# Patient Record
Sex: MALE | Race: WHITE
[De-identification: names, ages, dates, MRNs, and addresses within clinical notes are randomized per-mention and may not be internally consistent; named-entity substitution may affect disease eponyms.]

---

## 2017-05-08 ENCOUNTER — APPOINTMENT (OUTPATIENT)
Dept: THORACIC SURGERY | Facility: CLINIC | Age: 82
End: 2017-05-08
Payer: COMMERCIAL

## 2017-05-08 PROCEDURE — 99213 OFFICE O/P EST LOW 20 MIN: CPT

## 2017-05-17 ENCOUNTER — APPOINTMENT (OUTPATIENT)
Dept: ELECTROPHYSIOLOGY | Facility: CLINIC | Age: 82
End: 2017-05-17

## 2017-05-17 VITALS
HEIGHT: 71 IN | HEART RATE: 59 BPM | SYSTOLIC BLOOD PRESSURE: 170 MMHG | WEIGHT: 196 LBS | BODY MASS INDEX: 27.44 KG/M2 | DIASTOLIC BLOOD PRESSURE: 71 MMHG

## 2017-09-21 ENCOUNTER — INPATIENT (INPATIENT)
Facility: HOSPITAL | Age: 82
LOS: 5 days | Discharge: HOME HEALTH | End: 2017-09-27
Attending: FAMILY MEDICINE | Admitting: FAMILY MEDICINE
Payer: COMMERCIAL

## 2017-09-21 VITALS — WEIGHT: 184.97 LBS | HEIGHT: 71 IN

## 2017-09-21 DIAGNOSIS — J18.9 PNEUMONIA, UNSPECIFIED ORGANISM: ICD-10-CM

## 2017-09-21 DIAGNOSIS — Z98.1 ARTHRODESIS STATUS: Chronic | ICD-10-CM

## 2017-09-21 LAB
ALBUMIN SERPL ELPH-MCNC: 3.8 G/DL — SIGNIFICANT CHANGE UP (ref 3.3–5)
ALP SERPL-CCNC: 67 U/L — SIGNIFICANT CHANGE UP (ref 40–120)
ALT FLD-CCNC: 15 U/L — SIGNIFICANT CHANGE UP (ref 12–78)
ANION GAP SERPL CALC-SCNC: 9 MMOL/L — SIGNIFICANT CHANGE UP (ref 5–17)
APTT BLD: 23.9 SEC — LOW (ref 27.5–37.4)
AST SERPL-CCNC: 13 U/L — LOW (ref 15–37)
BASOPHILS # BLD AUTO: 0.1 K/UL — SIGNIFICANT CHANGE UP (ref 0–0.2)
BASOPHILS NFR BLD AUTO: 0.8 % — SIGNIFICANT CHANGE UP (ref 0–2)
BILIRUB SERPL-MCNC: 0.8 MG/DL — SIGNIFICANT CHANGE UP (ref 0.2–1.2)
BLD GP AB SCN SERPL QL: SIGNIFICANT CHANGE UP
BUN SERPL-MCNC: 31 MG/DL — HIGH (ref 7–23)
CALCIUM SERPL-MCNC: 8.3 MG/DL — LOW (ref 8.5–10.1)
CHLORIDE SERPL-SCNC: 109 MMOL/L — HIGH (ref 96–108)
CO2 SERPL-SCNC: 24 MMOL/L — SIGNIFICANT CHANGE UP (ref 22–31)
CREAT SERPL-MCNC: 1.8 MG/DL — HIGH (ref 0.5–1.3)
EOSINOPHIL # BLD AUTO: 0.4 K/UL — SIGNIFICANT CHANGE UP (ref 0–0.5)
EOSINOPHIL NFR BLD AUTO: 3.6 % — SIGNIFICANT CHANGE UP (ref 0–6)
GLUCOSE SERPL-MCNC: 117 MG/DL — HIGH (ref 70–99)
HCT VFR BLD CALC: 43.3 % — SIGNIFICANT CHANGE UP (ref 39–50)
HGB BLD-MCNC: 15 G/DL — SIGNIFICANT CHANGE UP (ref 13–17)
INR BLD: 0.98 RATIO — SIGNIFICANT CHANGE UP (ref 0.88–1.16)
LYMPHOCYTES # BLD AUTO: 1.5 K/UL — SIGNIFICANT CHANGE UP (ref 1–3.3)
LYMPHOCYTES # BLD AUTO: 13 % — SIGNIFICANT CHANGE UP (ref 13–44)
MCHC RBC-ENTMCNC: 31.5 PG — SIGNIFICANT CHANGE UP (ref 27–34)
MCHC RBC-ENTMCNC: 34.5 GM/DL — SIGNIFICANT CHANGE UP (ref 32–36)
MCV RBC AUTO: 91.3 FL — SIGNIFICANT CHANGE UP (ref 80–100)
MONOCYTES # BLD AUTO: 0.7 K/UL — SIGNIFICANT CHANGE UP (ref 0–0.9)
MONOCYTES NFR BLD AUTO: 6.4 % — SIGNIFICANT CHANGE UP (ref 2–14)
NEUTROPHILS # BLD AUTO: 8.6 K/UL — HIGH (ref 1.8–7.4)
NEUTROPHILS NFR BLD AUTO: 76.2 % — SIGNIFICANT CHANGE UP (ref 43–77)
NT-PROBNP SERPL-SCNC: 238 PG/ML — SIGNIFICANT CHANGE UP (ref 0–450)
PLATELET # BLD AUTO: 262 K/UL — SIGNIFICANT CHANGE UP (ref 150–400)
POTASSIUM SERPL-MCNC: 4.4 MMOL/L — SIGNIFICANT CHANGE UP (ref 3.5–5.3)
POTASSIUM SERPL-SCNC: 4.4 MMOL/L — SIGNIFICANT CHANGE UP (ref 3.5–5.3)
PROT SERPL-MCNC: 6.9 G/DL — SIGNIFICANT CHANGE UP (ref 6–8.3)
PROTHROM AB SERPL-ACNC: 10.6 SEC — SIGNIFICANT CHANGE UP (ref 9.8–12.7)
RBC # BLD: 4.74 M/UL — SIGNIFICANT CHANGE UP (ref 4.2–5.8)
RBC # FLD: 12.8 % — SIGNIFICANT CHANGE UP (ref 10.3–14.5)
SODIUM SERPL-SCNC: 142 MMOL/L — SIGNIFICANT CHANGE UP (ref 135–145)
TROPONIN I SERPL-MCNC: <.015 NG/ML — SIGNIFICANT CHANGE UP (ref 0.01–0.04)
TYPE + AB SCN PNL BLD: SIGNIFICANT CHANGE UP
WBC # BLD: 11.2 K/UL — HIGH (ref 3.8–10.5)
WBC # FLD AUTO: 11.2 K/UL — HIGH (ref 3.8–10.5)

## 2017-09-21 PROCEDURE — 99285 EMERGENCY DEPT VISIT HI MDM: CPT

## 2017-09-21 PROCEDURE — 71250 CT THORAX DX C-: CPT | Mod: 26

## 2017-09-21 PROCEDURE — 71010: CPT | Mod: 26

## 2017-09-21 PROCEDURE — 93010 ELECTROCARDIOGRAM REPORT: CPT

## 2017-09-21 RX ORDER — ASPIRIN/CALCIUM CARB/MAGNESIUM 324 MG
81 TABLET ORAL DAILY
Qty: 0 | Refills: 0 | Status: DISCONTINUED | OUTPATIENT
Start: 2017-09-21 | End: 2017-09-27

## 2017-09-21 RX ORDER — FINASTERIDE 5 MG/1
5 TABLET, FILM COATED ORAL DAILY
Qty: 0 | Refills: 0 | Status: DISCONTINUED | OUTPATIENT
Start: 2017-09-21 | End: 2017-09-27

## 2017-09-21 RX ORDER — SODIUM CHLORIDE 9 MG/ML
3 INJECTION INTRAMUSCULAR; INTRAVENOUS; SUBCUTANEOUS ONCE
Qty: 0 | Refills: 0 | Status: COMPLETED | OUTPATIENT
Start: 2017-09-21 | End: 2017-09-21

## 2017-09-21 RX ORDER — DILTIAZEM HCL 120 MG
180 CAPSULE, EXT RELEASE 24 HR ORAL DAILY
Qty: 0 | Refills: 0 | Status: DISCONTINUED | OUTPATIENT
Start: 2017-09-21 | End: 2017-09-27

## 2017-09-21 RX ORDER — HYDROMORPHONE HYDROCHLORIDE 2 MG/ML
0.5 INJECTION INTRAMUSCULAR; INTRAVENOUS; SUBCUTANEOUS ONCE
Qty: 0 | Refills: 0 | Status: DISCONTINUED | OUTPATIENT
Start: 2017-09-21 | End: 2017-09-21

## 2017-09-21 RX ORDER — PREGABALIN 225 MG/1
1000 CAPSULE ORAL DAILY
Qty: 0 | Refills: 0 | Status: DISCONTINUED | OUTPATIENT
Start: 2017-09-21 | End: 2017-09-27

## 2017-09-21 RX ORDER — CHOLECALCIFEROL (VITAMIN D3) 125 MCG
2000 CAPSULE ORAL DAILY
Qty: 0 | Refills: 0 | Status: DISCONTINUED | OUTPATIENT
Start: 2017-09-21 | End: 2017-09-27

## 2017-09-21 RX ORDER — LABETALOL HCL 100 MG
200 TABLET ORAL THREE TIMES A DAY
Qty: 0 | Refills: 0 | Status: DISCONTINUED | OUTPATIENT
Start: 2017-09-21 | End: 2017-09-27

## 2017-09-21 RX ORDER — TAMSULOSIN HYDROCHLORIDE 0.4 MG/1
0.4 CAPSULE ORAL AT BEDTIME
Qty: 0 | Refills: 0 | Status: DISCONTINUED | OUTPATIENT
Start: 2017-09-21 | End: 2017-09-27

## 2017-09-21 RX ORDER — HEPARIN SODIUM 5000 [USP'U]/ML
5000 INJECTION INTRAVENOUS; SUBCUTANEOUS EVERY 8 HOURS
Qty: 0 | Refills: 0 | Status: DISCONTINUED | OUTPATIENT
Start: 2017-09-21 | End: 2017-09-27

## 2017-09-21 RX ORDER — FUROSEMIDE 40 MG
20 TABLET ORAL DAILY
Qty: 0 | Refills: 0 | Status: DISCONTINUED | OUTPATIENT
Start: 2017-09-21 | End: 2017-09-23

## 2017-09-21 RX ORDER — ACETAMINOPHEN 500 MG
650 TABLET ORAL EVERY 6 HOURS
Qty: 0 | Refills: 0 | Status: DISCONTINUED | OUTPATIENT
Start: 2017-09-21 | End: 2017-09-27

## 2017-09-21 RX ORDER — MORPHINE SULFATE 50 MG/1
2 CAPSULE, EXTENDED RELEASE ORAL EVERY 4 HOURS
Qty: 0 | Refills: 0 | Status: DISCONTINUED | OUTPATIENT
Start: 2017-09-21 | End: 2017-09-27

## 2017-09-21 RX ORDER — MORPHINE SULFATE 50 MG/1
4 CAPSULE, EXTENDED RELEASE ORAL ONCE
Qty: 0 | Refills: 0 | Status: DISCONTINUED | OUTPATIENT
Start: 2017-09-21 | End: 2017-09-21

## 2017-09-21 RX ORDER — RANOLAZINE 500 MG/1
500 TABLET, FILM COATED, EXTENDED RELEASE ORAL
Qty: 0 | Refills: 0 | Status: DISCONTINUED | OUTPATIENT
Start: 2017-09-21 | End: 2017-09-27

## 2017-09-21 RX ADMIN — HEPARIN SODIUM 5000 UNIT(S): 5000 INJECTION INTRAVENOUS; SUBCUTANEOUS at 21:14

## 2017-09-21 RX ADMIN — SODIUM CHLORIDE 3 MILLILITER(S): 9 INJECTION INTRAMUSCULAR; INTRAVENOUS; SUBCUTANEOUS at 10:39

## 2017-09-21 RX ADMIN — RANOLAZINE 500 MILLIGRAM(S): 500 TABLET, FILM COATED, EXTENDED RELEASE ORAL at 21:14

## 2017-09-21 RX ADMIN — MORPHINE SULFATE 4 MILLIGRAM(S): 50 CAPSULE, EXTENDED RELEASE ORAL at 10:39

## 2017-09-21 RX ADMIN — Medication 81 MILLIGRAM(S): at 18:46

## 2017-09-21 RX ADMIN — MORPHINE SULFATE 4 MILLIGRAM(S): 50 CAPSULE, EXTENDED RELEASE ORAL at 11:09

## 2017-09-21 RX ADMIN — Medication 2000 UNIT(S): at 17:52

## 2017-09-21 RX ADMIN — TAMSULOSIN HYDROCHLORIDE 0.4 MILLIGRAM(S): 0.4 CAPSULE ORAL at 21:14

## 2017-09-21 RX ADMIN — Medication 200 MILLIGRAM(S): at 21:14

## 2017-09-21 RX ADMIN — Medication 20 MILLIGRAM(S): at 17:52

## 2017-09-21 RX ADMIN — Medication 180 MILLIGRAM(S): at 17:52

## 2017-09-21 RX ADMIN — HYDROMORPHONE HYDROCHLORIDE 0.5 MILLIGRAM(S): 2 INJECTION INTRAMUSCULAR; INTRAVENOUS; SUBCUTANEOUS at 21:29

## 2017-09-21 RX ADMIN — PREGABALIN 1000 MICROGRAM(S): 225 CAPSULE ORAL at 17:52

## 2017-09-21 RX ADMIN — FINASTERIDE 5 MILLIGRAM(S): 5 TABLET, FILM COATED ORAL at 17:52

## 2017-09-21 RX ADMIN — HYDROMORPHONE HYDROCHLORIDE 0.5 MILLIGRAM(S): 2 INJECTION INTRAMUSCULAR; INTRAVENOUS; SUBCUTANEOUS at 21:14

## 2017-09-21 NOTE — ED PROVIDER NOTE - MEDICAL DECISION MAKING DETAILS
Pt with recurrent R pleural effusions p/w exertional dyspnea and R sided chest pain. CT showing hydropneumothorax, VSS in ED. Seen by surgery, plan for admission and IR catheter drainage

## 2017-09-21 NOTE — H&P ADULT - HISTORY OF PRESENT ILLNESS
84 yo male with PMH of CAD, HTN, HLD, BPH, SSS s/p PPM, CKD stage III, h/o SVT, h/o right pleural effusions and PTX presents to ED with complaint of SOB and right sided chest pain. Pt states he woke up this morning with dyspnea on exertion. No orthopnea or PND. States he did have right sided chest pain that wrapped around to right back this morning which has now resolved after receiving morphine. He had a recent outpatient CT 2 weeks ago which he states showed a little fluid on right side but no PTX. Denies fevers, chills, headaches, palpitations, abd pain, N/V, diarrhea, dizziness.    In ED CXR and CT chest shows hydropneumothorax.

## 2017-09-21 NOTE — ED PROVIDER NOTE - OBJECTIVE STATEMENT
86 y/o M hx HTN HLD, recurrent R sided pleural effusions p/w pain to R side of chest and dyspnea on exertion. Pt reports he had a CT done 2 wks ago that showed small R effusion through his pulmonologist, and since then reports progressive worsening of his symptoms. He feels comfortable at rest but experiences dyspnea when walking as well as central and R sided chest pain. Denies fever, cough, falls/injuries, light headedness, N/V/D, syncope.

## 2017-09-21 NOTE — ED ADULT TRIAGE NOTE - CHIEF COMPLAINT QUOTE
Pt presents to ED c/o chest pain that radiates to the back starting 4 days ago. Pt report "my lung doctor told me I have fluid on my heart".

## 2017-09-21 NOTE — H&P ADULT - PMH
BPH (benign prostatic hyperplasia)    CAD (coronary artery disease)    CKD (chronic kidney disease), stage III    Hyperlipidemia    Hypertension    Pleural effusion    Spinal stenosis    SSS (sick sinus syndrome)

## 2017-09-21 NOTE — ED PROVIDER NOTE - PROGRESS NOTE DETAILS
Attending Jorje: S/w pts pulmonologist Dr Valadez - current XR is pts baseline, appears as PTX however he has adhesions from prior VATS procedure. CT 1 wk ago showed no PTX Pt remains stable, no complaints at this time. Seen by surgery PA, see note for details. Surgery will rec IR for pigtail drainage, no acute surgical intervention, medical admission

## 2017-09-21 NOTE — H&P ADULT - NSHPPHYSICALEXAM_GEN_ALL_CORE
Vital Signs Last 24 Hrs  T(C): 36.7 (21 Sep 2017 15:00), Max: 36.7 (21 Sep 2017 15:00)  T(F): 98 (21 Sep 2017 15:00), Max: 98 (21 Sep 2017 15:00)  HR: 60 (21 Sep 2017 15:09) (60 - 60)  BP: 171/67 (21 Sep 2017 15:09) (142/61 - 171/67)  BP(mean): --  RR: 18 (21 Sep 2017 15:09) (18 - 19)  SpO2: 100% (21 Sep 2017 15:09) (99% - 100%)

## 2017-09-22 ENCOUNTER — RESULT REVIEW (OUTPATIENT)
Age: 82
End: 2017-09-22

## 2017-09-22 LAB
ANION GAP SERPL CALC-SCNC: 7 MMOL/L — SIGNIFICANT CHANGE UP (ref 5–17)
B PERT IGG+IGM PNL SER: (no result)
BASOPHILS # BLD AUTO: 0.1 K/UL — SIGNIFICANT CHANGE UP (ref 0–0.2)
BASOPHILS NFR BLD AUTO: 0.5 % — SIGNIFICANT CHANGE UP (ref 0–2)
BUN SERPL-MCNC: 31 MG/DL — HIGH (ref 7–23)
CALCIUM SERPL-MCNC: 8.3 MG/DL — LOW (ref 8.5–10.1)
CHLORIDE SERPL-SCNC: 107 MMOL/L — SIGNIFICANT CHANGE UP (ref 96–108)
CHOLEST SERPL-MCNC: 123 MG/DL — SIGNIFICANT CHANGE UP (ref 10–199)
CO2 SERPL-SCNC: 28 MMOL/L — SIGNIFICANT CHANGE UP (ref 22–31)
COLOR FLD: SIGNIFICANT CHANGE UP
CREAT SERPL-MCNC: 1.89 MG/DL — HIGH (ref 0.5–1.3)
EOSINOPHIL # BLD AUTO: 0.4 K/UL — SIGNIFICANT CHANGE UP (ref 0–0.5)
EOSINOPHIL # FLD: 16 % — SIGNIFICANT CHANGE UP
EOSINOPHIL NFR BLD AUTO: 3.3 % — SIGNIFICANT CHANGE UP (ref 0–6)
FLUID INTAKE SUBSTANCE CLASS: SIGNIFICANT CHANGE UP
FLUID SEGMENTED GRANULOCYTES: 5 % — SIGNIFICANT CHANGE UP
GLUCOSE SERPL-MCNC: 118 MG/DL — HIGH (ref 70–99)
HCT VFR BLD CALC: 39.8 % — SIGNIFICANT CHANGE UP (ref 39–50)
HDLC SERPL-MCNC: 30 MG/DL — LOW (ref 40–125)
HGB BLD-MCNC: 13.8 G/DL — SIGNIFICANT CHANGE UP (ref 13–17)
INR BLD: 1.05 RATIO — SIGNIFICANT CHANGE UP (ref 0.88–1.16)
LIPID PNL WITH DIRECT LDL SERPL: 42 MG/DL — SIGNIFICANT CHANGE UP
LYMPHOCYTES # BLD AUTO: 1.6 K/UL — SIGNIFICANT CHANGE UP (ref 1–3.3)
LYMPHOCYTES # BLD AUTO: 15 % — SIGNIFICANT CHANGE UP (ref 13–44)
LYMPHOCYTES # FLD: 66 % — SIGNIFICANT CHANGE UP
MCHC RBC-ENTMCNC: 32.4 PG — SIGNIFICANT CHANGE UP (ref 27–34)
MCHC RBC-ENTMCNC: 34.8 GM/DL — SIGNIFICANT CHANGE UP (ref 32–36)
MCV RBC AUTO: 93 FL — SIGNIFICANT CHANGE UP (ref 80–100)
MONOCYTES # BLD AUTO: 0.9 K/UL — SIGNIFICANT CHANGE UP (ref 0–0.9)
MONOCYTES NFR BLD AUTO: 8.2 % — SIGNIFICANT CHANGE UP (ref 2–14)
MONOS+MACROS # FLD: 13 % — SIGNIFICANT CHANGE UP
NEUTROPHILS # BLD AUTO: 7.7 K/UL — HIGH (ref 1.8–7.4)
NEUTROPHILS NFR BLD AUTO: 73 % — SIGNIFICANT CHANGE UP (ref 43–77)
PH FLD: 7.25 — SIGNIFICANT CHANGE UP
PLATELET # BLD AUTO: 215 K/UL — SIGNIFICANT CHANGE UP (ref 150–400)
POTASSIUM SERPL-MCNC: 4.2 MMOL/L — SIGNIFICANT CHANGE UP (ref 3.5–5.3)
POTASSIUM SERPL-SCNC: 4.2 MMOL/L — SIGNIFICANT CHANGE UP (ref 3.5–5.3)
PROTHROM AB SERPL-ACNC: 11.3 SEC — SIGNIFICANT CHANGE UP (ref 9.8–12.7)
RBC # BLD: 4.28 M/UL — SIGNIFICANT CHANGE UP (ref 4.2–5.8)
RBC # FLD: 13.2 % — SIGNIFICANT CHANGE UP (ref 10.3–14.5)
RCV VOL RI: HIGH /UL (ref 0–5)
SODIUM SERPL-SCNC: 142 MMOL/L — SIGNIFICANT CHANGE UP (ref 135–145)
TOTAL CHOLESTEROL/HDL RATIO MEASUREMENT: 4.1 RATIO — SIGNIFICANT CHANGE UP (ref 3.4–9.6)
TOTAL NUCLEATED CELL COUNT, BODY FLUID: 3800 /UL — HIGH (ref 0–5)
TRIGL SERPL-MCNC: 257 MG/DL — HIGH (ref 10–149)
TUBE TYPE: SIGNIFICANT CHANGE UP
WBC # BLD: 10.6 K/UL — HIGH (ref 3.8–10.5)
WBC # FLD AUTO: 10.6 K/UL — HIGH (ref 3.8–10.5)

## 2017-09-22 PROCEDURE — 88305 TISSUE EXAM BY PATHOLOGIST: CPT | Mod: 26

## 2017-09-22 PROCEDURE — 32557 INSERT CATH PLEURA W/ IMAGE: CPT | Mod: RT

## 2017-09-22 PROCEDURE — 88108 CYTOPATH CONCENTRATE TECH: CPT | Mod: 26

## 2017-09-22 RX ORDER — SODIUM CHLORIDE 9 MG/ML
1000 INJECTION INTRAMUSCULAR; INTRAVENOUS; SUBCUTANEOUS
Qty: 0 | Refills: 0 | Status: DISCONTINUED | OUTPATIENT
Start: 2017-09-22 | End: 2017-09-25

## 2017-09-22 RX ORDER — ACETAMINOPHEN 500 MG
1000 TABLET ORAL ONCE
Qty: 0 | Refills: 0 | Status: DISCONTINUED | OUTPATIENT
Start: 2017-09-22 | End: 2017-09-22

## 2017-09-22 RX ORDER — ONDANSETRON 8 MG/1
4 TABLET, FILM COATED ORAL ONCE
Qty: 0 | Refills: 0 | Status: DISCONTINUED | OUTPATIENT
Start: 2017-09-22 | End: 2017-09-22

## 2017-09-22 RX ORDER — FENTANYL CITRATE 50 UG/ML
25 INJECTION INTRAVENOUS
Qty: 0 | Refills: 0 | Status: DISCONTINUED | OUTPATIENT
Start: 2017-09-22 | End: 2017-09-22

## 2017-09-22 RX ADMIN — Medication 2000 UNIT(S): at 15:19

## 2017-09-22 RX ADMIN — MORPHINE SULFATE 2 MILLIGRAM(S): 50 CAPSULE, EXTENDED RELEASE ORAL at 06:18

## 2017-09-22 RX ADMIN — TAMSULOSIN HYDROCHLORIDE 0.4 MILLIGRAM(S): 0.4 CAPSULE ORAL at 21:42

## 2017-09-22 RX ADMIN — RANOLAZINE 500 MILLIGRAM(S): 500 TABLET, FILM COATED, EXTENDED RELEASE ORAL at 18:01

## 2017-09-22 RX ADMIN — Medication 200 MILLIGRAM(S): at 21:42

## 2017-09-22 RX ADMIN — HEPARIN SODIUM 5000 UNIT(S): 5000 INJECTION INTRAVENOUS; SUBCUTANEOUS at 15:18

## 2017-09-22 RX ADMIN — FINASTERIDE 5 MILLIGRAM(S): 5 TABLET, FILM COATED ORAL at 15:20

## 2017-09-22 RX ADMIN — Medication 200 MILLIGRAM(S): at 15:18

## 2017-09-22 RX ADMIN — MORPHINE SULFATE 2 MILLIGRAM(S): 50 CAPSULE, EXTENDED RELEASE ORAL at 21:42

## 2017-09-22 RX ADMIN — PREGABALIN 1000 MICROGRAM(S): 225 CAPSULE ORAL at 15:19

## 2017-09-22 RX ADMIN — HEPARIN SODIUM 5000 UNIT(S): 5000 INJECTION INTRAVENOUS; SUBCUTANEOUS at 21:42

## 2017-09-22 RX ADMIN — MORPHINE SULFATE 2 MILLIGRAM(S): 50 CAPSULE, EXTENDED RELEASE ORAL at 05:38

## 2017-09-22 RX ADMIN — Medication 81 MILLIGRAM(S): at 15:18

## 2017-09-22 NOTE — PROGRESS NOTE ADULT - SUBJECTIVE AND OBJECTIVE BOX
Pt seen, no new complaints. Awaiting pigtail placement with IR.    MEDICATIONS  (STANDING):  finasteride 5 milliGRAM(s) Oral daily  aspirin enteric coated 81 milliGRAM(s) Oral daily  tamsulosin 0.4 milliGRAM(s) Oral at bedtime  ranolazine 500 milliGRAM(s) Oral two times a day  diltiazem    milliGRAM(s) Oral daily  labetalol 200 milliGRAM(s) Oral three times a day  furosemide    Tablet 20 milliGRAM(s) Oral daily  cyanocobalamin 1000 MICROGram(s) Oral daily  cholecalciferol 2000 Unit(s) Oral daily  heparin  Injectable 5000 Unit(s) SubCutaneous every 8 hours  sodium chloride 0.9%. 1000 milliLiter(s) (75 mL/Hr) IV Continuous <Continuous>    MEDICATIONS  (PRN):  acetaminophen   Tablet. 650 milliGRAM(s) Oral every 6 hours PRN Mild Pain (1 - 3)  morphine  - Injectable 2 milliGRAM(s) IV Push every 4 hours PRN Moderate Pain (4 - 6)      Allergies    amlodipine (Unknown)  Crestor (Other)  Lipitor (Unknown)      Vital Signs Last 24 Hrs  T(C): 37.1 (22 Sep 2017 10:05), Max: 37.1 (22 Sep 2017 10:05)  T(F): 98.8 (22 Sep 2017 10:05), Max: 98.8 (22 Sep 2017 10:05)  HR: 59 (22 Sep 2017 10:05) (59 - 60)  BP: 135/57 (22 Sep 2017 10:05) (135/57 - 177/64)  BP(mean): --  RR: 17 (22 Sep 2017 10:05) (15 - 19)  SpO2: 92% (22 Sep 2017 10:05) (91% - 100%)    General: NAD  Cardiovascular: S1, S2  Respiratory: CTA    LABS:                        13.8   10.6  )-----------( 215      ( 22 Sep 2017 06:15 )             39.8     09-22    142  |  107  |  31<H>  ----------------------------<  118<H>  4.2   |  28  |  1.89<H>    Ca    8.3<L>      22 Sep 2017 06:15    TPro  6.9  /  Alb  3.8  /  TBili  0.8  /  DBili  x   /  AST  13<L>  /  ALT  15  /  AlkPhos  67  09-21    PT/INR - ( 22 Sep 2017 06:15 )   PT: 11.3 sec;   INR: 1.05 ratio         PTT - ( 21 Sep 2017 09:16 )  PTT:23.9 sec      RADIOLOGY & ADDITIONAL TESTS:  < from: CT Chest No Cont (09.21.17 @ 10:50) >    EXAM:  CT CHEST                            PROCEDURE DATE:  09/21/2017          INTERPRETATION:  Clinical information: Right-sided chest pain. Status   post VATS.    COMPARISON: May 17, 2016    PROCEDURE:   CT of the Chest was performed without intravenous contrast.  Sagittal and coronal reformats were performed.    FINDINGS:    LOWER NECK: Within normal limits.  AXILLA, MEDIASTINUM AND CARLITA: No lymphadenopathy. Small hiatal hernia.  VESSELS: Atherosclerotic arterial calcifications, including diffuse   coronary artery calcification.  HEART: Heart size is normal.No pericardial effusion. Pacemaker leads   noted in the right atrium and right ventricle.  PLEURA: Large right-sided hydropneumothorax. There is no mass effect on   the heart or mediastinum.  Status post right upper lobe wedge resection.  LUNGS AND LARGE AIRWAYS: Patent central airways. No pulmonary nodules.   Subsegmental atelectasis in the lingula and right lower lobe.  VISUALIZED UPPER ABDOMEN: Bilateral renal cysts. Nonobstructing right   renal calculus. Cholelithiasis. Caudate lobe cyst in the liver.  BONES: Diffuse degenerative arthritic changes. 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 No acute bony   abnormality.  CHEST WALL:  Unremarkable    IMPRESSION: Large right hydropneumothorax.  Findings were discussed with Dr. Recinos on 9.21.17, 1125 hours.                          MINNIE JAY   This document has been electronically signed. Sep 21 2017 11:27AM                < end of copied text >

## 2017-09-22 NOTE — PROGRESS NOTE ADULT - SUBJECTIVE AND OBJECTIVE BOX
86 yo male with PMH of CAD, HTN, HLD, BPH, SSS s/p PPM, CKD stage III, h/o SVT, h/o right pleural effusions and PTX presents to ED with complaint of SOB and right sided chest pain. Pt states he woke up this morning with dyspnea on exertion. No orthopnea or PND. States he did have right sided chest pain that wrapped around to right back this morning which has now resolved after receiving morphine. He had a recent outpatient CT 2 weeks ago which he states showed a little fluid on right side but no PTX. Denies fevers, chills, headaches, palpitations, abd pain, N/V, diarrhea, dizziness.  CT done revealed a large Rt effusion      9.22: no cp, no sob      REVIEW OF SYSTEMS:    CONSTITUTIONAL: No weakness, No fevers or chills  ENT: No ear ache, No sorethroat  NECK: No pain, No stiffness  RESPIRATORY: No cough, No wheezing, No hemoptysis; +dyspnea  CARDIOVASCULAR: No chest pain, No palpitations  GASTROINTESTINAL: No abd pain, No nausea, No vomiting, No hematemesis, No diarrhea or constipation. No melena, No hematochezia.  GENITOURINARY: No dysuria, No  hematuria  NEUROLOGICAL: No diplopia, No paresthesia, No motor dysfunction  MUSCULOSKELETAL: No arthralgia, No myalgia  SKIN: No rashes, or lesions   PSYCH: no anxiety, no suicidal ideation    All other review of systems is negative unless indicated above    Vital Signs Last 24 Hrs  T(C): 36.5 (22 Sep 2017 15:14), Max: 37.1 (22 Sep 2017 10:05)  T(F): 97.7 (22 Sep 2017 15:14), Max: 98.8 (22 Sep 2017 10:05)  HR: 60 (22 Sep 2017 15:14) (59 - 60)  BP: 151/52 (22 Sep 2017 15:14) (135/57 - 177/64)  BP(mean): --  RR: 18 (22 Sep 2017 15:14) (15 - 18)  SpO2: 96% (22 Sep 2017 15:14) (91% - 99%)    PHYSICAL EXAM:    GENERAL: NAD, Well nourished  HEENT:  NC/AT, EOMI, PERRLA, No scleral icterus, Moist mucous membranes  NECK: Supple, No JVD  CNS:  Alert & Oriented X3, Motor Strength 5/5 B/L upper and lower extremities; DTRs 2+ intact   LUNG: Decreased BS on Rt base  HEART: RRR; No murmurs, No rubs  ABDOMEN: +BS, ST/ND/NT  GENITOURINARY: Voiding, Bladder not distended  EXTREMITIES:  2+ Peripheral Pulses, No clubbing, No cyanosis, No tibial edema  MUSCULOSKELTAL: Joints normal ROM, No TTP, No effusion  SKIN: no rashes  RECTAL: deferred, not indicated  BREAST: deferred                          13.8   10.6  )-----------( 215      ( 22 Sep 2017 06:15 )             39.8     09-22    142  |  107  |  31<H>  ----------------------------<  118<H>  4.2   |  28  |  1.89<H>    Ca    8.3<L>      22 Sep 2017 06:15    TPro  6.9  /  Alb  3.8  /  TBili  0.8  /  DBili  x   /  AST  13<L>  /  ALT  15  /  AlkPhos  67  09-21      MEDICATIONS  (STANDING):  finasteride 5 milliGRAM(s) Oral daily  aspirin enteric coated 81 milliGRAM(s) Oral daily  tamsulosin 0.4 milliGRAM(s) Oral at bedtime  ranolazine 500 milliGRAM(s) Oral two times a day  diltiazem    milliGRAM(s) Oral daily  labetalol 200 milliGRAM(s) Oral three times a day  furosemide    Tablet 20 milliGRAM(s) Oral daily  cyanocobalamin 1000 MICROGram(s) Oral daily  cholecalciferol 2000 Unit(s) Oral daily  heparin  Injectable 5000 Unit(s) SubCutaneous every 8 hours  sodium chloride 0.9%. 1000 milliLiter(s) (75 mL/Hr) IV Continuous <Continuous>    MEDICATIONS  (PRN):  acetaminophen   Tablet. 650 milliGRAM(s) Oral every 6 hours PRN Mild Pain (1 - 3)  morphine  - Injectable 2 milliGRAM(s) IV Push every 4 hours PRN Moderate Pain (4 - 6)      all labs reviewed  all imaging reviewed      Assessment and Plan:    1. SOB secondary to right large recurrent  hydropneumothorax  - admit to [facility] for pulse ox monitoring  - CT Sx consult noted  - IR consult for thoracentesis and biopsy today  - pulm consult  - O2 supplementation    2. CKD stage 3  - baseline Cr 1.8  -gentle hydration  - monitor renal panel  - avoid nephrotoxic medications    3. h/o CAD  - continue ASA, BB, statin, ranexa    4. SSS s/p PPM  - scheduled for outpatient PPM interrogation next month    5. HTN  - continue home meds: diltiazem, labetalol, furosemide    6. BPH: cw Flomax, Proscar

## 2017-09-22 NOTE — BRIEF OPERATIVE NOTE - PROCEDURE
<<-----Click on this checkbox to enter Procedure Chest tube placement with CT guidance  09/22/2017    Active  [id number]

## 2017-09-22 NOTE — PROGRESS NOTE ADULT - ASSESSMENT
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  1. plan is for IR biopsy today.  Case discussed with Dr. Reyes and Dr. Carrion

## 2017-09-23 DIAGNOSIS — J90 PLEURAL EFFUSION, NOT ELSEWHERE CLASSIFIED: ICD-10-CM

## 2017-09-23 LAB
ALBUMIN FLD-MCNC: 2.9 G/DL — SIGNIFICANT CHANGE UP
ANION GAP SERPL CALC-SCNC: 8 MMOL/L — SIGNIFICANT CHANGE UP (ref 5–17)
BUN SERPL-MCNC: 29 MG/DL — HIGH (ref 7–23)
CALCIUM SERPL-MCNC: 8.5 MG/DL — SIGNIFICANT CHANGE UP (ref 8.5–10.1)
CHLORIDE SERPL-SCNC: 106 MMOL/L — SIGNIFICANT CHANGE UP (ref 96–108)
CO2 SERPL-SCNC: 24 MMOL/L — SIGNIFICANT CHANGE UP (ref 22–31)
CREAT SERPL-MCNC: 1.96 MG/DL — HIGH (ref 0.5–1.3)
GLUCOSE FLD-MCNC: 55 MG/DL — SIGNIFICANT CHANGE UP
GLUCOSE SERPL-MCNC: 127 MG/DL — HIGH (ref 70–99)
GRAM STN FLD: SIGNIFICANT CHANGE UP
HCT VFR BLD CALC: 40.5 % — SIGNIFICANT CHANGE UP (ref 39–50)
HGB BLD-MCNC: 14.1 G/DL — SIGNIFICANT CHANGE UP (ref 13–17)
LDH SERPL L TO P-CCNC: 366 U/L — SIGNIFICANT CHANGE UP
NIGHT BLUE STAIN TISS: SIGNIFICANT CHANGE UP
POTASSIUM SERPL-MCNC: 4.4 MMOL/L — SIGNIFICANT CHANGE UP (ref 3.5–5.3)
POTASSIUM SERPL-SCNC: 4.4 MMOL/L — SIGNIFICANT CHANGE UP (ref 3.5–5.3)
PROT FLD-MCNC: 4.8 G/DL — SIGNIFICANT CHANGE UP
SODIUM SERPL-SCNC: 138 MMOL/L — SIGNIFICANT CHANGE UP (ref 135–145)
SPECIMEN SOURCE: SIGNIFICANT CHANGE UP
SPECIMEN SOURCE: SIGNIFICANT CHANGE UP

## 2017-09-23 PROCEDURE — 71010: CPT | Mod: 26

## 2017-09-23 RX ORDER — CEFTRIAXONE 500 MG/1
1 INJECTION, POWDER, FOR SOLUTION INTRAMUSCULAR; INTRAVENOUS EVERY 24 HOURS
Qty: 0 | Refills: 0 | Status: DISCONTINUED | OUTPATIENT
Start: 2017-09-24 | End: 2017-09-27

## 2017-09-23 RX ORDER — CEFTRIAXONE 500 MG/1
1 INJECTION, POWDER, FOR SOLUTION INTRAMUSCULAR; INTRAVENOUS ONCE
Qty: 0 | Refills: 0 | Status: COMPLETED | OUTPATIENT
Start: 2017-09-23 | End: 2017-09-23

## 2017-09-23 RX ORDER — CEFTRIAXONE 500 MG/1
INJECTION, POWDER, FOR SOLUTION INTRAMUSCULAR; INTRAVENOUS
Qty: 0 | Refills: 0 | Status: DISCONTINUED | OUTPATIENT
Start: 2017-09-23 | End: 2017-09-27

## 2017-09-23 RX ORDER — ACYCLOVIR 50 MG/G
1 OINTMENT TOPICAL
Qty: 0 | Refills: 0 | Status: DISCONTINUED | OUTPATIENT
Start: 2017-09-23 | End: 2017-09-23

## 2017-09-23 RX ORDER — VALACYCLOVIR 500 MG/1
500 TABLET, FILM COATED ORAL EVERY 12 HOURS
Qty: 0 | Refills: 0 | Status: DISCONTINUED | OUTPATIENT
Start: 2017-09-23 | End: 2017-09-27

## 2017-09-23 RX ADMIN — Medication 81 MILLIGRAM(S): at 11:42

## 2017-09-23 RX ADMIN — FINASTERIDE 5 MILLIGRAM(S): 5 TABLET, FILM COATED ORAL at 11:43

## 2017-09-23 RX ADMIN — Medication 200 MILLIGRAM(S): at 14:38

## 2017-09-23 RX ADMIN — HEPARIN SODIUM 5000 UNIT(S): 5000 INJECTION INTRAVENOUS; SUBCUTANEOUS at 06:07

## 2017-09-23 RX ADMIN — PREGABALIN 1000 MICROGRAM(S): 225 CAPSULE ORAL at 11:43

## 2017-09-23 RX ADMIN — Medication 2000 UNIT(S): at 11:42

## 2017-09-23 RX ADMIN — MORPHINE SULFATE 2 MILLIGRAM(S): 50 CAPSULE, EXTENDED RELEASE ORAL at 14:38

## 2017-09-23 RX ADMIN — VALACYCLOVIR 500 MILLIGRAM(S): 500 TABLET, FILM COATED ORAL at 14:38

## 2017-09-23 RX ADMIN — Medication 200 MILLIGRAM(S): at 06:06

## 2017-09-23 RX ADMIN — Medication 20 MILLIGRAM(S): at 06:07

## 2017-09-23 RX ADMIN — TAMSULOSIN HYDROCHLORIDE 0.4 MILLIGRAM(S): 0.4 CAPSULE ORAL at 21:20

## 2017-09-23 RX ADMIN — RANOLAZINE 500 MILLIGRAM(S): 500 TABLET, FILM COATED, EXTENDED RELEASE ORAL at 06:07

## 2017-09-23 RX ADMIN — VALACYCLOVIR 500 MILLIGRAM(S): 500 TABLET, FILM COATED ORAL at 18:15

## 2017-09-23 RX ADMIN — HEPARIN SODIUM 5000 UNIT(S): 5000 INJECTION INTRAVENOUS; SUBCUTANEOUS at 21:20

## 2017-09-23 RX ADMIN — Medication 200 MILLIGRAM(S): at 21:20

## 2017-09-23 RX ADMIN — HEPARIN SODIUM 5000 UNIT(S): 5000 INJECTION INTRAVENOUS; SUBCUTANEOUS at 14:38

## 2017-09-23 RX ADMIN — Medication 180 MILLIGRAM(S): at 06:07

## 2017-09-23 RX ADMIN — RANOLAZINE 500 MILLIGRAM(S): 500 TABLET, FILM COATED, EXTENDED RELEASE ORAL at 18:15

## 2017-09-23 RX ADMIN — CEFTRIAXONE 100 GRAM(S): 500 INJECTION, POWDER, FOR SOLUTION INTRAMUSCULAR; INTRAVENOUS at 13:29

## 2017-09-23 NOTE — PROGRESS NOTE ADULT - SUBJECTIVE AND OBJECTIVE BOX
86 yo male with PMH of CAD, HTN, HLD, BPH, SSS s/p PPM, CKD stage III, h/o SVT, h/o right pleural effusions and PTX presents to ED with complaint of SOB and right sided chest pain. Pt states he woke up this morning with dyspnea on exertion. No orthopnea or PND. States he did have right sided chest pain that wrapped around to right back this morning which has now resolved after receiving morphine. He had a recent outpatient CT 2 weeks ago which he states showed a little fluid on right side but no PTX. Denies fevers, chills, headaches, palpitations, abd pain, N/V, diarrhea, dizziness.  CT done revealed a large Rt effusion      9.22: no cp, no sob      REVIEW OF SYSTEMS:    CONSTITUTIONAL: No weakness, No fevers or chills  ENT: No ear ache, No sorethroat  NECK: No pain, No stiffness  RESPIRATORY: No cough, No wheezing, No hemoptysis; +dyspnea  CARDIOVASCULAR: No chest pain, No palpitations  GASTROINTESTINAL: No abd pain, No nausea, No vomiting, No hematemesis, No diarrhea or constipation. No melena, No hematochezia.  GENITOURINARY: No dysuria, No  hematuria  NEUROLOGICAL: No diplopia, No paresthesia, No motor dysfunction  MUSCULOSKELETAL: No arthralgia, No myalgia  SKIN: No rashes, or lesions   PSYCH: no anxiety, no suicidal ideation    All other review of systems is negative unless indicated above    Vital Signs Last 24 Hrs  T(C): 36.5 (22 Sep 2017 15:14), Max: 37.1 (22 Sep 2017 10:05)  T(F): 97.7 (22 Sep 2017 15:14), Max: 98.8 (22 Sep 2017 10:05)  HR: 60 (22 Sep 2017 15:14) (59 - 60)  BP: 151/52 (22 Sep 2017 15:14) (135/57 - 177/64)  BP(mean): --  RR: 18 (22 Sep 2017 15:14) (15 - 18)  SpO2: 96% (22 Sep 2017 15:14) (91% - 99%)    PHYSICAL EXAM:    GENERAL: NAD, Well nourished  HEENT:  NC/AT, EOMI, PERRLA, No scleral icterus, Moist mucous membranes  NECK: Supple, No JVD  CNS:  Alert & Oriented X3, Motor Strength 5/5 B/L upper and lower extremities; DTRs 2+ intact   LUNG: Decreased BS on Rt base  HEART: RRR; No murmurs, No rubs  ABDOMEN: +BS, ST/ND/NT  GENITOURINARY: Voiding, Bladder not distended  EXTREMITIES:  2+ Peripheral Pulses, No clubbing, No cyanosis, No tibial edema  MUSCULOSKELTAL: Joints normal ROM, No TTP, No effusion  SKIN: no rashes  RECTAL: deferred, not indicated  BREAST: deferred                          13.8   10.6  )-----------( 215      ( 22 Sep 2017 06:15 )             39.8     09-22    142  |  107  |  31<H>  ----------------------------<  118<H>  4.2   |  28  |  1.89<H>    Ca    8.3<L>      22 Sep 2017 06:15    TPro  6.9  /  Alb  3.8  /  TBili  0.8  /  DBili  x   /  AST  13<L>  /  ALT  15  /  AlkPhos  67  09-21      MEDICATIONS  (STANDING):  finasteride 5 milliGRAM(s) Oral daily  aspirin enteric coated 81 milliGRAM(s) Oral daily  tamsulosin 0.4 milliGRAM(s) Oral at bedtime  ranolazine 500 milliGRAM(s) Oral two times a day  diltiazem    milliGRAM(s) Oral daily  labetalol 200 milliGRAM(s) Oral three times a day  furosemide    Tablet 20 milliGRAM(s) Oral daily  cyanocobalamin 1000 MICROGram(s) Oral daily  cholecalciferol 2000 Unit(s) Oral daily  heparin  Injectable 5000 Unit(s) SubCutaneous every 8 hours  sodium chloride 0.9%. 1000 milliLiter(s) (75 mL/Hr) IV Continuous <Continuous>    MEDICATIONS  (PRN):  acetaminophen   Tablet. 650 milliGRAM(s) Oral every 6 hours PRN Mild Pain (1 - 3)  morphine  - Injectable 2 milliGRAM(s) IV Push every 4 hours PRN Moderate Pain (4 - 6)      all labs reviewed  all imaging reviewed      Assessment and Plan:    1. SOB secondary to right large recurrent  hydropneumothorax  - admit to [facility] for pulse ox monitoring  - CT Sx consult noted  - IR consult for thoracentesis and biopsy today  - pulm consult  - O2 supplementation    2. CKD stage 3  - baseline Cr 1.8  -gentle hydration  - monitor renal panel  - avoid nephrotoxic medications    3. h/o CAD  - continue ASA, BB, statin, ranexa    4. SSS s/p PPM  - scheduled for outpatient PPM interrogation next month    5. HTN  - continue home meds: diltiazem, labetalol, furosemide    6. BPH: cw Flomax, Proscar 84 yo male with PMH of CAD, HTN, HLD, BPH, SSS s/p PPM, CKD stage III, h/o SVT, h/o right pleural effusions and PTX presents to ED with complaint of SOB and right sided chest pain. Pt states he woke up this morning with dyspnea on exertion. No orthopnea or PND. States he did have right sided chest pain that wrapped around to right back this morning which has now resolved after receiving morphine. He had a recent outpatient CT 2 weeks ago which he states showed a little fluid on right side but no PTX. Denies fevers, chills, headaches, palpitations, abd pain, N/V, diarrhea, dizziness.  CT done revealed a large Rt effusion      9/23: Chart reviewed, Pt was seen and examined, Pt reports that Chest pain and SOB is better. No fevers. Pt was asked regartding  R chest wall rash, states that was applying hitting pads and burned then skin, also was using Icy/hot  patch which POC discussed.       REVIEW OF SYSTEMS:  CONSTITUTIONAL: No weakness, No fevers or chills  ENT: No ear ache, No sore throat  NECK: No pain, No stiffness  RESPIRATORY: No cough, No wheezing, No hemoptysis; +dyspnea  CARDIOVASCULAR: No chest pain, No palpitations  GASTROINTESTINAL: No abd pain, No nausea, No vomiting, No hematemesis, No diarrhea or constipation. No melena, No hematochezia.  GENITOURINARY: No dysuria, No  hematuria  NEUROLOGICAL: No diplopia, No paresthesia, No motor dysfunction  MUSCULOSKELETAL: No arthralgia, No myalgia  SKIN: No rashes, or lesions   PSYCH: no anxiety, no suicidal ideation    All other review of systems is negative unless indicated above    Vital Signs Last 24 Hrs  T(C): 36.5 (22 Sep 2017 15:14), Max: 37.1 (22 Sep 2017 10:05)  T(F): 97.7 (22 Sep 2017 15:14), Max: 98.8 (22 Sep 2017 10:05)  HR: 60 (22 Sep 2017 15:14) (59 - 60)  BP: 151/52 (22 Sep 2017 15:14) (135/57 - 177/64)  BP(mean): --  RR: 18 (22 Sep 2017 15:14) (15 - 18)  SpO2: 96% (22 Sep 2017 15:14) (91% - 99%)    PHYSICAL EXAM:    GENERAL: NAD, Well nourished  HEENT:  NC/AT, EOMI, PERRLA, No scleral icterus, Moist mucous membranes  NECK: Supple, No JVD  CNS:  Alert & Oriented X3, Motor Strength 5/5 B/L upper and lower extremities; DTRs 2+ intact   LUNG: Decreased BS on Rt base  HEART: RRR; No murmurs, No rubs  ABDOMEN: +BS, ST/ND/NT  GENITOURINARY: Voiding, Bladder not distended  EXTREMITIES:  2+ Peripheral Pulses, No clubbing, No cyanosis, No tibial edema  MUSCULOSKELTAL: Joints normal ROM, No TTP, No effusion  SKIN: no rashes  RECTAL: deferred, not indicated  BREAST: deferred    LABS:                           14.1   x     )-----------( x        ( 23 Sep 2017 06:23 )             40.5     09-23    138  |  106  |  29<H>  ----------------------------<  127<H>  4.4   |  24  |  1.96<H>    Ca    8.5      23 Sep 2017 06:23      CARDIAC MARKERS ( 21 Sep 2017 14:47 )  <.015 ng/mL / x     / x     / x     / x      CARDIAC MARKERS ( 21 Sep 2017 11:54 )  <.015 ng/mL / x     / x     / x     / x        PT/INR - ( 22 Sep 2017 06:15 )   PT: 11.3 sec;   INR: 1.05 ratio        MEDICATIONS  (STANDING):  finasteride 5 milliGRAM(s) Oral daily  aspirin enteric coated 81 milliGRAM(s) Oral daily  tamsulosin 0.4 milliGRAM(s) Oral at bedtime  ranolazine 500 milliGRAM(s) Oral two times a day  diltiazem    milliGRAM(s) Oral daily  labetalol 200 milliGRAM(s) Oral three times a day  furosemide    Tablet 20 milliGRAM(s) Oral daily  cyanocobalamin 1000 MICROGram(s) Oral daily  cholecalciferol 2000 Unit(s) Oral daily  heparin  Injectable 5000 Unit(s) SubCutaneous every 8 hours  sodium chloride 0.9%. 1000 milliLiter(s) (75 mL/Hr) IV Continuous <Continuous>    MEDICATIONS  (PRN):  acetaminophen   Tablet. 650 milliGRAM(s) Oral every 6 hours PRN Mild Pain (1 - 3)  morphine  - Injectable 2 milliGRAM(s) IV Push every 4 hours PRN Moderate Pain (4 - 6)          Assessment and Plan:    1. SOB secondary to right large recurrent  hydropneumothorax  - S/P CT placement, management as per Dr Reyes, Plan for water seal   - c/w  pulse ox monitoring, supplement with O2 PRN  - Incentive spirometry   - F/u cytology  - Pulm eval appreciated     2. CKD stage 3  - baseline Cr 1.8  - gentle hydration  - D/c lasix   - monitor renal panel  - avoid nephrotoxic medications    3. h/o CAD  - continue ASA, BB, statin, ranexa  - D/w DR Carranza     4. SSS s/p PPM  - scheduled for routine  outpatient PPM interrogation next month    5. HTN  - continue home meds: diltiazem, labetalol, hold  furosemide for now     6. BPH: cw Flomax, Proscar

## 2017-09-23 NOTE — PROGRESS NOTE ADULT - SUBJECTIVE AND OBJECTIVE BOX
Patient is a 85y old  Male who presents with a chief complaint of SOB (21 Sep 2017 15:34)      HPI:  84 yo male with PMH of CAD, HTN, HLD, BPH, SSS s/p PPM, CKD stage III, h/o SVT, h/o right pleural effusions seen by me as an outpatient.  Presents with recurrent pleuritic chest pain, worse on the right side.  Recently seen for CAP, treated with ABX/PO Prednisone and I also had repeated a CT chest at that time.  He presented to the ER on 9/21 for a pleurisy.   No fevers, chills, headaches, palpitations, abd pain, N/V, diarrhea, dizziness.  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 Denies any shingles history      PAST MEDICAL & SURGICAL HISTORY:  Spinal stenosis  CKD (chronic kidney disease), stage III  SSS (sick sinus syndrome)  Pleural effusion  Hyperlipidemia  BPH (benign prostatic hyperplasia)  CAD (coronary artery disease)  Hypertension  Status post lumbar spinal fusion      PREVIOUS DIAGNOSTIC TESTING:      MEDICATIONS  (STANDING):  finasteride 5 milliGRAM(s) Oral daily  aspirin enteric coated 81 milliGRAM(s) Oral daily  tamsulosin 0.4 milliGRAM(s) Oral at bedtime  ranolazine 500 milliGRAM(s) Oral two times a day  diltiazem    milliGRAM(s) Oral daily  labetalol 200 milliGRAM(s) Oral three times a day  furosemide    Tablet 20 milliGRAM(s) Oral daily  cyanocobalamin 1000 MICROGram(s) Oral daily  cholecalciferol 2000 Unit(s) Oral daily  heparin  Injectable 5000 Unit(s) SubCutaneous every 8 hours  sodium chloride 0.9%. 1000 milliLiter(s) (75 mL/Hr) IV Continuous <Continuous>    MEDICATIONS  (PRN):  acetaminophen   Tablet. 650 milliGRAM(s) Oral every 6 hours PRN Mild Pain (1 - 3)  morphine  - Injectable 2 milliGRAM(s) IV Push every 4 hours PRN Moderate Pain (4 - 6)      FAMILY HISTORY:  No pertinent family history in first degree relatives      SOCIAL HISTORY: lives at home, born in [place]    REVIEW OF SYSTEM:  Shortness of breath, chest pain, otherwise 12 point ROS negative    Allergic/Immunologic: negative for anaphylaxis, angioedema and urticaria    Vital Signs Last 24 Hrs  T(C): 37.1 (22 Sep 2017 10:05), Max: 37.1 (22 Sep 2017 10:05)  T(F): 98.8 (22 Sep 2017 10:05), Max: 98.8 (22 Sep 2017 10:05)  HR: 59 (22 Sep 2017 10:05) (59 - 60)  BP: 135/57 (22 Sep 2017 10:05) (135/57 - 177/64)  BP(mean): --  RR: 17 (22 Sep 2017 10:05) (15 - 19)  SpO2: 92% (22 Sep 2017 10:05) (91% - 100%)    I&O's Summary    PHYSICAL EXAM  General Appearance: cooperative, no acute distress,   HEENT: PERRL, conjunctiva clear, EOM's intact, non injected pharynx, no exudate, TM   normal  Neck: Supple, , no adenopathy, thyroid: not enlarged, no carotid bruit or JVD  Back: Symmetric, no  tenderness,no soft tissue tenderness  Lungs: Decreased breath sounds right hemithorax, Right pigtail in place  Heart: Regular rate and rhythm, S1, S2 normal, no murmur, rub or gallop  Abdomen: Soft, non-tender, bowel sounds active , no hepatosplenomegaly  Extremities: no cyanosis or edema, no joint swelling  Skin: Skin color, texture normal, no rashes   Neurologic: Alert and oriented X3 , cranial nerves intact, sensory and motor normal,    ECG:    LABS:                          13.8   10.6  )-----------( 215      ( 22 Sep 2017 06:15 )             39.8     09-22    142  |  107  |  31<H>  ----------------------------<  118<H>  4.2   |  28  |  1.89<H>    Ca    8.3<L>      22 Sep 2017 06:15    TPro  6.9  /  Alb  3.8  /  TBili  0.8  /  DBili  x   /  AST  13<L>  /  ALT  15  /  AlkPhos  67  09-21    CARDIAC MARKERS ( 21 Sep 2017 14:47 )  <.015 ng/mL / x     / x     / x     / x      CARDIAC MARKERS ( 21 Sep 2017 11:54 )  <.015 ng/mL / x     / x     / x     / x      CARDIAC MARKERS ( 21 Sep 2017 09:16 )  <.015 ng/mL / x     / x     / x     / x            Pro BNP  238 09-21 @ 09:16  D Dimer  -- 09-21 @ 09:16    PT/INR - ( 22 Sep 2017 06:15 )   PT: 11.3 sec;   INR: 1.05 ratio         PTT - ( 21 Sep 2017 09:16 )  PTT:23.9 sec          RADIOLOGY & ADDITIONAL STUDIES:    CT chest images/report reviewed by me  My interpretation: Non-expandable right hemithorax, increased pleural fluid

## 2017-09-23 NOTE — PROGRESS NOTE ADULT - ASSESSMENT
1) Pleuritic Chest Pain  2) Trapped Lung  3) Abnormal CT Chest  4) Recent Pneumonia  5) Shortness of Breath  6) Parapneumonic effusion    86 yo Pt is non smoker.s/p right VATS decortication done by Dr Reyes 5/2016 for recurrent pleural effusions in the setting of trapped lung.  Patient was recently treated in my office on 9/14 for worsening cough, dyspnea and subjective fevers/rigors so I had prescribed 5 day course of Z-Pack and Prednisone 40mg for 5 days. He states that his cough and fevers resolved but the dyspnea was associated with pleuritic chest pain in the right side so he presented to the ED.   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  In the past, he states that thoracentesis/thoracostomy has helped.  Patient to undergo IR guided pigtail  Follow up fluid studies    9/23  - Patient s/p IR guided thoracostomy, denies any issues  - Studies show a ph7.25/glucose 55/pleural LDHelevated, no organisms, eosinophils not elevated but fluid sanguinous.   - This could be suggestive of parapneumonic effusion, will start IV Ceftriaxone 1g daily for 5-7 days  - Primary team to discuss treatment with ID for shingles     Thank you for the consult

## 2017-09-24 LAB
ANION GAP SERPL CALC-SCNC: 5 MMOL/L — SIGNIFICANT CHANGE UP (ref 5–17)
BUN SERPL-MCNC: 34 MG/DL — HIGH (ref 7–23)
CALCIUM SERPL-MCNC: 8.1 MG/DL — LOW (ref 8.5–10.1)
CHLORIDE SERPL-SCNC: 107 MMOL/L — SIGNIFICANT CHANGE UP (ref 96–108)
CO2 SERPL-SCNC: 25 MMOL/L — SIGNIFICANT CHANGE UP (ref 22–31)
CREAT ?TM UR-MCNC: 121 MG/DL — SIGNIFICANT CHANGE UP
CREAT SERPL-MCNC: 2.21 MG/DL — HIGH (ref 0.5–1.3)
GLUCOSE SERPL-MCNC: 151 MG/DL — HIGH (ref 70–99)
HCT VFR BLD CALC: 39.2 % — SIGNIFICANT CHANGE UP (ref 39–50)
HGB BLD-MCNC: 13 G/DL — SIGNIFICANT CHANGE UP (ref 13–17)
MCHC RBC-ENTMCNC: 31 PG — SIGNIFICANT CHANGE UP (ref 27–34)
MCHC RBC-ENTMCNC: 33.2 GM/DL — SIGNIFICANT CHANGE UP (ref 32–36)
MCV RBC AUTO: 93.6 FL — SIGNIFICANT CHANGE UP (ref 80–100)
NT-PROBNP SERPL-SCNC: 222 PG/ML — SIGNIFICANT CHANGE UP (ref 0–450)
PLATELET # BLD AUTO: 165 K/UL — SIGNIFICANT CHANGE UP (ref 150–400)
POTASSIUM SERPL-MCNC: 4.6 MMOL/L — SIGNIFICANT CHANGE UP (ref 3.5–5.3)
POTASSIUM SERPL-SCNC: 4.6 MMOL/L — SIGNIFICANT CHANGE UP (ref 3.5–5.3)
PROT ?TM UR-MCNC: 20 MG/DL — HIGH (ref 0–12)
PROT/CREAT UR-RTO: 0.2 RATIO — SIGNIFICANT CHANGE UP (ref 0–0.2)
RBC # BLD: 4.19 M/UL — LOW (ref 4.2–5.8)
RBC # FLD: 13.3 % — SIGNIFICANT CHANGE UP (ref 10.3–14.5)
SODIUM SERPL-SCNC: 137 MMOL/L — SIGNIFICANT CHANGE UP (ref 135–145)
SODIUM UR-SCNC: 56 MMOL/L — SIGNIFICANT CHANGE UP
WBC # BLD: 11.2 K/UL — HIGH (ref 3.8–10.5)
WBC # FLD AUTO: 11.2 K/UL — HIGH (ref 3.8–10.5)

## 2017-09-24 PROCEDURE — 93280 PM DEVICE PROGR EVAL DUAL: CPT | Mod: 26

## 2017-09-24 RX ORDER — INFLUENZA VIRUS VACCINE 15; 15; 15; 15 UG/.5ML; UG/.5ML; UG/.5ML; UG/.5ML
0.5 SUSPENSION INTRAMUSCULAR ONCE
Qty: 0 | Refills: 0 | Status: COMPLETED | OUTPATIENT
Start: 2017-09-24 | End: 2017-09-27

## 2017-09-24 RX ORDER — DOCUSATE SODIUM 100 MG
100 CAPSULE ORAL THREE TIMES A DAY
Qty: 0 | Refills: 0 | Status: DISCONTINUED | OUTPATIENT
Start: 2017-09-24 | End: 2017-09-27

## 2017-09-24 RX ORDER — MULTIVIT WITH MIN/MFOLATE/K2 340-15/3 G
300 POWDER (GRAM) ORAL ONCE
Qty: 0 | Refills: 0 | Status: COMPLETED | OUTPATIENT
Start: 2017-09-24 | End: 2017-09-24

## 2017-09-24 RX ORDER — POLYETHYLENE GLYCOL 3350 17 G/17G
17 POWDER, FOR SOLUTION ORAL DAILY
Qty: 0 | Refills: 0 | Status: DISCONTINUED | OUTPATIENT
Start: 2017-09-24 | End: 2017-09-27

## 2017-09-24 RX ADMIN — POLYETHYLENE GLYCOL 3350 17 GRAM(S): 17 POWDER, FOR SOLUTION ORAL at 12:55

## 2017-09-24 RX ADMIN — MORPHINE SULFATE 2 MILLIGRAM(S): 50 CAPSULE, EXTENDED RELEASE ORAL at 05:20

## 2017-09-24 RX ADMIN — VALACYCLOVIR 500 MILLIGRAM(S): 500 TABLET, FILM COATED ORAL at 06:28

## 2017-09-24 RX ADMIN — Medication 100 MILLIGRAM(S): at 06:40

## 2017-09-24 RX ADMIN — TAMSULOSIN HYDROCHLORIDE 0.4 MILLIGRAM(S): 0.4 CAPSULE ORAL at 21:08

## 2017-09-24 RX ADMIN — Medication 100 MILLIGRAM(S): at 21:08

## 2017-09-24 RX ADMIN — FINASTERIDE 5 MILLIGRAM(S): 5 TABLET, FILM COATED ORAL at 11:19

## 2017-09-24 RX ADMIN — VALACYCLOVIR 500 MILLIGRAM(S): 500 TABLET, FILM COATED ORAL at 18:01

## 2017-09-24 RX ADMIN — Medication 300 MILLILITER(S): at 18:01

## 2017-09-24 RX ADMIN — Medication 180 MILLIGRAM(S): at 05:03

## 2017-09-24 RX ADMIN — Medication 200 MILLIGRAM(S): at 05:03

## 2017-09-24 RX ADMIN — HEPARIN SODIUM 5000 UNIT(S): 5000 INJECTION INTRAVENOUS; SUBCUTANEOUS at 05:03

## 2017-09-24 RX ADMIN — Medication 200 MILLIGRAM(S): at 21:08

## 2017-09-24 RX ADMIN — CEFTRIAXONE 100 GRAM(S): 500 INJECTION, POWDER, FOR SOLUTION INTRAMUSCULAR; INTRAVENOUS at 12:54

## 2017-09-24 RX ADMIN — MORPHINE SULFATE 2 MILLIGRAM(S): 50 CAPSULE, EXTENDED RELEASE ORAL at 04:50

## 2017-09-24 RX ADMIN — PREGABALIN 1000 MICROGRAM(S): 225 CAPSULE ORAL at 11:19

## 2017-09-24 RX ADMIN — Medication 81 MILLIGRAM(S): at 11:19

## 2017-09-24 RX ADMIN — RANOLAZINE 500 MILLIGRAM(S): 500 TABLET, FILM COATED, EXTENDED RELEASE ORAL at 18:01

## 2017-09-24 RX ADMIN — RANOLAZINE 500 MILLIGRAM(S): 500 TABLET, FILM COATED, EXTENDED RELEASE ORAL at 05:03

## 2017-09-24 RX ADMIN — HEPARIN SODIUM 5000 UNIT(S): 5000 INJECTION INTRAVENOUS; SUBCUTANEOUS at 21:08

## 2017-09-24 RX ADMIN — Medication 200 MILLIGRAM(S): at 13:10

## 2017-09-24 RX ADMIN — HEPARIN SODIUM 5000 UNIT(S): 5000 INJECTION INTRAVENOUS; SUBCUTANEOUS at 13:10

## 2017-09-24 RX ADMIN — Medication 2000 UNIT(S): at 11:19

## 2017-09-24 NOTE — PROGRESS NOTE ADULT - SUBJECTIVE AND OBJECTIVE BOX
HPI:  Samuel Behar is a 85 y.o. male  he has a history of high cholesterol, hypertension, sick sinus syndrome status post permanent pacemaker placement in the past, coronary artery disease by CT scan of the chest, he has calcified coronaries on chest CT scan, chronic renal insufficiency, history of chronic moderate right pleural effusion and history of right hydropneumothorax in the past status post pleurodesis and wedge resection in the past . He was admitted with complains of right pleuritic chest pain, increasing shortness of breath for the past 2 weeks and after admission he was diagnosed to have large right hydro-pneumothorax and he status post chest tube placement. He feels better since yesterday and he denies any chest pain or shortness of breath. He has a pain in the chest tube site. He is hemodynamically stable and is afebrile.      : CT Chest No Cont (09.21.17   FINDINGS:    LOWER NECK: Within normal limits.  AXILLA, MEDIASTINUM AND CARLITA: No lymphadenopathy. Small hiatal hernia.  VESSELS: Atherosclerotic arterial calcifications, including diffuse   coronary artery calcification.  HEART: Heart size is normal.No pericardial effusion. Pacemaker leads   noted in the right atrium and right ventricle.  PLEURA: Large right-sided hydropneumothorax. There is no mass effect on   the heart or mediastinum.  Status post right upper lobe wedge resection.  LUNGS AND LARGE AIRWAYS: Patent central airways. No pulmonary nodules.   Subsegmental atelectasis in the lingula and right lower lobe.  VISUALIZED UPPER ABDOMEN: Bilateral renal cysts. Nonobstructing right   renal calculus. Cholelithiasis. Caudate lobe cyst in the liver.  BONES: Diffuse degenerative arthritic changes. 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 No acute bony   abnormality.  CHEST WALL:  Unremarkable    IMPRESSION: Large right hydropneumothorax.    Echo jUNE 2017   --There is mild left ventricular hypertrophy.  --Global LV wall motion is normal.  --The left ventricular filling pattern is consistent with abnormal relaxation.  --The left ventricular ejection fraction is normal at 60-65%.  --There is a pacemaker in the right heart.  --Aortic valve is trileaflet. The aortic valve is mildly calcified.  --There is mild mitral annular calcification. There is mild mitral valve thickening. There is   trace mitral regurgitation.  --There is mild tricuspid regurgitation.  --There is trace pulmonic regurgitation.  --The right atrial pressure is 6 - 10 mm Hg. There is no pulmonary hypertension.  --There is no pericardial effusion.      PAST MEDICAL & SURGICAL HISTORY:  Spinal stenosis  CKD (chronic kidney disease), stage III  SSS (sick sinus syndrome)  Pleural effusion  Hyperlipidemia  BPH (benign prostatic hyperplasia)  CAD (coronary artery disease)  Hypertension  Status post lumbar spinal fusion      MEDICATIONS  (STANDING):  finasteride 5 milliGRAM(s) Oral daily  aspirin enteric coated 81 milliGRAM(s) Oral daily  tamsulosin 0.4 milliGRAM(s) Oral at bedtime  ranolazine 500 milliGRAM(s) Oral two times a day  diltiazem    milliGRAM(s) Oral daily  labetalol 200 milliGRAM(s) Oral three times a day  cyanocobalamin 1000 MICROGram(s) Oral daily  cholecalciferol 2000 Unit(s) Oral daily  heparin  Injectable 5000 Unit(s) SubCutaneous every 8 hours  sodium chloride 0.9%. 1000 milliLiter(s) (75 mL/Hr) IV Continuous <Continuous>  cefTRIAXone   IVPB      cefTRIAXone   IVPB 1 Gram(s) IV Intermittent every 24 hours  valACYclovir 500 milliGRAM(s) Oral every 12 hours  docusate sodium 100 milliGRAM(s) Oral three times a day  influenza   Vaccine 0.5 milliLiter(s) IntraMuscular once  polyethylene glycol 3350 17 Gram(s) Oral daily    MEDICATIONS  (PRN):  acetaminophen   Tablet. 650 milliGRAM(s) Oral every 6 hours PRN Mild Pain (1 - 3)  morphine  - Injectable 2 milliGRAM(s) IV Push every 4 hours PRN Moderate Pain (4 - 6)          REVIEW OF SYSTEM:  Pertinent items are noted in HPI.  Constitutional	Negative for chills, fevers, sweats.    Eyes: 	Negative for visual disturbance.  Ears, nose, mouth, throat, and face: Negative for epistaxis, nasal congestion, sore throat  .  Neck:	Negative for enlargement, pain and difficulty in swallowing  Respiration : Negative for cough, dyspnea on exertion, pleuritic chest pain and wheezing  Cardiovascular: Negative for chest pain, dyspnea and palpitations    Gastrointestinal : Negative for abdominal pain, diarrhea, nausea and vomiting  Genitourinary: Negative for dysuria, frequency and urinary incontinence .  Skin: Negative for  rash, pruritus, swelling, dryness .  	  Hematologic/lymphatic: Negative for bleeding and easy bruising  Musculoskeletal: Negative for arthralgias, back pain and muscle weakness.  Neurological: Negative for dizziness, headaches, seizures and tremors  Behavioral/Psych: Negative for mood change, depression.  Endocrine:	Negative for blurry vision, polydipsia and polyuria, diaphoresis.   Allergic/Immunologic:	Negative for anaphylaxis, angioedema and urticaria.      Vital Signs Last 24 Hrs  T(C): 37.4 (24 Sep 2017 05:08), Max: 37.4 (24 Sep 2017 05:08)  T(F): 99.4 (24 Sep 2017 05:08), Max: 99.4 (24 Sep 2017 05:08)  HR: 60 (24 Sep 2017 05:08) (60 - 60)  BP: 138/48 (24 Sep 2017 05:08) (133/56 - 149/52)  BP(mean): --  RR: 17 (24 Sep 2017 05:08) (17 - 19)  SpO2: 96% (24 Sep 2017 05:08) (95% - 96%)        PHYSICAL EXAM  General Appearance: cooperative, no acute distress,   HEENT: PERRL, conjunctiva clear, EOM's intact, non injected pharynx, no exudate .  Neck: Supple, , no adenopathy, thyroid: not enlarged, no carotid bruit or JVD  Back: Symmetric, no  tenderness,no soft tissue tenderness  Lungs: Clear to auscultation bilateral,no adventitious breath sounds, normal   expiratory phase  Heart: Regular rate and rhythm, S1, S2 normal, no murmur, rub or gallop  Abdomen: Soft, non-tender, bowel sounds active , no hepatosplenomegaly  Extremities: no cyanosis or edema, no joint swelling  Skin: Skin color, texture normal, no rashes   Neurologic: Alert and oriented X3 , cranial nerves intact, sensory and motor normal,        INTERPRETATION OF TELEMETRY: Atrial paced beats.          LABS:                          13.0   11.2  )-----------( 165      ( 24 Sep 2017 06:22 )             39.2     09-24    137  |  107  |  34<H>  ----------------------------<  151<H>  4.6   |  25  |  2.21<H>    Ca    8.1<L>      24 Sep 2017 06:22          Lipid Panel  123  30  42  257    Pro BNP  238 09-21 @ 09:16  D Dimer  -- 09-21 @ 09:16

## 2017-09-24 NOTE — PROGRESS NOTE ADULT - SUBJECTIVE AND OBJECTIVE BOX
86 yo male with PMH of CAD, HTN, HLD, BPH, SSS s/p PPM, CKD stage III, h/o SVT, h/o right pleural effusions and PTX presents to ED with complaint of SOB and right sided chest pain. Pt states he woke up this morning with dyspnea on exertion. No orthopnea or PND. States he did have right sided chest pain that wrapped around to right back this morning which has now resolved after receiving morphine. He had a recent outpatient CT 2 weeks ago which he states showed a little fluid on right side but no PTX. Denies fevers, chills, headaches, palpitations, abd pain, N/V, diarrhea, dizziness.  CT done revealed a large Rt effusion      9/23: Chart reviewed, Pt was seen and examined, Pt reports that Chest pain and SOB is better. No fevers. Pt was asked regartding  R chest wall rash, states that was applying hitting pads and burned then skin, also was using Icy/hot  patch which POC discussed.     9/24: Pt was seen and examined, states that does not believe that rash from shingles, but due to burn. Explained that the way it located  unlikely was able to burn  that way and also had new vesicles while in the hospital. Reports no pain or SOB. As per RN was dyspneic when was walking back from the bathroom. No fevers. No BMs x 3 days. No fevers, no CP.     REVIEW OF SYSTEMS:  CONSTITUTIONAL: No weakness, No fevers or chills  ENT: No ear ache, No sore throat  NECK: No pain, No stiffness  RESPIRATORY: No cough, No wheezing, No hemoptysis; +dyspnea  CARDIOVASCULAR: No chest pain, but yesterday was c/o burning at R Chest wall.  No palpitations  GASTROINTESTINAL: No abd pain, No nausea, No vomiting, No hematemesis, No diarrhea, + constipation. No melena, No hematochezia.  GENITOURINARY: No dysuria, No  hematuria  NEUROLOGICAL: No diplopia, No paresthesia, No motor dysfunction  MUSCULOSKELETAL: No arthralgia, No myalgia  SKIN: +  rash, or lesions       All other review of systems is negative unless indicated above    Vital Signs Last 24 Hrs  T(C): 36.5 (22 Sep 2017 15:14), Max: 37.1 (22 Sep 2017 10:05)  T(F): 97.7 (22 Sep 2017 15:14), Max: 98.8 (22 Sep 2017 10:05)  HR: 60 (22 Sep 2017 15:14) (59 - 60)  BP: 151/52 (22 Sep 2017 15:14) (135/57 - 177/64)  RR: 18 (22 Sep 2017 15:14) (15 - 18)  SpO2: 96% (22 Sep 2017 15:14) (91% - 99%)    PHYSICAL EXAM:    GENERAL: NAD, Well nourished  HEENT:  NC/AT, EOMI, PERRLA, No scleral icterus, Moist mucous membranes  NECK: Supple, No JVD  CNS:  Alert & Oriented X3, Motor Strength 5/5 B/L upper and lower extremities  LUNG: Decreased BS on Rt base, no wheezing  or rales, + subQ crepitation, non tender  HEART: RRR; No murmurs  ABDOMEN: +BS, ST/ND/NT  GENITOURINARY: Voiding, no suprapubic tenderness  EXTREMITIES:  2+ Peripheral Pulses, No  edema  MUSCULOSKELTAL: Joints normal ROM, No TTP, No effusion  SKIN: + R chest wall linear vesicular rash, mostly dry vesicles few new with fluid       LABS:                           13.0   11.2  )-----------( 165      ( 24 Sep 2017 06:22 )             39.2     09-24    137  |  107  |  34<H>  ----------------------------<  151<H>  4.6   |  25  |  2.21<H>    Ca    8.1<L>      24 Sep 2017 06:22          MEDICATIONS  (STANDING):  finasteride 5 milliGRAM(s) Oral daily  aspirin enteric coated 81 milliGRAM(s) Oral daily  tamsulosin 0.4 milliGRAM(s) Oral at bedtime  ranolazine 500 milliGRAM(s) Oral two times a day  diltiazem    milliGRAM(s) Oral daily  labetalol 200 milliGRAM(s) Oral three times a day  cyanocobalamin 1000 MICROGram(s) Oral daily  cholecalciferol 2000 Unit(s) Oral daily  heparin  Injectable 5000 Unit(s) SubCutaneous every 8 hours  sodium chloride 0.9%. 1000 milliLiter(s) (75 mL/Hr) IV Continuous <Continuous>  cefTRIAXone   IVPB      cefTRIAXone   IVPB 1 Gram(s) IV Intermittent every 24 hours  valACYclovir 500 milliGRAM(s) Oral every 12 hours  docusate sodium 100 milliGRAM(s) Oral three times a day  influenza   Vaccine 0.5 milliLiter(s) IntraMuscular once  polyethylene glycol 3350 17 Gram(s) Oral daily    MEDICATIONS  (PRN):  acetaminophen   Tablet. 650 milliGRAM(s) Oral every 6 hours PRN Mild Pain (1 - 3)  morphine  - Injectable 2 milliGRAM(s) IV Push every 4 hours PRN Moderate Pain (4 - 6)            Assessment and Plan:    1. SOB secondary to right large recurrent  hydropneumothorax  - S/P CT placement, management as per Dr Reyes  - some subQ emphysema   - c/w  pulse ox monitoring, supplement with O2 PRN, stable on RA  - Repeat CXR noted, PNTX improved   - Incentive spirometry   - F/u cytology  - D/w Pulm, likely parapneumonic effusion, started on IV Ceftriaxone     2. SHRUTHI/ CKD stage 3  - baseline Cr 1.8  - BUN/CR higher today  - off  lasix from today  - On gentle IVF, but not sure if received   - avoid nephrotoxic medications  -Bladder scan  - D/w DR Mae will evaluate Pt     3. h/o CAD  - continue ASA, BB, statin, ranexa  - D/w DR Carranza     4. SSS s/p PPM  - A and AV paced. SR underline   - interrogated, Normal Fx     5. HTN  - continue home meds: diltiazem, labetalol, hold  furosemide for now     6. BPH: cw Flomax, Proscar    7. Rash, likely Shingles  - suggest hitting pad made it worse  - Started on Valacyclovir 500mg PO BID, renally dosed   - D/w Dr Brown     Dispo: Monitor Renal FX

## 2017-09-24 NOTE — PROGRESS NOTE ADULT - ASSESSMENT
Dyspnea and right pleuritic chest pain because of recurrent right pleural effusion. He status post chest tube placement and he feels better.  Essential hypertension.  Sick sinus syndrome.  Chronic renal insufficiency.  Probable coronary artery disease and he has opted for medical management. No recent angina.  Suggest  Continue with current cardiac medications.  Will check pacemaker during his admission.  Follow-up with electrolytes.  Continue with incentive spirometry.  DVT prophylaxis.  Discussed with hospitalist.

## 2017-09-24 NOTE — PROGRESS NOTE ADULT - SUBJECTIVE AND OBJECTIVE BOX
Patient is a 85y old  Male who presents with a chief complaint of SOB (21 Sep 2017 15:34)      HPI:  86 yo male with PMH of CAD, HTN, HLD, BPH, SSS s/p PPM, CKD stage III, h/o SVT, h/o right pleural effusions seen by me as an outpatient.  Presents with recurrent pleuritic chest pain, worse on the right side.  Recently seen for CAP, treated with ABX/PO Prednisone and I also had repeated a CT chest at that time.  He presented to the ER on 9/21 for a pleurisy.   No fevers, chills, headaches, palpitations, abd pain, N/V, diarrhea, dizziness.  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 Denies any shingles history    9/24  Patient has no shortness of breath  Pleurvac on water seal, Air leak present      PAST MEDICAL & SURGICAL HISTORY:  Spinal stenosis  CKD (chronic kidney disease), stage III  SSS (sick sinus syndrome)  Pleural effusion  Hyperlipidemia  BPH (benign prostatic hyperplasia)  CAD (coronary artery disease)  Hypertension  Status post lumbar spinal fusion      PREVIOUS DIAGNOSTIC TESTING:      MEDICATIONS  (STANDING):  finasteride 5 milliGRAM(s) Oral daily  aspirin enteric coated 81 milliGRAM(s) Oral daily  tamsulosin 0.4 milliGRAM(s) Oral at bedtime  ranolazine 500 milliGRAM(s) Oral two times a day  diltiazem    milliGRAM(s) Oral daily  labetalol 200 milliGRAM(s) Oral three times a day  furosemide    Tablet 20 milliGRAM(s) Oral daily  cyanocobalamin 1000 MICROGram(s) Oral daily  cholecalciferol 2000 Unit(s) Oral daily  heparin  Injectable 5000 Unit(s) SubCutaneous every 8 hours  sodium chloride 0.9%. 1000 milliLiter(s) (75 mL/Hr) IV Continuous <Continuous>    MEDICATIONS  (PRN):  acetaminophen   Tablet. 650 milliGRAM(s) Oral every 6 hours PRN Mild Pain (1 - 3)  morphine  - Injectable 2 milliGRAM(s) IV Push every 4 hours PRN Moderate Pain (4 - 6)      FAMILY HISTORY:  No pertinent family history in first degree relatives      SOCIAL HISTORY: lives at home, born in [place]    REVIEW OF SYSTEM:  Shortness of breath, chest pain, otherwise 12 point ROS negative    Allergic/Immunologic: negative for anaphylaxis, angioedema and urticaria    Vital Signs Last 24 Hrs  T(C): 37.1 (22 Sep 2017 10:05), Max: 37.1 (22 Sep 2017 10:05)  T(F): 98.8 (22 Sep 2017 10:05), Max: 98.8 (22 Sep 2017 10:05)  HR: 59 (22 Sep 2017 10:05) (59 - 60)  BP: 135/57 (22 Sep 2017 10:05) (135/57 - 177/64)  BP(mean): --  RR: 17 (22 Sep 2017 10:05) (15 - 19)  SpO2: 92% (22 Sep 2017 10:05) (91% - 100%)    I&O's Summary    PHYSICAL EXAM  General Appearance: cooperative, no acute distress,   HEENT: PERRL, conjunctiva clear, EOM's intact, non injected pharynx, no exudate, TM   normal  Neck: Supple, , no adenopathy, thyroid: not enlarged, no carotid bruit or JVD  Back: Symmetric, no  tenderness,no soft tissue tenderness  Lungs: Decreased breath sounds right hemithorax, Right pigtail in place  Heart: Regular rate and rhythm, S1, S2 normal, no murmur, rub or gallop  Abdomen: Soft, non-tender, bowel sounds active , no hepatosplenomegaly  Extremities: no cyanosis or edema, no joint swelling  Skin: Skin color, texture normal, no rashes   Neurologic: Alert and oriented X3 , cranial nerves intact, sensory and motor normal,    ECG:    LABS:                          13.8   10.6  )-----------( 215      ( 22 Sep 2017 06:15 )             39.8     09-22    142  |  107  |  31<H>  ----------------------------<  118<H>  4.2   |  28  |  1.89<H>    Ca    8.3<L>      22 Sep 2017 06:15    TPro  6.9  /  Alb  3.8  /  TBili  0.8  /  DBili  x   /  AST  13<L>  /  ALT  15  /  AlkPhos  67  09-21    CARDIAC MARKERS ( 21 Sep 2017 14:47 )  <.015 ng/mL / x     / x     / x     / x      CARDIAC MARKERS ( 21 Sep 2017 11:54 )  <.015 ng/mL / x     / x     / x     / x      CARDIAC MARKERS ( 21 Sep 2017 09:16 )  <.015 ng/mL / x     / x     / x     / x            Pro BNP  238 09-21 @ 09:16  D Dimer  -- 09-21 @ 09:16    PT/INR - ( 22 Sep 2017 06:15 )   PT: 11.3 sec;   INR: 1.05 ratio         PTT - ( 21 Sep 2017 09:16 )  PTT:23.9 sec          RADIOLOGY & ADDITIONAL STUDIES:    CT chest images/report reviewed by me  My interpretation: Non-expandable right hemithorax, increased pleural fluid

## 2017-09-24 NOTE — PROGRESS NOTE ADULT - ASSESSMENT
1) Pleuritic Chest Pain  2) Trapped Lung  3) Abnormal CT Chest  4) Recent Pneumonia  5) Shortness of Breath  6) Parapneumonic effusion    86 yo Pt is non smoker.s/p right VATS decortication done by Dr Reyes 5/2016 for recurrent pleural effusions in the setting of trapped lung.  Patient was recently treated in my office on 9/14 for worsening cough, dyspnea and subjective fevers/rigors so I had prescribed 5 day course of Z-Pack and Prednisone 40mg for 5 days. He states that his cough and fevers resolved but the dyspnea was associated with pleuritic chest pain in the right side so he presented to the ED.   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  In the past, he states that thoracentesis/thoracostomy has helped.  Patient to undergo IR guided pigtail  Follow up fluid studies    9/23  - Patient s/p IR guided thoracostomy, denies any issues  - Studies show a ph7.25/glucose 55/pleural LDHelevated, no organisms, eosinophils not elevated but fluid sanguinous.   - This could be suggestive of parapneumonic effusion, will start IV Ceftriaxone 1g daily for 5-7 days  - Primary team to discuss treatment with ID for shingles     9/24  Patient with chest tube with non-expandable lung, fluid seems to be drained optimally but CXR shows subcutaneous emphysema and air leak present in pleurvac.  Continue to leave on water seal  Continue IV Ceftriaxone  Will check again in AM    Thank you for the consult

## 2017-09-24 NOTE — CONSULT NOTE ADULT - ASSESSMENT
Right hydropneumothorax/vs trapped lung effusion, discussed with Dr. Valadez and Dr. Reyes, admit pt to tele, hospitalist service, get IR pigtail for drainage.  Send fluid for analysis.  Case dsicussed with Dr. reyes
1) Pleuritic Chest Pain  2) Trapped Lung  3) Abnormal CT Chest  4) Recent Pneumonia  5) Shortness of Breath    84 yo Pt is non smoker.s/p right VATS decortication done by Dr Reyes 5/2016 for recurrent pleural effusions in the setting of trapped lung.  Patient was recently treated in my office on 9/14 for worsening cough, dyspnea and subjective fevers/rigors so I had prescribed 5 day course of Z-Pack and Prednisone 40mg for 5 days. He states that his cough and fevers resolved but the dyspnea was associated with pleuritic chest pain in the right side so he presented to the ED.   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  In the past, he states that thoracentesis/thoracostomy has helped.  Patient to undergo IR guided pigtail  Follow up fluid studies  Thank you for the consult
85 with history of high cholesterol, hypertension, sick sinus syndrome status post permanent pacemaker placement in the past, coronary artery disease, CKD baseline near 1.8 mg/dl follows with Dr. Pastrana, history of chronic moderate right pleural effusion and history of right hydropneumothorax here with recurrence of the same, renal evaluation of CKD/Rise in renal function. Etiology of rise in renal function unclear, still mild pre-renal state. Urinary retention?    CKD/SHRUTHI  -Maintain IVF, NS at 75  -Formal US kidney r/o retention  -Hold lasix for now  -Urine studies/lytes  -NSAID avoidance    PNA/Hydropneumothorax  -Abx per medical team/ID  -CT surgery following    thank you for the courtesy of the consult, will follow with you
Dyspnea and right pleuritic chest pain because of recurrent right pleural effusion. He status post chest tube placement and he feels better.  Essential hypertension.  Sick sinus syndrome.  Chronic renal insufficiency.  Probable coronary artery disease and he has opted for medical management. No recent angina.  Suggest  Continue with current cardiac medications.  Will check pacemaker during his admission.  Follow-up with electrolytes.  Continue with incentive spirometry.  DVT prophylaxis.  Discussed with hospitalist.
86 yo male with PMH of CAD, HTN, HLD, BPH, SSS s/p PPM, CKD stage III, h/o SVT, h/o chronic right pleural effusion and PTX s/p wedge resection in past admitted 9/21 w/ sob/R sided cp found to have R hydropnemothorax s/p pigtail catheter placement with improvement, pt noted to have vesicular erythematous rash along R chest wall that started about 4 days prior, was given valtrex d/t concern for shingles and on IV rocephin for pna coverage.  1. shingles/R hydropneumonthorax/CKD III  - agree with valtrex renally-dose adjusted [id number] plan for 7 days of tx  - on precautions  - s/p IR pigtail catheter drainage  - on IV rocephin #4 for empiric pna coverage  - continue for now plan for 5 day course  - f/u cbc  - monitor temps  -tolerating abx well so far; no side effects noted  -reason for abx use and side effects reviewed with patient

## 2017-09-24 NOTE — CONSULT NOTE ADULT - SUBJECTIVE AND OBJECTIVE BOX
84 yo male with PMH of CAD, HTN, HLD, BPH, SSS s/p PPM, CKD stage III, h/o SVT, h/o right pleural effusions and PTX presents to ED with complaint of SOB and right sided chest pain. Pt states he woke up this morning with dyspnea on exertion. No orthopnea or PND. States he did have right sided chest pain that wrapped around to right back this morning which has now resolved after receiving morphine. He had a recent outpatient CT 2 weeks ago which he states showed a little fluid on right side but no PTX. I spoke with Dr. Valadez, pts pulmonologist who states that indeed on the CT scan 2 weeks ago there was trapped lung, and some increase in fluid accumulation.    In ED CXR and CT chest shows hydropneumothorax. (21 Sep 2017 15:34)      PAST MEDICAL & SURGICAL HISTORY:  Spinal stenosis  CKD (chronic kidney disease), stage III  SSS (sick sinus syndrome)  Pleural effusion s/p right VATS last year  Hyperlipidemia  BPH (benign prostatic hyperplasia)  CAD (coronary artery disease)  Hypertension  Status post lumbar spinal fusion      REVIEW OF SYSTEMS  Negative other than those stated in HPI    MEDICATIONS  (STANDING):  finasteride 5 milliGRAM(s) Oral daily  aspirin enteric coated 81 milliGRAM(s) Oral daily  tamsulosin 0.4 milliGRAM(s) Oral at bedtime  ranolazine 500 milliGRAM(s) Oral two times a day  diltiazem    milliGRAM(s) Oral daily  labetalol 200 milliGRAM(s) Oral three times a day  furosemide    Tablet 20 milliGRAM(s) Oral daily  cyanocobalamin 1000 MICROGram(s) Oral daily  cholecalciferol 2000 Unit(s) Oral daily  heparin  Injectable 5000 Unit(s) SubCutaneous every 8 hours    MEDICATIONS  (PRN):  acetaminophen   Tablet. 650 milliGRAM(s) Oral every 6 hours PRN Mild Pain (1 - 3)      Allergies    amlodipine (Unknown)  Crestor (Other)  Lipitor (Unknown)      FAMILY HISTORY:  No pertinent family history in first degree relatives      Vital Signs Last 24 Hrs  T(C): 36.7 (21 Sep 2017 15:00), Max: 36.7 (21 Sep 2017 15:00)  T(F): 98 (21 Sep 2017 15:00), Max: 98 (21 Sep 2017 15:00)  HR: 60 (21 Sep 2017 16:05) (60 - 60)  BP: 173/76 (21 Sep 2017 16:05) (142/61 - 173/76)  BP(mean): --  RR: 18 (21 Sep 2017 16:05) (18 - 19)  SpO2: 99% (21 Sep 2017 16:05) (99% - 100%)    PHYSICAL EXAM:  Constitutional: Comfortable, NAD  Eyes: EOMI  ENMT: Nares patent, throat clear  Neck: No LAD  Respiratory: CTA  Cardiovascular: S1, S2  Gastrointestinal: Abd soft, NT  Extremities: No edmema  Vascular: peripheral pulses 2+  Neurological: A&Ox3  Skin: No rashes  Lymph Nodes: No LAD  Musculoskeletal: FROM BLUE and BLLE        LABS:                        15.0   11.2  )-----------( 262      ( 21 Sep 2017 09:16 )             43.3     09-21    142  |  109<H>  |  31<H>  ----------------------------<  117<H>  4.4   |  24  |  1.80<H>    Ca    8.3<L>      21 Sep 2017 09:16    TPro  6.9  /  Alb  3.8  /  TBili  0.8  /  DBili  x   /  AST  13<L>  /  ALT  15  /  AlkPhos  67  09-21    PT/INR - ( 21 Sep 2017 09:16 )   PT: 10.6 sec;   INR: 0.98 ratio         PTT - ( 21 Sep 2017 09:16 )  PTT:23.9 sec      RADIOLOGY & ADDITIONAL STUDIES:  < from: CT Chest No Cont (09.21.17 @ 10:50) >    EXAM:  CT CHEST                            PROCEDURE DATE:  09/21/2017          INTERPRETATION:  Clinical information: Right-sided chest pain. Status   post VATS.    COMPARISON: May 17, 2016    PROCEDURE:   CT of the Chest was performed without intravenous contrast.  Sagittal and coronal reformats were performed.    FINDINGS:    LOWER NECK: Within normal limits.  AXILLA, MEDIASTINUM AND CARLITA: No lymphadenopathy. Small hiatal hernia.  VESSELS: Atherosclerotic arterial calcifications, including diffuse   coronary artery calcification.  HEART: Heart size is normal.No pericardial effusion. Pacemaker leads   noted in the right atrium and right ventricle.  PLEURA: Large right-sided hydropneumothorax. There is no mass effect on   the heart or mediastinum.  Status post right upper lobe wedge resection.  LUNGS AND LARGE AIRWAYS: Patent central airways. No pulmonary nodules.   Subsegmental atelectasis in the lingula and right lower lobe.  VISUALIZED UPPER ABDOMEN: Bilateral renal cysts. Nonobstructing right   renal calculus. Cholelithiasis. Caudate lobe cyst in the liver.  BONES: Diffuse degenerative arthritic changes. 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 No acute bony   abnormality.  CHEST WALL:  Unremarkable    IMPRESSION: Large right hydropneumothorax.  Findings were discussed with Dr. Recinos on 9.21.17, 1125 hours.                          MINNIE JAY   This document has been electronically signed. Sep 21 2017 11:27AM                < end of copied text >
Patient is a 85y Male whom presented to the hospital with history of high cholesterol, hypertension, sick sinus syndrome status post permanent pacemaker placement in the past, coronary artery disease, CKD baseline near 1.8 mg/dl follows with Dr. Pastrana, history of chronic moderate right pleural effusion and history of right hydropneumothorax in the past status post pleurodesis and wedge resection in the past here with pain and diagnosed to have large right hydro-pneumothorax now s/p catheter placement. Patient reports good UOP, bladder scan with 250? No nsaid use, no chest pain or SOB. Has been getting some IVF and some oral diuretics as well     PAST MEDICAL & SURGICAL HISTORY:  Spinal stenosis  CKD (chronic kidney disease), stage III  SSS (sick sinus syndrome)  Pleural effusion  Hyperlipidemia  BPH (benign prostatic hyperplasia)  CAD (coronary artery disease)  Hypertension  Status post lumbar spinal fusion      MEDICATIONS  (STANDING):  finasteride 5 milliGRAM(s) Oral daily  aspirin enteric coated 81 milliGRAM(s) Oral daily  tamsulosin 0.4 milliGRAM(s) Oral at bedtime  ranolazine 500 milliGRAM(s) Oral two times a day  diltiazem    milliGRAM(s) Oral daily  labetalol 200 milliGRAM(s) Oral three times a day  cyanocobalamin 1000 MICROGram(s) Oral daily  cholecalciferol 2000 Unit(s) Oral daily  heparin  Injectable 5000 Unit(s) SubCutaneous every 8 hours  sodium chloride 0.9%. 1000 milliLiter(s) (75 mL/Hr) IV Continuous <Continuous>  cefTRIAXone   IVPB      cefTRIAXone   IVPB 1 Gram(s) IV Intermittent every 24 hours  valACYclovir 500 milliGRAM(s) Oral every 12 hours  docusate sodium 100 milliGRAM(s) Oral three times a day  influenza   Vaccine 0.5 milliLiter(s) IntraMuscular once  polyethylene glycol 3350 17 Gram(s) Oral daily    MEDICATIONS  (PRN):  acetaminophen   Tablet. 650 milliGRAM(s) Oral every 6 hours PRN Mild Pain (1 - 3)  morphine  - Injectable 2 milliGRAM(s) IV Push every 4 hours PRN Moderate Pain (4 - 6)      Allergies    amlodipine (Unknown)  Crestor (Other)  Lipitor (Unknown)    Intolerances        SOCIAL HISTORY:  no active cigg/etoh    FAMILY HISTORY:  No pertinent family history in first degree relatives      REVIEW OF SYSTEMS:    CONSTITUTIONAL: No weakness, fevers or chills  EYES/ENT: No visual changes;  No vertigo or throat pain   NECK: No pain or stiffness  RESPIRATORY: No cough, wheezing, hemoptysis; No shortness of breath  CARDIOVASCULAR: No chest pain or palpitations  GASTROINTESTINAL: No abdominal or epigastric pain. No nausea, vomiting, or hematemesis; No diarrhea or constipation. No melena or hematochezia.  GENITOURINARY: No dysuria, frequency or hematuria  NEUROLOGICAL: No numbness or weakness  SKIN: No itching, burning, rashes, or lesions   All other review of systems is negative unless indicated above.      T(C): , Max: 37.4 (09-24-17 @ 05:08)  T(F): , Max: 99.4 (09-24-17 @ 05:08)  HR: 60 (09-24-17 @ 05:08)  BP: 138/48 (09-24-17 @ 05:08)  BP(mean): --  RR: 17 (09-24-17 @ 05:08)  SpO2: 96% (09-24-17 @ 05:08)  Wt(kg): --    09-23 @ 07:01  -  09-24 @ 07:00  --------------------------------------------------------  IN: 0 mL / OUT: 518 mL / NET: -518 mL          PHYSICAL EXAM:    Constitutional: NAD, well-groomed, well-developed  HEENT: PERRLA, EOMI,  MMM  Neck: No LAD, No JVD  Respiratory: decreased BS R  Cardiovascular: S1 and S2, RRR  Gastrointestinal: BS+, soft, NT/ND  Extremities: No peripheral edema  Neurological: A/O x 3, no focal deficits  Psychiatric: Normal mood, normal affect  : No Beaver  Skin: No rashes  Access: Not applicable        LABS:                        13.0   11.2  )-----------( 165      ( 24 Sep 2017 06:22 )             39.2     24 Sep 2017 06:22    137    |  107    |  34     ----------------------------<  151    4.6     |  25     |  2.21   23 Sep 2017 06:23    138    |  106    |  29     ----------------------------<  127    4.4     |  24     |  1.96   22 Sep 2017 06:15    142    |  107    |  31     ----------------------------<  118    4.2     |  28     |  1.89   21 Sep 2017 09:16    142    |  109    |  31     ----------------------------<  117    4.4     |  24     |  1.80     Ca    8.1        24 Sep 2017 06:22  Ca    8.5        23 Sep 2017 06:23  Ca    8.3        22 Sep 2017 06:15  Ca    8.3        21 Sep 2017 09:16    TPro  6.9    /  Alb  3.8    /  TBili  0.8    /  DBili  x      /  AST  13     /  ALT  15     /  AlkPhos  67     21 Sep 2017 09:16          Urine Studies:          RADIOLOGY & ADDITIONAL STUDIES:
Patient is a 85y old  Male who presents with a chief complaint of SOB (21 Sep 2017 15:34)      HPI:  84 yo male with PMH of CAD, HTN, HLD, BPH, SSS s/p PPM, CKD stage III, h/o SVT, h/o right pleural effusions seen by me as an outpatient.  Presents with recurrent pleuritic chest pain, worse on the right side.  Recently seen for CAP, treated with ABX/PO Prednisone and I also had repeated a CT chest at that time.  He presented to the ER on 9/21 for a pleurisy.   No fevers, chills, headaches, palpitations, abd pain, N/V, diarrhea, dizziness.  He stated that post antibiotics and steroids, his cough resolved  CT Chest showed recurrence of trapped lung with pleural fluid that might have increased  No alleviating/aggravating symptoms  States that in the past when he gets drained, it improves  CTS already on the case    PAST MEDICAL & SURGICAL HISTORY:  Spinal stenosis  CKD (chronic kidney disease), stage III  SSS (sick sinus syndrome)  Pleural effusion  Hyperlipidemia  BPH (benign prostatic hyperplasia)  CAD (coronary artery disease)  Hypertension  Status post lumbar spinal fusion      PREVIOUS DIAGNOSTIC TESTING:      MEDICATIONS  (STANDING):  finasteride 5 milliGRAM(s) Oral daily  aspirin enteric coated 81 milliGRAM(s) Oral daily  tamsulosin 0.4 milliGRAM(s) Oral at bedtime  ranolazine 500 milliGRAM(s) Oral two times a day  diltiazem    milliGRAM(s) Oral daily  labetalol 200 milliGRAM(s) Oral three times a day  furosemide    Tablet 20 milliGRAM(s) Oral daily  cyanocobalamin 1000 MICROGram(s) Oral daily  cholecalciferol 2000 Unit(s) Oral daily  heparin  Injectable 5000 Unit(s) SubCutaneous every 8 hours  sodium chloride 0.9%. 1000 milliLiter(s) (75 mL/Hr) IV Continuous <Continuous>    MEDICATIONS  (PRN):  acetaminophen   Tablet. 650 milliGRAM(s) Oral every 6 hours PRN Mild Pain (1 - 3)  morphine  - Injectable 2 milliGRAM(s) IV Push every 4 hours PRN Moderate Pain (4 - 6)      FAMILY HISTORY:  No pertinent family history in first degree relatives      SOCIAL HISTORY: lives at home, born in [place]    REVIEW OF SYSTEM:  Shortness of breath, chest pain, otherwise 12 point ROS negative    Allergic/Immunologic: negative for anaphylaxis, angioedema and urticaria    Vital Signs Last 24 Hrs  T(C): 37.1 (22 Sep 2017 10:05), Max: 37.1 (22 Sep 2017 10:05)  T(F): 98.8 (22 Sep 2017 10:05), Max: 98.8 (22 Sep 2017 10:05)  HR: 59 (22 Sep 2017 10:05) (59 - 60)  BP: 135/57 (22 Sep 2017 10:05) (135/57 - 177/64)  BP(mean): --  RR: 17 (22 Sep 2017 10:05) (15 - 19)  SpO2: 92% (22 Sep 2017 10:05) (91% - 100%)    I&O's Summary    PHYSICAL EXAM  General Appearance: cooperative, no acute distress,   HEENT: PERRL, conjunctiva clear, EOM's intact, non injected pharynx, no exudate, TM   normal  Neck: Supple, , no adenopathy, thyroid: not enlarged, no carotid bruit or JVD  Back: Symmetric, no  tenderness,no soft tissue tenderness  Lungs: Decreased breath sounds right hemithorax  Heart: Regular rate and rhythm, S1, S2 normal, no murmur, rub or gallop  Abdomen: Soft, non-tender, bowel sounds active , no hepatosplenomegaly  Extremities: no cyanosis or edema, no joint swelling  Skin: Skin color, texture normal, no rashes   Neurologic: Alert and oriented X3 , cranial nerves intact, sensory and motor normal,    ECG:    LABS:                          13.8   10.6  )-----------( 215      ( 22 Sep 2017 06:15 )             39.8     09-22    142  |  107  |  31<H>  ----------------------------<  118<H>  4.2   |  28  |  1.89<H>    Ca    8.3<L>      22 Sep 2017 06:15    TPro  6.9  /  Alb  3.8  /  TBili  0.8  /  DBili  x   /  AST  13<L>  /  ALT  15  /  AlkPhos  67  09-21    CARDIAC MARKERS ( 21 Sep 2017 14:47 )  <.015 ng/mL / x     / x     / x     / x      CARDIAC MARKERS ( 21 Sep 2017 11:54 )  <.015 ng/mL / x     / x     / x     / x      CARDIAC MARKERS ( 21 Sep 2017 09:16 )  <.015 ng/mL / x     / x     / x     / x            Pro BNP  238 09-21 @ 09:16  D Dimer  -- 09-21 @ 09:16    PT/INR - ( 22 Sep 2017 06:15 )   PT: 11.3 sec;   INR: 1.05 ratio         PTT - ( 21 Sep 2017 09:16 )  PTT:23.9 sec          RADIOLOGY & ADDITIONAL STUDIES:    CT chest images/report reviewed by me  My interpretation: Non-expandable right hemithorax, increased pleural fluid
Patient is a 85y old  Male who presents with a chief complaint of SOB (21 Sep 2017 15:34)      HPI:  Samuel Behar is a 85 y.o. male  he has a history of high cholesterol, hypertension, sick sinus syndrome status post permanent pacemaker placement in the past, coronary artery disease by CT scan of the chest, he has calcified coronaries on chest CT scan, chronic renal insufficiency, history of chronic moderate right pleural effusion and history of right hydropneumothorax in the past status post pleurodesis and wedge resection in the past . He was admitted with complains of right pleuritic chest pain, increasing shortness of breath for the past 2 weeks and after admission he was diagnosed to have large right hydro-pneumothorax and he status post chest tube placement. And he feels better. Patient states that because of his pain he has been putting heating pad and now he has a skin burn. He also complains of cough. Since admission and chest tube placement he feels better.      : CT Chest No Cont (09.21.17   FINDINGS:    LOWER NECK: Within normal limits.  AXILLA, MEDIASTINUM AND CARLITA: No lymphadenopathy. Small hiatal hernia.  VESSELS: Atherosclerotic arterial calcifications, including diffuse   coronary artery calcification.  HEART: Heart size is normal.No pericardial effusion. Pacemaker leads   noted in the right atrium and right ventricle.  PLEURA: Large right-sided hydropneumothorax. There is no mass effect on   the heart or mediastinum.  Status post right upper lobe wedge resection.  LUNGS AND LARGE AIRWAYS: Patent central airways. No pulmonary nodules.   Subsegmental atelectasis in the lingula and right lower lobe.  VISUALIZED UPPER ABDOMEN: Bilateral renal cysts. Nonobstructing right   renal calculus. Cholelithiasis. Caudate lobe cyst in the liver.  BONES: Diffuse degenerative arthritic changes. 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 No acute bony   abnormality.  CHEST WALL:  Unremarkable    IMPRESSION: Large right hydropneumothorax.    Echo jUNE 2017   --There is mild left ventricular hypertrophy.  --Global LV wall motion is normal.  --The left ventricular filling pattern is consistent with abnormal relaxation.  --The left ventricular ejection fraction is normal at 60-65%.  --There is a pacemaker in the right heart.  --Aortic valve is trileaflet. The aortic valve is mildly calcified.  --There is mild mitral annular calcification. There is mild mitral valve thickening. There is   trace mitral regurgitation.  --There is mild tricuspid regurgitation.  --There is trace pulmonic regurgitation.  --The right atrial pressure is 6 - 10 mm Hg. There is no pulmonary hypertension.  --There is no pericardial effusion.      PAST MEDICAL & SURGICAL HISTORY:  Spinal stenosis  CKD (chronic kidney disease), stage III  SSS (sick sinus syndrome)  Pleural effusion  Hyperlipidemia  BPH (benign prostatic hyperplasia)  CAD (coronary artery disease)  Hypertension  Status post lumbar spinal fusion        MEDICATIONS  (STANDING):  finasteride 5 milliGRAM(s) Oral daily  aspirin enteric coated 81 milliGRAM(s) Oral daily  tamsulosin 0.4 milliGRAM(s) Oral at bedtime  ranolazine 500 milliGRAM(s) Oral two times a day  diltiazem    milliGRAM(s) Oral daily  labetalol 200 milliGRAM(s) Oral three times a day  cyanocobalamin 1000 MICROGram(s) Oral daily  cholecalciferol 2000 Unit(s) Oral daily  heparin  Injectable 5000 Unit(s) SubCutaneous every 8 hours  sodium chloride 0.9%. 1000 milliLiter(s) (75 mL/Hr) IV Continuous <Continuous>    MEDICATIONS  (PRN):  acetaminophen   Tablet. 650 milliGRAM(s) Oral every 6 hours PRN Mild Pain (1 - 3)  morphine  - Injectable 2 milliGRAM(s) IV Push every 4 hours PRN Moderate Pain (4 - 6)      FAMILY HISTORY:  No pertinent family history in first degree relatives      SOCIAL HISTORY:  He denies any history of smoking or any alcohol consumption.    REVIEW OF SYSTEM:  Pertinent items are noted in HPI.  Constitutional	Negative for chills, fevers, sweats.    Eyes: 	Negative for visual disturbance.  Ears, nose, mouth, throat, and face: Negative for epistaxis, nasal congestion, sore throat and tinnitus.  Neck:	Negative for enlargement, pain and difficulty in swallowing  Respiration : Notable for cough, dyspnea on exertion, pleuritic chest pain .  Cardiovascular: Negative for chest pain, dyspnea and palpitations    Gastrointestinal : Negative for abdominal pain, diarrhea, nausea and vomiting  Genitourinary: Negative for dysuria, frequency and urinary incontinence .  Skin: Negative for  rash, pruritus, swelling, dryness .  	  Hematologic/lymphatic: Negative for bleeding and easy bruising  Musculoskeletal: Negative for arthralgias, back pain and muscle weakness.  Neurological: Negative for dizziness, headaches, seizures and tremors  Behavioral/Psych: Negative for mood change, depression.  Endocrine:	Negative for blurry vision, polydipsia and polyuria, diaphoresis.   Allergic/Immunologic:	Negative for anaphylaxis, angioedema and urticaria.      Vital Signs Last 24 Hrs  T(C): 37 (23 Sep 2017 05:52), Max: 99 (22 Sep 2017 21:45)  T(F): 98.6 (23 Sep 2017 05:52), Max: 210.2 (22 Sep 2017 21:45)  HR: 60 (23 Sep 2017 05:52) (59 - 60)  BP: 138/62 (23 Sep 2017 05:52) (135/56 - 168/51)  BP(mean): 82 (23 Sep 2017 05:52) (82 - 82)  RR: 18 (23 Sep 2017 05:52) (16 - 18)  SpO2: 92% (23 Sep 2017 05:52) (92% - 98%)          PHYSICAL EXAM  General Appearance: cooperative, no acute distress,   HEENT: PERRL, conjunctiva clear, EOM's intact, non injected pharynx, no exudate,.  Neck: Supple, , no adenopathy, thyroid: not enlarged, no carotid bruit or JVD  Lungs: Diminished breath sounds right base and patient has a chest tube. There is also a burn anthony present on the skin.  Heart: Regular rate and rhythm, S1, S2 normal, no murmur, rub or gallop  Abdomen: Soft, non-tender, bowel sounds active , no hepatosplenomegaly  Extremities: no cyanosis or edema, no joint swelling  Skin: Skin color, texture normal, no rashes   Neurologic: Alert and oriented X3 , cranial nerves intact, sensory and motor normal,        INTERPRETATION OF TELEMETRY: Electronic Atrial pacing      ECG:  Electronic Atrial pacing RBBB        LABS:                          14.1   x     )-----------( x        ( 23 Sep 2017 06:23 )             40.5     09-23    138  |  106  |  29<H>  ----------------------------<  127<H>  4.4   |  24  |  1.96<H>    Ca    8.5      23 Sep 2017 06:23      CARDIAC MARKERS ( 21 Sep 2017 14:47 )  <.015 ng/mL / x     / x     / x     / x          Lipid Panel  123  30  42  257    Pro BNP  238 09-21 @ 09:16  D Dimer  -- 09-21 @ 09:16    PT/INR - ( 22 Sep 2017 06:15 )   PT: 11.3 sec;   INR: 1.05 ratio                   RADIOLOGY & ADDITIONAL STUDIES:
Patient is a 85y old  Male who presents with a chief complaint of SOB (21 Sep 2017 15:34)      HPI:  86 yo male with PMH of CAD, HTN, HLD, BPH, SSS s/p PPM, CKD stage III, h/o SVT, h/o chronic right pleural effusion and PTX s/p wedge resection in past admitted 9/21 w/ sob/R sided cp found to have R hydropnemothorax s/p pigtail catheter placement with improvement, pt noted to have vesicular erythematous rash along R chest wall that started about 4 days prior, was given valtrex d/t concern for shingles and on IV rocephin for pna coverage.          PMH: as above    PSH: as above    Meds: per reconciliation sheet, noted below    MEDICATIONS  (STANDING):  finasteride 5 milliGRAM(s) Oral daily  aspirin enteric coated 81 milliGRAM(s) Oral daily  tamsulosin 0.4 milliGRAM(s) Oral at bedtime  ranolazine 500 milliGRAM(s) Oral two times a day  diltiazem    milliGRAM(s) Oral daily  labetalol 200 milliGRAM(s) Oral three times a day  cyanocobalamin 1000 MICROGram(s) Oral daily  cholecalciferol 2000 Unit(s) Oral daily  heparin  Injectable 5000 Unit(s) SubCutaneous every 8 hours  sodium chloride 0.9%. 1000 milliLiter(s) (75 mL/Hr) IV Continuous <Continuous>  cefTRIAXone   IVPB      cefTRIAXone   IVPB 1 Gram(s) IV Intermittent every 24 hours  valACYclovir 500 milliGRAM(s) Oral every 12 hours  docusate sodium 100 milliGRAM(s) Oral three times a day  influenza   Vaccine 0.5 milliLiter(s) IntraMuscular once      Allergies    amlodipine (Unknown)  Crestor (Other)  Lipitor (Unknown)    Intolerances      Social: no smoking, no alcohol, no illegal drugs; no recent travel, no exposure to TB    Family history:  No pertinent family history in first degree relatives      ROS: the patient denies fever, no chills, no HA, no dizziness, no sore throat, no blurry vision, no CP, no palpitations, no SOB, no cough, no abdominal pain, no diarrhea, no N/V, no dysuria, no leg pain, no claudication, no rash, no joint aches, no rectal pain or bleeding, no night sweats    Vital Signs Last 24 Hrs  T(C): 37.4 (24 Sep 2017 05:08), Max: 37.4 (24 Sep 2017 05:08)  T(F): 99.4 (24 Sep 2017 05:08), Max: 99.4 (24 Sep 2017 05:08)  HR: 60 (24 Sep 2017 05:08) (60 - 60)  BP: 138/48 (24 Sep 2017 05:08) (133/56 - 149/52)  BP(mean): --  RR: 17 (24 Sep 2017 05:08) (17 - 19)  SpO2: 96% (24 Sep 2017 05:08) (95% - 96%)      PE:  Constitutional: frail looking  HEENT: NC/AT, EOMI, PERRLA  Neck: supple  Back: no tenderness  Respiratory: decreased breath sounds, R pigtail catheter in place   Cardiovascular: S1S2 regular, no murmurs  Abdomen: soft, not tender, not distended, positive BS  Genitourinary: deferred  Rectal: deferred  Musculoskeletal: no muscle tenderness, no joint swelling or tenderness  Extremities: no pedal edema  Neurological: AxOx3, moving all extremities, no focal deficits  Skin: R chest wall along T4 dermatoma with vesicular rash on erythematous base with small blisters    Labs:                        13.0   11.2  )-----------( 165      ( 24 Sep 2017 06:22 )             39.2     09-24    137  |  107  |  34<H>  ----------------------------<  151<H>  4.6   |  25  |  2.21<H>    Ca    8.1<L>      24 Sep 2017 06:22                 Radiology: reviewed    Advanced directives addressed: full resuscitation

## 2017-09-25 LAB
ALBUMIN SERPL ELPH-MCNC: 3.1 G/DL — LOW (ref 3.3–5)
ALP SERPL-CCNC: 68 U/L — SIGNIFICANT CHANGE UP (ref 40–120)
ALT FLD-CCNC: 14 U/L — SIGNIFICANT CHANGE UP (ref 12–78)
ANION GAP SERPL CALC-SCNC: 8 MMOL/L — SIGNIFICANT CHANGE UP (ref 5–17)
AST SERPL-CCNC: 11 U/L — LOW (ref 15–37)
BILIRUB SERPL-MCNC: 0.6 MG/DL — SIGNIFICANT CHANGE UP (ref 0.2–1.2)
BUN SERPL-MCNC: 26 MG/DL — HIGH (ref 7–23)
CALCIUM SERPL-MCNC: 8.6 MG/DL — SIGNIFICANT CHANGE UP (ref 8.5–10.1)
CHLORIDE SERPL-SCNC: 108 MMOL/L — SIGNIFICANT CHANGE UP (ref 96–108)
CO2 SERPL-SCNC: 24 MMOL/L — SIGNIFICANT CHANGE UP (ref 22–31)
CREAT SERPL-MCNC: 2.03 MG/DL — HIGH (ref 0.5–1.3)
GLUCOSE SERPL-MCNC: 134 MG/DL — HIGH (ref 70–99)
HCT VFR BLD CALC: 37.9 % — LOW (ref 39–50)
HGB BLD-MCNC: 13.1 G/DL — SIGNIFICANT CHANGE UP (ref 13–17)
MCHC RBC-ENTMCNC: 32 PG — SIGNIFICANT CHANGE UP (ref 27–34)
MCHC RBC-ENTMCNC: 34.6 GM/DL — SIGNIFICANT CHANGE UP (ref 32–36)
MCV RBC AUTO: 92.3 FL — SIGNIFICANT CHANGE UP (ref 80–100)
NON-GYN CYTOLOGY SPEC: SIGNIFICANT CHANGE UP
PLATELET # BLD AUTO: 159 K/UL — SIGNIFICANT CHANGE UP (ref 150–400)
POTASSIUM SERPL-MCNC: 3.9 MMOL/L — SIGNIFICANT CHANGE UP (ref 3.5–5.3)
POTASSIUM SERPL-SCNC: 3.9 MMOL/L — SIGNIFICANT CHANGE UP (ref 3.5–5.3)
PROT SERPL-MCNC: 6.2 GM/DL — SIGNIFICANT CHANGE UP (ref 6–8.3)
RBC # BLD: 4.1 M/UL — LOW (ref 4.2–5.8)
RBC # FLD: 13 % — SIGNIFICANT CHANGE UP (ref 10.3–14.5)
SODIUM SERPL-SCNC: 140 MMOL/L — SIGNIFICANT CHANGE UP (ref 135–145)
WBC # BLD: 8.5 K/UL — SIGNIFICANT CHANGE UP (ref 3.8–10.5)
WBC # FLD AUTO: 8.5 K/UL — SIGNIFICANT CHANGE UP (ref 3.8–10.5)

## 2017-09-25 PROCEDURE — 99232 SBSQ HOSP IP/OBS MODERATE 35: CPT

## 2017-09-25 PROCEDURE — 71010: CPT | Mod: 26

## 2017-09-25 RX ADMIN — Medication 81 MILLIGRAM(S): at 12:20

## 2017-09-25 RX ADMIN — VALACYCLOVIR 500 MILLIGRAM(S): 500 TABLET, FILM COATED ORAL at 18:08

## 2017-09-25 RX ADMIN — Medication 100 MILLIGRAM(S): at 05:07

## 2017-09-25 RX ADMIN — Medication 180 MILLIGRAM(S): at 05:07

## 2017-09-25 RX ADMIN — Medication 200 MILLIGRAM(S): at 14:44

## 2017-09-25 RX ADMIN — TAMSULOSIN HYDROCHLORIDE 0.4 MILLIGRAM(S): 0.4 CAPSULE ORAL at 21:45

## 2017-09-25 RX ADMIN — HEPARIN SODIUM 5000 UNIT(S): 5000 INJECTION INTRAVENOUS; SUBCUTANEOUS at 05:07

## 2017-09-25 RX ADMIN — HEPARIN SODIUM 5000 UNIT(S): 5000 INJECTION INTRAVENOUS; SUBCUTANEOUS at 14:43

## 2017-09-25 RX ADMIN — HEPARIN SODIUM 5000 UNIT(S): 5000 INJECTION INTRAVENOUS; SUBCUTANEOUS at 21:45

## 2017-09-25 RX ADMIN — RANOLAZINE 500 MILLIGRAM(S): 500 TABLET, FILM COATED, EXTENDED RELEASE ORAL at 05:07

## 2017-09-25 RX ADMIN — Medication 100 MILLIGRAM(S): at 21:45

## 2017-09-25 RX ADMIN — Medication 200 MILLIGRAM(S): at 05:07

## 2017-09-25 RX ADMIN — FINASTERIDE 5 MILLIGRAM(S): 5 TABLET, FILM COATED ORAL at 12:19

## 2017-09-25 RX ADMIN — RANOLAZINE 500 MILLIGRAM(S): 500 TABLET, FILM COATED, EXTENDED RELEASE ORAL at 18:08

## 2017-09-25 RX ADMIN — CEFTRIAXONE 100 GRAM(S): 500 INJECTION, POWDER, FOR SOLUTION INTRAMUSCULAR; INTRAVENOUS at 12:19

## 2017-09-25 RX ADMIN — PREGABALIN 1000 MICROGRAM(S): 225 CAPSULE ORAL at 12:19

## 2017-09-25 RX ADMIN — Medication 2000 UNIT(S): at 12:19

## 2017-09-25 RX ADMIN — VALACYCLOVIR 500 MILLIGRAM(S): 500 TABLET, FILM COATED ORAL at 05:07

## 2017-09-25 NOTE — PROGRESS NOTE ADULT - ASSESSMENT
Dyspnea and right pleuritic chest pain because of recurrent right pleural effusion. He status post chest tube placement and he feels better.  Essential hypertension.  Sick sinus syndrome.  Chronic renal insufficiency.  Probable coronary artery disease and he has opted for medical management. No recent angina.  Suggest  Continue with current cardiac medications.  Follow-up with electrolytes.  Continue with incentive spirometry.  DVT prophylaxis.  Discussed with hospitalist.  Stop IV fluids.  Discussed with Family.

## 2017-09-25 NOTE — PROGRESS NOTE ADULT - ASSESSMENT
85 with history of high cholesterol, hypertension, sick sinus syndrome status post permanent pacemaker placement in the past, coronary artery disease, CKD baseline near 1.8 mg/dl follows with Dr. Pastrana, history of chronic moderate right pleural effusion and history of right hydropneumothorax here with recurrence of the same, renal evaluation of CKD/Rise in renal function. Etiology of rise in renal function unclear, still mild pre-renal state. Urinary retention?    CKD/SHRUTHI  -Formal US kidney r/o retention - ordered  -Hold lasix for now  -Urine studies/lytes  -NSAID avoidance  - stop IVF  - renal function improving    PNA/Hydropneumothorax  -Abx per medical team/ID  -CT surgery following  - s/p CTube

## 2017-09-25 NOTE — PROGRESS NOTE ADULT - ASSESSMENT
86 yo male with PMH of CAD, HTN, HLD, BPH, SSS s/p PPM, CKD stage III, h/o SVT, h/o chronic right pleural effusion and PTX s/p wedge resection in past admitted 9/21 w/ sob/R sided cp found to have R hydropnemothorax s/p pigtail catheter placement with improvement, pt noted to have vesicular erythematous rash along R chest wall that started about 4 days prior, was given valtrex d/t concern for shingles and on IV rocephin for pna coverage.  1. shingles/R hydropneumonthorax/CKD III  - slowly improving  - rash does not cross midline and isolated to single dermatomal distribution  - continue with valtrex renally-dose adjusted [id number] #3 plan for 7 days of tx  - on contact precautions  - s/p IR pigtail catheter drainage  - on IV rocephin #5 for empiric pna coverage  - continue for now plan for 5-7 day course  - f/u cbc  - monitor temps  -tolerating abx well so far; no side effects noted  -reason for abx use and side effects reviewed with patient

## 2017-09-25 NOTE — PROGRESS NOTE ADULT - SUBJECTIVE AND OBJECTIVE BOX
HPI:  Samuel Behar is a 85 y.o. male  he has a history of high cholesterol, hypertension, sick sinus syndrome status post permanent pacemaker placement in the past, coronary artery disease by CT scan of the chest, he has calcified coronaries on chest CT scan, chronic renal insufficiency, history of chronic moderate right pleural effusion and history of right hydropneumothorax in the past status post pleurodesis and wedge resection in the past . He was admitted with complains of right pleuritic chest pain, increasing shortness of breath for the past 2 weeks and after admission he was diagnosed to have large right hydro-pneumothorax and he status post chest tube placement. He is comfortable and he complains of pain at the chest tube site, otherwise no chest pain or shortness of breath. He Henson to be in electronic atrial pacing on telemetry.      : CT Chest No Cont (09.21.17   FINDINGS:    LOWER NECK: Within normal limits.  AXILLA, MEDIASTINUM AND CARLITA: No lymphadenopathy. Small hiatal hernia.  VESSELS: Atherosclerotic arterial calcifications, including diffuse   coronary artery calcification.  HEART: Heart size is normal.No pericardial effusion. Pacemaker leads   noted in the right atrium and right ventricle.  PLEURA: Large right-sided hydropneumothorax. There is no mass effect on   the heart or mediastinum.  Status post right upper lobe wedge resection.  LUNGS AND LARGE AIRWAYS: Patent central airways. No pulmonary nodules.   Subsegmental atelectasis in the lingula and right lower lobe.  VISUALIZED UPPER ABDOMEN: Bilateral renal cysts. Nonobstructing right   renal calculus. Cholelithiasis. Caudate lobe cyst in the liver.  BONES: Diffuse degenerative arthritic changes. 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 No acute bony   abnormality.  CHEST WALL:  Unremarkable    IMPRESSION: Large right hydropneumothorax.    Echo jUNE 2017   --There is mild left ventricular hypertrophy.  --Global LV wall motion is normal.  --The left ventricular filling pattern is consistent with abnormal relaxation.  --The left ventricular ejection fraction is normal at 60-65%.  --There is a pacemaker in the right heart.  --Aortic valve is trileaflet. The aortic valve is mildly calcified.  --There is mild mitral annular calcification. There is mild mitral valve thickening. There is   trace mitral regurgitation.  --There is mild tricuspid regurgitation.  --There is trace pulmonic regurgitation.  --The right atrial pressure is 6 - 10 mm Hg. There is no pulmonary hypertension.  --There is no pericardial effusion.      PAST MEDICAL & SURGICAL HISTORY:  Spinal stenosis  CKD (chronic kidney disease), stage III  SSS (sick sinus syndrome)  Pleural effusion  Hyperlipidemia  BPH (benign prostatic hyperplasia)  CAD (coronary artery disease)  Hypertension  Status post lumbar spinal fusion    PAST MEDICAL & SURGICAL HISTORY:  Spinal stenosis  CKD (chronic kidney disease), stage III  SSS (sick sinus syndrome)  Pleural effusion  Hyperlipidemia  BPH (benign prostatic hyperplasia)  CAD (coronary artery disease)  Hypertension  Status post lumbar spinal fusion      MEDICATIONS  (STANDING):  finasteride 5 milliGRAM(s) Oral daily  aspirin enteric coated 81 milliGRAM(s) Oral daily  tamsulosin 0.4 milliGRAM(s) Oral at bedtime  ranolazine 500 milliGRAM(s) Oral two times a day  diltiazem    milliGRAM(s) Oral daily  labetalol 200 milliGRAM(s) Oral three times a day  cyanocobalamin 1000 MICROGram(s) Oral daily  cholecalciferol 2000 Unit(s) Oral daily  heparin  Injectable 5000 Unit(s) SubCutaneous every 8 hours  cefTRIAXone   IVPB      cefTRIAXone   IVPB 1 Gram(s) IV Intermittent every 24 hours  valACYclovir 500 milliGRAM(s) Oral every 12 hours  docusate sodium 100 milliGRAM(s) Oral three times a day  influenza   Vaccine 0.5 milliLiter(s) IntraMuscular once  polyethylene glycol 3350 17 Gram(s) Oral daily    MEDICATIONS  (PRN):  acetaminophen   Tablet. 650 milliGRAM(s) Oral every 6 hours PRN Mild Pain (1 - 3)  morphine  - Injectable 2 milliGRAM(s) IV Push every 4 hours PRN Moderate Pain (4 - 6)      REVIEW OF SYSTEM:  Pertinent items are noted in HPI.  Constitutional	Negative for chills, fevers, sweats.    Eyes: 	Negative for visual disturbance.  Ears, nose, mouth, throat, and face: Negative for epistaxis, nasal congestion, sore throat and tinnitus.  Neck:	Negative for enlargement, pain and difficulty in swallowing  Respiration : Negative for cough, dyspnea on exertion, pleuritic chest pain and wheezing  Cardiovascular: Negative for chest pain,  and palpitations    Gastrointestinal : Negative for abdominal pain, diarrhea, nausea and vomiting  Genitourinary: Negative for dysuria, frequency and urinary incontinence .  Skin: Negative for  rash, pruritus, swelling, dryness .  	  Hematologic/lymphatic: Negative for bleeding and easy bruising  Musculoskeletal: Negative for arthralgias, back pain and muscle weakness.  Neurological: Negative for dizziness, headaches, seizures and tremors  Behavioral/Psych: Negative for mood change, depression.  Endocrine:	Negative for blurry vision, polydipsia and polyuria, diaphoresis.   Allergic/Immunologic:	Negative for anaphylaxis, angioedema and urticaria.      Vital Signs Last 24 Hrs  T(C): 37 (25 Sep 2017 04:05), Max: 37 (25 Sep 2017 04:05)  T(F): 98.6 (25 Sep 2017 04:05), Max: 98.6 (25 Sep 2017 04:05)  HR: 58 (25 Sep 2017 04:05) (58 - 62)  BP: 140/373 (25 Sep 2017 04:05) (140/373 - 167/69)  BP(mean): --  RR: 18 (25 Sep 2017 04:05) (18 - 18)  SpO2: 96% (25 Sep 2017 04:05) (93% - 97%)          PHYSICAL EXAM  General Appearance: cooperative, no acute distress,   HEENT: PERRL, conjunctiva clear, EOM's intact, non injected pharynx, no exudate .  Neck: Supple, , no adenopathy, thyroid: not enlarged, no carotid bruit or JVD  Back: Symmetric, no  tenderness,no soft tissue tenderness  Lungs: Diminished breath sounds on right side scattered rhonchi on the right side.  Heart: Regular rate and rhythm, S1, S2 normal, no murmur, rub or gallop  Abdomen: Soft, non-tender, bowel sounds active , no hepatosplenomegaly  Extremities: no cyanosis or edema, no joint swelling  Skin: Skin color, texture normal, no rashes   Neurologic: Alert and oriented X3 , cranial nerves intact, sensory and motor normal,        INTERPRETATION OF TELEMETRY: Atrial paced           LABS:                          13.1   8.5   )-----------( 159      ( 25 Sep 2017 06:21 )             37.9     09-25    140  |  108  |  26<H>  ----------------------------<  134<H>  3.9   |  24  |  2.03<H>    Ca    8.6      25 Sep 2017 06:21    TPro  6.2  /  Alb  3.1<L>  /  TBili  0.6  /  DBili  x   /  AST  11<L>  /  ALT  14  /  AlkPhos  68  09-25        Lipid Panel  123  30  42  257    Pro BNP  222 09-24 @ 16:35  D Dimer  -- 09-24 @ 16:35  Pro BNP  238 09-21 @ 09:16  D Dimer  -- 09-21 @ 09:16

## 2017-09-25 NOTE — PROGRESS NOTE ADULT - SUBJECTIVE AND OBJECTIVE BOX
86 yo male with PMH of CAD, HTN, HLD, BPH, SSS s/p PPM, CKD stage III, h/o SVT, h/o chronic right pleural effusion and PTX s/p wedge resection in past admitted 9/21 w/ sob/R sided cp found to have R hydropnemothorax s/p pigtail catheter placement with improvement, pt noted to have vesicular erythematous rash along R chest wall that started about 4 days prior, was given valtrex d/t concern for shingles and on IV rocephin for pna coverage.    feels ok  no complaints  had some burning along R chest wall yesterday      MEDICATIONS  (STANDING):  finasteride 5 milliGRAM(s) Oral daily  aspirin enteric coated 81 milliGRAM(s) Oral daily  tamsulosin 0.4 milliGRAM(s) Oral at bedtime  ranolazine 500 milliGRAM(s) Oral two times a day  diltiazem    milliGRAM(s) Oral daily  labetalol 200 milliGRAM(s) Oral three times a day  cyanocobalamin 1000 MICROGram(s) Oral daily  cholecalciferol 2000 Unit(s) Oral daily  heparin  Injectable 5000 Unit(s) SubCutaneous every 8 hours  cefTRIAXone   IVPB      cefTRIAXone   IVPB 1 Gram(s) IV Intermittent every 24 hours  valACYclovir 500 milliGRAM(s) Oral every 12 hours  docusate sodium 100 milliGRAM(s) Oral three times a day  influenza   Vaccine 0.5 milliLiter(s) IntraMuscular once  polyethylene glycol 3350 17 Gram(s) Oral daily      Vital Signs Last 24 Hrs  T(C): 36.9 (25 Sep 2017 10:05), Max: 37 (25 Sep 2017 04:05)  T(F): 98.4 (25 Sep 2017 10:05), Max: 98.6 (25 Sep 2017 04:05)  HR: 58 (25 Sep 2017 10:05) (58 - 59)  BP: 137/36 (25 Sep 2017 10:05) (137/36 - 167/69)  BP(mean): --  RR: 18 (25 Sep 2017 10:05) (18 - 18)  SpO2: 92% (25 Sep 2017 10:05) (92% - 96%)          PE:  Constitutional: frail looking  HEENT: NC/AT, EOMI, PERRLA  Neck: supple  Back: no tenderness  Respiratory: decreased breath sounds, R pigtail catheter in place   Cardiovascular: S1S2 regular, no murmurs  Abdomen: soft, not tender, not distended, positive BS  Genitourinary: deferred  Rectal: deferred  Musculoskeletal: no muscle tenderness, no joint swelling or tenderness  Extremities: no pedal edema  Neurological: AxOx3, moving all extremities, no focal deficits  Skin: R chest wall along T4 dermatoma with vesicular rash on erythematous base with small blisters    Labs:                                   13.1   8.5   )-----------( 159      ( 25 Sep 2017 06:21 )             37.9     09-25    140  |  108  |  26<H>  ----------------------------<  134<H>  3.9   |  24  |  2.03<H>    Ca    8.6      25 Sep 2017 06:21    TPro  6.2  /  Alb  3.1<L>  /  TBili  0.6  /  DBili  x   /  AST  11<L>  /  ALT  14  /  AlkPhos  68  09-25           Cultures:   Culture - Body Fluid with Gram Stain (09.22.17 @ 14:12)    Gram Stain:   polymorphonuclear leukocytes seen  No organisms seen  by cytocentrifuge    Specimen Source: .Body Fluid Pleural Fluid    Culture Results:   No growth              Radiology: reviewed    Advanced directives addressed: full resuscitation

## 2017-09-25 NOTE — PROGRESS NOTE ADULT - SUBJECTIVE AND OBJECTIVE BOX
Patient is a 85y old  Male who presents with a chief complaint of SOB (21 Sep 2017 15:34)      HPI:  86 yo male with PMH of CAD, HTN, HLD, BPH, SSS s/p PPM, CKD stage III, h/o SVT, h/o right pleural effusions seen by me as an outpatient.  Presents with recurrent pleuritic chest pain, worse on the right side.  Recently seen for CAP, treated with ABX/PO Prednisone and I also had repeated a CT chest at that time.  He presented to the ER on 9/21 for a pleurisy.   No fevers, chills, headaches, palpitations, abd pain, N/V, diarrhea, dizziness.  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 Denies any shingles history    9/24  Patient has no shortness of breath  Pleurvac on water seal, Air leak present    9/25  Patient is asleep  water chamber not showing air leak      PAST MEDICAL & SURGICAL HISTORY:  Spinal stenosis  CKD (chronic kidney disease), stage III  SSS (sick sinus syndrome)  Pleural effusion  Hyperlipidemia  BPH (benign prostatic hyperplasia)  CAD (coronary artery disease)  Hypertension  Status post lumbar spinal fusion      PREVIOUS DIAGNOSTIC TESTING:      MEDICATIONS  (STANDING):  finasteride 5 milliGRAM(s) Oral daily  aspirin enteric coated 81 milliGRAM(s) Oral daily  tamsulosin 0.4 milliGRAM(s) Oral at bedtime  ranolazine 500 milliGRAM(s) Oral two times a day  diltiazem    milliGRAM(s) Oral daily  labetalol 200 milliGRAM(s) Oral three times a day  furosemide    Tablet 20 milliGRAM(s) Oral daily  cyanocobalamin 1000 MICROGram(s) Oral daily  cholecalciferol 2000 Unit(s) Oral daily  heparin  Injectable 5000 Unit(s) SubCutaneous every 8 hours  sodium chloride 0.9%. 1000 milliLiter(s) (75 mL/Hr) IV Continuous <Continuous>    MEDICATIONS  (PRN):  acetaminophen   Tablet. 650 milliGRAM(s) Oral every 6 hours PRN Mild Pain (1 - 3)  morphine  - Injectable 2 milliGRAM(s) IV Push every 4 hours PRN Moderate Pain (4 - 6)      FAMILY HISTORY:  No pertinent family history in first degree relatives      SOCIAL HISTORY: lives at home, born in [place]    REVIEW OF SYSTEM:  Shortness of breath, chest pain, otherwise 12 point ROS negative    Allergic/Immunologic: negative for anaphylaxis, angioedema and urticaria    Vital Signs Last 24 Hrs  T(C): 37.1 (22 Sep 2017 10:05), Max: 37.1 (22 Sep 2017 10:05)  T(F): 98.8 (22 Sep 2017 10:05), Max: 98.8 (22 Sep 2017 10:05)  HR: 59 (22 Sep 2017 10:05) (59 - 60)  BP: 135/57 (22 Sep 2017 10:05) (135/57 - 177/64)  BP(mean): --  RR: 17 (22 Sep 2017 10:05) (15 - 19)  SpO2: 92% (22 Sep 2017 10:05) (91% - 100%)    I&O's Summary    PHYSICAL EXAM  General Appearance: cooperative, no acute distress,   HEENT: PERRL, conjunctiva clear, EOM's intact, non injected pharynx, no exudate, TM   normal  Neck: Supple, , no adenopathy, thyroid: not enlarged, no carotid bruit or JVD  Back: Symmetric, no  tenderness,no soft tissue tenderness  Lungs: Decreased breath sounds right hemithorax, Right pigtail in place  Heart: Regular rate and rhythm, S1, S2 normal, no murmur, rub or gallop  Abdomen: Soft, non-tender, bowel sounds active , no hepatosplenomegaly  Extremities: no cyanosis or edema, no joint swelling  Skin: Skin color, texture normal, no rashes   Neurologic: Alert and oriented X3 , cranial nerves intact, sensory and motor normal,    ECG:    LABS:                          13.1   8.5   )-----------( 159      ( 25 Sep 2017 06:21 )             37.9   09-24    137  |  107  |  34<H>  ----------------------------<  151<H>  4.6   |  25  |  2.21<H>    Ca    8.1<L>      24 Sep 2017 06:22          Pro BNP  238 09-21 @ 09:16  D Dimer  -- 09-21 @ 09:16    PT/INR - ( 22 Sep 2017 06:15 )   PT: 11.3 sec;   INR: 1.05 ratio         PTT - ( 21 Sep 2017 09:16 )  PTT:23.9 sec          RADIOLOGY & ADDITIONAL STUDIES:    CT chest images/report reviewed by me  My interpretation: Non-expandable right hemithorax, increased pleural fluid

## 2017-09-25 NOTE — PROGRESS NOTE ADULT - SUBJECTIVE AND OBJECTIVE BOX
Pt seen, no complaints, says his breathing is improved.    MEDICATIONS  (STANDING):  finasteride 5 milliGRAM(s) Oral daily  aspirin enteric coated 81 milliGRAM(s) Oral daily  tamsulosin 0.4 milliGRAM(s) Oral at bedtime  ranolazine 500 milliGRAM(s) Oral two times a day  diltiazem    milliGRAM(s) Oral daily  labetalol 200 milliGRAM(s) Oral three times a day  cyanocobalamin 1000 MICROGram(s) Oral daily  cholecalciferol 2000 Unit(s) Oral daily  heparin  Injectable 5000 Unit(s) SubCutaneous every 8 hours  cefTRIAXone   IVPB      cefTRIAXone   IVPB 1 Gram(s) IV Intermittent every 24 hours  valACYclovir 500 milliGRAM(s) Oral every 12 hours  docusate sodium 100 milliGRAM(s) Oral three times a day  influenza   Vaccine 0.5 milliLiter(s) IntraMuscular once  polyethylene glycol 3350 17 Gram(s) Oral daily    MEDICATIONS  (PRN):  acetaminophen   Tablet. 650 milliGRAM(s) Oral every 6 hours PRN Mild Pain (1 - 3)  morphine  - Injectable 2 milliGRAM(s) IV Push every 4 hours PRN Moderate Pain (4 - 6)      Allergies    amlodipine (Unknown)  Crestor (Other)  Lipitor (Unknown)      Vital Signs Last 24 Hrs  T(C): 36.9 (25 Sep 2017 10:05), Max: 37 (25 Sep 2017 04:05)  T(F): 98.4 (25 Sep 2017 10:05), Max: 98.6 (25 Sep 2017 04:05)  HR: 58 (25 Sep 2017 10:05) (58 - 59)  BP: 137/36 (25 Sep 2017 10:05) (137/36 - 167/69)  BP(mean): --  RR: 18 (25 Sep 2017 10:05) (18 - 18)  SpO2: 92% (25 Sep 2017 10:05) (92% - 96%)    General: NAD  Cardiovascular: S1, S2  Respiratory: CTA  Chest tube: Output minimal.  There is a small air leak.  LABS:                        13.1   8.5   )-----------( 159      ( 25 Sep 2017 06:21 )             37.9     09-25    140  |  108  |  26<H>  ----------------------------<  134<H>  3.9   |  24  |  2.03<H>    Ca    8.6      25 Sep 2017 06:21    TPro  6.2  /  Alb  3.1<L>  /  TBili  0.6  /  DBili  x   /  AST  11<L>  /  ALT  14  /  AlkPhos  68  09-25          RADIOLOGY & ADDITIONAL TESTS:  < from: Xray Chest 1 View AP/PA. (09.25.17 @ 09:38) >    EXAM:  CHEST SINGLE VIEW FRONTAL                            PROCEDURE DATE:  09/25/2017          INTERPRETATION:  Single view chest    History pneumothorax    Comparison 2 days prior    The right pigtail chest catheter remains in place. There is mild to   moderate, worsening amount of peripheral soft tissue emphysema. There is   a tiny stable basilar and apical pneumothorax. There is no recurrent   effusion. There is mild slightly worsening discoid atelectasis in the   left lung. A pacemaker isnoted. Heart is normal in size. There is no   kailyn central edema.    IMPRESSION:    As above                ROLY LIM   This document has been electronically signed. Sep 25 2017  9:56AM              < end of copied text >

## 2017-09-25 NOTE — PROGRESS NOTE ADULT - SUBJECTIVE AND OBJECTIVE BOX
86 yo male with PMH of CAD, HTN, HLD, BPH, SSS s/p PPM, CKD stage III, h/o SVT, h/o right pleural effusions and PTX presents to ED with complaint of SOB and right sided chest pain. Pt states he woke up this morning with dyspnea on exertion. No orthopnea or PND. States he did have right sided chest pain that wrapped around to right back this morning which has now resolved after receiving morphine. He had a recent outpatient CT 2 weeks ago which he states showed a little fluid on right side but no PTX. Denies fevers, chills, headaches, palpitations, abd pain, N/V, diarrhea, dizziness.  CT done revealed a large Rt effusion      9/23: Chart reviewed, Pt was seen and examined, Pt reports that Chest pain and SOB is better. No fevers. Pt was asked regartding  R chest wall rash, states that was applying hitting pads and burned then skin, also was using Icy/hot  patch which POC discussed.     9/24: Pt was seen and examined, states that does not believe that rash from shingles, but due to burn. Explained that the way it located  unlikely was able to burn  that way and also had new vesicles while in the hospital. Reports no pain or SOB. As per RN was dyspneic when was walking back from the bathroom. No fevers. No BMs x 3 days. No fevers, no CP.     9/25: Pt was seen and examined, reports no new complains, denies SOB.      REVIEW OF SYSTEMS:  CONSTITUTIONAL: No weakness, No fevers or chills  ENT: No ear ache, No sore throat  NECK: No pain, No stiffness  RESPIRATORY: No cough, No wheezing, No hemoptysis; +dyspnea  CARDIOVASCULAR: No chest pain, but yesterday was c/o burning at R Chest wall.  No palpitations  GASTROINTESTINAL: No abd pain, No nausea, No vomiting, No hematemesis, No diarrhea, + constipation. No melena, No hematochezia.  GENITOURINARY: No dysuria, No  hematuria  NEUROLOGICAL: No diplopia, No paresthesia, No motor dysfunction  MUSCULOSKELETAL: No arthralgia, No myalgia  SKIN: +  rash, or lesions       All other review of systems is negative unless indicated above    Vital Signs Last 24 Hrs  T(C): 36.5 (22 Sep 2017 15:14), Max: 37.1 (22 Sep 2017 10:05)  T(F): 97.7 (22 Sep 2017 15:14), Max: 98.8 (22 Sep 2017 10:05)  HR: 60 (22 Sep 2017 15:14) (59 - 60)  BP: 151/52 (22 Sep 2017 15:14) (135/57 - 177/64)  RR: 18 (22 Sep 2017 15:14) (15 - 18)  SpO2: 96% (22 Sep 2017 15:14) (91% - 99%)    PHYSICAL EXAM:    GENERAL: NAD, Well nourished  HEENT:  NC/AT, EOMI, PERRLA, No scleral icterus, Moist mucous membranes  NECK: Supple, No JVD  CNS:  Alert & Oriented X3, Motor Strength 5/5 B/L upper and lower extremities  LUNG: Decreased BS on Rt base, no wheezing  or rales, + subQ crepitation, improved,  non tender  HEART: RRR; No murmurs  ABDOMEN: +BS, ST/ND/NT  GENITOURINARY: Voiding, no suprapubic tenderness  EXTREMITIES:  2+ Peripheral Pulses, No  edema  MUSCULOSKELTAL: Joints normal ROM, No TTP, No effusion  SKIN: + R chest wall linear vesicular rash, mostly dry vesicles on the back, multiple  new  vesicles on anterior chest       LABS:                                      13.1   8.5   )-----------( 159      ( 25 Sep 2017 06:21 )             37.9     09-25    140  |  108  |  26<H>  ----------------------------<  134<H>  3.9   |  24  |  2.03<H>    Ca    8.6      25 Sep 2017 06:21    TPro  6.2  /  Alb  3.1<L>  /  TBili  0.6  /  DBili  x   /  AST  11<L>  /  ALT  14  /  AlkPhos  68  09-25    LIVER FUNCTIONS - ( 25 Sep 2017 06:21 )  Alb: 3.1 g/dL / Pro: 6.2 gm/dL / ALK PHOS: 68 U/L / ALT: 14 U/L / AST: 11 U/L / GGT: x             MEDICATIONS  (STANDING):  finasteride 5 milliGRAM(s) Oral daily  aspirin enteric coated 81 milliGRAM(s) Oral daily  tamsulosin 0.4 milliGRAM(s) Oral at bedtime  ranolazine 500 milliGRAM(s) Oral two times a day  diltiazem    milliGRAM(s) Oral daily  labetalol 200 milliGRAM(s) Oral three times a day  cyanocobalamin 1000 MICROGram(s) Oral daily  cholecalciferol 2000 Unit(s) Oral daily  heparin  Injectable 5000 Unit(s) SubCutaneous every 8 hours  cefTRIAXone   IVPB      cefTRIAXone   IVPB 1 Gram(s) IV Intermittent every 24 hours  valACYclovir 500 milliGRAM(s) Oral every 12 hours  docusate sodium 100 milliGRAM(s) Oral three times a day  influenza   Vaccine 0.5 milliLiter(s) IntraMuscular once  polyethylene glycol 3350 17 Gram(s) Oral daily    MEDICATIONS  (PRN):  acetaminophen   Tablet. 650 milliGRAM(s) Oral every 6 hours PRN Mild Pain (1 - 3)  morphine  - Injectable 2 milliGRAM(s) IV Push every 4 hours PRN Moderate Pain (4 - 6)              Assessment and Plan:    1. SOB secondary to right large recurrent  hydropneumothorax, trapped lung  - S/P CT placement, management as per CT Sx, + mild air leak   - some subQ emphysema   - c/w  pulse ox monitoring, supplement with O2 PRN, stable on RA  - Repeat CXR noted, PNTX improved, still collapsed lung  - Incentive spirometry   - CX neg  - C/w IV Ceftriaxone for empiric  PNA coverage  - D/w CT Sx team       2. SHRUTHI/ CKD stage 3  - baseline Cr 1.8  - BUN/CR starte trending down on IVF   - hold  lasix  - D/c  IVF  - avoid nephrotoxic medications  -Bladder scan 200ml   - Bldder/Kidneys US ordered  - Renal f/u appreciated     3. h/o CAD  - continue ASA, BB, statin, ranexa  - D/w DR Carranza     4. SSS s/p PPM  - A and AV paced. SR underline   - interrogated, Normal Fx     5. HTN  - continue home meds: diltiazem, labetalol, hold  furosemide for now     6. BPH: cw Flomax, Proscar    7. Rash, likely Shingles  - suggest hitting pad made it worse  -C/w  Valacyclovir 500mg PO BID, renally dosed   - D/w Dr Brown     Dispo: Monitor Renal FX

## 2017-09-25 NOTE — PROGRESS NOTE ADULT - ASSESSMENT
Right hydropneumothorax, improved. Chest tube with air leak.  1. Continue chest tube to water seal.  Case discussed with Dr. Reyes

## 2017-09-25 NOTE — PROGRESS NOTE ADULT - SUBJECTIVE AND OBJECTIVE BOX
Patient is a 85y Male whom presented to the hospital with history of high cholesterol, hypertension, sick sinus syndrome status post permanent pacemaker placement in the past, coronary artery disease, CKD baseline near 1.8 mg/dl, history of chronic moderate right pleural effusion and history of right hydropneumothorax in the past status post pleurodesis and wedge resection in the past here with pain and diagnosed to have large right hydro-pneumothorax now s/p catheter placement. Patient reports good UOP, bladder scan with 250? No nsaid use, no chest pain or SOB. Has been getting some IVF and some oral diuretics as well     9/25 - improved SOB, good appetite, on IVF    MEDICATIONS  (STANDING):  finasteride 5 milliGRAM(s) Oral daily  aspirin enteric coated 81 milliGRAM(s) Oral daily  tamsulosin 0.4 milliGRAM(s) Oral at bedtime  ranolazine 500 milliGRAM(s) Oral two times a day  diltiazem    milliGRAM(s) Oral daily  labetalol 200 milliGRAM(s) Oral three times a day  cyanocobalamin 1000 MICROGram(s) Oral daily  cholecalciferol 2000 Unit(s) Oral daily  heparin  Injectable 5000 Unit(s) SubCutaneous every 8 hours  cefTRIAXone   IVPB      cefTRIAXone   IVPB 1 Gram(s) IV Intermittent every 24 hours  valACYclovir 500 milliGRAM(s) Oral every 12 hours  docusate sodium 100 milliGRAM(s) Oral three times a day  influenza   Vaccine 0.5 milliLiter(s) IntraMuscular once  polyethylene glycol 3350 17 Gram(s) Oral daily        Allergies    amlodipine (Unknown)  Crestor (Other)  Lipitor (Unknown)    Intolerances    REVIEW OF SYSTEMS:    CONSTITUTIONAL: No weakness, fevers or chills  EYES/ENT: No visual changes;  No vertigo or throat pain   NECK: No pain or stiffness  RESPIRATORY: No cough, wheezing, hemoptysis; + shortness of breath (improving)  CARDIOVASCULAR: No chest pain or palpitations  GASTROINTESTINAL: No abdominal or epigastric pain. No nausea, vomiting, or hematemesis; No diarrhea or constipation. No melena or hematochezia.  GENITOURINARY: No dysuria, frequency or hematuria  NEUROLOGICAL: No numbness or weakness  SKIN: No itching, burning, rashes, or lesions   All other review of systems is negative unless indicated above.      Vital Signs Last 24 Hrs  T(C): 37 (25 Sep 2017 04:05), Max: 37 (25 Sep 2017 04:05)  T(F): 98.6 (25 Sep 2017 04:05), Max: 98.6 (25 Sep 2017 04:05)  HR: 58 (25 Sep 2017 04:05) (58 - 62)  BP: 140/373 (25 Sep 2017 04:05) (140/373 - 167/69)  BP(mean): --  RR: 18 (25 Sep 2017 04:05) (18 - 18)  SpO2: 96% (25 Sep 2017 04:05) (93% - 97%)  Daily     Daily   I&O's Summary    24 Sep 2017 07:01  -  25 Sep 2017 07:00  --------------------------------------------------------  IN: 0 mL / OUT: 552 mL / NET: -552 mL        PHYSICAL EXAM:    Constitutional: NAD, well-groomed, well-developed  HEENT: PERRLA, EOMI,  MMM  Neck: No LAD, No JVD  Respiratory: R chest tube with decreased BS at base, L trace crackles at base  Cardiovascular: S1 and S2, RRR  Gastrointestinal: BS+, soft, NT/ND  Extremities: trace peripheral edema  Neurological: A/O x 3, no focal deficits  Psychiatric: Normal mood, normal affect  : No Beaver  Skin: No rashes  Access: Not applicable        LABS:                                   13.1   8.5   )-----------( 159      ( 25 Sep 2017 06:21 )             37.9                         13.0   11.2  )-----------( 165      ( 24 Sep 2017 06:22 )             39.2     140    |  108    |  26     ----------------------------<  134       25 Sep 2017 06:21  3.9     |  24     |  2.03     137    |  107    |  34     ----------------------------<  151       24 Sep 2017 06:22  4.6     |  25     |  2.21     138    |  106    |  29     ----------------------------<  127       23 Sep 2017 06:23  4.4     |  24     |  1.96     Ca    8.6        25 Sep 2017 06:21  Ca    8.1        24 Sep 2017 06:22        TPro  6.2    /  Alb  3.1    /  TBili  0.6    /        25 Sep 2017 06:21  DBili  x      /  AST  11     /  ALT  14     /  AlkPhos  68         22 Sep 2017 06:15    142    |  107    |  31     ----------------------------<  118    4.2     |  28     |  1.89   21 Sep 2017 09:16    142    |  109    |  31     ----------------------------<  117    4.4     |  24     |  1.80     Ca    8.1        24 Sep 2017 06:22  Ca    8.5        23 Sep 2017 06:23  Ca    8.3        22 Sep 2017 06:15  Ca    8.3        21 Sep 2017 09:16    TPro  6.9    /  Alb  3.8    /  TBili  0.8    /  DBili  x      /  AST  13     /  ALT  15     /  AlkPhos  67     21 Sep 2017 09:16          Urine Studies:          RADIOLOGY & ADDITIONAL STUDIES:

## 2017-09-25 NOTE — PROGRESS NOTE ADULT - ASSESSMENT
1) Pleuritic Chest Pain  2) Trapped Lung  3) Abnormal CT Chest  4) Recent Pneumonia  5) Shortness of Breath  6) Parapneumonic effusion    86 yo Pt is non smoker.s/p right VATS decortication done by Dr Reyes 5/2016 for recurrent pleural effusions in the setting of trapped lung.  Patient was recently treated in my office on 9/14 for worsening cough, dyspnea and subjective fevers/rigors so I had prescribed 5 day course of Z-Pack and Prednisone 40mg for 5 days. He states that his cough and fevers resolved but the dyspnea was associated with pleuritic chest pain in the right side so he presented to the ED.   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  In the past, he states that thoracentesis/thoracostomy has helped.  Patient to undergo IR guided pigtail  Follow up fluid studies    9/23  - Patient s/p IR guided thoracostomy, denies any issues  - Studies show a ph7.25/glucose 55/pleural LDHelevated, no organisms, eosinophils not elevated but fluid sanguinous.   - This could be suggestive of parapneumonic effusion, will start IV Ceftriaxone 1g daily for 5-7 days  - Primary team to discuss treatment with ID for shingles     9/24  Patient with chest tube with non-expandable lung, fluid seems to be drained optimally but CXR shows subcutaneous emphysema and air leak present in pleurvac.  Continue to leave on water seal  Continue IV Ceftriaxone  Will check again in AM    9/25  Patient with chest tube with non-expandable lung, fluid seems to be drained optimally and an air leak was present on 9/24. This may represent the non-expandable space but previous CXR suggested subcutaneous emphysema.  I have ordered a repeat CXR for today.  Will continue to follow     Thank you for the consult

## 2017-09-26 LAB
ANION GAP SERPL CALC-SCNC: 8 MMOL/L — SIGNIFICANT CHANGE UP (ref 5–17)
BUN SERPL-MCNC: 22 MG/DL — SIGNIFICANT CHANGE UP (ref 7–23)
CALCIUM SERPL-MCNC: 8.6 MG/DL — SIGNIFICANT CHANGE UP (ref 8.5–10.1)
CHLORIDE SERPL-SCNC: 109 MMOL/L — HIGH (ref 96–108)
CO2 SERPL-SCNC: 23 MMOL/L — SIGNIFICANT CHANGE UP (ref 22–31)
CREAT SERPL-MCNC: 1.86 MG/DL — HIGH (ref 0.5–1.3)
GLUCOSE SERPL-MCNC: 102 MG/DL — HIGH (ref 70–99)
POTASSIUM SERPL-MCNC: 3.7 MMOL/L — SIGNIFICANT CHANGE UP (ref 3.5–5.3)
POTASSIUM SERPL-SCNC: 3.7 MMOL/L — SIGNIFICANT CHANGE UP (ref 3.5–5.3)
SODIUM SERPL-SCNC: 140 MMOL/L — SIGNIFICANT CHANGE UP (ref 135–145)

## 2017-09-26 PROCEDURE — 32552 REMOVE LUNG CATHETER: CPT

## 2017-09-26 PROCEDURE — 99232 SBSQ HOSP IP/OBS MODERATE 35: CPT | Mod: 25

## 2017-09-26 PROCEDURE — 76770 US EXAM ABDO BACK WALL COMP: CPT | Mod: 26

## 2017-09-26 PROCEDURE — 71010: CPT | Mod: 26

## 2017-09-26 RX ORDER — FUROSEMIDE 40 MG
20 TABLET ORAL DAILY
Qty: 0 | Refills: 0 | Status: DISCONTINUED | OUTPATIENT
Start: 2017-09-26 | End: 2017-09-27

## 2017-09-26 RX ADMIN — VALACYCLOVIR 500 MILLIGRAM(S): 500 TABLET, FILM COATED ORAL at 17:45

## 2017-09-26 RX ADMIN — Medication 20 MILLIGRAM(S): at 11:07

## 2017-09-26 RX ADMIN — VALACYCLOVIR 500 MILLIGRAM(S): 500 TABLET, FILM COATED ORAL at 05:46

## 2017-09-26 RX ADMIN — TAMSULOSIN HYDROCHLORIDE 0.4 MILLIGRAM(S): 0.4 CAPSULE ORAL at 21:52

## 2017-09-26 RX ADMIN — Medication 100 MILLIGRAM(S): at 21:52

## 2017-09-26 RX ADMIN — MORPHINE SULFATE 2 MILLIGRAM(S): 50 CAPSULE, EXTENDED RELEASE ORAL at 21:52

## 2017-09-26 RX ADMIN — Medication 200 MILLIGRAM(S): at 05:49

## 2017-09-26 RX ADMIN — RANOLAZINE 500 MILLIGRAM(S): 500 TABLET, FILM COATED, EXTENDED RELEASE ORAL at 05:46

## 2017-09-26 RX ADMIN — CEFTRIAXONE 100 GRAM(S): 500 INJECTION, POWDER, FOR SOLUTION INTRAMUSCULAR; INTRAVENOUS at 13:56

## 2017-09-26 RX ADMIN — Medication 180 MILLIGRAM(S): at 05:49

## 2017-09-26 RX ADMIN — Medication 200 MILLIGRAM(S): at 13:56

## 2017-09-26 RX ADMIN — POLYETHYLENE GLYCOL 3350 17 GRAM(S): 17 POWDER, FOR SOLUTION ORAL at 11:08

## 2017-09-26 RX ADMIN — RANOLAZINE 500 MILLIGRAM(S): 500 TABLET, FILM COATED, EXTENDED RELEASE ORAL at 17:45

## 2017-09-26 RX ADMIN — Medication 81 MILLIGRAM(S): at 11:07

## 2017-09-26 RX ADMIN — HEPARIN SODIUM 5000 UNIT(S): 5000 INJECTION INTRAVENOUS; SUBCUTANEOUS at 13:56

## 2017-09-26 RX ADMIN — HEPARIN SODIUM 5000 UNIT(S): 5000 INJECTION INTRAVENOUS; SUBCUTANEOUS at 05:46

## 2017-09-26 RX ADMIN — Medication 2000 UNIT(S): at 11:07

## 2017-09-26 RX ADMIN — PREGABALIN 1000 MICROGRAM(S): 225 CAPSULE ORAL at 11:07

## 2017-09-26 RX ADMIN — FINASTERIDE 5 MILLIGRAM(S): 5 TABLET, FILM COATED ORAL at 11:08

## 2017-09-26 RX ADMIN — Medication 100 MILLIGRAM(S): at 13:56

## 2017-09-26 RX ADMIN — Medication 100 MILLIGRAM(S): at 05:46

## 2017-09-26 RX ADMIN — HEPARIN SODIUM 5000 UNIT(S): 5000 INJECTION INTRAVENOUS; SUBCUTANEOUS at 21:52

## 2017-09-26 RX ADMIN — Medication 200 MILLIGRAM(S): at 21:52

## 2017-09-26 NOTE — PROGRESS NOTE ADULT - SUBJECTIVE AND OBJECTIVE BOX
Patient is a 85y old  Male who presents with a chief complaint of SOB (21 Sep 2017 15:34)      HPI:  84 yo male with PMH of CAD, HTN, HLD, BPH, SSS s/p PPM, CKD stage III, h/o SVT, h/o right pleural effusions seen by me as an outpatient.  Presents with recurrent pleuritic chest pain, worse on the right side.  Recently seen for CAP, treated with ABX/PO Prednisone and I also had repeated a CT chest at that time.  He presented to the ER on 9/21 for a pleurisy.   No fevers, chills, headaches, palpitations, abd pain, N/V, diarrhea, dizziness.  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 Denies any shingles history    9/24  Patient has no shortness of breath  Pleurvac on water seal, Air leak present    9/25  Patient is asleep  water chamber not showing air leak    9/26  Patient states he feels better today (Denies chest pain)  On expiration, slight air leak in water chamber  States that he would like to walk around to see if he feels more short of breath      PAST MEDICAL & SURGICAL HISTORY:  Spinal stenosis  CKD (chronic kidney disease), stage III  SSS (sick sinus syndrome)  Pleural effusion  Hyperlipidemia  BPH (benign prostatic hyperplasia)  CAD (coronary artery disease)  Hypertension  Status post lumbar spinal fusion      PREVIOUS DIAGNOSTIC TESTING:      MEDICATIONS  (STANDING):  MEDICATIONS  (STANDING):  finasteride 5 milliGRAM(s) Oral daily  aspirin enteric coated 81 milliGRAM(s) Oral daily  tamsulosin 0.4 milliGRAM(s) Oral at bedtime  ranolazine 500 milliGRAM(s) Oral two times a day  diltiazem    milliGRAM(s) Oral daily  labetalol 200 milliGRAM(s) Oral three times a day  cyanocobalamin 1000 MICROGram(s) Oral daily  cholecalciferol 2000 Unit(s) Oral daily  heparin  Injectable 5000 Unit(s) SubCutaneous every 8 hours  cefTRIAXone   IVPB      cefTRIAXone   IVPB 1 Gram(s) IV Intermittent every 24 hours  valACYclovir 500 milliGRAM(s) Oral every 12 hours  docusate sodium 100 milliGRAM(s) Oral three times a day  influenza   Vaccine 0.5 milliLiter(s) IntraMuscular once  polyethylene glycol 3350 17 Gram(s) Oral daily    MEDICATIONS  (PRN):  acetaminophen   Tablet. 650 milliGRAM(s) Oral every 6 hours PRN Mild Pain (1 - 3)  morphine  - Injectable 2 milliGRAM(s) IV Push every 4 hours PRN Moderate Pain (4 - 6)      FAMILY HISTORY:  No pertinent family history in first degree relatives      SOCIAL HISTORY: lives at home, born in [place]    REVIEW OF SYSTEM:  Shortness of breath, chest pain, otherwise 12 point ROS negative    Allergic/Immunologic: negative for anaphylaxis, angioedema and urticaria    Vital Signs Last 24 Hrs  T(C): 37.1 (22 Sep 2017 10:05), Max: 37.1 (22 Sep 2017 10:05)  T(F): 98.8 (22 Sep 2017 10:05), Max: 98.8 (22 Sep 2017 10:05)  HR: 59 (22 Sep 2017 10:05) (59 - 60)  BP: 135/57 (22 Sep 2017 10:05) (135/57 - 177/64)  BP(mean): --  RR: 17 (22 Sep 2017 10:05) (15 - 19)  SpO2: 92% (22 Sep 2017 10:05) (91% - 100%)    I&O's Summary    PHYSICAL EXAM  General Appearance: cooperative, no acute distress,   HEENT: PERRL, conjunctiva clear, EOM's intact, non injected pharynx, no exudate, TM   normal  Neck: Supple, , no adenopathy, thyroid: not enlarged, no carotid bruit or JVD  Back: Symmetric, no  tenderness,no soft tissue tenderness  Lungs: Decreased breath sounds right hemithorax, Right pigtail in place  Heart: Regular rate and rhythm, S1, S2 normal, no murmur, rub or gallop  Abdomen: Soft, non-tender, bowel sounds active , no hepatosplenomegaly  Extremities: no cyanosis or edema, no joint swelling  Skin: Skin color, texture normal, no rashes   Neurologic: Alert and oriented X3 , cranial nerves intact, sensory and motor normal,    ECG:    LABS:                          13.1   8.5   )-----------( 159      ( 25 Sep 2017 06:21 )             37.9   09-24    137  |  107  |  34<H>  ----------------------------<  151<H>  4.6   |  25  |  2.21<H>    Ca    8.1<L>      24 Sep 2017 06:22          Pro BNP  238 09-21 @ 09:16  D Dimer  -- 09-21 @ 09:16    PT/INR - ( 22 Sep 2017 06:15 )   PT: 11.3 sec;   INR: 1.05 ratio         PTT - ( 21 Sep 2017 09:16 )  PTT:23.9 sec          RADIOLOGY & ADDITIONAL STUDIES:    CT chest images/report reviewed by me  My interpretation: Non-expandable right hemithorax, increased pleural fluid

## 2017-09-26 NOTE — PROGRESS NOTE ADULT - SUBJECTIVE AND OBJECTIVE BOX
Patient is a 85y Male whom presented to the hospital with history of high cholesterol, hypertension, sick sinus syndrome status post permanent pacemaker placement in the past, coronary artery disease, CKD baseline near 1.8 mg/dl, history of chronic moderate right pleural effusion and history of right hydropneumothorax in the past status post pleurodesis and wedge resection in the past here with pain and diagnosed to have large right hydro-pneumothorax now s/p catheter placement. Patient reports good UOP, bladder scan with 250? No nsaid use, no chest pain or SOB. Has been getting some IVF and some oral diuretics as well     9/25 - improved SOB, good appetite, on IVF  9/26 - off IVF, for CT removal today, wants to ambulate, no sob    MEDICATIONS  (STANDING):  finasteride 5 milliGRAM(s) Oral daily  aspirin enteric coated 81 milliGRAM(s) Oral daily  tamsulosin 0.4 milliGRAM(s) Oral at bedtime  ranolazine 500 milliGRAM(s) Oral two times a day  diltiazem    milliGRAM(s) Oral daily  labetalol 200 milliGRAM(s) Oral three times a day  cyanocobalamin 1000 MICROGram(s) Oral daily  cholecalciferol 2000 Unit(s) Oral daily  heparin  Injectable 5000 Unit(s) SubCutaneous every 8 hours  cefTRIAXone   IVPB      cefTRIAXone   IVPB 1 Gram(s) IV Intermittent every 24 hours  valACYclovir 500 milliGRAM(s) Oral every 12 hours  docusate sodium 100 milliGRAM(s) Oral three times a day  influenza   Vaccine 0.5 milliLiter(s) IntraMuscular once  polyethylene glycol 3350 17 Gram(s) Oral daily      Allergies    amlodipine (Unknown)  Crestor (Other)  Lipitor (Unknown)    Intolerances    REVIEW OF SYSTEMS:    CONSTITUTIONAL: No weakness, fevers or chills  EYES/ENT: No visual changes;  No vertigo or throat pain   NECK: No pain or stiffness  RESPIRATORY: No cough, wheezing, hemoptysis; + shortness of breath (improving)  CARDIOVASCULAR: No chest pain or palpitations  GASTROINTESTINAL: No abdominal or epigastric pain. No nausea, vomiting, or hematemesis; No diarrhea or constipation. No melena or hematochezia.  GENITOURINARY: No dysuria, frequency or hematuria  NEUROLOGICAL: No numbness or weakness  SKIN: No itching, burning, rashes, or lesions   All other review of systems is negative unless indicated above.      Vital Signs Last 24 Hrs  T(C): 36.8 (25 Sep 2017 18:30), Max: 36.9 (25 Sep 2017 10:05)  T(F): 98.3 (25 Sep 2017 18:30), Max: 98.4 (25 Sep 2017 10:05)  HR: 60 (26 Sep 2017 05:53) (58 - 65)  BP: 160/46 (26 Sep 2017 05:53) (118/28 - 160/46)  BP(mean): --  RR: 18 (25 Sep 2017 10:05) (18 - 18)  SpO2: 92% (25 Sep 2017 10:05) (92% - 92%)  Daily     Daily   I&O's Summary        PHYSICAL EXAM:    Constitutional: NAD, well-groomed, well-developed  HEENT: PERRLA, EOMI,  MMM  Neck: No LAD, No JVD  Respiratory: R chest tube with decreased BS at base (minimal), L trace crackles at base  Cardiovascular: S1 and S2, RRR  Gastrointestinal: BS+, soft, NT/ND  Extremities: no peripheral edema  Neurological: A/O x 3, no focal deficits  Psychiatric: Normal mood, normal affect  : No Beaver  Skin: No rashes  Access: Not applicable        LABS:                               13.1   8.5   )-----------( 159      ( 25 Sep 2017 06:21 )             37.9                         13.0   11.2  )-----------( 165      ( 24 Sep 2017 06:22 )             39.2   09-26    140  |  109<H>  |  22  ----------------------------<  102<H>  3.7   |  23  |  1.86<H>    Ca    8.6      26 Sep 2017 05:45    TPro  6.2  /  Alb  3.1<L>  /  TBili  0.6  /  DBili  x   /  AST  11<L>  /  ALT  14  /  AlkPhos  68  09-25    140    |  108    |  26     ----------------------------<  134       25 Sep 2017 06:21  3.9     |  24     |  2.03     137    |  107    |  34     ----------------------------<  151       24 Sep 2017 06:22  4.6     |  25     |  2.21     138    |  106    |  29     ----------------------------<  127       23 Sep 2017 06:23  4.4     |  24     |  1.96     Ca    8.6        25 Sep 2017 06:21  Ca    8.1        24 Sep 2017 06:22        TPro  6.2    /  Alb  3.1    /  TBili  0.6    /        25 Sep 2017 06:21  DBili  x      /  AST  11     /  ALT  14     /  AlkPhos  68         22 Sep 2017 06:15    142    |  107    |  31     ----------------------------<  118    4.2     |  28     |  1.89   21 Sep 2017 09:16    142    |  109    |  31     ----------------------------<  117    4.4     |  24     |  1.80     Ca    8.1        24 Sep 2017 06:22  Ca    8.5        23 Sep 2017 06:23  Ca    8.3        22 Sep 2017 06:15  Ca    8.3        21 Sep 2017 09:16    TPro  6.9    /  Alb  3.8    /  TBili  0.8    /  DBili  x      /  AST  13     /  ALT  15     /  AlkPhos  67     21 Sep 2017 09:16          Urine Studies:          RADIOLOGY & ADDITIONAL STUDIES:

## 2017-09-26 NOTE — PROGRESS NOTE ADULT - ASSESSMENT
85 with history of high cholesterol, hypertension, sick sinus syndrome status post permanent pacemaker placement in the past, coronary artery disease, CKD baseline near 1.8 mg/dl follows with Dr. Pastrana, history of chronic moderate right pleural effusion and history of right hydropneumothorax here with recurrence of the same, renal evaluation of CKD/Rise in renal function. Etiology of rise in renal function unclear, still mild pre-renal state. Urinary retention?    CKD/SHRUTHI  -Formal US kidney r/o retention - ordered, bladder scan with 200ml, renal function improving daily, now at baseline 1.8  -Hold lasix for now  -Urine studies/lytes  -NSAID avoidance  -  off IVF    PNA/Hydropneumothorax  -Abx per medical team/ID  -CT surgery following  - s/p CTube - for removal

## 2017-09-26 NOTE — PROCEDURE NOTE - ADDITIONAL PROCEDURE DETAILS
CXR reviewed, no significant change with tube clamped.  Area cleansed with chlorhexidine, pigtail released, right pigtail removed without complication.  Occlusive dressing placed.  Get post tube removal CXR
6.5 years remaining on battery  Total  41.3%

## 2017-09-26 NOTE — PROGRESS NOTE ADULT - SUBJECTIVE AND OBJECTIVE BOX
Pt seen, no new complaints.  Says his breathing is improved.  Pigtail output 0cc last night, there is a small air leak.  On water seal.    MEDICATIONS  (STANDING):  finasteride 5 milliGRAM(s) Oral daily  aspirin enteric coated 81 milliGRAM(s) Oral daily  tamsulosin 0.4 milliGRAM(s) Oral at bedtime  ranolazine 500 milliGRAM(s) Oral two times a day  diltiazem    milliGRAM(s) Oral daily  labetalol 200 milliGRAM(s) Oral three times a day  cyanocobalamin 1000 MICROGram(s) Oral daily  cholecalciferol 2000 Unit(s) Oral daily  heparin  Injectable 5000 Unit(s) SubCutaneous every 8 hours  cefTRIAXone   IVPB      cefTRIAXone   IVPB 1 Gram(s) IV Intermittent every 24 hours  valACYclovir 500 milliGRAM(s) Oral every 12 hours  docusate sodium 100 milliGRAM(s) Oral three times a day  influenza   Vaccine 0.5 milliLiter(s) IntraMuscular once  polyethylene glycol 3350 17 Gram(s) Oral daily  furosemide    Tablet 20 milliGRAM(s) Oral daily    MEDICATIONS  (PRN):  acetaminophen   Tablet. 650 milliGRAM(s) Oral every 6 hours PRN Mild Pain (1 - 3)  morphine  - Injectable 2 milliGRAM(s) IV Push every 4 hours PRN Moderate Pain (4 - 6)      Allergies    amlodipine (Unknown)  Crestor (Other)  Lipitor (Unknown)    Vital Signs Last 24 Hrs  T(C): 37.2 (26 Sep 2017 10:00), Max: 37.2 (26 Sep 2017 10:00)  T(F): 99 (26 Sep 2017 10:00), Max: 99 (26 Sep 2017 10:00)  HR: 60 (26 Sep 2017 10:00) (59 - 65)  BP: 147/44 (26 Sep 2017 10:00) (118/28 - 160/46)  BP(mean): --  RR: 16 (26 Sep 2017 10:00) (16 - 16)  SpO2: 95% (26 Sep 2017 10:00) (95% - 95%)    General: Comfortable, NAD  Cardiovascular: S1, S2  Respiratory: CTA  Chest tube: Outpu 80cc, small air leak.  On water seal.    LABS:                        13.1   8.5   )-----------( 159      ( 25 Sep 2017 06:21 )             37.9     09-26    140  |  109<H>  |  22  ----------------------------<  102<H>  3.7   |  23  |  1.86<H>    Ca    8.6      26 Sep 2017 05:45    TPro  6.2  /  Alb  3.1<L>  /  TBili  0.6  /  DBili  x   /  AST  11<L>  /  ALT  14  /  AlkPhos  68  09-25          RADIOLOGY & ADDITIONAL TESTS:  CXR today shows tiny basilar pneumotx.  Official report pending.

## 2017-09-26 NOTE — PROGRESS NOTE ADULT - ASSESSMENT
Dyspnea and right pleuritic chest pain because of recurrent right pleural effusion. He status post chest tube placement and he feels better.  Essential hypertension.  Sick sinus syndrome.  Chronic renal insufficiency.  Probable coronary artery disease and he has opted for medical management. No recent angina.  Suggest  Continue with current cardiac medications.  Restart low doses of lasix 20 mg PO Once daily.  Follow-up with electrolytes.  Continue with incentive spirometry.  DVT prophylaxis.  Discussed with hospitalist.  Discussed with patient's son .

## 2017-09-26 NOTE — PROGRESS NOTE ADULT - SUBJECTIVE AND OBJECTIVE BOX
Samuel Behar is a 85 y.o. male  he has a history of high cholesterol, hypertension, sick sinus syndrome status post permanent pacemaker placement in the past, coronary artery disease by CT scan of the chest, he has calcified coronaries on chest CT scan, chronic renal insufficiency, history of chronic moderate right pleural effusion and history of right hydropneumothorax in the past status post pleurodesis and wedge resection in the past . He was admitted with complains of right pleuritic chest pain, increasing shortness of breath for the past 2 weeks and after admission he was diagnosed to have large right hydro-pneumothorax and he status post chest tube placement. No real change in his clinical status, he feels better. He denies any chest pain or shortness of breath. His last some pain in the chest tube site. He is in atrial paced rhythm on telemetry. His blood pressure is mildly elevated.    : CT Chest No Cont 09.21.17   FINDINGS:    LOWER NECK: Within normal limits.  AXILLA, MEDIASTINUM AND CARLITA: No lymphadenopathy. Small hiatal hernia.  VESSELS: Atherosclerotic arterial calcifications, including diffuse   coronary artery calcification.  HEART: Heart size is normal.No pericardial effusion. Pacemaker leads   noted in the right atrium and right ventricle.  PLEURA: Large right-sided hydropneumothorax. There is no mass effect on   the heart or mediastinum.  Status post right upper lobe wedge resection.  LUNGS AND LARGE AIRWAYS: Patent central airways. No pulmonary nodules.   Subsegmental atelectasis in the lingula and right lower lobe.  VISUALIZED UPPER ABDOMEN: Bilateral renal cysts. Nonobstructing right   renal calculus. Cholelithiasis. Caudate lobe cyst in the liver.  BONES: Diffuse degenerative arthritic changes. 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 No acute bony   abnormality.  CHEST WALL:  Unremarkable    IMPRESSION: Large right hydropneumothorax.    Echo jUNE 2017   --There is mild left ventricular hypertrophy.  --Global LV wall motion is normal.  --The left ventricular filling pattern is consistent with abnormal relaxation.  --The left ventricular ejection fraction is normal at 60-65%.  --There is a pacemaker in the right heart.  --Aortic valve is trileaflet. The aortic valve is mildly calcified.  --There is mild mitral annular calcification. There is mild mitral valve thickening. There is   trace mitral regurgitation.  --There is mild tricuspid regurgitation.  --There is trace pulmonic regurgitation.  --The right atrial pressure is 6 - 10 mm Hg. There is no pulmonary hypertension.  --There is no pericardial effusion.      PAST MEDICAL & SURGICAL HISTORY:  Spinal stenosis  CKD (chronic kidney disease), stage III  SSS (sick sinus syndrome)  Pleural effusion  Hyperlipidemia  BPH (benign prostatic hyperplasia)  CAD (coronary artery disease)  Hypertension  Status post lumbar spinal fusion    PAST MEDICAL & SURGICAL HISTORY:  Spinal stenosis  CKD (chronic kidney disease), stage III  SSS (sick sinus syndrome)  Pleural effusion  Hyperlipidemia  BPH (benign prostatic hyperplasia)  CAD (coronary artery disease)  Hypertension  Status post lumbar spinal fusion    MEDICATIONS  (STANDING):  finasteride 5 milliGRAM(s) Oral daily  aspirin enteric coated 81 milliGRAM(s) Oral daily  tamsulosin 0.4 milliGRAM(s) Oral at bedtime  ranolazine 500 milliGRAM(s) Oral two times a day  diltiazem    milliGRAM(s) Oral daily  labetalol 200 milliGRAM(s) Oral three times a day  cyanocobalamin 1000 MICROGram(s) Oral daily  cholecalciferol 2000 Unit(s) Oral daily  heparin  Injectable 5000 Unit(s) SubCutaneous every 8 hours  cefTRIAXone   IVPB      cefTRIAXone   IVPB 1 Gram(s) IV Intermittent every 24 hours  valACYclovir 500 milliGRAM(s) Oral every 12 hours  docusate sodium 100 milliGRAM(s) Oral three times a day  influenza   Vaccine 0.5 milliLiter(s) IntraMuscular once  polyethylene glycol 3350 17 Gram(s) Oral daily  furosemide    Tablet 20 milliGRAM(s) Oral daily    MEDICATIONS  (PRN):  acetaminophen   Tablet. 650 milliGRAM(s) Oral every 6 hours PRN Mild Pain (1 - 3)  morphine  - Injectable 2 milliGRAM(s) IV Push every 4 hours PRN Moderate Pain (4 - 6)        REVIEW OF SYSTEM:  Pertinent items are noted in HPI.  Constitutional	Negative for chills, fevers, sweats.    Eyes: 	Negative for visual disturbance.  Ears, nose, mouth, throat, and face: Negative for epistaxis, nasal congestion, sore throat and tinnitus.  Neck:	Negative for enlargement, pain and difficulty in swallowing  Respiration : Negative for cough, and wheezing . He has mildly right pleuritic chest pain.  Cardiovascular: Negative for chest pain, and palpitations    Gastrointestinal : Negative for abdominal pain, diarrhea, nausea and vomiting  Genitourinary: Negative for dysuria, frequency and urinary incontinence .  Skin: Negative for  rash, pruritus, swelling, dryness .  	  Hematologic/lymphatic: Negative for bleeding and easy bruising  Musculoskeletal: Negative for arthralgias, back pain and muscle weakness.  Neurological: Negative for dizziness, headaches, seizures and tremors  Behavioral/Psych: Negative for mood change, depression.  Endocrine:	Negative for blurry vision, polydipsia and polyuria, diaphoresis.   Allergic/Immunologic:	Negative for anaphylaxis, angioedema and urticaria.      Vital Signs Last 24 Hrs  T(C): 36.8 (25 Sep 2017 18:30), Max: 36.9 (25 Sep 2017 10:05)  T(F): 98.3 (25 Sep 2017 18:30), Max: 98.4 (25 Sep 2017 10:05)  HR: 60 (26 Sep 2017 05:53) (58 - 65)  BP: 160/46 (26 Sep 2017 05:53) (118/28 - 160/46)  BP(mean): --  RR: 18 (25 Sep 2017 10:05) (18 - 18)  SpO2: 92% (25 Sep 2017 10:05) (92% - 92%)    I&O's Summary    25 Sep 2017 07:01  -  26 Sep 2017 07:00  --------------------------------------------------------  IN: 295 mL / OUT: 415 mL / NET: -120 mL      PHYSICAL EXAM  General Appearance: cooperative, no acute distress,   HEENT: PERRL, conjunctiva clear, EOM's intact, non injected pharynx, no exudate .  Neck: Supple, , no adenopathy, thyroid: not enlarged, no carotid bruit or JVD  Back: Symmetric, no  tenderness,no soft tissue tenderness  Lungs:  Diminished breath sounds in right base with scattered right basilar rales and rhonchi .  Heart: Regular rate and rhythm, S1, S2 normal, grade 1/6 holosystolic murmur, no rub or gallop  Abdomen: Soft, non-tender, bowel sounds active , no hepatosplenomegaly  Extremities: no cyanosis or edema, no joint swelling  Skin: Skin color, texture normal, no rashes   Neurologic: Alert and oriented X3 , cranial nerves intact, sensory and motor normal,        INTERPRETATION OF TELEMETRY: Atrial ppacing          LABS:                          13.1   8.5   )-----------( 159      ( 25 Sep 2017 06:21 )             37.9     09-26    140  |  109<H>  |  22  ----------------------------<  102<H>  3.7   |  23  |  1.86<H>    Ca    8.6      26 Sep 2017 05:45    TPro  6.2  /  Alb  3.1<L>  /  TBili  0.6  /  DBili  x   /  AST  11<L>  /  ALT  14  /  AlkPhos  68  09-25          Pro BNP  222 09-24 @ 16:35  D Dimer  -- 09-24 @ 16:35  Pro BNP  238 09-21 @ 09:16  D Dimer  -- 09-21 @ 09:16

## 2017-09-26 NOTE — PROGRESS NOTE ADULT - ASSESSMENT
Right hydropneumothorax, tiny residual.  pigtail with small leak  1. clamped pigtail this morning to assess stability of pneumothorax  2. Repeat CXR, if stable will d/c tube  Case discussed with Dr. Reyes

## 2017-09-26 NOTE — PROGRESS NOTE ADULT - SUBJECTIVE AND OBJECTIVE BOX
86 yo male with PMH of CAD, HTN, HLD, BPH, SSS s/p PPM, CKD stage III, h/o SVT, h/o right pleural effusions and PTX presents to ED with complaint of SOB and right sided chest pain. Pt states he woke up this morning with dyspnea on exertion. No orthopnea or PND. States he did have right sided chest pain that wrapped around to right back this morning which has now resolved after receiving morphine. He had a recent outpatient CT 2 weeks ago which he states showed a little fluid on right side but no PTX. Denies fevers, chills, headaches, palpitations, abd pain, N/V, diarrhea, dizziness.  CT done revealed a large Rt effusion      9/23: Chart reviewed, Pt was seen and examined, Pt reports that Chest pain and SOB is better. No fevers. Pt was asked regartding  R chest wall rash, states that was applying hitting pads and burned then skin, also was using Icy/hot  patch which POC discussed.     9/24: Pt was seen and examined, states that does not believe that rash from shingles, but due to burn. Explained that the way it located  unlikely was able to burn  that way and also had new vesicles while in the hospital. Reports no pain or SOB. As per RN was dyspneic when was walking back from the bathroom. No fevers. No BMs x 3 days. No fevers, no CP.     9/25: Pt was seen and examined, reports no new complains, denies SOB.    9/26: Pt was seen and examined, reports feeling well, no SOB, no pain, wants to ambulate. No CP. + BMs     REVIEW OF SYSTEMS:  CONSTITUTIONAL: No weakness, No fevers or chills  ENT: No ear ache, No sore throat  NECK: No pain, No stiffness  RESPIRATORY: No cough, No wheezing, No hemoptysis; +dyspnea  CARDIOVASCULAR: No chest pain,  No palpitations  GASTROINTESTINAL: No abd pain, No nausea, No vomiting, No hematemesis, No diarrhea, NO constipation. No melena, No hematochezia.  GENITOURINARY: No dysuria, No  hematuria  NEUROLOGICAL: No diplopia, No paresthesia, No motor dysfunction  MUSCULOSKELETAL: No arthralgia, No myalgia  SKIN: +  rash, or lesions       All other review of systems is negative unless indicated above    Vital Signs Last 24 Hrs  T(C): 36.8 (25 Sep 2017 18:30), Max: 36.9 (25 Sep 2017 10:05)  T(F): 98.3 (25 Sep 2017 18:30), Max: 98.4 (25 Sep 2017 10:05)  HR: 60 (26 Sep 2017 05:53) (58 - 65)  BP: 160/46 (26 Sep 2017 05:53) (118/28 - 160/46  RR: 18 (25 Sep 2017 10:05) (18 - 18)  SpO2: 92% (25 Sep 2017 10:05) (92% - 92%)    PHYSICAL EXAM:    GENERAL: NAD, Well nourished  HEENT:  NC/AT, EOMI, PERRLA, No scleral icterus, Moist mucous membranes  NECK: Supple, No JVD  CNS:  Alert & Oriented X3, Motor Strength 5/5 B/L upper and lower extremities  LUNG: Decreased BS on Rt base, no wheezing  or rales, no  subQ crepitation,  non tender  HEART: RRR; No murmurs  ABDOMEN: +BS, ST/ND/NT  GENITOURINARY: Voiding, no suprapubic tenderness  EXTREMITIES:  2+ Peripheral Pulses, No  edema  MUSCULOSKELTAL: Joints normal ROM, No TTP, No effusion  SKIN: + R chest wall linear vesicular rash, mostly dry vesicles on the back, multiple  new  vesicles on anterior chest       LABS:                                              13.1   8.5   )-----------( 159      ( 25 Sep 2017 06:21 )             37.9     09-26    140  |  109<H>  |  22  ----------------------------<  102<H>  3.7   |  23  |  1.86<H>    Ca    8.6      26 Sep 2017 05:45    TPro  6.2  /  Alb  3.1<L>  /  TBili  0.6  /  DBili  x   /  AST  11<L>  /  ALT  14  /  AlkPhos  68  09-25  LIVER FUNCTIONS - ( 25 Sep 2017 06:21 )  Alb: 3.1 g/dL / Pro: 6.2 gm/dL / ALK PHOS: 68 U/L / ALT: 14 U/L / AST: 11 U/L / GGT: x             MEDICATIONS  (STANDING):  finasteride 5 milliGRAM(s) Oral daily  aspirin enteric coated 81 milliGRAM(s) Oral daily  tamsulosin 0.4 milliGRAM(s) Oral at bedtime  ranolazine 500 milliGRAM(s) Oral two times a day  diltiazem    milliGRAM(s) Oral daily  labetalol 200 milliGRAM(s) Oral three times a day  cyanocobalamin 1000 MICROGram(s) Oral daily  cholecalciferol 2000 Unit(s) Oral daily  heparin  Injectable 5000 Unit(s) SubCutaneous every 8 hours  cefTRIAXone   IVPB      cefTRIAXone   IVPB 1 Gram(s) IV Intermittent every 24 hours  valACYclovir 500 milliGRAM(s) Oral every 12 hours  docusate sodium 100 milliGRAM(s) Oral three times a day  influenza   Vaccine 0.5 milliLiter(s) IntraMuscular once  polyethylene glycol 3350 17 Gram(s) Oral daily    MEDICATIONS  (PRN):  acetaminophen   Tablet. 650 milliGRAM(s) Oral every 6 hours PRN Mild Pain (1 - 3)  morphine  - Injectable 2 milliGRAM(s) IV Push every 4 hours PRN Moderate Pain (4 - 6)    RADIOLOGY:    EXAM:  CHEST SINGLE VIEW FRONTAL                        PROCEDURE DATE:  09/25/2017      The right pigtail chest catheter remains in place. There is mild to   moderate, worsening amount of peripheral soft tissue emphysema. There is   a tiny stable basilar and apical pneumothorax. There is no recurrent   effusion. There is mild slightly worsening discoid atelectasis in the   left lung. A pacemaker isnoted. Heart is normal in size. There is no   kailyn central edema.                  Assessment and Plan:    1. SOB secondary to right large recurrent  hydropneumothorax, trapped lung  - S/P CT placement, management as per CT Sx, + mild air leak   - some subQ emphysema   -Clinically much improved   - c/w  pulse ox monitoring, supplement with O2 PRN, stable on RA  - Repeat CXR noted  - Incentive spirometry   - CX neg  - C/w IV Ceftriaxone for empiric  PNA coverage  - D/w CT Sx team      2. SHRUTHI/ CKD stage 3  - baseline Cr 1.8  - BUN/CR  trending down   - hold  lasix  - off   IVF  - avoid nephrotoxic medications  -Bladder scan 200ml   - Bldder/Kidneys US ordered  - D/w Renal     3. h/o CAD  - continue ASA, BB, statin, ranexa  - D/w DR Carranza     4. SSS s/p PPM  - A and AV paced. SR underline   - interrogated, Normal Fx     5. HTN  - continue home meds: diltiazem, labetalol, hold  furosemide for now     6. BPH: cw Flomax, Proscar    7. Rash, likely Shingles  - suggest hitting pad made it worse  -C/w  Valacyclovir 500mg PO BID, renally dosed

## 2017-09-26 NOTE — PROGRESS NOTE ADULT - ASSESSMENT
1) Pleuritic Chest Pain  2) Trapped Lung  3) Abnormal CT Chest  4) Recent Pneumonia  5) Shortness of Breath  6) Parapneumonic effusion    84 yo Pt is non smoker.s/p right VATS decortication done by Dr Reyes 5/2016 for recurrent pleural effusions in the setting of trapped lung.  Patient was recently treated in my office on 9/14 for worsening cough, dyspnea and subjective fevers/rigors so I had prescribed 5 day course of Z-Pack and Prednisone 40mg for 5 days. He states that his cough and fevers resolved but the dyspnea was associated with pleuritic chest pain in the right side so he presented to the ED.   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  In the past, he states that thoracentesis/thoracostomy has helped.  Patient to undergo IR guided pigtail  Follow up fluid studies    9/23  - Patient s/p IR guided thoracostomy, denies any issues  - Studies show a ph7.25/glucose 55/pleural LDHelevated, no organisms, eosinophils not elevated but fluid sanguinous.   - This could be suggestive of parapneumonic effusion, will start IV Ceftriaxone 1g daily for 5-7 days  - Primary team to discuss treatment with ID for shingles     9/24  Patient with chest tube with non-expandable lung, fluid seems to be drained optimally but CXR shows subcutaneous emphysema and air leak present in pleurvac.  Continue to leave on water seal  Continue IV Ceftriaxone  Will check again in AM    9/25  Patient with chest tube with non-expandable lung, fluid seems to be drained optimally and an air leak was present on 9/24. This may represent the non-expandable space but previous CXR suggested subcutaneous emphysema.  I have ordered a repeat CXR for today.  Will continue to follow     9/26  Patient with chest tube for trapped lung and increased pleural fluid  CXR suggested subcutaneous emphysema (repeat CXR on 9/26 showed the same), findings are suggestive of pigtail possibly in the lung.   CTS planning on removing tube today.  Last day of ceftriaxone will be 9/27    Thank you for the consult 1) Pleuritic Chest Pain  2) Trapped Lung  3) Abnormal CT Chest  4) Recent Pneumonia  5) Shortness of Breath  6) Parapneumonic effusion    84 yo Pt is non smoker.s/p right VATS decortication done by Dr Reyes 5/2016 for recurrent pleural effusions in the setting of trapped lung.  Patient was recently treated in my office on 9/14 for worsening cough, dyspnea and subjective fevers/rigors so I had prescribed 5 day course of Z-Pack and Prednisone 40mg for 5 days. He states that his cough and fevers resolved but the dyspnea was associated with pleuritic chest pain in the right side so he presented to the ED.   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  In the past, he states that thoracentesis/thoracostomy has helped.  Patient to undergo IR guided pigtail  Follow up fluid studies    9/23  - Patient s/p IR guided thoracostomy, denies any issues  - Studies show a ph7.25/glucose 55/pleural LDHelevated, no organisms, eosinophils not elevated but fluid sanguinous.   - This could be suggestive of parapneumonic effusion, will start IV Ceftriaxone 1g daily for 5-7 days  - Primary team to discuss treatment with ID for shingles     9/24  Patient with chest tube with non-expandable lung, fluid seems to be drained optimally but CXR shows subcutaneous emphysema and air leak present in pleurvac.  Continue to leave on water seal  Continue IV Ceftriaxone  Will check again in AM    9/25  Patient with chest tube with non-expandable lung, fluid seems to be drained optimally and an air leak was present on 9/24. This may represent the non-expandable space but previous CXR suggested subcutaneous emphysema.  I have ordered a repeat CXR for today.  Will continue to follow     9/26  Patient with chest tube for trapped lung and increased pleural fluid  CXR suggested subcutaneous emphysema (repeat CXR on 9/26 showed the same), findings are suggestive of pigtail possibly in the lung.   CTS planning on removing tube today.  Last day of ceftriaxone will be 9/27  Encourage OOB activities with nursing/PT/OT assistance    Thank you for the consult

## 2017-09-27 ENCOUNTER — TRANSCRIPTION ENCOUNTER (OUTPATIENT)
Age: 82
End: 2017-09-27

## 2017-09-27 VITALS
SYSTOLIC BLOOD PRESSURE: 115 MMHG | TEMPERATURE: 98 F | DIASTOLIC BLOOD PRESSURE: 80 MMHG | OXYGEN SATURATION: 95 % | RESPIRATION RATE: 18 BRPM | HEART RATE: 60 BPM

## 2017-09-27 LAB
ANION GAP SERPL CALC-SCNC: 8 MMOL/L — SIGNIFICANT CHANGE UP (ref 5–17)
BUN SERPL-MCNC: 16 MG/DL — SIGNIFICANT CHANGE UP (ref 7–23)
CALCIUM SERPL-MCNC: 8.3 MG/DL — LOW (ref 8.5–10.1)
CHLORIDE SERPL-SCNC: 108 MMOL/L — SIGNIFICANT CHANGE UP (ref 96–108)
CO2 SERPL-SCNC: 23 MMOL/L — SIGNIFICANT CHANGE UP (ref 22–31)
CREAT SERPL-MCNC: 1.95 MG/DL — HIGH (ref 0.5–1.3)
CULTURE RESULTS: SIGNIFICANT CHANGE UP
GLUCOSE SERPL-MCNC: 113 MG/DL — HIGH (ref 70–99)
POTASSIUM SERPL-MCNC: 3.8 MMOL/L — SIGNIFICANT CHANGE UP (ref 3.5–5.3)
POTASSIUM SERPL-SCNC: 3.8 MMOL/L — SIGNIFICANT CHANGE UP (ref 3.5–5.3)
SODIUM SERPL-SCNC: 139 MMOL/L — SIGNIFICANT CHANGE UP (ref 135–145)
SPECIMEN SOURCE: SIGNIFICANT CHANGE UP

## 2017-09-27 PROCEDURE — 71010: CPT | Mod: 26

## 2017-09-27 PROCEDURE — 99232 SBSQ HOSP IP/OBS MODERATE 35: CPT

## 2017-09-27 RX ORDER — ACETAMINOPHEN 500 MG
2 TABLET ORAL
Qty: 0 | Refills: 0 | COMMUNITY
Start: 2017-09-27

## 2017-09-27 RX ORDER — VALACYCLOVIR 500 MG/1
1 TABLET, FILM COATED ORAL
Qty: 2 | Refills: 0 | OUTPATIENT
Start: 2017-09-27 | End: 2017-09-28

## 2017-09-27 RX ADMIN — Medication 81 MILLIGRAM(S): at 12:41

## 2017-09-27 RX ADMIN — RANOLAZINE 500 MILLIGRAM(S): 500 TABLET, FILM COATED, EXTENDED RELEASE ORAL at 06:46

## 2017-09-27 RX ADMIN — Medication 100 MILLIGRAM(S): at 14:00

## 2017-09-27 RX ADMIN — Medication 2000 UNIT(S): at 12:41

## 2017-09-27 RX ADMIN — Medication 200 MILLIGRAM(S): at 13:59

## 2017-09-27 RX ADMIN — VALACYCLOVIR 500 MILLIGRAM(S): 500 TABLET, FILM COATED ORAL at 17:13

## 2017-09-27 RX ADMIN — Medication 180 MILLIGRAM(S): at 06:46

## 2017-09-27 RX ADMIN — POLYETHYLENE GLYCOL 3350 17 GRAM(S): 17 POWDER, FOR SOLUTION ORAL at 12:48

## 2017-09-27 RX ADMIN — HEPARIN SODIUM 5000 UNIT(S): 5000 INJECTION INTRAVENOUS; SUBCUTANEOUS at 14:00

## 2017-09-27 RX ADMIN — Medication 200 MILLIGRAM(S): at 06:46

## 2017-09-27 RX ADMIN — Medication 20 MILLIGRAM(S): at 06:46

## 2017-09-27 RX ADMIN — FINASTERIDE 5 MILLIGRAM(S): 5 TABLET, FILM COATED ORAL at 12:42

## 2017-09-27 RX ADMIN — PREGABALIN 1000 MICROGRAM(S): 225 CAPSULE ORAL at 12:42

## 2017-09-27 RX ADMIN — CEFTRIAXONE 100 GRAM(S): 500 INJECTION, POWDER, FOR SOLUTION INTRAMUSCULAR; INTRAVENOUS at 12:42

## 2017-09-27 RX ADMIN — INFLUENZA VIRUS VACCINE 0.5 MILLILITER(S): 15; 15; 15; 15 SUSPENSION INTRAMUSCULAR at 17:14

## 2017-09-27 RX ADMIN — HEPARIN SODIUM 5000 UNIT(S): 5000 INJECTION INTRAVENOUS; SUBCUTANEOUS at 06:46

## 2017-09-27 RX ADMIN — Medication 100 MILLIGRAM(S): at 06:46

## 2017-09-27 RX ADMIN — RANOLAZINE 500 MILLIGRAM(S): 500 TABLET, FILM COATED, EXTENDED RELEASE ORAL at 17:13

## 2017-09-27 RX ADMIN — VALACYCLOVIR 500 MILLIGRAM(S): 500 TABLET, FILM COATED ORAL at 06:46

## 2017-09-27 NOTE — DISCHARGE NOTE ADULT - PATIENT PORTAL LINK FT
“You can access the [facility] Patient Portal, offered by [facility], by registering with the following website: [email or web address]”

## 2017-09-27 NOTE — PROGRESS NOTE ADULT - ASSESSMENT
1) Pleuritic Chest Pain  2) Trapped Lung  3) Abnormal CT Chest  4) Recent Pneumonia  5) Shortness of Breath  6) Parapneumonic effusion    86 yo Pt is non smoker.s/p right VATS decortication done by Dr Reyes 5/2016 for recurrent pleural effusions in the setting of trapped lung.  Patient was recently treated in my office on 9/14 for worsening cough, dyspnea and subjective fevers/rigors so I had prescribed 5 day course of Z-Pack and Prednisone 40mg for 5 days. He states that his cough and fevers resolved but the dyspnea was associated with pleuritic chest pain in the right side so he presented to the ED.   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  In the past, he states that thoracentesis/thoracostomy has helped.  Patient to undergo IR guided pigtail  Follow up fluid studies    9/23  - Patient s/p IR guided thoracostomy, denies any issues  - Studies show a ph7.25/glucose 55/pleural LDHelevated, no organisms, eosinophils not elevated but fluid sanguinous.   - This could be suggestive of parapneumonic effusion, will start IV Ceftriaxone 1g daily for 5-7 days  - Primary team to discuss treatment with ID for shingles     9/24  Patient with chest tube with non-expandable lung, fluid seems to be drained optimally but CXR shows subcutaneous emphysema and air leak present in pleurvac.  Continue to leave on water seal  Continue IV Ceftriaxone  Will check again in AM    9/25  Patient with chest tube with non-expandable lung, fluid seems to be drained optimally and an air leak was present on 9/24. This may represent the non-expandable space but previous CXR suggested subcutaneous emphysema.  I have ordered a repeat CXR for today.  Will continue to follow     9/26  Patient with chest tube for trapped lung and increased pleural fluid  CXR suggested subcutaneous emphysema (repeat CXR on 9/26 showed the same), findings are suggestive of pigtail possibly in the lung.   CTS planning on removing tube today.  Last day of ceftriaxone will be 9/27  Encourage OOB activities with nursing/PT/OT assistance    9/27  Patient initially with chest tube for trapped lung and increased pleural fluid  Etiology is multifactorial; Cytology was negative for malignancy  s/p thoracostomy removal on 9/27  Last day of ceftriaxone should be today.   Encourage OOB activities with nursing/PT/OT assistance    Thank you for the consult

## 2017-09-27 NOTE — DISCHARGE NOTE ADULT - PLAN OF CARE
resolve Complete 1 more days of Antiviral medication. keep rash/wound clean and dry. Apply dressing for night time improve F/u with Pulmonologist or PCP to repeat CXR monitor F/u with PCP to repeat renal fx in 3-4 days

## 2017-09-27 NOTE — PROGRESS NOTE ADULT - SUBJECTIVE AND OBJECTIVE BOX
Subjective:  Patient is feeling well, no SOB, would like to go home today.    Tele:                                                             T(C): 36.4 (09-27-17 @ 09:49), Max: 36.9 (09-26-17 @ 21:45)  HR: 60 (09-27-17 @ 09:49) (59 - 73)  BP: 124/54 (09-27-17 @ 09:49) (124/54 - 141/48)  RR: 17 (09-27-17 @ 09:49) (17 - 18)  SpO2: 92% (09-27-17 @ 09:49) (92% - 97%)     09-27    139  |  108  |  16  ----------------------------<  113<H>  3.8   |  23  |  1.95<H>    Ca    8.3<L>      27 Sep 2017 06:13    CXR:  < from: Xray Chest 1 View AP/PA. (09.27.17 @ 09:12) >  FINDINGS /   IMPRESSION:     There is small right pneumothorax with compression of adjacent right lung   base and subcutaneous emphysema unchanged. Dual-lead pacemaker.   Mediastinal structures are otherwise unremarkable.    There are   degenerative changes. If intervention isn't elected, continued follow-up   to full resolution is suggested    < end of copied text >    < from: Xray Chest 1 View AP/PA. (09.26.17 @ 14:30) >  IMPRESSION:   Unchanged small right pneumothorax.    < end of copied text >    < from: Xray Chest 1 View AP/PA. (09.26.17 @ 12:21) >  IMPRESSION:   Unchanged small right pneumothorax.    < end of copied text >    IN:  Oral Fluid: 120 mL  Total IN: 120 mL    OUT:  Total OUT: 0 mL    Total NET: 120 mL    Drug Dosing Weight  Height (cm): 180.34 (21 Sep 2017 08:28)  Weight (kg): 83.9 (21 Sep 2017 08:28)  BMI (kg/m2): 25.8 (21 Sep 2017 08:28)  BSA (m2): 2.04 (21 Sep 2017 08:28)    finasteride 5 milliGRAM(s) Oral daily  tamsulosin 0.4 milliGRAM(s) Oral at bedtime  ranolazine 500 milliGRAM(s) Oral two times a day  diltiazem    milliGRAM(s) Oral daily  labetalol 200 milliGRAM(s) Oral three times a day  cyanocobalamin 1000 MICROGram(s) Oral daily  cholecalciferol 2000 Unit(s) Oral daily  docusate sodium 100 milliGRAM(s) Oral three times a day  polyethylene glycol 3350 17 Gram(s) Oral daily  furosemide    Tablet 20 milliGRAM(s) Oral daily      Assessment  Neuro: awake and alert  Pulm: CTA b/l  CV: S1, S2  Abd: Soft, non-tender  Extrem/MS: Moves all ext.  Skin: rash with open sores on right back, in distribution resembling shingles.    Assessment:  85yMale with PAST MEDICAL & SURGICAL HISTORY:  Spinal stenosis  CKD (chronic kidney disease), stage III  SSS (sick sinus syndrome)  Pleural effusion  Hyperlipidemia  BPH (benign prostatic hyperplasia)  CAD (coronary artery disease)  Hypertension  Status post lumbar spinal fusion        Plan:

## 2017-09-27 NOTE — DISCHARGE NOTE ADULT - HOSPITAL COURSE
86 yo male with PMH of CAD, HTN, HLD, BPH, SSS s/p PPM, CKD stage III, h/o SVT, Diastolic CHF,  h/o right pleural effusions and PTX admitted SOB and right sided chest pain. + dyspnea on exertion. No orthopnea. +  right sided chest pain that wrapped around to right back  which has . He had a recent outpatient CT 2 weeks ago which he states showed a little fluid on right side but no PTX. In ED CT done revealed a large Rt effusion and PTX. Pt had CT placed, SOB improved gradually, Pt had mild air leak and suQ emphysema. Pt was receiving IV Ceftriaxone for empiric PNA coverage.  Also hospital course complicated by R sided linear vesicular rash from mid back wrapping around to sternum which thought to be 2/2 Shingles. Pt was started on  Valacyclovir.  Pts BUN/CR was elevated likely due to dehydration and baseline CKD, s/p fluids, furosemide was held/ Renal Fx at baseline now, lasix restarted.  Chest tube removed yesterday. Repeat CXR stable. Today Pt reports no complains,  denies CP or SOB, states feels well and wants to go home. Results, meds, outPt f/u discussed   Vital Signs Last 24 Hrs  T(C): 36.4 (27 Sep 2017 09:49), Max: 36.9 (26 Sep 2017 21:45)  T(F): 97.5 (27 Sep 2017 09:49), Max: 98.5 (26 Sep 2017 21:45)  HR: 60 (27 Sep 2017 13:58) (59 - 73)  BP: 135/44 (27 Sep 2017 13:58) (124/54 - 141/48)  RR: 17 (27 Sep 2017 09:49) (17 - 18)  SpO2: 92% (27 Sep 2017 09:49) (92% - 97%)    PHYSICAL EXAM:  GENERAL: NAD, Well nourished  HEENT:  NC/AT, EOMI, PERRLA, No scleral icterus, Moist mucous membranes  NECK: Supple, No JVD  CNS:  Alert & Oriented X3, Motor Strength 5/5 B/L upper and lower extremities  LUNG: Decreased BS on Rt base, no wheezing  or rales, no  subQ crepitation,  non tender  HEART: RRR; No murmurs  ABDOMEN: +BS, ST/ND/NT  GENITOURINARY: Voiding, no suprapubic tenderness  EXTREMITIES:  2+ Peripheral Pulses, No  edema  MUSCULOSKELTAL: Joints normal ROM, No TTP, No effusion  SKIN: + R chest wall linear vesicular rash, mostly dry vesicles on the back, no   new  vesicles on anterior chest. Skin tear at site of CT dressing, no edema or exudate.   Assessment and Plan:  1. SOB secondary to right large recurrent  hydropneumothorax, trapped lung  - S/P CT placement, and removal  - CXR repeat this am min PTX no pleural effusion   -   pulse ox stable  - S/p  IV Ceftriaxone for empiric  PNA coverage, completed today   - D/w CT Sx team      2. SHRUTHI/ CKD stage 3  - baseline Cr 1.8-2.0  - BUN/CR  at baseline   - restarted on   lasix   -Bladder/Kidneys US   - F/u with Renal or PCP outPt for renal Fx check     3. h/o CAD  - continue ASA, BB, statin, ranexa  - D/w DR Carranza     4. SSS s/p PPM  - A and AV paced. SR underline   - interrogated, Normal Fx     5. HTN  - continue home meds: diltiazem, labetalol,     6. BPH: cw Flomax, Proscar    7. Rash, likely Shingles  - suggest hitting pad made it worse  -C/w  Valacyclovir 500mg PO BID x 5days,  renally dosed, needs 1 more day     8. Chrinic systolic CHF  - Stable   - C/w lasix 20mg PO QD  - F/u with Dr Carranza       D/w DR Carranza

## 2017-09-27 NOTE — PROGRESS NOTE ADULT - ASSESSMENT
85 with history of high cholesterol, hypertension, sick sinus syndrome status post permanent pacemaker placement in the past, coronary artery disease, CKD3 (baseline SCr 1.8-2.0), history of chronic moderate right pleural effusion and history of right hydropneumothorax here with recurrence of the same, renal evaluation of CKD/Rise in renal function. Etiology of rise in renal function unclear, still mild pre-renal state. Urinary retention?    CKD/SHRUTHI  -Renal U/S negative for obstruction, bladder scan with minimal PVR, renal function improving daily, now at baseline 1.8-2.0  - monitor closely on Lasix - restarted at 20mg daily - cont  -NSAID avoidance  - off IVF  - po hydration    PNA/Hydropneumothorax  -Abx per medical team/ID  -CT surgery following  - s/p CTube - now removed

## 2017-09-27 NOTE — PROGRESS NOTE ADULT - ASSESSMENT
Dyspnea and right pleuritic chest pain because of recurrent right pleural effusion. Chest few has been removed and he feels better.  Essential hypertension.  Sick sinus syndrome.  Chronic renal insufficiency.  Probable coronary artery disease and he has opted for medical management. No recent angina.  Suggest  Continue with current cardiac medications.  Follow-up with electrolytes.  Continue with incentive spirometry.  DVT prophylaxis.  Discussed with hospitalist Dr Fox..  Discussed with patient's son .  I shall follow up as an outpatient.

## 2017-09-27 NOTE — PROGRESS NOTE ADULT - SUBJECTIVE AND OBJECTIVE BOX
Patient is a 85y old  Male who presents with a chief complaint of SOB (21 Sep 2017 15:34)      HPI:  86 yo male with PMH of CAD, HTN, HLD, BPH, SSS s/p PPM, CKD stage III, h/o SVT, h/o right pleural effusions seen by me as an outpatient.  Presents with recurrent pleuritic chest pain, worse on the right side.  Recently seen for CAP, treated with ABX/PO Prednisone and I also had repeated a CT chest at that time.  He presented to the ER on 9/21 for a pleurisy.   No fevers, chills, headaches, palpitations, abd pain, N/V, diarrhea, dizziness.  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 Denies any shingles history    9/24  Patient has no shortness of breath  Pleurvac on water seal, Air leak present    9/25  Patient is asleep  water chamber not showing air leak    9/26  Patient states he feels better today (Denies chest pain)  On expiration, slight air leak in water chamber  States that he would like to walk around to see if he feels more short of breath    9/27  Chest tube out, denies any pain  Patient would like to walk around    PAST MEDICAL & SURGICAL HISTORY:  Spinal stenosis  CKD (chronic kidney disease), stage III  SSS (sick sinus syndrome)  Pleural effusion  Hyperlipidemia  BPH (benign prostatic hyperplasia)  CAD (coronary artery disease)  Hypertension  Status post lumbar spinal fusion      PREVIOUS DIAGNOSTIC TESTING:      MEDICATIONS  (STANDING):  MEDICATIONS  (STANDING):  finasteride 5 milliGRAM(s) Oral daily  aspirin enteric coated 81 milliGRAM(s) Oral daily  tamsulosin 0.4 milliGRAM(s) Oral at bedtime  ranolazine 500 milliGRAM(s) Oral two times a day  diltiazem    milliGRAM(s) Oral daily  labetalol 200 milliGRAM(s) Oral three times a day  cyanocobalamin 1000 MICROGram(s) Oral daily  cholecalciferol 2000 Unit(s) Oral daily  heparin  Injectable 5000 Unit(s) SubCutaneous every 8 hours  cefTRIAXone   IVPB      cefTRIAXone   IVPB 1 Gram(s) IV Intermittent every 24 hours  valACYclovir 500 milliGRAM(s) Oral every 12 hours  docusate sodium 100 milliGRAM(s) Oral three times a day  influenza   Vaccine 0.5 milliLiter(s) IntraMuscular once  polyethylene glycol 3350 17 Gram(s) Oral daily    MEDICATIONS  (PRN):  acetaminophen   Tablet. 650 milliGRAM(s) Oral every 6 hours PRN Mild Pain (1 - 3)  morphine  - Injectable 2 milliGRAM(s) IV Push every 4 hours PRN Moderate Pain (4 - 6)      FAMILY HISTORY:  No pertinent family history in first degree relatives      SOCIAL HISTORY: lives at home, born in [place]    REVIEW OF SYSTEM:  Shortness of breath, chest pain, otherwise 12 point ROS negative    Allergic/Immunologic: negative for anaphylaxis, angioedema and urticaria    Vital Signs Last 24 Hrs  T(C): 37.1 (22 Sep 2017 10:05), Max: 37.1 (22 Sep 2017 10:05)  T(F): 98.8 (22 Sep 2017 10:05), Max: 98.8 (22 Sep 2017 10:05)  HR: 59 (22 Sep 2017 10:05) (59 - 60)  BP: 135/57 (22 Sep 2017 10:05) (135/57 - 177/64)  BP(mean): --  RR: 17 (22 Sep 2017 10:05) (15 - 19)  SpO2: 92% (22 Sep 2017 10:05) (91% - 100%)    I&O's Summary    PHYSICAL EXAM  General Appearance: cooperative, no acute distress,   HEENT: PERRL, conjunctiva clear, EOM's intact, non injected pharynx, no exudate, TM   normal  Neck: Supple, , no adenopathy, thyroid: not enlarged, no carotid bruit or JVD  Back: Symmetric, no  tenderness,no soft tissue tenderness  Lungs: Decreased breath sounds right hemithorax  Heart: Regular rate and rhythm, S1, S2 normal, no murmur, rub or gallop  Abdomen: Soft, non-tender, bowel sounds active , no hepatosplenomegaly  Extremities: no cyanosis or edema, no joint swelling  Skin: Skin color, texture normal, no rashes   Neurologic: Alert and oriented X3 , cranial nerves intact, sensory and motor normal,    ECG:    LABS:    09-27    139  |  108  |  16  ----------------------------<  113<H>  3.8   |  23  |  1.95<H>    Ca    8.3<L>      27 Sep 2017 06:13            Pro BNP  238 09-21 @ 09:16  D Dimer  -- 09-21 @ 09:16    PT/INR - ( 22 Sep 2017 06:15 )   PT: 11.3 sec;   INR: 1.05 ratio         PTT - ( 21 Sep 2017 09:16 )  PTT:23.9 sec          RADIOLOGY & ADDITIONAL STUDIES:    CT chest images/report reviewed by me  My interpretation: Non-expandable right hemithorax, increased pleural fluid

## 2017-09-27 NOTE — PROGRESS NOTE ADULT - ASSESSMENT
84 y/o male s/p VATS decortication, pleuradesis with Dr Reyes 5/2016 with recurrent hydropneumothoax in setting of trapped lung. Pigtail catheter removed yesterday, serial chest xray's reveal stable small right pneumothorax. The patient is cleared for d/c from thoracic surgery  view. Suggest repeat chest xray as out patient. D/w Dr. Reyes.

## 2017-09-27 NOTE — PROGRESS NOTE ADULT - SUBJECTIVE AND OBJECTIVE BOX
Patient is a 85y Male whom presented to the hospital with history of high cholesterol, hypertension, sick sinus syndrome status post permanent pacemaker placement in the past, coronary artery disease, CKD baseline near 1.8 mg/dl, history of chronic moderate right pleural effusion and history of right hydropneumothorax in the past status post pleurodesis and wedge resection in the past here with pain and diagnosed to have large right hydro-pneumothorax now s/p catheter placement. Patient reports good UOP, bladder scan with 250? No nsaid use, no chest pain or SOB. Has been getting some IVF and some oral diuretics as well     9/25 - improved SOB, good appetite, on IVF  9/26 - off IVF, for CT removal today, wants to ambulate, no sob  9/27 - chest tube out, started on Lasix 20mg po daily    MEDICATIONS  (STANDING):  finasteride 5 milliGRAM(s) Oral daily  aspirin enteric coated 81 milliGRAM(s) Oral daily  tamsulosin 0.4 milliGRAM(s) Oral at bedtime  ranolazine 500 milliGRAM(s) Oral two times a day  diltiazem    milliGRAM(s) Oral daily  labetalol 200 milliGRAM(s) Oral three times a day  cyanocobalamin 1000 MICROGram(s) Oral daily  cholecalciferol 2000 Unit(s) Oral daily  heparin  Injectable 5000 Unit(s) SubCutaneous every 8 hours  cefTRIAXone   IVPB      cefTRIAXone   IVPB 1 Gram(s) IV Intermittent every 24 hours  valACYclovir 500 milliGRAM(s) Oral every 12 hours  docusate sodium 100 milliGRAM(s) Oral three times a day  influenza   Vaccine 0.5 milliLiter(s) IntraMuscular once  polyethylene glycol 3350 17 Gram(s) Oral daily  furosemide    Tablet 20 milliGRAM(s) Oral daily        Allergies    amlodipine (Unknown)  Crestor (Other)  Lipitor (Unknown)    Intolerances    REVIEW OF SYSTEMS:    CONSTITUTIONAL: No weakness, fevers or chills  EYES/ENT: No visual changes;  No vertigo or throat pain   NECK: No pain or stiffness  RESPIRATORY: No cough, wheezing, hemoptysis; + shortness of breath (improving)  CARDIOVASCULAR: No chest pain or palpitations  GASTROINTESTINAL: No abdominal or epigastric pain. No nausea, vomiting, or hematemesis; No diarrhea or constipation. No melena or hematochezia.  GENITOURINARY: No dysuria, frequency or hematuria  NEUROLOGICAL: No numbness or weakness  SKIN: No itching, burning, rashes, or lesions   All other review of systems is negative unless indicated above.      Vital Signs Last 24 Hrs  T(C): 36.4 (27 Sep 2017 09:49), Max: 36.9 (26 Sep 2017 21:45)  T(F): 97.5 (27 Sep 2017 09:49), Max: 98.5 (26 Sep 2017 21:45)  HR: 60 (27 Sep 2017 09:49) (59 - 73)  BP: 124/54 (27 Sep 2017 09:49) (124/54 - 141/48)  BP(mean): --  RR: 17 (27 Sep 2017 09:49) (17 - 18)  SpO2: 92% (27 Sep 2017 09:49) (92% - 97%)  Daily     Daily   I&O's Summary        PHYSICAL EXAM:    Constitutional: NAD, well-groomed, well-developed  HEENT: PERRLA, EOMI,  MMM  Neck: No LAD, No JVD  Respiratory: R chest tube out, dressing in place, trace R and L crackles at basees  Cardiovascular: S1 and S2, RRR  Gastrointestinal: BS+, soft, NT/ND  Extremities: no peripheral edema  Neurological: A/O x 3, no focal deficits  Psychiatric: Normal mood, normal affect  : No [place]  Skin: No rashes  Access: Not applicable        LABS:                               13.1   8.5   )-----------( 159      ( 25 Sep 2017 06:21 )             37.9                         13.0   11.2  )-----------( 165      ( 24 Sep 2017 06:22 )             39.2     139    |  108    |  16     ----------------------------<  113       27 Sep 2017 06:13  3.8     |  23     |  1.95     140    |  109    |  22     ----------------------------<  102       26 Sep 2017 05:45  3.7     |  23     |  1.86     140    |  108    |  26     ----------------------------<  134       25 Sep 2017 06:21  3.9     |  24     |  2.03     Ca    8.3        27 Sep 2017 06:13  Ca    8.6        26 Sep 2017 05:45        TPro  6.2    /  Alb  3.1    /  TBili  0.6    /        25 Sep 2017 06:21  DBili  x      /  AST  11     /  ALT  14     /  AlkPhos  68           137    |  107    |  34     ----------------------------<  151       24 Sep 2017 06:22  4.6     |  25     |  2.21     138    |  106    |  29     ----------------------------<  127       23 Sep 2017 06:23  4.4     |  24     |  1.96     Ca    8.6        25 Sep 2017 06:21  Ca    8.1        24 Sep 2017 06:22        TPro  6.2    /  Alb  3.1    /  TBili  0.6    /        25 Sep 2017 06:21  DBili  x      /  AST  11     /  ALT  14     /  AlkPhos  68         22 Sep 2017 06:15    142    |  107    |  31     ----------------------------<  118    4.2     |  28     |  1.89   21 Sep 2017 09:16    142    |  109    |  31     ----------------------------<  117    4.4     |  24     |  1.80     Ca    8.1        24 Sep 2017 06:22  Ca    8.5        23 Sep 2017 06:23  Ca    8.3        22 Sep 2017 06:15  Ca    8.3        21 Sep 2017 09:16    TPro  6.9    /  Alb  3.8    /  TBili  0.8    /  DBili  x      /  AST  13     /  ALT  15     /  AlkPhos  67     21 Sep 2017 09:16          Urine Studies:          RADIOLOGY & ADDITIONAL STUDIES:    < from: US Kidney and Bladder (09.26.17 @ 14:47) >    EXAM:  US KIDNEYS AND BLADDER                            PROCEDURE DATE:  09/26/2017          INTERPRETATION:      Ultrasound of the kidneys         CLINICAL INFORMATION:       Flank pain.    TECHNIQUE:  Transabdominal sonography was performed.    FINDINGS:   2/6/2016 available for review.    The right kidney measures 9.3 cm in length. Cortical thinning is noted.   Its contours are smooth. Multiple cysts are noted measuring 2.4 cm in the   upper pole, 0.4 cm in the midpole and 0.2 cm in the lower pole.  No   calculi are seen.  No hydronephrosis is present.     The left kidney measures 11.0 cm in length. Cortical thinning is noted.   Its contours are smooth. There is a 10.3 cm cyst in the upper pole and   5.2 cm cyst in the lower pole.  No calculi are seen.  No hydronephrosis   is present.    The abdominal aorta measures normal caliber anterior to posterior.  The   IVC and retroperitoneal structures appear intact.      IMPRESSION:    Bilateral renal cysts.  Bilateral cortical thinning is  noted. No renal calculi or obstruction recognized.
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
## 2017-09-27 NOTE — DISCHARGE NOTE ADULT - CARE PLAN
Principal Discharge DX:	Pneumothorax  Goal:	improve  Instructions for follow-up, activity and diet:	F/u with Pulmonologist or PCP to repeat CXR  Secondary Diagnosis:	CKD (chronic kidney disease), stage III  Goal:	monitor  Instructions for follow-up, activity and diet:	F/u with PCP to repeat renal fx in 3-4 days  Secondary Diagnosis:	Shingles rash  Goal:	resolve  Instructions for follow-up, activity and diet:	Complete 1 more days of Antiviral medication. keep rash/wound clean and dry. Apply dressing for night time

## 2017-09-27 NOTE — PROGRESS NOTE ADULT - PROVIDER SPECIALTY LIST ADULT
CT Surgery
Cardiology
Hospitalist
Infectious Disease
Nephrology
Pulmonology
Thoracic Surgery
Hospitalist
Pulmonology
Pulmonology
Cardiology

## 2017-09-27 NOTE — DISCHARGE NOTE ADULT - CARE PROVIDER_API CALL
[facility] (), Pulmonary Disease; Sleep Medicine  [address] [place] [address]  Phone: [phone]  Fax: [phone]    Seth Carranza), Cardiovascular Disease; Internal Medicine  [address] [place] [address]  Phone: [phone]  Fax: [phone]

## 2017-09-27 NOTE — DISCHARGE NOTE ADULT - INSTRUCTIONS
Low sodium/Low fat diet cleanse shingle vesicules with ns then apply aquacel/hydrofiber (cut to size pieces) to opem shingle areas daily at night and leave open to air during the day.

## 2017-09-27 NOTE — DISCHARGE NOTE ADULT - MEDICATION SUMMARY - MEDICATIONS TO TAKE
I will START or STAY ON the medications listed below when I get home from the hospital:    finasteride 5 mg oral tablet  -- 1 tab(s) by mouth once a day  -- Indication: For BPH (benign prostatic hyperplasia)    Aspirin Enteric Coated 81 mg oral delayed release tablet  -- 1 tab(s) by mouth once a day  -- Indication: For CAD (coronary artery disease)    acetaminophen 325 mg oral tablet  -- 2 tab(s) by mouth every 6 hours, As needed, Mild Pain (1 - 3)  -- Indication: For Pain    tamsulosin 0.4 mg oral capsule  -- 1 cap(s) by mouth once a day  -- Indication: For BPH (benign prostatic hyperplasia)    Ranexa 500 mg oral tablet, extended release  -- 1 tab(s) by mouth 2 times a day  -- Indication: For CAD (coronary artery disease)    dilTIAZem 180 mg/24 hours oral capsule, extended release  -- 1 cap(s) by mouth once a day  -- Indication: For SSS (sick sinus syndrome)    pravastatin 80 mg oral tablet  -- 1 tab(s) by mouth once a day  -- Indication: For HLD    Welchol 625 mg oral tablet  -- 1 tab(s) by mouth 2 times a day  -- Indication: For HLD    valACYclovir 500 mg oral tablet  -- 1 tab(s) by mouth every 12 hours  -- Indication: For Shingles rash    labetalol 200 mg oral tablet  -- 1 tab(s) by mouth 3 times a day  -- Indication: For HTN    furosemide 20 mg oral tablet  -- 1 tab(s) by mouth once a day  -- Indication: For CHF    CoQ10 300 mg oral capsule  -- 1 cap(s) by mouth once a day  -- Indication: For Supplement    Vitamin B12 1000 mcg oral tablet  -- 1 tab(s) by mouth once a day  -- Indication: For Supplement    Vitamin D3 2000 intl units oral tablet  -- 1 tab(s) by mouth once a day  -- Indication: For Supplement

## 2017-10-01 DIAGNOSIS — J90 PLEURAL EFFUSION, NOT ELSEWHERE CLASSIFIED: ICD-10-CM

## 2017-10-01 DIAGNOSIS — J93.82 OTHER AIR LEAK: ICD-10-CM

## 2017-10-01 DIAGNOSIS — N17.9 ACUTE KIDNEY FAILURE, UNSPECIFIED: ICD-10-CM

## 2017-10-01 DIAGNOSIS — J94.2 HEMOTHORAX: ICD-10-CM

## 2017-10-01 DIAGNOSIS — Z88.8 ALLERGY STATUS TO OTHER DRUGS, MEDICAMENTS AND BIOLOGICAL SUBSTANCES STATUS: ICD-10-CM

## 2017-10-01 DIAGNOSIS — R07.81 PLEURODYNIA: ICD-10-CM

## 2017-10-01 DIAGNOSIS — J98.2 INTERSTITIAL EMPHYSEMA: ICD-10-CM

## 2017-10-01 DIAGNOSIS — E86.0 DEHYDRATION: ICD-10-CM

## 2017-10-01 DIAGNOSIS — Z95.0 PRESENCE OF CARDIAC PACEMAKER: ICD-10-CM

## 2017-10-01 DIAGNOSIS — B02.9 ZOSTER WITHOUT COMPLICATIONS: ICD-10-CM

## 2017-10-01 DIAGNOSIS — I25.10 ATHEROSCLEROTIC HEART DISEASE OF NATIVE CORONARY ARTERY WITHOUT ANGINA PECTORIS: ICD-10-CM

## 2017-10-01 DIAGNOSIS — N40.0 BENIGN PROSTATIC HYPERPLASIA WITHOUT LOWER URINARY TRACT SYMPTOMS: ICD-10-CM

## 2017-10-01 DIAGNOSIS — N18.3 CHRONIC KIDNEY DISEASE, STAGE 3 (MODERATE): ICD-10-CM

## 2017-10-01 DIAGNOSIS — I13.0 HYPERTENSIVE HEART AND CHRONIC KIDNEY DISEASE WITH HEART FAILURE AND STAGE 1 THROUGH STAGE 4 CHRONIC KIDNEY DISEASE, OR UNSPECIFIED CHRONIC KIDNEY DISEASE: ICD-10-CM

## 2017-10-01 DIAGNOSIS — I50.22 CHRONIC SYSTOLIC (CONGESTIVE) HEART FAILURE: ICD-10-CM
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
## 2017-11-11 LAB
CULTURE RESULTS: SIGNIFICANT CHANGE UP
SPECIMEN SOURCE: SIGNIFICANT CHANGE UP

## 2017-11-22 ENCOUNTER — APPOINTMENT (OUTPATIENT)
Dept: ELECTROPHYSIOLOGY | Facility: CLINIC | Age: 82
End: 2017-11-22
Payer: COMMERCIAL

## 2017-11-22 VITALS — DIASTOLIC BLOOD PRESSURE: 69 MMHG | SYSTOLIC BLOOD PRESSURE: 156 MMHG | HEART RATE: 60 BPM

## 2017-11-22 PROCEDURE — 93280 PM DEVICE PROGR EVAL DUAL: CPT

## 2018-02-23 ENCOUNTER — APPOINTMENT (OUTPATIENT)
Dept: ELECTROPHYSIOLOGY | Facility: CLINIC | Age: 83
End: 2018-02-23
Payer: COMMERCIAL

## 2018-02-23 VITALS
SYSTOLIC BLOOD PRESSURE: 152 MMHG | HEART RATE: 60 BPM | HEIGHT: 71 IN | BODY MASS INDEX: 24.64 KG/M2 | WEIGHT: 176 LBS | DIASTOLIC BLOOD PRESSURE: 65 MMHG

## 2018-02-23 PROCEDURE — 93280 PM DEVICE PROGR EVAL DUAL: CPT

## 2018-05-22 ENCOUNTER — APPOINTMENT (OUTPATIENT)
Dept: ELECTROPHYSIOLOGY | Facility: CLINIC | Age: 83
End: 2018-05-22
Payer: COMMERCIAL

## 2018-05-22 VITALS
BODY MASS INDEX: 24.5 KG/M2 | WEIGHT: 175 LBS | SYSTOLIC BLOOD PRESSURE: 163 MMHG | HEART RATE: 60 BPM | DIASTOLIC BLOOD PRESSURE: 67 MMHG | HEIGHT: 71 IN

## 2018-05-22 PROCEDURE — 93280 PM DEVICE PROGR EVAL DUAL: CPT

## 2018-05-23 ENCOUNTER — INPATIENT (INPATIENT)
Facility: HOSPITAL | Age: 83
LOS: 3 days | Discharge: HOME | End: 2018-05-27
Attending: INTERNAL MEDICINE | Admitting: INTERNAL MEDICINE
Payer: COMMERCIAL

## 2018-05-23 VITALS — WEIGHT: 117.07 LBS

## 2018-05-23 DIAGNOSIS — Z98.1 ARTHRODESIS STATUS: Chronic | ICD-10-CM

## 2018-05-23 LAB
ALBUMIN SERPL ELPH-MCNC: 4.1 G/DL — SIGNIFICANT CHANGE UP (ref 3.3–5)
ALP SERPL-CCNC: 91 U/L — SIGNIFICANT CHANGE UP (ref 40–120)
ALT FLD-CCNC: 16 U/L — SIGNIFICANT CHANGE UP (ref 12–78)
ANION GAP SERPL CALC-SCNC: 6 MMOL/L — SIGNIFICANT CHANGE UP (ref 5–17)
APTT BLD: 28.9 SEC — SIGNIFICANT CHANGE UP (ref 27.5–37.4)
AST SERPL-CCNC: 13 U/L — LOW (ref 15–37)
BASOPHILS # BLD AUTO: 0.04 K/UL — SIGNIFICANT CHANGE UP (ref 0–0.2)
BASOPHILS NFR BLD AUTO: 0.6 % — SIGNIFICANT CHANGE UP (ref 0–2)
BILIRUB SERPL-MCNC: 0.6 MG/DL — SIGNIFICANT CHANGE UP (ref 0.2–1.2)
BUN SERPL-MCNC: 27 MG/DL — HIGH (ref 7–23)
CALCIUM SERPL-MCNC: 9 MG/DL — SIGNIFICANT CHANGE UP (ref 8.5–10.1)
CHLORIDE SERPL-SCNC: 106 MMOL/L — SIGNIFICANT CHANGE UP (ref 96–108)
CO2 SERPL-SCNC: 25 MMOL/L — SIGNIFICANT CHANGE UP (ref 22–31)
CREAT SERPL-MCNC: 2.36 MG/DL — HIGH (ref 0.5–1.3)
EOSINOPHIL # BLD AUTO: 0.16 K/UL — SIGNIFICANT CHANGE UP (ref 0–0.5)
EOSINOPHIL NFR BLD AUTO: 2.3 % — SIGNIFICANT CHANGE UP (ref 0–6)
GLUCOSE SERPL-MCNC: 104 MG/DL — HIGH (ref 70–99)
HCT VFR BLD CALC: 42.4 % — SIGNIFICANT CHANGE UP (ref 39–50)
HGB BLD-MCNC: 14.2 G/DL — SIGNIFICANT CHANGE UP (ref 13–17)
IMM GRANULOCYTES NFR BLD AUTO: 0.3 % — SIGNIFICANT CHANGE UP (ref 0–1.5)
INR BLD: 1.03 RATIO — SIGNIFICANT CHANGE UP (ref 0.88–1.16)
LYMPHOCYTES # BLD AUTO: 1 K/UL — SIGNIFICANT CHANGE UP (ref 1–3.3)
LYMPHOCYTES # BLD AUTO: 14.2 % — SIGNIFICANT CHANGE UP (ref 13–44)
MCHC RBC-ENTMCNC: 31.1 PG — SIGNIFICANT CHANGE UP (ref 27–34)
MCHC RBC-ENTMCNC: 33.5 GM/DL — SIGNIFICANT CHANGE UP (ref 32–36)
MCV RBC AUTO: 93 FL — SIGNIFICANT CHANGE UP (ref 80–100)
MONOCYTES # BLD AUTO: 0.82 K/UL — SIGNIFICANT CHANGE UP (ref 0–0.9)
MONOCYTES NFR BLD AUTO: 11.6 % — SIGNIFICANT CHANGE UP (ref 2–14)
NEUTROPHILS # BLD AUTO: 5.02 K/UL — SIGNIFICANT CHANGE UP (ref 1.8–7.4)
NEUTROPHILS NFR BLD AUTO: 71 % — SIGNIFICANT CHANGE UP (ref 43–77)
NRBC # BLD: 0 /100 WBCS — SIGNIFICANT CHANGE UP (ref 0–0)
PLATELET # BLD AUTO: 234 K/UL — SIGNIFICANT CHANGE UP (ref 150–400)
POTASSIUM SERPL-MCNC: 4.7 MMOL/L — SIGNIFICANT CHANGE UP (ref 3.5–5.3)
POTASSIUM SERPL-SCNC: 4.7 MMOL/L — SIGNIFICANT CHANGE UP (ref 3.5–5.3)
PROT SERPL-MCNC: 7.6 GM/DL — SIGNIFICANT CHANGE UP (ref 6–8.3)
PROTHROM AB SERPL-ACNC: 11.1 SEC — SIGNIFICANT CHANGE UP (ref 9.8–12.7)
RBC # BLD: 4.56 M/UL — SIGNIFICANT CHANGE UP (ref 4.2–5.8)
RBC # FLD: 14.2 % — SIGNIFICANT CHANGE UP (ref 10.3–14.5)
SODIUM SERPL-SCNC: 137 MMOL/L — SIGNIFICANT CHANGE UP (ref 135–145)
WBC # BLD: 7.06 K/UL — SIGNIFICANT CHANGE UP (ref 3.8–10.5)
WBC # FLD AUTO: 7.06 K/UL — SIGNIFICANT CHANGE UP (ref 3.8–10.5)

## 2018-05-23 PROCEDURE — 71250 CT THORAX DX C-: CPT | Mod: 26

## 2018-05-23 PROCEDURE — 99285 EMERGENCY DEPT VISIT HI MDM: CPT

## 2018-05-23 PROCEDURE — 71046 X-RAY EXAM CHEST 2 VIEWS: CPT | Mod: 26

## 2018-05-23 PROCEDURE — 93010 ELECTROCARDIOGRAM REPORT: CPT

## 2018-05-23 RX ORDER — FINASTERIDE 5 MG/1
5 TABLET, FILM COATED ORAL AT BEDTIME
Qty: 0 | Refills: 0 | Status: DISCONTINUED | OUTPATIENT
Start: 2018-05-23 | End: 2018-05-27

## 2018-05-23 RX ORDER — SODIUM BICARBONATE 1 MEQ/ML
325 SYRINGE (ML) INTRAVENOUS THREE TIMES A DAY
Qty: 0 | Refills: 0 | Status: DISCONTINUED | OUTPATIENT
Start: 2018-05-23 | End: 2018-05-27

## 2018-05-23 RX ORDER — TAMSULOSIN HYDROCHLORIDE 0.4 MG/1
0.4 CAPSULE ORAL AT BEDTIME
Qty: 0 | Refills: 0 | Status: DISCONTINUED | OUTPATIENT
Start: 2018-05-23 | End: 2018-05-27

## 2018-05-23 RX ORDER — FINASTERIDE 5 MG/1
1 TABLET, FILM COATED ORAL
Qty: 0 | Refills: 0 | COMMUNITY

## 2018-05-23 RX ORDER — DILTIAZEM HCL 120 MG
180 CAPSULE, EXT RELEASE 24 HR ORAL DAILY
Qty: 0 | Refills: 0 | Status: DISCONTINUED | OUTPATIENT
Start: 2018-05-23 | End: 2018-05-27

## 2018-05-23 RX ORDER — LABETALOL HCL 100 MG
200 TABLET ORAL THREE TIMES A DAY
Qty: 0 | Refills: 0 | Status: DISCONTINUED | OUTPATIENT
Start: 2018-05-23 | End: 2018-05-27

## 2018-05-23 RX ORDER — ASPIRIN/CALCIUM CARB/MAGNESIUM 324 MG
81 TABLET ORAL DAILY
Qty: 0 | Refills: 0 | Status: DISCONTINUED | OUTPATIENT
Start: 2018-05-23 | End: 2018-05-27

## 2018-05-23 RX ORDER — CHOLECALCIFEROL (VITAMIN D3) 125 MCG
2000 CAPSULE ORAL DAILY
Qty: 0 | Refills: 0 | Status: DISCONTINUED | OUTPATIENT
Start: 2018-05-23 | End: 2018-05-27

## 2018-05-23 RX ORDER — PREGABALIN 225 MG/1
1000 CAPSULE ORAL DAILY
Qty: 0 | Refills: 0 | Status: DISCONTINUED | OUTPATIENT
Start: 2018-05-23 | End: 2018-05-27

## 2018-05-23 RX ORDER — ONDANSETRON 8 MG/1
4 TABLET, FILM COATED ORAL EVERY 6 HOURS
Qty: 0 | Refills: 0 | Status: DISCONTINUED | OUTPATIENT
Start: 2018-05-23 | End: 2018-05-27

## 2018-05-23 RX ORDER — ACETAMINOPHEN 500 MG
650 TABLET ORAL EVERY 6 HOURS
Qty: 0 | Refills: 0 | Status: DISCONTINUED | OUTPATIENT
Start: 2018-05-23 | End: 2018-05-27

## 2018-05-23 RX ORDER — RANOLAZINE 500 MG/1
500 TABLET, FILM COATED, EXTENDED RELEASE ORAL
Qty: 0 | Refills: 0 | Status: DISCONTINUED | OUTPATIENT
Start: 2018-05-23 | End: 2018-05-27

## 2018-05-23 RX ORDER — SODIUM CHLORIDE 9 MG/ML
3 INJECTION INTRAMUSCULAR; INTRAVENOUS; SUBCUTANEOUS ONCE
Qty: 0 | Refills: 0 | Status: COMPLETED | OUTPATIENT
Start: 2018-05-23 | End: 2018-05-23

## 2018-05-23 RX ADMIN — Medication 200 MILLIGRAM(S): at 23:14

## 2018-05-23 RX ADMIN — FINASTERIDE 5 MILLIGRAM(S): 5 TABLET, FILM COATED ORAL at 23:14

## 2018-05-23 RX ADMIN — SODIUM CHLORIDE 3 MILLILITER(S): 9 INJECTION INTRAMUSCULAR; INTRAVENOUS; SUBCUTANEOUS at 15:47

## 2018-05-23 RX ADMIN — Medication 325 MILLIGRAM(S): at 23:14

## 2018-05-23 RX ADMIN — TAMSULOSIN HYDROCHLORIDE 0.4 MILLIGRAM(S): 0.4 CAPSULE ORAL at 23:14

## 2018-05-23 NOTE — ED ADULT NURSE NOTE - FALL HARM RISK
coagulation(Bleeding disorder R/T clinical cond/anti-coags)/aspirin/bones(Osteoporosis,prev fx,steroid use,metastatic bone ca

## 2018-05-23 NOTE — ED PROVIDER NOTE - NS_ ATTENDINGSCRIBEDETAILS _ED_A_ED_FT
I, Chuck Meade MD,  performed the initial face to face bedside interview with this patient regarding history of present illness, review of symptoms and relevant past medical, social and family history.  I completed an independent physical examination.    The history, relevant review of systems, past medical and surgical history, medical decision making, and physical examination was documented by the scribe in my presence and I attest to the accuracy of the documentation.

## 2018-05-23 NOTE — ED ADULT NURSE NOTE - CHPI ED SYMPTOMS NEG
no chills/no diaphoresis/no body aches/no cough/no chest pain/no hemoptysis/no fever/no headache/no edema/no wheezing

## 2018-05-23 NOTE — ED ADULT NURSE NOTE - OBJECTIVE STATEMENT
pt states worsening exertional dyspnea accompanied w/ throat pain for the past 3 days. hx of pleural effusions w/ drainage last week.

## 2018-05-23 NOTE — ED STATDOCS - PROGRESS NOTE DETAILS
Mikala Delacruz on behalf of Attending Dr. Leach. 85 y/o M with PMHx of HTN, HLD, SSS, CAD with pacemaker, chronic R pleural effusion presents to the ED c/o SOB for the past few days. Pt was sent to the ED by MD after CXR showed R hydropneumothorax. Pt had chest tubes placed in the past. Dr. Veronica Lama, PCP. Pt will be sent to the Main ED for further evaluation.

## 2018-05-23 NOTE — H&P ADULT - NSHPPHYSICALEXAM_GEN_ALL_CORE
Vital Signs Last 24 Hrs  T(C): 37.1 (23 May 2018 19:06), Max: 37.2 (23 May 2018 14:42)  T(F): 98.7 (23 May 2018 19:06), Max: 98.9 (23 May 2018 14:42)  HR: 60 (23 May 2018 19:06) (60 - 66)  BP: 156/62 (23 May 2018 19:06) (106/82 - 156/62)  BP(mean): --  RR: 18 (23 May 2018 19:06) (18 - 18)  SpO2: 97% (23 May 2018 19:06) (97% - 97%)    GEN: appears comfortable  Neuro: AAOx3, nonfocal  HEENT: NC/AT, EOMI  Neck: no thyroidmegaly, no JVD  Cardiovascular: S1S2 present, regular rhythm, no murmur  Respiratory: breath sounds diminished right side, no wheezing, no rales, +rhonchi  Gastrointestinal: bowel sounds normal, soft, no abdominal tenderness  Musculoskeletal: no muscle tenderness  Extremities: No edema  Skin: No rash

## 2018-05-23 NOTE — ED PROVIDER NOTE - PROGRESS NOTE DETAILS
Jayme RAGSDALE for ED attending, Dr. Meade: spoke with Dr. Reyes who states will consult pt and recommends IR placement of chest tube. Jayme RAGSDALE for ED attending, Dr. Meade: spoke with Dr. Carrion, IR, who states he doesnt think pt needs emergent chest tube placed at this moment. Pt reassessed, comfortably seated in stretcher, pulse ox of 98.

## 2018-05-23 NOTE — ED ADULT NURSE REASSESSMENT NOTE - NS ED NURSE REASSESS COMMENT FT1
Pt updated in regards to admission status, pt remains on bedside monitor at this time with call bell within reach. Lights turned off to decrease visual stimuli and promote relaxation. Pt has no questions at this time. Will continue to monitor.

## 2018-05-23 NOTE — ED ADULT TRIAGE NOTE - CHIEF COMPLAINT QUOTE
pt states he was sent to ED by MD for water on his lungs. pt states he has had SOb for the past few days, no chest pain, dizziness or weakness

## 2018-05-23 NOTE — H&P ADULT - ASSESSMENT
85 y.o. male with PMH CAD, HTN, HLD, BPH, SSS s/p PPM, CKD stage III, h/o SVT, h/o right pleural effusions and PTX s/p VATS decortication, pleuradesis 5/2016, spinal stenosis s/p lumbar fusion presents to ED with complaint of cough for the past few days. Pt reports nonproductive cough, went to see his doctor and had Xray performed and told him to come to ED. Pt denies fever, chills, CP, palpitation, abd pain, dysuria, or diarrhea. Denies sore throat. States SOB with exertion and it has been similar to prior. Reports this is the 4th time with fluid in lungs. Denies any other symptoms.    #shortness of breath likely due to right hydropneumothorax, pleural effusion  -per ED note,  Dr. Meade: spoke with Dr. Reyes who states will consult pt and recommends IR placement of chest tube. Dr. Meade: spoke with Dr. Carrion, IR, who states he doesnt think pt needs emergent chest tube placed at this moment.   -admit to tele  -supplemental oxygen prn and pulse ox monitoring continuous  -cardio thoracic sx consult, Dr Reyes  -pulm consult, Dr Valadez  -daily CXR  -IR eval  -NPO after MN    #cough  -no fever, no leukocytosis. CT showing right basilar consolidation.  -obtain BCx  -will hold off ABx for now    #SHRUTHI on CKD stage 3  -baseline Cr ~1.8  -avoid nephrotoxic meds  -hold lasix  -echo    #CAD  -on ASA, BB, ranexa, statin    #HTN, HLD, BPH  -cont home meds    #DVT ppx  -SCDs (possible chest tube placement)

## 2018-05-23 NOTE — ED PROVIDER NOTE - OBJECTIVE STATEMENT
85 y/o M with PMHx of CKD, HTN, HLD, SSS, CAD, s/p pacemaker, chronic R pleural effusion s/p pleurodesis (by Dr. Reyes at [facility] presents to the ED c/o SOB on exertion x2days. Pt was sent to the ED by MD after CXR showed R hydropneumothorax. Pt has had fluid in lungs 3 other times (last time in Sep 2017). No fever or any other acute complaints at this time. On ASA 81mg. PCP: Dr. Veronica Lama. Pulm: Dr. Valadez. Cardio: Dr. Carranza.

## 2018-05-23 NOTE — H&P ADULT - HISTORY OF PRESENT ILLNESS
85 y.o. male with PMH CAD, HTN, HLD, BPH, SSS s/p PPM, CKD stage III, h/o SVT, h/o right pleural effusions and PTX s/p VATS decortication, pleuradesis 5/2016, spinal stenosis s/p lumbar fusion presents to ED with complaint of cough for the past few days. Pt reports nonproductive cough, went to see his doctor and had Xray performed and told him to come to ED. Pt denies fever, chills, CP, palpitation, abd pain, dysuria, or diarrhea. Denies sore throat. States SOB with exertion and it has been similar to prior. Reports this is the 4th time with fluid in lungs. Denies any other symptoms.    PMH: as above  PSH: as above and chest tube placed 9/22/17, cataract, tonsil  Social Hx: No tobacco, EtOH or drugs  Family Hx: Mother-CVA age 77; Father-CVA age 60  ROS: per HPI

## 2018-05-24 LAB
ANION GAP SERPL CALC-SCNC: 7 MMOL/L — SIGNIFICANT CHANGE UP (ref 5–17)
BASOPHILS # BLD AUTO: 0.04 K/UL — SIGNIFICANT CHANGE UP (ref 0–0.2)
BASOPHILS NFR BLD AUTO: 0.5 % — SIGNIFICANT CHANGE UP (ref 0–2)
BUN SERPL-MCNC: 28 MG/DL — HIGH (ref 7–23)
CALCIUM SERPL-MCNC: 8.6 MG/DL — SIGNIFICANT CHANGE UP (ref 8.5–10.1)
CHLORIDE SERPL-SCNC: 108 MMOL/L — SIGNIFICANT CHANGE UP (ref 96–108)
CK SERPL-CCNC: 49 U/L — SIGNIFICANT CHANGE UP (ref 26–308)
CO2 SERPL-SCNC: 26 MMOL/L — SIGNIFICANT CHANGE UP (ref 22–31)
CREAT SERPL-MCNC: 2.36 MG/DL — HIGH (ref 0.5–1.3)
EOSINOPHIL # BLD AUTO: 0.26 K/UL — SIGNIFICANT CHANGE UP (ref 0–0.5)
EOSINOPHIL NFR BLD AUTO: 3.6 % — SIGNIFICANT CHANGE UP (ref 0–6)
GLUCOSE SERPL-MCNC: 115 MG/DL — HIGH (ref 70–99)
HCT VFR BLD CALC: 37 % — LOW (ref 39–50)
HGB BLD-MCNC: 12.5 G/DL — LOW (ref 13–17)
IMM GRANULOCYTES NFR BLD AUTO: 0.4 % — SIGNIFICANT CHANGE UP (ref 0–1.5)
LYMPHOCYTES # BLD AUTO: 0.76 K/UL — LOW (ref 1–3.3)
LYMPHOCYTES # BLD AUTO: 10.4 % — LOW (ref 13–44)
MCHC RBC-ENTMCNC: 31.5 PG — SIGNIFICANT CHANGE UP (ref 27–34)
MCHC RBC-ENTMCNC: 33.8 GM/DL — SIGNIFICANT CHANGE UP (ref 32–36)
MCV RBC AUTO: 93.2 FL — SIGNIFICANT CHANGE UP (ref 80–100)
MONOCYTES # BLD AUTO: 0.92 K/UL — HIGH (ref 0–0.9)
MONOCYTES NFR BLD AUTO: 12.6 % — SIGNIFICANT CHANGE UP (ref 2–14)
NEUTROPHILS # BLD AUTO: 5.27 K/UL — SIGNIFICANT CHANGE UP (ref 1.8–7.4)
NEUTROPHILS NFR BLD AUTO: 72.5 % — SIGNIFICANT CHANGE UP (ref 43–77)
NRBC # BLD: 0 /100 WBCS — SIGNIFICANT CHANGE UP (ref 0–0)
PLATELET # BLD AUTO: 186 K/UL — SIGNIFICANT CHANGE UP (ref 150–400)
POTASSIUM SERPL-MCNC: 4.4 MMOL/L — SIGNIFICANT CHANGE UP (ref 3.5–5.3)
POTASSIUM SERPL-SCNC: 4.4 MMOL/L — SIGNIFICANT CHANGE UP (ref 3.5–5.3)
RBC # BLD: 3.97 M/UL — LOW (ref 4.2–5.8)
RBC # FLD: 14.2 % — SIGNIFICANT CHANGE UP (ref 10.3–14.5)
SODIUM SERPL-SCNC: 141 MMOL/L — SIGNIFICANT CHANGE UP (ref 135–145)
WBC # BLD: 7.28 K/UL — SIGNIFICANT CHANGE UP (ref 3.8–10.5)
WBC # FLD AUTO: 7.28 K/UL — SIGNIFICANT CHANGE UP (ref 3.8–10.5)

## 2018-05-24 PROCEDURE — 71045 X-RAY EXAM CHEST 1 VIEW: CPT | Mod: 26

## 2018-05-24 PROCEDURE — 93306 TTE W/DOPPLER COMPLETE: CPT | Mod: 26

## 2018-05-24 PROCEDURE — 99232 SBSQ HOSP IP/OBS MODERATE 35: CPT

## 2018-05-24 RX ORDER — AZITHROMYCIN 500 MG/1
500 TABLET, FILM COATED ORAL ONCE
Qty: 0 | Refills: 0 | Status: COMPLETED | OUTPATIENT
Start: 2018-05-24 | End: 2018-05-24

## 2018-05-24 RX ORDER — AZITHROMYCIN 500 MG/1
500 TABLET, FILM COATED ORAL EVERY 24 HOURS
Qty: 0 | Refills: 0 | Status: DISCONTINUED | OUTPATIENT
Start: 2018-05-25 | End: 2018-05-27

## 2018-05-24 RX ORDER — IPRATROPIUM/ALBUTEROL SULFATE 18-103MCG
3 AEROSOL WITH ADAPTER (GRAM) INHALATION EVERY 6 HOURS
Qty: 0 | Refills: 0 | Status: DISCONTINUED | OUTPATIENT
Start: 2018-05-24 | End: 2018-05-27

## 2018-05-24 RX ORDER — AZITHROMYCIN 500 MG/1
TABLET, FILM COATED ORAL
Qty: 0 | Refills: 0 | Status: DISCONTINUED | OUTPATIENT
Start: 2018-05-24 | End: 2018-05-27

## 2018-05-24 RX ADMIN — RANOLAZINE 500 MILLIGRAM(S): 500 TABLET, FILM COATED, EXTENDED RELEASE ORAL at 17:33

## 2018-05-24 RX ADMIN — Medication 180 MILLIGRAM(S): at 05:56

## 2018-05-24 RX ADMIN — Medication 3 MILLILITER(S): at 14:44

## 2018-05-24 RX ADMIN — Medication 2000 UNIT(S): at 11:53

## 2018-05-24 RX ADMIN — Medication 325 MILLIGRAM(S): at 05:56

## 2018-05-24 RX ADMIN — Medication 325 MILLIGRAM(S): at 21:11

## 2018-05-24 RX ADMIN — AZITHROMYCIN 255 MILLIGRAM(S): 500 TABLET, FILM COATED ORAL at 13:51

## 2018-05-24 RX ADMIN — Medication 200 MILLIGRAM(S): at 21:12

## 2018-05-24 RX ADMIN — Medication 3 MILLILITER(S): at 20:03

## 2018-05-24 RX ADMIN — RANOLAZINE 500 MILLIGRAM(S): 500 TABLET, FILM COATED, EXTENDED RELEASE ORAL at 05:56

## 2018-05-24 RX ADMIN — TAMSULOSIN HYDROCHLORIDE 0.4 MILLIGRAM(S): 0.4 CAPSULE ORAL at 21:11

## 2018-05-24 RX ADMIN — Medication 200 MILLIGRAM(S): at 05:56

## 2018-05-24 RX ADMIN — Medication 325 MILLIGRAM(S): at 13:29

## 2018-05-24 RX ADMIN — Medication 81 MILLIGRAM(S): at 11:54

## 2018-05-24 RX ADMIN — FINASTERIDE 5 MILLIGRAM(S): 5 TABLET, FILM COATED ORAL at 21:12

## 2018-05-24 RX ADMIN — Medication 650 MILLIGRAM(S): at 06:03

## 2018-05-24 RX ADMIN — Medication 650 MILLIGRAM(S): at 11:54

## 2018-05-24 RX ADMIN — Medication 80 MILLIGRAM(S): at 21:12

## 2018-05-24 RX ADMIN — PREGABALIN 1000 MICROGRAM(S): 225 CAPSULE ORAL at 11:55

## 2018-05-24 RX ADMIN — Medication 200 MILLIGRAM(S): at 13:29

## 2018-05-24 RX ADMIN — Medication 200 MILLIGRAM(S): at 13:54

## 2018-05-24 NOTE — PROGRESS NOTE ADULT - ASSESSMENT
A/P  SOB secondary to right hydropneumothorax vs infectious process  - con't tele for pulse ox monitoring  - CT Sx consult  - appreciate pulm consult  - start zithromax / recheck CXR / antitussive  - O2 supplementation    SHRUTHI on CKD stage 3  - ? baseline Cr 1.8  - monitor renal panel / ck renal US  - avoid nephrotoxic medications  - renal consult: he sees Dr Pastrana outpt    CAD - chronic  - no further chest pain (suspect d/t cough/pulmonary issues)  - continue ASA, BB, statin, ranexa / telemonitor    SSS  - hx of PPM placement   - EP for interrogation    HTN  - BP elevated overnight, better controlled today  - continue home meds    Dyslipidemia  - continue statin    DVT prophylaxis  - venodyne A/P  * Dyspnea secondary to chronic  right hydropneumothorax  * Suspected acute bronchitis vs atelectasis   - con't tele for pulse ox monitoring  - CT Sx consult - no surgical interventions  - appreciate pulm consult  - start zithromax / recheck CXR / antitussive  - O2 supplementation  - check O2 on ambulation  - incentive spirometry    SHRUTHI on CKD stage 3  - ? baseline Cr 1.8  - monitor renal panel / ck renal US  - avoid nephrotoxic medications  - renal consult: he sees Dr Pastrana outpt    CAD - chronic  - check troponin, CK, EKG   - no further chest pain (suspect d/t cough/pulmonary issues)  - continue ASA, BB, statin, ranexa / telemonitor    SSS  - hx of PPM placement   - EP for interrogation    HTN  - BP elevated overnight, better controlled today  - continue home meds    Dyslipidemia  - continue statin    DVT prophylaxis  - Venodyne

## 2018-05-24 NOTE — PROGRESS NOTE ADULT - SUBJECTIVE AND OBJECTIVE BOX
Chart and meds reviewed.  Patient seen and examined.    CC: shortness of breath, worsening cough    HPI: 84 yo male with PMH of spinal stenosis s/p lumbar fusion, CAD, HTN, HLD, BPH, SSS s/p PPM, CKD stage III, h/o SVT, h/o right pleural effusions and PTX s/p VATS decortication, pleuradesis 5/2016, presented to ED with complaint of SOB and right sided chest pain. Pt stated that he has had cough for several months but at its worst past 2-3 days at which time he presented for outpt visit with his doctor who then ordered CXR, which revealed PTX at which time he was referred to the ED. In ED CXR and CT chest shows hydropneumothorax.    HOSPITAL COURSE  5/24 He denies HA/dizziness/cp/palpitations/abd pain/n/v/d/f/c    All systems reviewed and found to be negative with the exception of what has been described above.      VITALS:  Vital Signs Last 24 Hrs  T(C): 37.6 (24 May 2018 11:41), Max: 37.6 (24 May 2018 11:41)  T(F): 99.7 (24 May 2018 11:41), Max: 99.7 (24 May 2018 11:41)  HR: 60 (24 May 2018 15:00) (59 - 64)  BP: 132/48 (24 May 2018 13:25) (125/39 - 160/51)  BP(mean): --  RR: 18 (24 May 2018 11:41) (18 - 18)  SpO2: 97% (24 May 2018 11:41) (94% - 97%)      PHYSICAL EXAM:  GENERAL: NAD  HEENT:  pupils equal and reactive, EOMI, no oropharyngeal lesions, erythema, exudates, oral thrush  NECK:   supple, no carotid bruits, no palpable lymph nodes, no thyromegaly  CV:  +S1, +S2, regular, no murmurs or rubs  RESP: decrease breath sounds R>L; +rhonchi, even and unlabored breathing at rest; raspy voice  GI:  abdomen soft, non-tender, non-distended, normal BS, no bruits, no abdominal masses, no palpable masses  MSK:   normal muscle tone, no atrophy, no rigidity, no contractions  EXT:   no clubbing, no cyanosis, no edema, no calf pain, swelling or erythema  VASCULAR:  pulses equal and symmetric in the upper and lower extremities  NEURO:  AAOX3, no focal neurological deficits, follows all commands, able to move extremities spontaneously  SKIN:  no ulcers, lesions or rashes      LABS:                        12.5   7.28  )-----------( 186      ( 24 May 2018 06:49 )             37.0   05-24    141  |  108  |  28<H>  ----------------------------<  115<H>  4.4   |  26  |  2.36<H>    Ca    8.6      24 May 2018 06:49  TPro  7.6  /  Alb  4.1  /  TBili  0.6  /  DBili  x   /  AST  13<L>  /  ALT  16  /  AlkPhos  91  05-23  LIVER FUNCTIONS - ( 23 May 2018 15:55 )  Alb: 4.1 g/dL / Pro: 7.6 gm/dL / ALK PHOS: 91 U/L / ALT: 16 U/L / AST: 13 U/L / GGT: x           PT/INR - ( 23 May 2018 15:55 )   PT: 11.1 sec;   INR: 1.03 ratio    PTT - ( 23 May 2018 15:55 )  PTT:28.9 sec      MEDICATIONS  (STANDING):  ALBUTerol/ipratropium for Nebulization 3 milliLiter(s) Nebulizer every 6 hours  aspirin enteric coated 81 milliGRAM(s) Oral daily  azithromycin  IVPB      cholecalciferol 2000 Unit(s) Oral daily  cyanocobalamin 1000 MICROGram(s) Oral daily  diltiazem    milliGRAM(s) Oral daily  finasteride 5 milliGRAM(s) Oral at bedtime  guaiFENesin    Syrup 200 milliGRAM(s) Oral every 8 hours  labetalol 200 milliGRAM(s) Oral three times a day  pravastatin 80 milliGRAM(s) Oral at bedtime  ranolazine 500 milliGRAM(s) Oral two times a day  sodium bicarbonate 325 milliGRAM(s) Oral three times a day  tamsulosin 0.4 milliGRAM(s) Oral at bedtime    MEDICATIONS  (PRN):  acetaminophen   Tablet 650 milliGRAM(s) Oral every 6 hours PRN For Temp greater than 38 C (100.4 F), mild pain, HA  ondansetron Injectable 4 milliGRAM(s) IV Push every 6 hours PRN Nausea Chart and meds reviewed.  Patient seen and examined.    CC: shortness of breath, worsening cough    HPI: 86 yo male with PMH of spinal stenosis s/p lumbar fusion, CAD, HTN, HLD, BPH, SSS s/p PPM, CKD stage III, h/o SVT, h/o right pleural effusions and PTX s/p VATS decortication, pleuradesis 5/2016, presented to ED on 5/23/18  with complaint of SOB and right sided chest pain. Pt stated that he has had cough for several months but at its worst past 2-3 days at which time he presented for outpt visit with his doctor who then ordered CXR, which revealed PTX at which time he was referred to the ED. In ED CXR and CT chest shows hydropneumothorax.    HOSPITAL COURSE  5/24 He denies HA/dizziness/cp/palpitations/abd pain/n/v/d/f/c, reports dyspnea on minimal exertion, + Productive cough     All systems reviewed and found to be negative with the exception of what has been described above.      VITALS:  Vital Signs Last 24 Hrs  T(C): 37.6 (24 May 2018 11:41), Max: 37.6 (24 May 2018 11:41)  T(F): 99.7 (24 May 2018 11:41), Max: 99.7 (24 May 2018 11:41)  HR: 60 (24 May 2018 15:00) (59 - 64)  BP: 132/48 (24 May 2018 13:25) (125/39 - 160/51)  BP(mean): --  RR: 18 (24 May 2018 11:41) (18 - 18)  SpO2: 97% (24 May 2018 11:41) (94% - 97%)      PHYSICAL EXAM:  GENERAL: NAD  HEENT:  pupils equal and reactive, EOMI, no oropharyngeal lesions, erythema, exudates, oral thrush  NECK:   supple, no carotid bruits, no palpable lymph nodes, no thyromegaly  CV:  +S1, +S2, regular, no murmurs or rubs  RESP: decrease breath sounds R>L; +rhonchi, even and unlabored breathing at rest; raspy voice  GI:  abdomen soft, non-tender, non-distended, normal BS, no bruits, no abdominal masses, no palpable masses  MSK:   normal muscle tone, no atrophy, no rigidity, no contractions  EXT:   no clubbing, no cyanosis, no edema, no calf pain, swelling or erythema  VASCULAR:  pulses equal and symmetric in the upper and lower extremities  NEURO:  AAOX3, no focal neurological deficits, follows all commands, able to move extremities spontaneously  SKIN:  no ulcers, lesions or rashes      LABS:                        12.5   7.28  )-----------( 186      ( 24 May 2018 06:49 )             37.0   05-24    141  |  108  |  28<H>  ----------------------------<  115<H>  4.4   |  26  |  2.36<H>    Ca    8.6      24 May 2018 06:49  TPro  7.6  /  Alb  4.1  /  TBili  0.6  /  DBili  x   /  AST  13<L>  /  ALT  16  /  AlkPhos  91  05-23  LIVER FUNCTIONS - ( 23 May 2018 15:55 )  Alb: 4.1 g/dL / Pro: 7.6 gm/dL / ALK PHOS: 91 U/L / ALT: 16 U/L / AST: 13 U/L / GGT: x           PT/INR - ( 23 May 2018 15:55 )   PT: 11.1 sec;   INR: 1.03 ratio    PTT - ( 23 May 2018 15:55 )  PTT:28.9 sec    < from: Xray Chest 1 View- PORTABLE-Urgent (05.24.18 @ 12:45) >    A pacemaker remains in place. The layering right-sided effusion   previously present has regressed with slight decrease in size of the   residual pneumothorax. There is no kailyn central edema. The left   hemithorax is clear. The heart is normal in size.    < end of copied text >    < from: CT Chest No Cont (05.23.18 @ 15:36) >  Lungs, Airways and Pleura: Central tracheobronchialtree is grossly   patent. No endobronchial lesions. Surgical sutures in the right upper   lobe. Moderate right hydropneumothorax. Right basilar consolidative   changes likely representing rounded atelectasis. Medial right apical   bleb. Linear scarring within the lingula. No left-sided pneumothorax. No   pulmonary edema. No left-sided pleural effusions.    Heart and Great Vessels: Atherosclerotic changes of the aorta and   coronary vasculature. Heart is normal in size. No pericardial effusions.   Left-sided pacemaker with leads in place.    Mediastinum and Sarah: within normal limits    Neck and Chest Wall: within normal limits    Bones: Degenerative changes and osteopenia.    Upper Abdomen:  Multiple bilateral renal cysts. Complex right lateral   midpole exophytic renal cyst with thin septations demonstrating   calcification. Nonobstructive right intrarenal calculi. Correlate lobe   cyst. Gallstones.    IMPRESSION:         Moderate right hydropneumothorax with right basilar consolidation with   the appearance of rounded atelectasis.        < end of copied text >  MEDICATIONS  (STANDING):  ALBUTerol/ipratropium for Nebulization 3 milliLiter(s) Nebulizer every 6 hours  aspirin enteric coated 81 milliGRAM(s) Oral daily  azithromycin  IVPB      cholecalciferol 2000 Unit(s) Oral daily  cyanocobalamin 1000 MICROGram(s) Oral daily  diltiazem    milliGRAM(s) Oral daily  finasteride 5 milliGRAM(s) Oral at bedtime  guaiFENesin    Syrup 200 milliGRAM(s) Oral every 8 hours  labetalol 200 milliGRAM(s) Oral three times a day  pravastatin 80 milliGRAM(s) Oral at bedtime  ranolazine 500 milliGRAM(s) Oral two times a day  sodium bicarbonate 325 milliGRAM(s) Oral three times a day  tamsulosin 0.4 milliGRAM(s) Oral at bedtime    MEDICATIONS  (PRN):  acetaminophen   Tablet 650 milliGRAM(s) Oral every 6 hours PRN For Temp greater than 38 C (100.4 F), mild pain, HA  ondansetron Injectable 4 milliGRAM(s) IV Push every 6 hours PRN Nausea

## 2018-05-24 NOTE — PROGRESS NOTE ADULT - SUBJECTIVE AND OBJECTIVE BOX
CXR reviewed from today and compared to yesterday evening's CXR, no obvious change in trapped lung appearance.  No surgical intervention based on symptoms and pt's co-morbidities.  OK to d/c home from thoracic standpoint.   Care as per medicine and pulmonary.  D/W covering hospitalist

## 2018-05-24 NOTE — PROGRESS NOTE ADULT - ATTENDING COMMENTS
Patient seen and examined with NP Swathi Elizondo . I performed a history and physical examination of the patient and discussed patient’s management with the NP and spend 40 minutes face to face time.   I reviewed the NP’s note and agree with the documented findings and plan of care.

## 2018-05-25 LAB
ANION GAP SERPL CALC-SCNC: 4 MMOL/L — LOW (ref 5–17)
BUN SERPL-MCNC: 24 MG/DL — HIGH (ref 7–23)
CALCIUM SERPL-MCNC: 8.1 MG/DL — LOW (ref 8.5–10.1)
CHLORIDE SERPL-SCNC: 109 MMOL/L — HIGH (ref 96–108)
CO2 SERPL-SCNC: 24 MMOL/L — SIGNIFICANT CHANGE UP (ref 22–31)
CREAT SERPL-MCNC: 2.17 MG/DL — HIGH (ref 0.5–1.3)
GLUCOSE SERPL-MCNC: 127 MG/DL — HIGH (ref 70–99)
HCT VFR BLD CALC: 36.8 % — LOW (ref 39–50)
HGB BLD-MCNC: 12.4 G/DL — LOW (ref 13–17)
MAGNESIUM SERPL-MCNC: 2.3 MG/DL — SIGNIFICANT CHANGE UP (ref 1.6–2.6)
MCHC RBC-ENTMCNC: 31.4 PG — SIGNIFICANT CHANGE UP (ref 27–34)
MCHC RBC-ENTMCNC: 33.7 GM/DL — SIGNIFICANT CHANGE UP (ref 32–36)
MCV RBC AUTO: 93.2 FL — SIGNIFICANT CHANGE UP (ref 80–100)
NRBC # BLD: 0 /100 WBCS — SIGNIFICANT CHANGE UP (ref 0–0)
PHOSPHATE SERPL-MCNC: 4 MG/DL — SIGNIFICANT CHANGE UP (ref 2.5–4.5)
PLATELET # BLD AUTO: 172 K/UL — SIGNIFICANT CHANGE UP (ref 150–400)
POTASSIUM SERPL-MCNC: 4.2 MMOL/L — SIGNIFICANT CHANGE UP (ref 3.5–5.3)
POTASSIUM SERPL-SCNC: 4.2 MMOL/L — SIGNIFICANT CHANGE UP (ref 3.5–5.3)
RBC # BLD: 3.95 M/UL — LOW (ref 4.2–5.8)
RBC # FLD: 14.3 % — SIGNIFICANT CHANGE UP (ref 10.3–14.5)
SODIUM SERPL-SCNC: 137 MMOL/L — SIGNIFICANT CHANGE UP (ref 135–145)
TROPONIN I SERPL-MCNC: <0.015 NG/ML — SIGNIFICANT CHANGE UP (ref 0.01–0.04)
WBC # BLD: 5.88 K/UL — SIGNIFICANT CHANGE UP (ref 3.8–10.5)
WBC # FLD AUTO: 5.88 K/UL — SIGNIFICANT CHANGE UP (ref 3.8–10.5)

## 2018-05-25 PROCEDURE — 99232 SBSQ HOSP IP/OBS MODERATE 35: CPT

## 2018-05-25 PROCEDURE — 71045 X-RAY EXAM CHEST 1 VIEW: CPT | Mod: 26

## 2018-05-25 PROCEDURE — 93010 ELECTROCARDIOGRAM REPORT: CPT

## 2018-05-25 PROCEDURE — 76770 US EXAM ABDO BACK WALL COMP: CPT | Mod: 26

## 2018-05-25 RX ORDER — BENZOCAINE AND MENTHOL 5; 1 G/100ML; G/100ML
1 LIQUID ORAL ONCE
Qty: 0 | Refills: 0 | Status: COMPLETED | OUTPATIENT
Start: 2018-05-25 | End: 2018-05-25

## 2018-05-25 RX ORDER — IPRATROPIUM/ALBUTEROL SULFATE 18-103MCG
3 AEROSOL WITH ADAPTER (GRAM) INHALATION ONCE
Qty: 0 | Refills: 0 | Status: COMPLETED | OUTPATIENT
Start: 2018-05-25 | End: 2018-05-25

## 2018-05-25 RX ORDER — BUDESONIDE, MICRONIZED 100 %
0.5 POWDER (GRAM) MISCELLANEOUS EVERY 12 HOURS
Qty: 0 | Refills: 0 | Status: DISCONTINUED | OUTPATIENT
Start: 2018-05-25 | End: 2018-05-27

## 2018-05-25 RX ADMIN — Medication 2000 UNIT(S): at 11:16

## 2018-05-25 RX ADMIN — Medication 3 MILLILITER(S): at 14:15

## 2018-05-25 RX ADMIN — RANOLAZINE 500 MILLIGRAM(S): 500 TABLET, FILM COATED, EXTENDED RELEASE ORAL at 05:55

## 2018-05-25 RX ADMIN — Medication 200 MILLIGRAM(S): at 13:02

## 2018-05-25 RX ADMIN — FINASTERIDE 5 MILLIGRAM(S): 5 TABLET, FILM COATED ORAL at 21:18

## 2018-05-25 RX ADMIN — Medication 200 MILLIGRAM(S): at 21:18

## 2018-05-25 RX ADMIN — Medication 650 MILLIGRAM(S): at 13:37

## 2018-05-25 RX ADMIN — Medication 325 MILLIGRAM(S): at 13:02

## 2018-05-25 RX ADMIN — BENZOCAINE AND MENTHOL 1 LOZENGE: 5; 1 LIQUID ORAL at 22:19

## 2018-05-25 RX ADMIN — Medication 650 MILLIGRAM(S): at 05:55

## 2018-05-25 RX ADMIN — Medication 200 MILLIGRAM(S): at 05:54

## 2018-05-25 RX ADMIN — Medication 0.5 MILLIGRAM(S): at 19:40

## 2018-05-25 RX ADMIN — RANOLAZINE 500 MILLIGRAM(S): 500 TABLET, FILM COATED, EXTENDED RELEASE ORAL at 17:15

## 2018-05-25 RX ADMIN — Medication 650 MILLIGRAM(S): at 21:23

## 2018-05-25 RX ADMIN — Medication 80 MILLIGRAM(S): at 21:19

## 2018-05-25 RX ADMIN — Medication 3 MILLILITER(S): at 10:29

## 2018-05-25 RX ADMIN — Medication 325 MILLIGRAM(S): at 21:19

## 2018-05-25 RX ADMIN — PREGABALIN 1000 MICROGRAM(S): 225 CAPSULE ORAL at 11:16

## 2018-05-25 RX ADMIN — Medication 81 MILLIGRAM(S): at 11:15

## 2018-05-25 RX ADMIN — TAMSULOSIN HYDROCHLORIDE 0.4 MILLIGRAM(S): 0.4 CAPSULE ORAL at 21:18

## 2018-05-25 RX ADMIN — Medication 325 MILLIGRAM(S): at 05:55

## 2018-05-25 RX ADMIN — Medication 180 MILLIGRAM(S): at 05:54

## 2018-05-25 RX ADMIN — Medication 3 MILLILITER(S): at 19:40

## 2018-05-25 RX ADMIN — AZITHROMYCIN 255 MILLIGRAM(S): 500 TABLET, FILM COATED ORAL at 13:02

## 2018-05-25 RX ADMIN — Medication 40 MILLIGRAM(S): at 21:18

## 2018-05-25 RX ADMIN — Medication 40 MILLIGRAM(S): at 13:01

## 2018-05-25 NOTE — PROGRESS NOTE ADULT - SUBJECTIVE AND OBJECTIVE BOX
Subjective: Patient has no new complaints.  Overnight he denies shortness of breath, difficulty breathing, chest pain.  His cough is controlled with PRN cough medication.  No fever or chills.    Vital Signs:  Vital Signs Last 24 Hrs  T(C): 37.3 (05-25-18 @ 04:49), Max: 37.6 (05-24-18 @ 11:41)  T(F): 99.1 (05-25-18 @ 04:49), Max: 99.7 (05-24-18 @ 11:41)  HR: 60 (05-25-18 @ 04:49) (59 - 60)   BP: 145/60 (05-25-18 @ 04:49) (127/49 - 145/60)  RR: 17 (05-25-18 @ 04:49) (17 - 18)  SpO2: 99% (05-25-18 @ 04:49) (94% - 99%) on (O2)    Telemetry/Alarms:  Sinus rhythm.    Relevant labs, radiology and Medications reviewed                        12.4   5.88  )-----------( 172      ( 25 May 2018 05:09 )             36.8     05-25    137  |  109<H>  |  24<H>  ----------------------------<  127<H>  4.2   |  24  |  2.17<H>    Ca    8.1<L>      25 May 2018 05:09  Phos  4.0     05-25  Mg     2.3     05-25    TPro  7.6  /  Alb  4.1  /  TBili  0.6  /  DBili  x   /  AST  13<L>  /  ALT  16  /  AlkPhos  91  05-23    PT/INR - ( 23 May 2018 15:55 )   PT: 11.1 sec;   INR: 1.03 ratio         PTT - ( 23 May 2018 15:55 )  PTT:28.9 sec  MEDICATIONS  (STANDING):  ALBUTerol/ipratropium for Nebulization 3 milliLiter(s) Nebulizer every 6 hours  aspirin enteric coated 81 milliGRAM(s) Oral daily  azithromycin  IVPB      azithromycin  IVPB 500 milliGRAM(s) IV Intermittent every 24 hours  buDESOnide   0.5 milliGRAM(s) Respule 0.5 milliGRAM(s) Inhalation every 12 hours  cholecalciferol 2000 Unit(s) Oral daily  cyanocobalamin 1000 MICROGram(s) Oral daily  diltiazem    milliGRAM(s) Oral daily  finasteride 5 milliGRAM(s) Oral at bedtime  guaiFENesin    Syrup 200 milliGRAM(s) Oral every 8 hours  labetalol 200 milliGRAM(s) Oral three times a day  pravastatin 80 milliGRAM(s) Oral at bedtime  ranolazine 500 milliGRAM(s) Oral two times a day  sodium bicarbonate 325 milliGRAM(s) Oral three times a day  tamsulosin 0.4 milliGRAM(s) Oral at bedtime    MEDICATIONS  (PRN):  acetaminophen   Tablet 650 milliGRAM(s) Oral every 6 hours PRN For Temp greater than 38 C (100.4 F), mild pain, HA  ondansetron Injectable 4 milliGRAM(s) IV Push every 6 hours PRN Nausea      Physical exam  Gen: NAD, breathing comfortably.  Neuro:  alert and oriented.  Card:  S1 S2, normal sinus rhythm.  Pulm:  Decreased breath sounds on the right.  No wheezing but minor rhonchi bilaterally.  Abd:  soft, nontender, nondistended.  Ext: no edema.    CXR:  right side hydropneumothorax, no worse.    Bedside ultrasound done on the right side, shows small loculated pneumothorax.        Assessment  86y Male  w/ PAST MEDICAL & SURGICAL HISTORY:  Spinal stenosis  CKD (chronic kidney disease), stage III  SSS (sick sinus syndrome)  Pleural effusion  Hyperlipidemia  BPH (benign prostatic hyperplasia)  CAD (coronary artery disease)  Hypertension  Status post lumbar spinal fusion  admitted with complaints of shortness of breath secondary to Right hydropnemothorax (23 May 2018 21:21)      PLAN  Neuro: Pain management.  Pulm: Encourage coughing, deep breathing and use of incentive spirometry.  Bedside ultrasound was done, spoke to Dr. Reyes, patient is not symptomatic will observe.  Cardio: Monitor telemetry/alarms.  GI: Tolerating diet. Continue stool softeners.  Renal: monitor urine output, supplement electrolytes as needed.  Vasc: OOB, ambulate.  Heme: Stable H/H.  ID:  Continue abx as prescribed.  Therapy: OOB/ambulate, PT    Discussed with Cardiothoracic Team at AM rounds.

## 2018-05-25 NOTE — PROGRESS NOTE ADULT - ASSESSMENT
A/P  * Dyspnea secondary to chronic  right hydropneumothorax vs d/t acute bronchitis vs atelectasis   - con't tele for pulse ox monitoring  - CT Sx consult - no surgical interventions  - appreciate pulm consult  - started zithromax 5/24 / recheck CXR / antitussive  - add solumedrol 40mg IV bid (5/25)  - O2 supplementation  - incentive spirometry    SHRUTHI on CKD stage 3  - ? baseline Cr 1.8  - monitor renal panel   - f/u renal US  - avoid nephrotoxic medications  - renal consult: he sees Dr Pastrana outpt    CAD - chronic  - TNI negative, ecg no acute ST-T changes  - no further chest pain (suspect d/t cough/pulmonary issues)  - continue ASA, BB, statin, ranexa / telemonitor  - f/b cardiology    SSS  - hx of PPM placement   - EP for interrogation    HTN  - BP controlled  - continue home meds    Dyslipidemia  - continue statin    DVT prophylaxis  - Venodyne A/P  * Dyspnea secondary to chronic  right hydropneumothorax vs d/t acute bronchitis vs atelectasis   - con't tele for pulse ox monitoring  - CT Sx consult - no surgical interventions  - appreciate pulm consult  - started zithromax 5/24 / recheck CXR / antitussive  - add solumedrol 40mg IV bid (5/25)  - O2 supplementation  - incentive spirometry    SHRUTHI on CKD stage 3  - ? baseline Cr 1.8  - slight downtrend in crt  - f/u renal US  - avoid nephrotoxic medications  - renal consult: he sees Dr Pastrana outpt    CAD - chronic  - TTE 5/24 reviewed  - TNI negative, ecg no acute ST-T changes  - no further chest pain (suspect d/t cough/pulmonary issues)  - continue ASA, BB, statin, ranexa / telemonitor  - f/b cardiology    SSS  - hx of PPM placement   - EP for interrogation    HTN  - BP controlled  - continue home meds    Dyslipidemia  - continue statin    DVT prophylaxis  - Venodyne A/P  * Dyspnea secondary to chronic  right hydropneumothorax vs d/t acute bronchitis vs atelectasis   - con't tele for pulse ox monitoring  - CT Sx consult - no surgical interventions  - appreciate pulm consult  - started zithromax 5/24 / recheck CXR / antitussive  - add solumedrol 40mg IV bid (5/25)  - O2 supplementation  - incentive spirometry    SHRUTHI on CKD stage 3  - ? baseline Cr 1.8  - slight downtrend in crt  - f/u renal US  - avoid nephrotoxic medications  - renal consult: he sees Dr Pastrana outpt    CAD - chronic  - TTE 5/24 reviewed  - TNI negative, ecg no acute ST-T changes  - no further chest pain (suspect d/t cough/pulmonary issues)  - continue ASA, BB, statin, ranexa / telemonitor  - f/b cardiology    SSS  - hx of PPM placement   - had outpt EP visit and PPM interrogation ~2 wks ago: normal functioning (report in paper chart)    HTN  - BP controlled  - continue home meds    Dyslipidemia  - continue statin    DVT prophylaxis  - Venodyne

## 2018-05-25 NOTE — PROGRESS NOTE ADULT - ATTENDING COMMENTS
Patient seen and examined.  Agree with NP Caro assessment and plan.  No plan for chest tube as per CT surgery.  Will add low dose IV solumedrol.  Discussed with patient.

## 2018-05-25 NOTE — CONSULT NOTE ADULT - ASSESSMENT
85 y/o M with PMH of CKD3 (baseline SCr 1.8-2.0), HTN, CAD, HLD, BPH, SSS/SVT s/p PPM, h/o right pleural effusions and PTX s/p VATS decortication, pleurodesis 5/2016, spinal stenosis s/p lumbar fusion presents to ED with complaint of cough for the past few days, nonproductive. Saw PCP, had Xray performed and told to come to ED. Found to have Hydropneumothorax and SCr 2.4 - renal called for eval.    1. SHRUTHI on CKD3 - baseline SCr 1.8-2.0, peak 2.4  - improving today to 2.2  - BP controlled  - renal sono WNL  - lytes acceptable  - due to hemodynamic changes  - encourage po hydration (discussed with patient)  - not on any nephrotoxic meds    2. HTN - controlled  3. Hydropneumothorax  - improved symptoms since admission  - incentive spirometry  - pulm following  - no surgical intervention    Thank you for consult. Will follow.  Dr. Mae to cover service 5/26-28.
85 y.o. male with PMH CAD, HTN, HLD, BPH, SSS s/p PPM, CKD stage III, h/o SVT, h/o right pleural effusions and PTX s/p VATS decortication, pleuradesis 5/2016, spinal stenosis s/p lumbar fusion presents to ED with complaint of cough for the past few days. Pt reports nonproductive cough, went to see his doctor and had Xray performed and told him to come to ED. Pt denies fever, chills, CP, palpitation, abd pain, dysuria, or diarrhea. Denies sore throat. States SOB with exertion and it has been similar to prior. Reports this is the 4th time with fluid in lungs. Denies any other symptoms.    pt is seen and examined with his RN at bedside today 5/24  Recurrent hydropneumothorax  Hx VATS / pleurodesis 2016  etiology remains obscured and concerning with failed pleurodesis  doubt malignancy or infectious process  stable resp status now  no sign of pneumonia  discussed case with Dr Reyes, his input is appreciated  will review ct scan
A/P- 86y Male with PMH CAD, HTN, HLD, BPH, SSS s/p PPM, CKD stage III, h/o SVT, h/o right pleural effusions and PTX s/p VATS decortication/pleuradesis 5/2016, spinal stenosis s/p lumbar fusion presents w cough, hydropneumothorax, trapped lung    Pt well known to Dr. Reyes  NO CT needed at this time, as this is chronic, pt is HD stable, and sating well.  Will follow with you  Cancelled IR request, and NPO order, pt may eat  D/W Dr. Reyes, IR, RN and pt
Dyspnea and cough due to recurrent hydro-pneumothorax. He feels better since admission. He is afebrile and hemodynamically stable. .  Essential hypertension.  Sick sinus syndrome. His pacemaker was recently checked and was reported to be functioning normal.  Chronic renal insufficiency.  Probable coronary artery disease and he has opted for medical management. No recent angina or congestive heart failure.  Suggest  Continue with current cardiac medications.  Follow-up with electrolytes.  Continue with incentive spirometry.  DVT prophylaxis.  Gradual ambulation.  discussed with the hospitalist and pulm

## 2018-05-25 NOTE — PROGRESS NOTE ADULT - SUBJECTIVE AND OBJECTIVE BOX
Chart and meds reviewed.  Patient seen and examined.    CC: shortness of breath, worsening cough    HPI: 86 yo male with PMH of spinal stenosis s/p lumbar fusion, CAD, HTN, HLD, BPH, SSS s/p PPM, CKD stage III, h/o SVT, h/o right pleural effusions and PTX s/p VATS decortication, pleuradesis 5/2016, presented to ED on 5/23/18  with complaint of SOB and right sided chest pain. Pt stated that he has had cough for several months but at its worst past 2-3 days at which time he presented for outpt visit with his doctor who then ordered CXR, which revealed PTX at which time he was referred to the ED. In ED CXR and CT chest shows hydropneumothorax.    HOSPITAL COURSE  5/24 He denies HA/dizziness/cp/palpitations/abd pain/n/v/d/f/c, reports dyspnea on minimal exertion, + Productive cough  5/25 slept well last night, no SOB at rest, only with ambulation, cough is improving with moving of phelgm better; denies CP/palpitations/n/v/d/f/c    All systems reviewed and found to be negative with the exception of what has been described above.      VITALS:  Vital Signs Last 24 Hrs  T(C): 37.1 (25 May 2018 10:44), Max: 37.3 (25 May 2018 04:49)  T(F): 98.7 (25 May 2018 10:44), Max: 99.1 (25 May 2018 04:49)  HR: 60 (25 May 2018 10:44) (60 - 60)  BP: 123/47 (25 May 2018 10:44) (123/47 - 145/60)  BP(mean): --  RR: 18 (25 May 2018 10:44) (17 - 18)  SpO2: 95% (25 May 2018 10:44) (94% - 99%)      PHYSICAL EXAM:  GENERAL: NAD  HEENT:  pupils equal and reactive, EOMI, no oropharyngeal lesions, erythema, exudates, oral thrush  NECK:   supple, no carotid bruits, no palpable lymph nodes, no thyromegaly  CV:  +S1, +S2, regular, no murmurs or rubs  RESP: decrease breath sounds R>L; +rhonchi, even and unlabored breathing at rest; raspy voice - improved  GI:  abdomen soft, non-tender, non-distended, normal BS, no bruits, no abdominal masses, no palpable masses  MSK:   normal muscle tone, no atrophy, no rigidity, no contractions  EXT:   no clubbing, no cyanosis, no edema, no calf pain, swelling or erythema  VASCULAR:  pulses equal and symmetric in the upper and lower extremities  NEURO:  AAOX3, no focal neurological deficits, follows all commands, able to move extremities spontaneously  SKIN:  no ulcers, lesions or rashes    LABS:    5-25             12.4   5.88  )-----------( 172      ( 25 May 2018 05:09 )             36.8     05-25    137  |  109<H>  |  24<H>  ----------------------------<  127<H>  4.2   |  24  |  2.17<H>    Ca    8.1<L>      25 May 2018 05:09  Phos  4.0     05-25  Mg     2.3     05-25    TPro  7.6  /  Alb  4.1  /  TBili  0.6  /  DBili  x   /  AST  13<L>  /  ALT  16  /  AlkPhos  91  05-23    CARDIAC MARKERS ( 25 May 2018 05:09 )  <0.015 ng/mL / x     / x     / x     / x      CARDIAC MARKERS ( 24 May 2018 20:00 )  x     / x     / 49 U/L / x     / x        LIVER FUNCTIONS - ( 23 May 2018 15:55 )  Alb: 4.1 g/dL / Pro: 7.6 gm/dL / ALK PHOS: 91 U/L / ALT: 16 U/L / AST: 13 U/L / GGT: x           PT/INR - ( 23 May 2018 15:55 )   PT: 11.1 sec;   INR: 1.03 ratio       PTT - ( 23 May 2018 15:55 )  PTT:28.9 sec    Culture - Blood (collected 05-23-18 @ 22:35)  Source: .Blood None  Preliminary Report (05-25-18 @ 09:03):    No growth to date.    Culture - Blood (collected 05-23-18 @ 22:35)  Source: .Blood None  Preliminary Report (05-25-18 @ 09:03):    No growth to date.    5-24                        12.5   7.28  )-----------( 186      ( 24 May 2018 06:49 )             37.0   05-24    141  |  108  |  28<H>  ----------------------------<  115<H>  4.4   |  26  |  2.36<H>    Ca    8.6      24 May 2018 06:49  TPro  7.6  /  Alb  4.1  /  TBili  0.6  /  DBili  x   /  AST  13<L>  /  ALT  16  /  AlkPhos  91  05-23  LIVER FUNCTIONS - ( 23 May 2018 15:55 )  Alb: 4.1 g/dL / Pro: 7.6 gm/dL / ALK PHOS: 91 U/L / ALT: 16 U/L / AST: 13 U/L / GGT: x           PT/INR - ( 23 May 2018 15:55 )   PT: 11.1 sec;   INR: 1.03 ratio    PTT - ( 23 May 2018 15:55 )  PTT:28.9 sec    < from: Xray Chest 1 View- PORTABLE-Urgent (05.24.18 @ 12:45) >    A pacemaker remains in place. The layering right-sided effusion   previously present has regressed with slight decrease in size of the   residual pneumothorax. There is no kailyn central edema. The left   hemithorax is clear. The heart is normal in size.    < end of copied text >    < from: CT Chest No Cont (05.23.18 @ 15:36) >  Lungs, Airways and Pleura: Central tracheobronchialtree is grossly   patent. No endobronchial lesions. Surgical sutures in the right upper   lobe. Moderate right hydropneumothorax. Right basilar consolidative   changes likely representing rounded atelectasis. Medial right apical   bleb. Linear scarring within the lingula. No left-sided pneumothorax. No   pulmonary edema. No left-sided pleural effusions.    Heart and Great Vessels: Atherosclerotic changes of the aorta and   coronary vasculature. Heart is normal in size. No pericardial effusions.   Left-sided pacemaker with leads in place.    Mediastinum and Sarah: within normal limits    Neck and Chest Wall: within normal limits    Bones: Degenerative changes and osteopenia.    Upper Abdomen:  Multiple bilateral renal cysts. Complex right lateral   midpole exophytic renal cyst with thin septations demonstrating   calcification. Nonobstructive right intrarenal calculi. Correlate lobe   cyst. Gallstones.    IMPRESSION:         Moderate right hydropneumothorax with right basilar consolidation with   the appearance of rounded atelectasis.    < end of copied text >  MEDICATIONS  (STANDING):  ALBUTerol/ipratropium for Nebulization 3 milliLiter(s) Nebulizer every 6 hours  aspirin enteric coated 81 milliGRAM(s) Oral daily  azithromycin  IVPB      cholecalciferol 2000 Unit(s) Oral daily  cyanocobalamin 1000 MICROGram(s) Oral daily  diltiazem    milliGRAM(s) Oral daily  finasteride 5 milliGRAM(s) Oral at bedtime  guaiFENesin    Syrup 200 milliGRAM(s) Oral every 8 hours  labetalol 200 milliGRAM(s) Oral three times a day  pravastatin 80 milliGRAM(s) Oral at bedtime  ranolazine 500 milliGRAM(s) Oral two times a day  sodium bicarbonate 325 milliGRAM(s) Oral three times a day  tamsulosin 0.4 milliGRAM(s) Oral at bedtime    MEDICATIONS  (PRN):  acetaminophen   Tablet 650 milliGRAM(s) Oral every 6 hours PRN For Temp greater than 38 C (100.4 F), mild pain, HA  ondansetron Injectable 4 milliGRAM(s) IV Push every 6 hours PRN Nausea Chart and meds reviewed.  Patient seen and examined.    CC: shortness of breath, worsening cough    HPI: 86 yo male with PMH of spinal stenosis s/p lumbar fusion, CAD, HTN, HLD, BPH, SSS s/p PPM, CKD stage III, h/o SVT, h/o right pleural effusions and PTX s/p VATS decortication, pleuradesis 5/2016, presented to ED on 5/23/18  with complaint of SOB and right sided chest pain. Pt stated that he has had cough for several months but at its worst past 2-3 days at which time he presented for outpt visit with his doctor who then ordered CXR, which revealed PTX at which time he was referred to the ED. In ED CXR and CT chest shows hydropneumothorax.    HOSPITAL COURSE  5/24 He denies HA/dizziness/cp/palpitations/abd pain/n/v/d/f/c, reports dyspnea on minimal exertion, + Productive cough  5/25 slept well last night, no SOB at rest, only with ambulation, cough is improving with moving of phelgm better; denies CP/palpitations/n/v/d/f/c    All systems reviewed and found to be negative with the exception of what has been described above.      VITALS:  Vital Signs Last 24 Hrs  T(C): 37.1 (25 May 2018 10:44), Max: 37.3 (25 May 2018 04:49)  T(F): 98.7 (25 May 2018 10:44), Max: 99.1 (25 May 2018 04:49)  HR: 60 (25 May 2018 10:44) (60 - 60)  BP: 123/47 (25 May 2018 10:44) (123/47 - 145/60)  BP(mean): --  RR: 18 (25 May 2018 10:44) (17 - 18)  SpO2: 95% (25 May 2018 10:44) (94% - 99%)      PHYSICAL EXAM:  GENERAL: NAD  HEENT:  pupils equal and reactive, EOMI, no oropharyngeal lesions, erythema, exudates, oral thrush  NECK:   supple, no carotid bruits, no palpable lymph nodes, no thyromegaly  CV:  +S1, +S2, regular, no murmurs or rubs  RESP: decrease breath sounds R>L; +rhonchi, even and unlabored breathing at rest; raspy voice - improved  GI:  abdomen soft, non-tender, non-distended, normal BS, no bruits, no abdominal masses, no palpable masses  MSK:   normal muscle tone, no atrophy, no rigidity, no contractions  EXT:   no clubbing, no cyanosis, no edema, no calf pain, swelling or erythema  VASCULAR:  pulses equal and symmetric in the upper and lower extremities  NEURO:  AAOX3, no focal neurological deficits, follows all commands, able to move extremities spontaneously  SKIN:  no ulcers, lesions or rashes    LABS:    5-25             12.4   5.88  )-----------( 172      ( 25 May 2018 05:09 )             36.8     05-25    137  |  109<H>  |  24<H>  ----------------------------<  127<H>  4.2   |  24  |  2.17<H>    Ca    8.1<L>      25 May 2018 05:09  Phos  4.0     05-25  Mg     2.3     05-25    TPro  7.6  /  Alb  4.1  /  TBili  0.6  /  DBili  x   /  AST  13<L>  /  ALT  16  /  AlkPhos  91  05-23    CARDIAC MARKERS ( 25 May 2018 05:09 )  <0.015 ng/mL / x     / x     / x     / x      CARDIAC MARKERS ( 24 May 2018 20:00 )  x     / x     / 49 U/L / x     / x        LIVER FUNCTIONS - ( 23 May 2018 15:55 )  Alb: 4.1 g/dL / Pro: 7.6 gm/dL / ALK PHOS: 91 U/L / ALT: 16 U/L / AST: 13 U/L / GGT: x           PT/INR - ( 23 May 2018 15:55 )   PT: 11.1 sec;   INR: 1.03 ratio       PTT - ( 23 May 2018 15:55 )  PTT:28.9 sec    Culture - Blood (collected 05-23-18 @ 22:35)  Source: .Blood None  Preliminary Report (05-25-18 @ 09:03):    No growth to date.    Culture - Blood (collected 05-23-18 @ 22:35)  Source: .Blood None  Preliminary Report (05-25-18 @ 09:03):    No growth to date.    5-24                        12.5   7.28  )-----------( 186      ( 24 May 2018 06:49 )             37.0   05-24    141  |  108  |  28<H>  ----------------------------<  115<H>  4.4   |  26  |  2.36<H>    Ca    8.6      24 May 2018 06:49  TPro  7.6  /  Alb  4.1  /  TBili  0.6  /  DBili  x   /  AST  13<L>  /  ALT  16  /  AlkPhos  91  05-23  LIVER FUNCTIONS - ( 23 May 2018 15:55 )  Alb: 4.1 g/dL / Pro: 7.6 gm/dL / ALK PHOS: 91 U/L / ALT: 16 U/L / AST: 13 U/L / GGT: x           PT/INR - ( 23 May 2018 15:55 )   PT: 11.1 sec;   INR: 1.03 ratio    PTT - ( 23 May 2018 15:55 )  PTT:28.9 sec    < from: Xray Chest 1 View- PORTABLE-Urgent (05.24.18 @ 12:45) >    A pacemaker remains in place. The layering right-sided effusion   previously present has regressed with slight decrease in size of the   residual pneumothorax. There is no kailyn central edema. The left   hemithorax is clear. The heart is normal in size.    < end of copied text >    < from: CT Chest No Cont (05.23.18 @ 15:36) >  Lungs, Airways and Pleura: Central tracheobronchialtree is grossly   patent. No endobronchial lesions. Surgical sutures in the right upper   lobe. Moderate right hydropneumothorax. Right basilar consolidative   changes likely representing rounded atelectasis. Medial right apical   bleb. Linear scarring within the lingula. No left-sided pneumothorax. No   pulmonary edema. No left-sided pleural effusions.    Heart and Great Vessels: Atherosclerotic changes of the aorta and   coronary vasculature. Heart is normal in size. No pericardial effusions.   Left-sided pacemaker with leads in place.    Mediastinum and Sarah: within normal limits    Neck and Chest Wall: within normal limits    Bones: Degenerative changes and osteopenia.    Upper Abdomen:  Multiple bilateral renal cysts. Complex right lateral   midpole exophytic renal cyst with thin septations demonstrating   calcification. Nonobstructive right intrarenal calculi. Correlate lobe   cyst. Gallstones.    IMPRESSION:         Moderate right hydropneumothorax with right basilar consolidation with   the appearance of rounded atelectasis.    < end of copied text >    < from: Transthoracic Echocardiogram (05.24.18 @ 10:24) >   Summary     The left ventricle is normal in size, wall motion and contractility.   Mild concentric left ventricular hypertrophy is present.   Estimated left ventricular ejection fraction is 60-65 %.   Normal appearing right atrium.   A device wire is seen in the RV and RA.   Normal appearing mitral valve structure and function.   Mild (1+) mitral regurgitation is present.   Mild mitral annular calcification is present.   Normal appearing tricuspid valve structure and function.   Mild (1+) tricuspid valve regurgitation is present.   Mild pulmonary hypertension.    < end of copied text >    MEDICATIONS  (STANDING):  ALBUTerol/ipratropium for Nebulization 3 milliLiter(s) Nebulizer every 6 hours  aspirin enteric coated 81 milliGRAM(s) Oral daily  azithromycin  IVPB      cholecalciferol 2000 Unit(s) Oral daily  cyanocobalamin 1000 MICROGram(s) Oral daily  diltiazem    milliGRAM(s) Oral daily  finasteride 5 milliGRAM(s) Oral at bedtime  guaiFENesin    Syrup 200 milliGRAM(s) Oral every 8 hours  labetalol 200 milliGRAM(s) Oral three times a day  pravastatin 80 milliGRAM(s) Oral at bedtime  ranolazine 500 milliGRAM(s) Oral two times a day  sodium bicarbonate 325 milliGRAM(s) Oral three times a day  tamsulosin 0.4 milliGRAM(s) Oral at bedtime    MEDICATIONS  (PRN):  acetaminophen   Tablet 650 milliGRAM(s) Oral every 6 hours PRN For Temp greater than 38 C (100.4 F), mild pain, HA  ondansetron Injectable 4 milliGRAM(s) IV Push every 6 hours PRN Nausea

## 2018-05-25 NOTE — PROGRESS NOTE ADULT - ASSESSMENT
85 y.o. male with PMH CAD, HTN, HLD, BPH, SSS s/p PPM, CKD stage III, h/o SVT, h/o right pleural effusions and PTX s/p VATS decortication, pleuradesis 5/2016, spinal stenosis s/p lumbar fusion presents to ED with complaint of cough for the past few days. Pt reports nonproductive cough, went to see his doctor and had Xray performed and told him to come to ED. Pt denies fever, chills, CP, palpitation, abd pain, dysuria, or diarrhea. Denies sore throat. States SOB with exertion and it has been similar to prior. Reports this is the 4th time with fluid in lungs. Denies any other symptoms.    pt is seen and examined with his RN at bedside today 5/25  Recurrent hydropneumothorax  Hx VATS / pleurodesis 2016  etiology remains obscured and concerning with failed pleurodesis  doubt malignancy or infectious process  stable resp status now  mild bronchitis with bronchospasm  now scheduled for repeat pigtail CT placement for further management  pls send PF for analysis given recurrent pneumothorax  no sign of pneumonia  discussed case with Dr Reyes, his input is appreciated  will review ct scan  cont bronchodilator rx  agree with course of antibiotics  inhaled steroids

## 2018-05-25 NOTE — CONSULT NOTE ADULT - SUBJECTIVE AND OBJECTIVE BOX
85 y/o M with PMH of CKD3 (baseline SCr 1.8-2.0), HTN, CAD, HLD, BPH, SSS/SVT s/p PPM, h/o right pleural effusions and PTX s/p VATS decortication, pleurodesis 5/2016, spinal stenosis s/p lumbar fusion presents to ED with complaint of cough for the past few days, nonproductive. Saw PCP, had Xray performed and told to come to ED. Found to have Hydropneumothorax and SCr 2.4 - renal called for eval.    Pt denies fever, chills, CP, palpitation, abd pain, dysuria, or diarrhea. Denies sore throat.     Good appetite, no difficulty urinating. Labs drawn outpatient on 5/7 showed SCr 2.0.    PAST MEDICAL & SURGICAL HISTORY:  Spinal stenosis  CKD (chronic kidney disease), stage III  SSS (sick sinus syndrome)  Pleural effusion  Hyperlipidemia  BPH (benign prostatic hyperplasia)  CAD (coronary artery disease)  Hypertension  Status post lumbar spinal fusion    Home Medications:  Aspirin Enteric Coated 81 mg oral delayed release tablet: 1 tab(s) orally once a day (23 May 2018 21:24)  CoQ10 300 mg oral capsule: 1 cap(s) orally once a day (23 May 2018 21:24)  dilTIAZem 180 mg/24 hours oral capsule, extended release: 1 cap(s) orally once a day (23 May 2018 21:24)  finasteride 5 mg oral tablet: 1 tab(s) orally once a day (at bedtime) (23 May 2018 21:24)  furosemide 20 mg oral tablet: 1 tab(s) orally once a day (23 May 2018 21:24)  labetalol 200 mg oral tablet: 1 tab(s) orally 3 times a day (23 May 2018 21:24)  pravastatin 80 mg oral tablet: 1 tab(s) orally once a day (23 May 2018 21:24)  PreserVision oral tablet: 1 tab(s) orally once a day (23 May 2018 21:24)  Ranexa 500 mg oral tablet, extended release: 1 tab(s) orally 2 times a day (23 May 2018 21:24)  sodium bicarbonate 325 mg oral tablet: 1 tab(s) orally 3 times a day (23 May 2018 21:24)  tamsulosin 0.4 mg oral capsule: 1 cap(s) orally once a day (23 May 2018 21:24)  Vitamin B12 1000 mcg oral tablet: 1 tab(s) orally once a day (23 May 2018 21:24)  Vitamin D3 2000 intl units oral tablet: 1 tab(s) orally once a day (23 May 2018 21:24)  Welchol 625 mg oral tablet: 1 tab(s) orally 2 times a day (23 May 2018 21:24)      MEDICATIONS  (STANDING):  ALBUTerol/ipratropium for Nebulization 3 milliLiter(s) Nebulizer every 6 hours  aspirin enteric coated 81 milliGRAM(s) Oral daily  azithromycin  IVPB      azithromycin  IVPB 500 milliGRAM(s) IV Intermittent every 24 hours  buDESOnide   0.5 milliGRAM(s) Respule 0.5 milliGRAM(s) Inhalation every 12 hours  cholecalciferol 2000 Unit(s) Oral daily  cyanocobalamin 1000 MICROGram(s) Oral daily  diltiazem    milliGRAM(s) Oral daily  finasteride 5 milliGRAM(s) Oral at bedtime  guaiFENesin    Syrup 200 milliGRAM(s) Oral every 8 hours  labetalol 200 milliGRAM(s) Oral three times a day  methylPREDNISolone sodium succinate Injectable 40 milliGRAM(s) IV Push every 12 hours  pravastatin 80 milliGRAM(s) Oral at bedtime  ranolazine 500 milliGRAM(s) Oral two times a day  sodium bicarbonate 325 milliGRAM(s) Oral three times a day  tamsulosin 0.4 milliGRAM(s) Oral at bedtime      Allergies    amlodipine (Unknown)  Crestor (Other)  Lipitor (Unknown)    Intolerances        SOCIAL HISTORY:  Denies ETOh, Smoking, IVDU    FAMILY HISTORY:  No pertinent family history in first degree relatives      REVIEW OF SYSTEMS:    CONSTITUTIONAL: No weakness, fevers or chills  EYES/ENT: No visual changes;  No vertigo or throat pain   NECK: No pain or stiffness  RESPIRATORY: ++ cough, mild wheezing, no hemoptysis; +shortness of breath  CARDIOVASCULAR: No chest pain or palpitations  GASTROINTESTINAL: No abdominal or epigastric pain. No nausea, vomiting, or hematemesis; No diarrhea or constipation. No melena or hematochezia.  GENITOURINARY: No dysuria, frequency or hematuria  NEUROLOGICAL: No numbness or weakness  SKIN: No itching, burning, rashes, or lesions   All other review of systems is negative unless indicated above.    Vital Signs Last 24 Hrs  T(C): 37.1 (25 May 2018 10:44), Max: 37.3 (25 May 2018 04:49)  T(F): 98.7 (25 May 2018 10:44), Max: 99.1 (25 May 2018 04:49)  HR: 60 (25 May 2018 10:44) (60 - 60)  BP: 123/47 (25 May 2018 10:44) (123/47 - 145/60)  BP(mean): --  RR: 18 (25 May 2018 10:44) (17 - 18)  SpO2: 95% (25 May 2018 10:44) (94% - 99%)    I and O's:        PHYSICAL EXAM:    Constitutional: NAD, AAOx3  HEENT: PERRLA, EOMI,  MMM  Neck: No LAD, No JVD  Respiratory: decreased BS RLL, no wheeze or crackles  Cardiovascular: S1 and S2  Gastrointestinal: BS+, soft, NT/ND  Extremities: No peripheral edema  Neurological: A/O x 3, no focal deficits  Psychiatric: Normal mood, normal affect  : No Beaver  Skin: No rashes  Access: Not applicable    LABS:                        12.4   5.88  )-----------( 172      ( 25 May 2018 05:09 )             36.8       137    |  109    |  24     ----------------------------<  127       25 May 2018 05:09  4.2     |  24     |  2.17     141    |  108    |  28     ----------------------------<  115       24 May 2018 06:49  4.4     |  26     |  2.36     137    |  106    |  27     ----------------------------<  104       23 May 2018 15:55  4.7     |  25     |  2.36     Ca    8.1        25 May 2018 05:09  Ca    8.6        24 May 2018 06:49    Phos  4.0       25 May 2018 05:09    Mg     2.3       25 May 2018 05:09    TPro  7.6    /  Alb  4.1    /  TBili  0.6    /        23 May 2018 15:55  DBili  x      /  AST  13     /  ALT  16     /  AlkPhos  91             Urine Studies:          RADIOLOGY & ADDITIONAL STUDIES:  < from: US Kidney and Bladder (05.25.18 @ 10:06) >    EXAM:  US KIDNEYS AND BLADDER                            PROCEDURE DATE:  05/25/2018          INTERPRETATION:  CLINICAL STATEMENT: Worsening renal function    TECHNIQUE: Ultrasound of the kidneys and urinary bladder.    COMPARISON: 9/26/2017    FINDINGS:  The right kidney measures 10 cm in length. .    The left kidney measures 10 cm in length .    There is no hydronephrosis. Bilateral renal cysts are noted measuring up   to 9.3 cm on the left.    echogenic kidneys which may represent medical renal disease    Visualized portion of urinary bladder grossly unremarkable.    IMPRESSION:  No hydronephrosis.
History of Present Illness:  86y Male with PMH CAD, HTN, HLD, BPH, SSS s/p PPM, CKD stage III, h/o SVT, h/o right pleural effusions and PTX s/p VATS decortication/pleuradesis 5/2016, spinal stenosis s/p lumbar fusion presents to ED with complaint of cough for the past few days. Pt reports nonproductive cough, saw PMD and  Xray revealed a hydroPTX,, then sent to ER. Pt denies fever, chills, CP, palpitation, SOB at rest. Admits to some BRENNER with walking. No acute change in pulmonary symptoms except for cough. Pt states has had 4 placements of CTs to Right chest since 2016.  Currently resting comfortably, no distress.    PMH/PSH:  Spinal stenosis  CKD (chronic kidney disease), stage III  SSS (sick sinus syndrome)  Pleural effusion  Hyperlipidemia  BPH (benign prostatic hyperplasia)  CAD (coronary artery disease)  Hypertension      Status post lumbar spinal fusion  Right VATS decortication/pleuradesis VATS 2016        Relevant Family History  FAMILY HISTORY:  No pertinent family history in first degree relatives      SOCIAL HISTORY:  Smoker: [ ] Yes  [x ] No        PACK YEARS:                         WHEN QUIT?  ETOH use: [ ] Yes  [x ] No              FREQUENCY / QUANTITY:  Ilicit Drug use:  [ ] Yes  [x ] No  Occupation: works as       MEDICATIONS  (STANDING):  aspirin enteric coated 81 milliGRAM(s) Oral daily  cholecalciferol 2000 Unit(s) Oral daily  cyanocobalamin 1000 MICROGram(s) Oral daily  diltiazem    milliGRAM(s) Oral daily  finasteride 5 milliGRAM(s) Oral at bedtime  labetalol 200 milliGRAM(s) Oral three times a day  pravastatin 80 milliGRAM(s) Oral at bedtime  ranolazine 500 milliGRAM(s) Oral two times a day  sodium bicarbonate 325 milliGRAM(s) Oral three times a day  tamsulosin 0.4 milliGRAM(s) Oral at bedtime    MEDICATIONS  (PRN):  acetaminophen   Tablet 650 milliGRAM(s) Oral every 6 hours PRN For Temp greater than 38 C (100.4 F), mild pain, HA  ondansetron Injectable 4 milliGRAM(s) IV Push every 6 hours PRN Nausea      Allergies: amlodipine (Unknown)  Crestor (Other)  Lipitor (Unknown)                                                            LABS:                        12.5   7.28  )-----------( 186      ( 24 May 2018 06:49 )             37.0     05-24    141  |  108  |  28<H>  ----------------------------<  115<H>  4.4   |  26  |  2.36<H>    Ca    8.6      24 May 2018 06:49    TPro  7.6  /  Alb  4.1  /  TBili  0.6  /  DBili  x   /  AST  13<L>  /  ALT  16  /  AlkPhos  91  05-23    PT/INR - ( 23 May 2018 15:55 )   PT: 11.1 sec;   INR: 1.03 ratio         PTT - ( 23 May 2018 15:55 )  PTT:28.9 sec    < from: CT Chest No Cont (05.23.18 @ 15:36) >  Lungs, Airways and Pleura: Central tracheobronchialtree is grossly   patent. No endobronchial lesions. Surgical sutures in the right upper   lobe. Moderate right hydropneumothorax. Right basilar consolidative   changes likely representing rounded atelectasis. Medial right apical   bleb. Linear scarring within the lingula. No left-sided pneumothorax. No   pulmonary edema. No left-sided pleural effusions.    Heart and Great Vessels: Atherosclerotic changes of the aorta and   coronary vasculature. Heart is normal in size. No pericardial effusions.   Left-sided pacemaker with leads in place.    Mediastinum and Sarah: within normal limits    Neck and Chest Wall: within normal limits    Bones: Degenerative changes and osteopenia.    Upper Abdomen:  Multiple bilateral renal cysts. Complex right lateral   midpole exophytic renal cyst with thin septations demonstrating   calcification. Nonobstructive right intrarenal calculi. Correlate lobe   cyst. Gallstones.    IMPRESSION:         Moderate right hydropneumothorax with right basilar consolidation with   the appearance of rounded atelectasis.      < end of copied text >                Review of Systems             Constitutional: denies fever, chills, general malaise,   HEENT: dry mouth  Respiratory: cough, BRENNER  Cardiovascular: denies CP, palpitations, edema, diaphoresis   Gastrointestinal: denies nausea, vomiting, diarrhea,  Genitourinary: denies dysuria, frequency, urgency, has CRI  Musculoskeletal: , arthritis,   Neurologic: denies syncope, LOC, headache,   Psychiatric: denies feeling anxious, depressed, suicidal, homicidal  Endocrine: denies  Hematology/Oncology: denies  ROS negative x 10 systems except as noted above    T(C): 37.1 (05-24-18 @ 05:00), Max: 37.2 (05-23-18 @ 14:42)  HR: 60 (05-24-18 @ 05:00) (60 - 66)  BP: 125/39 (05-24-18 @ 05:00) (106/82 - 160/51)  RR: 18 (05-24-18 @ 00:25) (18 - 18)  SpO2: 94% (05-24-18 @ 05:00) (94% - 97%)      Physical Exam  General: WD, WN, NAD                                                         Neuro: A+O x 3, non-focal, speech clear and intact                     Chest: decreased BS right side, no accessory muscle use  CV: RRR, +S1S2  GI: soft, NT, ND,   Extremities: warm, no edema
Patient is a 86y old  Male who presents with a chief complaint of R hydropnemothorax (23 May 2018 21:21)      HPI:  85 y.o. male with PMH CAD, HTN, HLD, BPH, SSS s/p PPM, CKD stage III, h/o SVT, h/o right pleural effusions and PTX s/p VATS decortication, pleuradesis 5/2016, spinal stenosis s/p lumbar fusion presents to ED with complaint of cough for the past few days. Pt reports nonproductive cough, went to see his doctor and had Xray performed and told him to come to ED. Pt denies fever, chills, CP, palpitation, abd pain, dysuria, or diarrhea. Denies sore throat. States SOB with exertion and it has been similar to prior. Reports this is the 4th time with fluid in lungs. Denies any other symptoms.    pt is seen and examined with his RN at bedside today 5/24  he feels ok  some cough  no trauma  no fever    PMH: as above  PSH: as above and chest tube placed 9/22/17, cataract, tonsil  Social Hx: No tobacco, EtOH or drugs  Family Hx: Mother-CVA age 77; Father-CVA age 60  ROS: per HPI (23 May 2018 21:21)      PAST MEDICAL & SURGICAL HISTORY:  Spinal stenosis  CKD (chronic kidney disease), stage III  SSS (sick sinus syndrome)  Pleural effusion  Hyperlipidemia  BPH (benign prostatic hyperplasia)  CAD (coronary artery disease)  Hypertension  Status post lumbar spinal fusion      PREVIOUS DIAGNOSTIC TESTING:      MEDICATIONS  (STANDING):  aspirin enteric coated 81 milliGRAM(s) Oral daily  cholecalciferol 2000 Unit(s) Oral daily  cyanocobalamin 1000 MICROGram(s) Oral daily  diltiazem    milliGRAM(s) Oral daily  finasteride 5 milliGRAM(s) Oral at bedtime  labetalol 200 milliGRAM(s) Oral three times a day  pravastatin 80 milliGRAM(s) Oral at bedtime  ranolazine 500 milliGRAM(s) Oral two times a day  sodium bicarbonate 325 milliGRAM(s) Oral three times a day  tamsulosin 0.4 milliGRAM(s) Oral at bedtime    MEDICATIONS  (PRN):  acetaminophen   Tablet 650 milliGRAM(s) Oral every 6 hours PRN For Temp greater than 38 C (100.4 F), mild pain, HA  ondansetron Injectable 4 milliGRAM(s) IV Push every 6 hours PRN Nausea      FAMILY HISTORY:  No pertinent family history in first degree relatives      SOCIAL HISTORY:  ***    REVIEW OF SYSTEM:  Pertinent items are noted in HPI.  Constitutional negative for chills, fevers, sweats and weight loss  throat, and face:  negative for epistaxis, nasal congestion, sore throat and   tinnitus  Respiratory: pos  for cough and dyspnea on exertion,no pleuritic chest pain  and wheezing  Cardiovascular:  negative for chest pain,pos dyspnea and palpitations  Gastrointestinal: negative for abdominal pain, diarrhea, nausea and vomiting  Genitourinary: negative for dysuria, frequency and urinary incontinence  Skin:  negative for redness, rash, pruritus, swelling, dryness and   fissures  Hematologic/lymphatic: negative for bleeding and easy bruising  Musculoskeletal: negative for arthralgias, back pain and muscle weakness  Neurological: negative for dizziness, headaches, seizures and tremors  Behavioral/Psych:  negative for mood change, depression, suicidal attempts    Allergic/Immunologic: negative for anaphylaxis, angioedema and urticaria    Vital Signs Last 24 Hrs  T(C): 37.1 (24 May 2018 05:00), Max: 37.2 (23 May 2018 14:42)  T(F): 98.7 (24 May 2018 05:00), Max: 98.9 (23 May 2018 14:42)  HR: 60 (24 May 2018 05:00) (60 - 66)  BP: 125/39 (24 May 2018 05:00) (106/82 - 160/51)  BP(mean): --  RR: 18 (24 May 2018 00:25) (18 - 18)  SpO2: 94% (24 May 2018 05:00) (94% - 97%)    I&O's Summary    PHYSICAL EXAM  General Appearance: cooperative, no acute distress,   HEENT: PERRL, conjunctiva clear, EOM's intact, non injected pharynx, no exudate, TM   normal  Neck: Supple, , no adenopathy, thyroid: not enlarged, no carotid bruit or JVD  Back: Symmetric, no  tenderness,no soft tissue tenderness  Lungs: decreased breath sounds right mid-lower lung filed, no wheezing, no rhonchi  Heart: Regular rate and rhythm, S1, S2 normal, no murmur, rub or gallop  Abdomen: Soft, non-tender, bowel sounds active , no hepatosplenomegaly  Extremities: no cyanosis or edema, no joint swelling  Skin: Skin color, texture normal, no rashes   Neurologic: Alert and oriented X3 , cranial nerves intact, sensory and motor normal,    ECG:    LABS:                          12.5   7.28  )-----------( 186      ( 24 May 2018 06:49 )             37.0     05-24    141  |  108  |  28<H>  ----------------------------<  115<H>  4.4   |  26  |  2.36<H>    Ca    8.6      24 May 2018 06:49    TPro  7.6  /  Alb  4.1  /  TBili  0.6  /  DBili  x   /  AST  13<L>  /  ALT  16  /  AlkPhos  91  05-23            PT/INR - ( 23 May 2018 15:55 )   PT: 11.1 sec;   INR: 1.03 ratio         PTT - ( 23 May 2018 15:55 )  PTT:28.9 sec          RADIOLOGY & ADDITIONAL STUDIES:    < from: Xray Chest 1 View AP/PA. (09.27.17 @ 09:12) >  PROCEDURE DATE:  09/27/2017          INTERPRETATION:    INDICATION: Shortness of breath pneumothorax follow-up.    Single frontal view of chest dated 9/27/2017 9:12 AM.  Prior radiograph   of 9/26/2017.    FINDINGS /   IMPRESSION:     There is small right pneumothorax with compression of adjacent right lung   base and subcutaneous emphysema unchanged. Dual-lead pacemaker.   Mediastinal structures are otherwise unremarkable.    There are   degenerative changes. If intervention isn't elected, continued follow-up   to full resolution is suggested    < end of copied text >  cxr 5/23 this admit reveiwed with loculated right sided pneumothorax  ct scan done but images are NOT available on PACS yet.
Patient is a 86y old  Male who presents with a chief complaint of cough & BRENNER.      HPI:  Samuel Behar is a 85 y.o. male  he has a history of high cholesterol, hypertension, sick sinus syndrome status post permanent pacemaker placement in the past, coronary artery disease by CT scan of the chest, he has calcified coronaries on chest CT scan, chronic renal insufficiency, history of chronic moderate right pleural effusion and history of right hydropneumothorax in the past status post pleurodesis and wedge resection in the past . He presented with complaints of cough for the past 3-4 days prior to admission and increasing shortness of breath. After admission he is been diagnosed to have recurrent right hydropneumothorax. He is otherwise comfortable at rest and feels better since admission. He is in normal sinus rhythm with atrial electronic pacing on telemetry. He denies any chest pain or palpitation. But he still has some cough and gets short of breath with exertion. He is otherwise afebrile. I was asked by pulmonologist to evaluate the patient.     CT Chest No Cont (05.23.18   IMPRESSION:         Moderate right hydropneumothorax with right basilar consolidation with   the appearance of rounded atelectasis.     Transthoracic Echocardiogram (05.24.18    Summary     The left ventricle is normal in size, wall motion and contractility.   Mild concentric left ventricular hypertrophy is present.   Estimated left ventricular ejection fraction is 60-65 %.   Normal appearing right atrium.   A device wire is seen in the RV and RA.   Normal appearing mitral valve structure and function.   Mild (1+) mitral regurgitation is present.   Mild mitral annular calcification is present.   Normal appearing tricuspid valve structure and function.   Mild (1+) tricuspid valve regurgitation is present.   Mild pulmonary hypertension.                PAST MEDICAL & SURGICAL HISTORY:  Spinal stenosis  CKD (chronic kidney disease), stage III  SSS (sick sinus syndrome)  Pleural effusion  Hyperlipidemia  BPH (benign prostatic hyperplasia)  CAD (coronary artery disease)  Hypertension  Status post lumbar spinal fusion        MEDICATIONS  (STANDING):  ALBUTerol/ipratropium for Nebulization 3 milliLiter(s) Nebulizer every 6 hours  ALBUTerol/ipratropium for Nebulization. 3 milliLiter(s) Nebulizer once  aspirin enteric coated 81 milliGRAM(s) Oral daily  azithromycin  IVPB      azithromycin  IVPB 500 milliGRAM(s) IV Intermittent every 24 hours  buDESOnide   0.5 milliGRAM(s) Respule 0.5 milliGRAM(s) Inhalation every 12 hours  cholecalciferol 2000 Unit(s) Oral daily  cyanocobalamin 1000 MICROGram(s) Oral daily  diltiazem    milliGRAM(s) Oral daily  finasteride 5 milliGRAM(s) Oral at bedtime  guaiFENesin    Syrup 200 milliGRAM(s) Oral every 8 hours  labetalol 200 milliGRAM(s) Oral three times a day  pravastatin 80 milliGRAM(s) Oral at bedtime  ranolazine 500 milliGRAM(s) Oral two times a day  sodium bicarbonate 325 milliGRAM(s) Oral three times a day  tamsulosin 0.4 milliGRAM(s) Oral at bedtime    MEDICATIONS  (PRN):  acetaminophen   Tablet 650 milliGRAM(s) Oral every 6 hours PRN For Temp greater than 38 C (100.4 F), mild pain, HA  ondansetron Injectable 4 milliGRAM(s) IV Push every 6 hours PRN Nausea      FAMILY HISTORY:  No pertinent family history in first degree relatives      SOCIAL HISTORY:  no history of smoking or any alcohol consumption.    REVIEW OF SYSTEM:  Pertinent items are noted in HPI.  Constitutional	Negative for chills, fevers, sweats.    Eyes: 	Negative for visual disturbance.  Ears, nose, mouth, throat, and face: Negative for epistaxis, nasal congestion, sore throat and tinnitus.  Neck:	Negative for enlargement, pain and difficulty in swallowing  Respiration : Notable  for cough, dyspnea on exertion No pleuritic chest pain and wheezing  Cardiovascular: Negative for chest pain,  and palpitations    Gastrointestinal : Negative for abdominal pain, diarrhea, nausea and vomiting  Genitourinary: Negative for dysuria, frequency and urinary incontinence .  Skin: Negative for  rash, pruritus, swelling, dryness .  	  Hematologic/lymphatic: Negative for bleeding and easy bruising  Musculoskeletal: Negative for arthralgias, back pain and muscle weakness.  Neurological: Negative for dizziness, headaches, seizures and tremors  Behavioral/Psych: Negative for mood change, depression.  Endocrine:	Negative for blurry vision, polydipsia and polyuria, diaphoresis.   Allergic/Immunologic:	Negative for anaphylaxis, angioedema and urticaria.      Vital Signs Last 24 Hrs  T(C): 37.3 (25 May 2018 04:49), Max: 37.6 (24 May 2018 11:41)  T(F): 99.1 (25 May 2018 04:49), Max: 99.7 (24 May 2018 11:41)  HR: 60 (25 May 2018 04:49) (59 - 60)  BP: 145/60 (25 May 2018 04:49) (127/49 - 145/60)  BP(mean): --  RR: 17 (25 May 2018 04:49) (17 - 18)  SpO2: 99% (25 May 2018 04:49) (94% - 99%)    I&O's Summary    PHYSICAL EXAM  General Appearance: cooperative, no acute distress,   HEENT: PERRL, conjunctiva clear, EOM's intact, non injected pharynx, no exudate .  Neck: Supple, , no adenopathy, thyroid: not enlarged, no carotid bruit or JVD  Back: Symmetric, no  tenderness,no soft tissue tenderness  Lungs: Diminished breath sounds right base. No rales or rhonchi's.  Heart: Regular rate and rhythm, S1, S2 normal,1/6 holosystolic murmur, no rub or gallop  Abdomen: Soft, non-tender, bowel sounds active , no hepatosplenomegaly  Extremities: no cyanosis or edema, no joint swelling  Skin: Skin color, texture normal, no rashes   Neurologic: Alert and oriented X3 , cranial nerves intact, sensory and motor normal,        INTERPRETATION OF TELEMETRY: electronic atrial pacing    ECG: electronic atrial pacing right bundle   Branch block.        LABS:                          12.4   5.88  )-----------( 172      ( 25 May 2018 05:09 )             36.8     05-25    137  |  109<H>  |  24<H>  ----------------------------<  127<H>  4.2   |  24  |  2.17<H>    Ca    8.1<L>      25 May 2018 05:09  Phos  4.0     05-25  Mg     2.3     05-25    TPro  7.6  /  Alb  4.1  /  TBili  0.6  /  DBili  x   /  AST  13<L>  /  ALT  16  /  AlkPhos  91  05-23    CARDIAC MARKERS ( 25 May 2018 05:09 )  <0.015 ng/mL / x     / x     / x     / x      CARDIAC MARKERS ( 24 May 2018 20:00 )  x     / x     / 49 U/L / x     / x              PT/INR - ( 23 May 2018 15:55 )   PT: 11.1 sec;   INR: 1.03 ratio         PTT - ( 23 May 2018 15:55 )  PTT:28.9 sec

## 2018-05-25 NOTE — PROGRESS NOTE ADULT - SUBJECTIVE AND OBJECTIVE BOX
Patient is a 86y old  Male who presents with a chief complaint of R hydropnemothorax (23 May 2018 21:21)      HPI:  85 y.o. male with PMH CAD, HTN, HLD, BPH, SSS s/p PPM, CKD stage III, h/o SVT, h/o right pleural effusions and PTX s/p VATS decortication, pleuradesis 5/2016, spinal stenosis s/p lumbar fusion presents to ED with complaint of cough for the past few days. Pt reports nonproductive cough, went to see his doctor and had Xray performed and told him to come to ED. Pt denies fever, chills, CP, palpitation, abd pain, dysuria, or diarrhea. Denies sore throat. States SOB with exertion and it has been similar to prior. Reports this is the 4th time with fluid in lungs. Denies any other symptoms.    pt is seen and examined with his RN at bedside today 5/24  he feels ok  some cough  no trauma  no fever  5/25  pt with some cough and congestion that is still present  he is aware of xray findings and wants to proceed with CT placement  "feels like have a cold"  no cp  PMH: as above  PSH: as above and chest tube placed 9/22/17, cataract, tonsil  Social Hx: No tobacco, EtOH or drugs  Family Hx: Mother-CVA age 77; Father-CVA age 60  ROS: per HPI (23 May 2018 21:21)      PAST MEDICAL & SURGICAL HISTORY:  Spinal stenosis  CKD (chronic kidney disease), stage III  SSS (sick sinus syndrome)  Pleural effusion  Hyperlipidemia  BPH (benign prostatic hyperplasia)  CAD (coronary artery disease)  Hypertension  Status post lumbar spinal fusion      PREVIOUS DIAGNOSTIC TESTING:      MEDICATIONS  (STANDING):  ALBUTerol/ipratropium for Nebulization 3 milliLiter(s) Nebulizer every 6 hours  aspirin enteric coated 81 milliGRAM(s) Oral daily  azithromycin  IVPB      azithromycin  IVPB 500 milliGRAM(s) IV Intermittent every 24 hours  cholecalciferol 2000 Unit(s) Oral daily  cyanocobalamin 1000 MICROGram(s) Oral daily  diltiazem    milliGRAM(s) Oral daily  finasteride 5 milliGRAM(s) Oral at bedtime  guaiFENesin    Syrup 200 milliGRAM(s) Oral every 8 hours  labetalol 200 milliGRAM(s) Oral three times a day  pravastatin 80 milliGRAM(s) Oral at bedtime  ranolazine 500 milliGRAM(s) Oral two times a day  sodium bicarbonate 325 milliGRAM(s) Oral three times a day  tamsulosin 0.4 milliGRAM(s) Oral at bedtime      MEDICATIONS  (PRN):  acetaminophen   Tablet 650 milliGRAM(s) Oral every 6 hours PRN For Temp greater than 38 C (100.4 F), mild pain, HA  ondansetron Injectable 4 milliGRAM(s) IV Push every 6 hours PRN Nausea      FAMILY HISTORY:  No pertinent family history in first degree relatives      SOCIAL HISTORY:  ***    REVIEW OF SYSTEM:  Pertinent items are noted in HPI.  Constitutional negative for chills, fevers, sweats and weight loss  throat, and face:  negative for epistaxis, nasal congestion, sore throat and   tinnitus  Respiratory: pos  for cough and dyspnea on exertion,no pleuritic chest pain  and wheezing  Cardiovascular:  negative for chest pain,pos dyspnea and palpitations  Gastrointestinal: negative for abdominal pain, diarrhea, nausea and vomiting  Genitourinary: negative for dysuria, frequency and urinary incontinence  Skin:  negative for redness, rash, pruritus, swelling, dryness and   fissures  Hematologic/lymphatic: negative for bleeding and easy bruising  Musculoskeletal: negative for arthralgias, back pain and muscle weakness  Neurological: negative for dizziness, headaches, seizures and tremors  Behavioral/Psych:  negative for mood change, depression, suicidal attempts    Allergic/Immunologic: negative for anaphylaxis, angioedema and urticaria    Vital Signs Last 24 Hrs  T(C): 37.3 (25 May 2018 04:49), Max: 37.6 (24 May 2018 11:41)  T(F): 99.1 (25 May 2018 04:49), Max: 99.7 (24 May 2018 11:41)  HR: 60 (25 May 2018 04:49) (59 - 60)  BP: 145/60 (25 May 2018 04:49) (127/49 - 145/60)  BP(mean): --  RR: 17 (25 May 2018 04:49) (17 - 18)  SpO2: 99% (25 May 2018 04:49) (94% - 99%)  I&O's Summary    PHYSICAL EXAM  General Appearance: cooperative, no acute distress,   HEENT: PERRL, conjunctiva clear, EOM's intact, non injected pharynx, no exudate, TM   normal  Neck: Supple, , no adenopathy, thyroid: not enlarged, no carotid bruit or JVD  Back: Symmetric, no  tenderness,no soft tissue tenderness  Lungs: decreased breath sounds right mid-lower lung filed, mild end exp wheezing, no rhonchi  Heart: Regular rate and rhythm, S1, S2 normal, no murmur, rub or gallop  Abdomen: Soft, non-tender, bowel sounds active , no hepatosplenomegaly  Extremities: no cyanosis or edema, no joint swelling  Skin: Skin color, texture normal, no rashes   Neurologic: Alert and oriented X3 , cranial nerves intact, sensory and motor normal,    ECG:    LABS:                        12.4   5.88  )-----------( 172      ( 25 May 2018 05:09 )             36.8   05-25    137  |  109<H>  |  24<H>  ----------------------------<  127<H>  4.2   |  24  |  2.17<H>    Ca    8.1<L>      25 May 2018 05:09  Phos  4.0     05-25  Mg     2.3     05-25    TPro  7.6  /  Alb  4.1  /  TBili  0.6  /  DBili  x   /  AST  13<L>  /  ALT  16  /  AlkPhos  91  05-23                          12.5   7.28  )-----------( 186      ( 24 May 2018 06:49 )             37.0     05-24    141  |  108  |  28<H>  ----------------------------<  115<H>  4.4   |  26  |  2.36<H>    Ca    8.6      24 May 2018 06:49    TPro  7.6  /  Alb  4.1  /  TBili  0.6  /  DBili  x   /  AST  13<L>  /  ALT  16  /  AlkPhos  91  05-23            PT/INR - ( 23 May 2018 15:55 )   PT: 11.1 sec;   INR: 1.03 ratio         PTT - ( 23 May 2018 15:55 )  PTT:28.9 sec          RADIOLOGY & ADDITIONAL STUDIES:    < from: Xray Chest 1 View AP/PA. (09.27.17 @ 09:12) >  PROCEDURE DATE:  09/27/2017          INTERPRETATION:    INDICATION: Shortness of breath pneumothorax follow-up.    Single frontal view of chest dated 9/27/2017 9:12 AM.  Prior radiograph   of 9/26/2017.    FINDINGS /   IMPRESSION:     There is small right pneumothorax with compression of adjacent right lung   base and subcutaneous emphysema unchanged. Dual-lead pacemaker.   Mediastinal structures are otherwise unremarkable.    There are   degenerative changes. If intervention isn't elected, continued follow-up   to full resolution is suggested    < end of copied text >  cxr 5/23 this admit reveiwed with loculated right sided pneumothorax  ct scan done but images are NOT available on PACS yet / will need to reveiew with radiologist / contacted Dr Duran today  data reveiwed with thoracic surgery Dr Reyes

## 2018-05-26 DIAGNOSIS — J93.9 PNEUMOTHORAX, UNSPECIFIED: ICD-10-CM

## 2018-05-26 LAB
ANION GAP SERPL CALC-SCNC: 9 MMOL/L — SIGNIFICANT CHANGE UP (ref 5–17)
BUN SERPL-MCNC: 32 MG/DL — HIGH (ref 7–23)
CALCIUM SERPL-MCNC: 8.7 MG/DL — SIGNIFICANT CHANGE UP (ref 8.5–10.1)
CHLORIDE SERPL-SCNC: 107 MMOL/L — SIGNIFICANT CHANGE UP (ref 96–108)
CO2 SERPL-SCNC: 24 MMOL/L — SIGNIFICANT CHANGE UP (ref 22–31)
CREAT SERPL-MCNC: 2 MG/DL — HIGH (ref 0.5–1.3)
GLUCOSE SERPL-MCNC: 181 MG/DL — HIGH (ref 70–99)
HCT VFR BLD CALC: 38.6 % — LOW (ref 39–50)
HGB BLD-MCNC: 13 G/DL — SIGNIFICANT CHANGE UP (ref 13–17)
MAGNESIUM SERPL-MCNC: 2.5 MG/DL — SIGNIFICANT CHANGE UP (ref 1.6–2.6)
MCHC RBC-ENTMCNC: 31.5 PG — SIGNIFICANT CHANGE UP (ref 27–34)
MCHC RBC-ENTMCNC: 33.7 GM/DL — SIGNIFICANT CHANGE UP (ref 32–36)
MCV RBC AUTO: 93.5 FL — SIGNIFICANT CHANGE UP (ref 80–100)
NRBC # BLD: 0 /100 WBCS — SIGNIFICANT CHANGE UP (ref 0–0)
PHOSPHATE SERPL-MCNC: 3.5 MG/DL — SIGNIFICANT CHANGE UP (ref 2.5–4.5)
PLATELET # BLD AUTO: 192 K/UL — SIGNIFICANT CHANGE UP (ref 150–400)
POTASSIUM SERPL-MCNC: 4.6 MMOL/L — SIGNIFICANT CHANGE UP (ref 3.5–5.3)
POTASSIUM SERPL-SCNC: 4.6 MMOL/L — SIGNIFICANT CHANGE UP (ref 3.5–5.3)
RBC # BLD: 4.13 M/UL — LOW (ref 4.2–5.8)
RBC # FLD: 14.2 % — SIGNIFICANT CHANGE UP (ref 10.3–14.5)
SODIUM SERPL-SCNC: 140 MMOL/L — SIGNIFICANT CHANGE UP (ref 135–145)
TROPONIN I SERPL-MCNC: <0.015 NG/ML — SIGNIFICANT CHANGE UP (ref 0.01–0.04)
WBC # BLD: 6.14 K/UL — SIGNIFICANT CHANGE UP (ref 3.8–10.5)
WBC # FLD AUTO: 6.14 K/UL — SIGNIFICANT CHANGE UP (ref 3.8–10.5)

## 2018-05-26 PROCEDURE — 71045 X-RAY EXAM CHEST 1 VIEW: CPT | Mod: 26

## 2018-05-26 RX ADMIN — Medication 200 MILLIGRAM(S): at 13:46

## 2018-05-26 RX ADMIN — Medication 325 MILLIGRAM(S): at 13:42

## 2018-05-26 RX ADMIN — Medication 80 MILLIGRAM(S): at 21:50

## 2018-05-26 RX ADMIN — Medication 200 MILLIGRAM(S): at 21:50

## 2018-05-26 RX ADMIN — AZITHROMYCIN 255 MILLIGRAM(S): 500 TABLET, FILM COATED ORAL at 13:42

## 2018-05-26 RX ADMIN — Medication 180 MILLIGRAM(S): at 05:26

## 2018-05-26 RX ADMIN — Medication 81 MILLIGRAM(S): at 11:54

## 2018-05-26 RX ADMIN — Medication 200 MILLIGRAM(S): at 05:26

## 2018-05-26 RX ADMIN — Medication 200 MILLIGRAM(S): at 13:42

## 2018-05-26 RX ADMIN — RANOLAZINE 500 MILLIGRAM(S): 500 TABLET, FILM COATED, EXTENDED RELEASE ORAL at 17:42

## 2018-05-26 RX ADMIN — Medication 325 MILLIGRAM(S): at 05:26

## 2018-05-26 RX ADMIN — Medication 40 MILLIGRAM(S): at 05:25

## 2018-05-26 RX ADMIN — Medication 2000 UNIT(S): at 11:53

## 2018-05-26 RX ADMIN — FINASTERIDE 5 MILLIGRAM(S): 5 TABLET, FILM COATED ORAL at 21:49

## 2018-05-26 RX ADMIN — Medication 40 MILLIGRAM(S): at 17:42

## 2018-05-26 RX ADMIN — RANOLAZINE 500 MILLIGRAM(S): 500 TABLET, FILM COATED, EXTENDED RELEASE ORAL at 05:26

## 2018-05-26 RX ADMIN — PREGABALIN 1000 MICROGRAM(S): 225 CAPSULE ORAL at 11:53

## 2018-05-26 RX ADMIN — TAMSULOSIN HYDROCHLORIDE 0.4 MILLIGRAM(S): 0.4 CAPSULE ORAL at 21:49

## 2018-05-26 RX ADMIN — Medication 325 MILLIGRAM(S): at 21:49

## 2018-05-26 NOTE — PROGRESS NOTE ADULT - SUBJECTIVE AND OBJECTIVE BOX
Chart and meds reviewed.  Patient seen and examined.    CC: shortness of breath, worsening cough    HPI: 84 yo male with PMH of spinal stenosis s/p lumbar fusion, CAD, HTN, HLD, BPH, SSS s/p PPM, CKD stage III, h/o SVT, h/o right pleural effusions and PTX s/p VATS decortication, pleuradesis 5/2016, presented to ED on 5/23/18  with complaint of SOB and right sided chest pain. Pt stated that he has had cough for several months but at its worst past 2-3 days at which time he presented for outpt visit with his doctor who then ordered CXR, which revealed PTX at which time he was referred to the ED. In ED CXR and CT chest shows hydropneumothorax.    HOSPITAL COURSE  5/24 He denies HA/dizziness/cp/palpitations/abd pain/n/v/d/f/c, reports dyspnea on minimal exertion, + Productive cough  5/25 slept well last night, no SOB at rest, only with ambulation, cough is improving with moving of phelgm better; denies CP/palpitations/n/v/d/f/c  05/26/18; Patient seen and examined. SOB improving. Discussed with patient regarding management and d/c plan.     All systems reviewed and found to be negative with the exception of what has been described above.      Vital Signs Last 24 Hrs  T(C): 37.1 (26 May 2018 11:17), Max: 37.1 (26 May 2018 11:17)  T(F): 98.7 (26 May 2018 11:17), Max: 98.7 (26 May 2018 11:17)  HR: 60 (26 May 2018 11:17) (60 - 64)  BP: 145/50 (26 May 2018 11:17) (145/50 - 159/62)  BP(mean): --  RR: 17 (26 May 2018 11:17) (17 - 17)  SpO2: 98% (26 May 2018 11:17) (93% - 98%)        PHYSICAL EXAM:  GENERAL: NAD  HEENT:  pupils equal and reactive, EOMI, no oropharyngeal lesions, erythema, exudates, oral thrush  NECK:   supple, no carotid bruits, no palpable lymph nodes, no thyromegaly  CV:  +S1, +S2, regular, no murmurs or rubs  RESP: decrease breath sounds R>L; +rhonchi, even and unlabored breathing at rest; raspy voice - improved  GI:  abdomen soft, non-tender, non-distended, normal BS, no bruits, no abdominal masses, no palpable masses  MSK:   normal muscle tone, no atrophy, no rigidity, no contractions  EXT:   no clubbing, no cyanosis, no edema, no calf pain, swelling or erythema  VASCULAR:  pulses equal and symmetric in the upper and lower extremities  NEURO:  AAOX3, no focal neurological deficits, follows all commands, able to move extremities spontaneously  SKIN:  no ulcers, lesions or rashes    LABS:                              13.0   6.14  )-----------( 192      ( 26 May 2018 06:20 )             38.6     26 May 2018 06:20    140    |  107    |  32     ----------------------------<  181    4.6     |  24     |  2.00     Ca    8.7        26 May 2018 06:20  Phos  3.5       26 May 2018 06:20  Mg     2.5       26 May 2018 06:20          CAPILLARY BLOOD GLUCOSE        CARDIAC MARKERS ( 26 May 2018 06:20 )  <0.015 ng/mL / x     / x     / x     / x      CARDIAC MARKERS ( 25 May 2018 05:09 )  <0.015 ng/mL / x     / x     / x     / x      CARDIAC MARKERS ( 24 May 2018 20:00 )  x     / x     / 49 U/L / x     / x              Culture - Blood (collected 05-23-18 @ 22:35)  Source: .Blood None  Preliminary Report (05-25-18 @ 09:03):    No growth to date.    Culture - Blood (collected 05-23-18 @ 22:35)  Source: .Blood None  Preliminary Report (05-25-18 @ 09:03):    No growth to date.    5-24                        12.5   7.28  )-----------( 186      ( 24 May 2018 06:49 )             37.0   05-24    141  |  108  |  28<H>  ----------------------------<  115<H>  4.4   |  26  |  2.36<H>    Ca    8.6      24 May 2018 06:49  TPro  7.6  /  Alb  4.1  /  TBili  0.6  /  DBili  x   /  AST  13<L>  /  ALT  16  /  AlkPhos  91  05-23  LIVER FUNCTIONS - ( 23 May 2018 15:55 )  Alb: 4.1 g/dL / Pro: 7.6 gm/dL / ALK PHOS: 91 U/L / ALT: 16 U/L / AST: 13 U/L / GGT: x           PT/INR - ( 23 May 2018 15:55 )   PT: 11.1 sec;   INR: 1.03 ratio    PTT - ( 23 May 2018 15:55 )  PTT:28.9 sec    < from: Xray Chest 1 View- PORTABLE-Urgent (05.24.18 @ 12:45) >    A pacemaker remains in place. The layering right-sided effusion   previously present has regressed with slight decrease in size of the   residual pneumothorax. There is no kailyn central edema. The left   hemithorax is clear. The heart is normal in size.    < end of copied text >    < from: CT Chest No Cont (05.23.18 @ 15:36) >  Lungs, Airways and Pleura: Central tracheobronchialtree is grossly   patent. No endobronchial lesions. Surgical sutures in the right upper   lobe. Moderate right hydropneumothorax. Right basilar consolidative   changes likely representing rounded atelectasis. Medial right apical   bleb. Linear scarring within the lingula. No left-sided pneumothorax. No   pulmonary edema. No left-sided pleural effusions.    Heart and Great Vessels: Atherosclerotic changes of the aorta and   coronary vasculature. Heart is normal in size. No pericardial effusions.   Left-sided pacemaker with leads in place.    Mediastinum and Sarah: within normal limits    Neck and Chest Wall: within normal limits    Bones: Degenerative changes and osteopenia.    Upper Abdomen:  Multiple bilateral renal cysts. Complex right lateral   midpole exophytic renal cyst with thin septations demonstrating   calcification. Nonobstructive right intrarenal calculi. Correlate lobe   cyst. Gallstones.    IMPRESSION:         Moderate right hydropneumothorax with right basilar consolidation with   the appearance of rounded atelectasis.    < end of copied text >    < from: Transthoracic Echocardiogram (05.24.18 @ 10:24) >   Summary     The left ventricle is normal in size, wall motion and contractility.   Mild concentric left ventricular hypertrophy is present.   Estimated left ventricular ejection fraction is 60-65 %.   Normal appearing right atrium.   A device wire is seen in the RV and RA.   Normal appearing mitral valve structure and function.   Mild (1+) mitral regurgitation is present.   Mild mitral annular calcification is present.   Normal appearing tricuspid valve structure and function.   Mild (1+) tricuspid valve regurgitation is present.   Mild pulmonary hypertension.    < end of copied text >    MEDICATIONS  (STANDING):  ALBUTerol/ipratropium for Nebulization 3 milliLiter(s) Nebulizer every 6 hours  aspirin enteric coated 81 milliGRAM(s) Oral daily  azithromycin  IVPB      cholecalciferol 2000 Unit(s) Oral daily  cyanocobalamin 1000 MICROGram(s) Oral daily  diltiazem    milliGRAM(s) Oral daily  finasteride 5 milliGRAM(s) Oral at bedtime  guaiFENesin    Syrup 200 milliGRAM(s) Oral every 8 hours  labetalol 200 milliGRAM(s) Oral three times a day  pravastatin 80 milliGRAM(s) Oral at bedtime  ranolazine 500 milliGRAM(s) Oral two times a day  sodium bicarbonate 325 milliGRAM(s) Oral three times a day  tamsulosin 0.4 milliGRAM(s) Oral at bedtime    MEDICATIONS  (PRN):  acetaminophen   Tablet 650 milliGRAM(s) Oral every 6 hours PRN For Temp greater than 38 C (100.4 F), mild pain, HA  ondansetron Injectable 4 milliGRAM(s) IV Push every 6 hours PRN Nausea

## 2018-05-26 NOTE — PROGRESS NOTE ADULT - ASSESSMENT
A/P  * Dyspnea secondary to chronic  right hydropneumothorax vs d/t acute bronchitis vs atelectasis   - con't tele for pulse ox monitoring  - CT Sx consult - no surgical interventions  - appreciate pulm consult  - started zithromax 5/24 / recheck CXR / antitussive  - added solumedrol 40mg IV bid (5/25)  - O2 supplementation  - incentive spirometry    SHRUTHI on CKD stage 3  - ? baseline Cr 1.8  - slight downtrend in crt  - avoid nephrotoxic medications  - renal consult: he sees Dr Pastrana outpt    CAD - chronic  - TTE 5/24 reviewed  - TNI negative, ecg no acute ST-T changes  - no further chest pain (suspect d/t cough/pulmonary issues)  - continue ASA, BB, statin, ranexa / telemonitor  - f/b cardiology    SSS  - hx of PPM placement   - had outpt EP visit and PPM interrogation ~2 wks ago: normal functioning (report in paper chart)    HTN  - BP controlled  - continue home meds    Dyslipidemia  - continue statin    DVT prophylaxis  - Venodyne     Possible d/c tomorrow.

## 2018-05-26 NOTE — PROGRESS NOTE ADULT - SUBJECTIVE AND OBJECTIVE BOX
Patient is a 86y Male who reports no complaints overnight. he reports feels at baseline and  no new procedure planned    REVIEW OF SYSTEMS:    CONSTITUTIONAL: No weakness, fevers or chills  RESPIRATORY: No cough, wheezing, hemoptysis; stable shortness of breath  CARDIOVASCULAR: No chest pain or palpitations  GENITOURINARY: No dysuria, frequency or hematuria  All other review of systems is negative unless indicated above.    MEDICATIONS  (STANDING):  ALBUTerol/ipratropium for Nebulization 3 milliLiter(s) Nebulizer every 6 hours  aspirin enteric coated 81 milliGRAM(s) Oral daily  azithromycin  IVPB      azithromycin  IVPB 500 milliGRAM(s) IV Intermittent every 24 hours  buDESOnide   0.5 milliGRAM(s) Respule 0.5 milliGRAM(s) Inhalation every 12 hours  cholecalciferol 2000 Unit(s) Oral daily  cyanocobalamin 1000 MICROGram(s) Oral daily  diltiazem    milliGRAM(s) Oral daily  finasteride 5 milliGRAM(s) Oral at bedtime  guaiFENesin    Syrup 200 milliGRAM(s) Oral every 8 hours  labetalol 200 milliGRAM(s) Oral three times a day  methylPREDNISolone sodium succinate Injectable 40 milliGRAM(s) IV Push every 12 hours  pravastatin 80 milliGRAM(s) Oral at bedtime  ranolazine 500 milliGRAM(s) Oral two times a day  sodium bicarbonate 325 milliGRAM(s) Oral three times a day  tamsulosin 0.4 milliGRAM(s) Oral at bedtime    MEDICATIONS  (PRN):  acetaminophen   Tablet 650 milliGRAM(s) Oral every 6 hours PRN For Temp greater than 38 C (100.4 F), mild pain, HA  ondansetron Injectable 4 milliGRAM(s) IV Push every 6 hours PRN Nausea        T(C): , Max: 37.1 (05-26-18 @ 11:17)  T(F): , Max: 98.7 (05-26-18 @ 11:17)  HR: 60 (05-26-18 @ 11:17)  BP: 145/50 (05-26-18 @ 11:17)  BP(mean): --  RR: 17 (05-26-18 @ 11:17)  SpO2: 98% (05-26-18 @ 11:17)  Wt(kg): --        PHYSICAL EXAM:    Constitutional: NAD, well-groomed, well-developed  HEENT: PERRLA, EOMI,  MMM  Neck: No LAD, No JVD  Respiratory: CTAB  Cardiovascular: S1 and S2, RRR  Gastrointestinal: BS+, soft, NT/ND  Extremities: No peripheral edema  Neurological: A/O x 3, no focal deficits  Psychiatric: Normal mood, normal affect  : No Beaver  Skin: No rashes  Access: Not applicable        LABS:                        13.0   6.14  )-----------( 192      ( 26 May 2018 06:20 )             38.6     26 May 2018 06:20    140    |  107    |  32     ----------------------------<  181    4.6     |  24     |  2.00   25 May 2018 05:09    137    |  109    |  24     ----------------------------<  127    4.2     |  24     |  2.17   24 May 2018 06:49    141    |  108    |  28     ----------------------------<  115    4.4     |  26     |  2.36   23 May 2018 15:55    137    |  106    |  27     ----------------------------<  104    4.7     |  25     |  2.36     Ca    8.7        26 May 2018 06:20  Ca    8.1        25 May 2018 05:09  Ca    8.6        24 May 2018 06:49  Ca    9.0        23 May 2018 15:55  Phos  3.5       26 May 2018 06:20  Phos  4.0       25 May 2018 05:09  Mg     2.5       26 May 2018 06:20  Mg     2.3       25 May 2018 05:09    TPro  7.6    /  Alb  4.1    /  TBili  0.6    /  DBili  x      /  AST  13     /  ALT  16     /  AlkPhos  91     23 May 2018 15:55          Urine Studies:          RADIOLOGY & ADDITIONAL STUDIES:

## 2018-05-26 NOTE — PROGRESS NOTE ADULT - PROBLEM SELECTOR PLAN 1
-Recent CXRs shows no marked change, the area in right chest is likely more of a trapped lung issue versus a new ptx; ptx clinically asymptomatic regarding pleural space issue-no intervention necessary at this time  -Ok to follow up as outpatient  -

## 2018-05-27 ENCOUNTER — TRANSCRIPTION ENCOUNTER (OUTPATIENT)
Age: 83
End: 2018-05-27

## 2018-05-27 VITALS
TEMPERATURE: 97 F | DIASTOLIC BLOOD PRESSURE: 43 MMHG | RESPIRATION RATE: 17 BRPM | HEART RATE: 59 BPM | OXYGEN SATURATION: 97 % | SYSTOLIC BLOOD PRESSURE: 124 MMHG

## 2018-05-27 LAB
ANION GAP SERPL CALC-SCNC: 8 MMOL/L — SIGNIFICANT CHANGE UP (ref 5–17)
BUN SERPL-MCNC: 46 MG/DL — HIGH (ref 7–23)
CALCIUM SERPL-MCNC: 8.5 MG/DL — SIGNIFICANT CHANGE UP (ref 8.5–10.1)
CHLORIDE SERPL-SCNC: 107 MMOL/L — SIGNIFICANT CHANGE UP (ref 96–108)
CO2 SERPL-SCNC: 24 MMOL/L — SIGNIFICANT CHANGE UP (ref 22–31)
CREAT SERPL-MCNC: 2.03 MG/DL — HIGH (ref 0.5–1.3)
GLUCOSE SERPL-MCNC: 188 MG/DL — HIGH (ref 70–99)
POTASSIUM SERPL-MCNC: 4.6 MMOL/L — SIGNIFICANT CHANGE UP (ref 3.5–5.3)
POTASSIUM SERPL-SCNC: 4.6 MMOL/L — SIGNIFICANT CHANGE UP (ref 3.5–5.3)
SODIUM SERPL-SCNC: 139 MMOL/L — SIGNIFICANT CHANGE UP (ref 135–145)

## 2018-05-27 PROCEDURE — 93010 ELECTROCARDIOGRAM REPORT: CPT

## 2018-05-27 RX ORDER — SODIUM BICARBONATE 1 MEQ/ML
325 SYRINGE (ML) INTRAVENOUS
Qty: 0 | Refills: 0 | Status: DISCONTINUED | OUTPATIENT
Start: 2018-05-27 | End: 2018-05-27

## 2018-05-27 RX ADMIN — RANOLAZINE 500 MILLIGRAM(S): 500 TABLET, FILM COATED, EXTENDED RELEASE ORAL at 05:19

## 2018-05-27 RX ADMIN — Medication 325 MILLIGRAM(S): at 05:19

## 2018-05-27 RX ADMIN — Medication 2000 UNIT(S): at 11:07

## 2018-05-27 RX ADMIN — Medication 200 MILLIGRAM(S): at 05:19

## 2018-05-27 RX ADMIN — Medication 40 MILLIGRAM(S): at 05:19

## 2018-05-27 RX ADMIN — Medication 81 MILLIGRAM(S): at 11:08

## 2018-05-27 RX ADMIN — Medication 180 MILLIGRAM(S): at 05:19

## 2018-05-27 RX ADMIN — PREGABALIN 1000 MICROGRAM(S): 225 CAPSULE ORAL at 11:09

## 2018-05-27 NOTE — DISCHARGE NOTE ADULT - CARE PROVIDERS DIRECT ADDRESSES
,DirectAddress_Unknown,[email or web address]

## 2018-05-27 NOTE — PROGRESS NOTE ADULT - PROVIDER SPECIALTY LIST ADULT
CT Surgery
Hospitalist
Hospitalist
Nephrology
Nephrology
Pulmonology
Thoracic Surgery
Thoracic Surgery
Hospitalist

## 2018-05-27 NOTE — PROGRESS NOTE ADULT - ASSESSMENT
85 y/o M with PMH of CKD3 (baseline SCr 1.8-2.0), HTN, CAD, HLD, BPH, SSS/SVT s/p PPM, h/o right pleural effusions and PTX s/p VATS decortication, pleurodesis 5/2016, spinal stenosis s/p lumbar fusion presents to ED with complaint of cough with renal evaluation of CKD.    1. CKD3 - baseline SCr 1.8-2.0,   - Renal function stable, near baseline. Outpatient follow up with Dr. Pastrana  - encourage po hydration  - not on any nephrotoxic meds  - Will lower nahc03 T's to T BID with stable serum values. Follow up values with Dr. Pastrana    2. HTN - controlled    3. Hydropneumothorax  -CTS/pulmonary managment    d/c with RN

## 2018-05-27 NOTE — DISCHARGE NOTE ADULT - CARE PROVIDER_API CALL
Veronica Lama), Internal Medicine  [address] [place] [address]  Phone: [phone]  Fax: [phone]    Miah Reyes), Thoracic Surgery  [address] [place] [address]  Phone: [phone]  Fax: [phone]

## 2018-05-27 NOTE — DISCHARGE NOTE ADULT - MEDICATION SUMMARY - MEDICATIONS TO TAKE
I will START or STAY ON the medications listed below when I get home from the hospital:    finasteride 5 mg oral tablet  -- 1 tab(s) by mouth once a day (at bedtime)  -- Indication: For BPH    Aspirin Enteric Coated 81 mg oral delayed release tablet  -- 1 tab(s) by mouth once a day  -- Indication: For CAD    sodium bicarbonate 325 mg oral tablet  -- 1 tab(s) by mouth 3 times a day  -- Indication: For GI    tamsulosin 0.4 mg oral capsule  -- 1 cap(s) by mouth once a day  -- Indication: For BPH    Ranexa 500 mg oral tablet, extended release  -- 1 tab(s) by mouth 2 times a day  -- Indication: For CAD    dilTIAZem 180 mg/24 hours oral capsule, extended release  -- 1 cap(s) by mouth once a day  -- Indication: For HTN    pravastatin 80 mg oral tablet  -- 1 tab(s) by mouth once a day  -- Indication: For HLD    Welchol 625 mg oral tablet  -- 1 tab(s) by mouth 2 times a day  -- Indication: For HLD    labetalol 200 mg oral tablet  -- 1 tab(s) by mouth 3 times a day  -- Indication: For HTN    furosemide 20 mg oral tablet  -- 1 tab(s) by mouth once a day  -- Indication: For CHF    CoQ10 300 mg oral capsule  -- 1 cap(s) by mouth once a day  -- Indication: For supplement    PreserVision oral tablet  -- 1 tab(s) by mouth once a day  -- Indication: For supplement    Vitamin B12 1000 mcg oral tablet  -- 1 tab(s) by mouth once a day  -- Indication: For supplement    Vitamin D3 2000 intl units oral tablet  -- 1 tab(s) by mouth once a day  -- Indication: For supplement

## 2018-05-27 NOTE — DISCHARGE NOTE ADULT - PATIENT PORTAL LINK FT
You can access the [facility], offered by [facility], by registering with the following website: [email or web address]

## 2018-05-27 NOTE — DISCHARGE NOTE ADULT - CARE PLAN
Principal Discharge DX:	Pneumothorax on right  Goal:	Chr pneumothorax.  Assessment and plan of treatment:	Follow up with Dr Reyes

## 2018-05-27 NOTE — DISCHARGE NOTE ADULT - HOSPITAL COURSE
84 yo male with PMH of spinal stenosis s/p lumbar fusion, CAD, HTN, HLD, BPH, SSS s/p PPM, CKD stage III, h/o SVT, h/o right pleural effusions and PTX s/p VATS decortication, pleuradesis 5/2016, presented to ED on 5/23/18  with complaint of SOB and right sided chest pain. Pt stated that he has had cough for several months but at its worst past 2-3 days at which time he presented for outpt visit with his doctor who then ordered CXR, which revealed PTX at which time he was referred to the ED. In ED CXR and CT chest shows hydropneumothorax.      HOSPITAL COURSE  5/24 He denies HA/dizziness/cp/palpitations/abd pain/n/v/d/f/c, reports dyspnea on minimal exertion, + Productive cough  5/25 slept well last night, no SOB at rest, only with ambulation, cough is improving with moving of phelgm better; denies CP/palpitations/n/v/d/f/c  05/26/18; Patient seen and examined. SOB improving. Discussed with patient regarding management and d/c plan.       Vital Signs Last 24 Hrs  T(C): 36.7 (27 May 2018 05:09), Max: 37.1 (26 May 2018 11:17)  T(F): 98.1 (27 May 2018 05:09), Max: 98.7 (26 May 2018 11:17)  HR: 60 (27 May 2018 05:09) (59 - 60)  BP: 153/54 (27 May 2018 05:09) (140/49 - 153/54)  BP(mean): --  RR: 18 (26 May 2018 17:03) (17 - 18)  SpO2: 96% (27 May 2018 05:09) (95% - 98%)      PHYSICAL EXAM:  GENERAL: NAD  HEENT:  pupils equal and reactive, EOMI, no oropharyngeal lesions, erythema, exudates, oral thrush  NECK:   supple, no carotid bruits, no palpable lymph nodes, no thyromegaly  CV:  +S1, +S2, regular, no murmurs or rubs  RESP: decrease breath sounds R>L; +rhonchi, even and unlabored breathing at rest; raspy voice - improved  GI:  abdomen soft, non-tender, non-distended, normal BS, no bruits, no abdominal masses, no palpable masses  MSK:   normal muscle tone, no atrophy, no rigidity, no contractions  EXT:   no clubbing, no cyanosis, no edema, no calf pain, swelling or erythema  VASCULAR:  pulses equal and symmetric in the upper and lower extremities  NEURO:  AAOX3, no focal neurological deficits, follows all commands, able to move extremities spontaneously  SKIN:  no ulcers, lesions or rashes        Assessment and Plan:   · Assessment		  A/P  * Dyspnea secondary to chronic  right hydropneumothorax vs d/t acute bronchitis vs atelectasis   - CT Sx consult - no surgical interventions  - appreciate pulm consult  - added solumedrol 40mg IV bid (5/25) for 2 days. D/C   - incentive spirometry.  -Outpatient follow up with Dr Eric HAWKINS on CKD stage 3  -Renal function stable  -Outpatient follow up with Dr Pastrana    CAD - chronic  - TTE 5/24 reviewed  - TNI negative, ecg no acute ST-T changes  - continue ASA, BB, statin, ranexa / telemonitor    SSS  - hx of PPM placement   - had outpt EP visit and PPM interrogation ~2 wks ago: normal functioning (report in paper chart)    HTN  - BP controlled  - continue home meds    Dyslipidemia  - continue statin      Home today.  PMD was notified.  Spent more than 30 minutes to prepare the discharge.

## 2018-05-27 NOTE — PROGRESS NOTE ADULT - SUBJECTIVE AND OBJECTIVE BOX
Patient is a 86y Male who reports no complaints overnight. Breathing and strength stable. No chest pain or sob    REVIEW OF SYSTEMS:    CONSTITUTIONAL: stabke weakness, fevers or chills  RESPIRATORY: No cough, wheezing, hemoptysis; stable shortness of breath  CARDIOVASCULAR: No chest pain or palpitations  GENITOURINARY: No dysuria, frequency or hematuria  All other review of systems is negative unless indicated above.    MEDICATIONS  (STANDING):  ALBUTerol/ipratropium for Nebulization 3 milliLiter(s) Nebulizer every 6 hours  aspirin enteric coated 81 milliGRAM(s) Oral daily  azithromycin  IVPB      azithromycin  IVPB 500 milliGRAM(s) IV Intermittent every 24 hours  buDESOnide   0.5 milliGRAM(s) Respule 0.5 milliGRAM(s) Inhalation every 12 hours  cholecalciferol 2000 Unit(s) Oral daily  cyanocobalamin 1000 MICROGram(s) Oral daily  diltiazem    milliGRAM(s) Oral daily  finasteride 5 milliGRAM(s) Oral at bedtime  guaiFENesin    Syrup 200 milliGRAM(s) Oral every 8 hours  labetalol 200 milliGRAM(s) Oral three times a day  methylPREDNISolone sodium succinate Injectable 40 milliGRAM(s) IV Push every 12 hours  pravastatin 80 milliGRAM(s) Oral at bedtime  ranolazine 500 milliGRAM(s) Oral two times a day  sodium bicarbonate 325 milliGRAM(s) Oral three times a day  tamsulosin 0.4 milliGRAM(s) Oral at bedtime    MEDICATIONS  (PRN):  acetaminophen   Tablet 650 milliGRAM(s) Oral every 6 hours PRN For Temp greater than 38 C (100.4 F), mild pain, HA  ondansetron Injectable 4 milliGRAM(s) IV Push every 6 hours PRN Nausea        T(C): , Max: 37.1 (05-26-18 @ 11:17)  T(F): , Max: 98.7 (05-26-18 @ 11:17)  HR: 60 (05-27-18 @ 05:09)  BP: 153/54 (05-27-18 @ 05:09)  BP(mean): --  RR: 18 (05-26-18 @ 17:03)  SpO2: 96% (05-27-18 @ 05:09)  Wt(kg): --        PHYSICAL EXAM:    Constitutional: NAD,   HEENT: PERRLA, EOMI,  MMM  Neck: No LAD, No JVD  Respiratory: dec R base  Cardiovascular: S1 and S2, RRR  Gastrointestinal: BS+, soft, NT/ND  Extremities: No peripheral edema  Neurological: A/O x 3, no focal deficits  Psychiatric: Normal mood, normal affect  : No Beaver  Skin: No rashes  Access: Not applicable        LABS:                        13.0   6.14  )-----------( 192      ( 26 May 2018 06:20 )             38.6     27 May 2018 06:18    139    |  107    |  46     ----------------------------<  188    4.6     |  24     |  2.03   26 May 2018 06:20    140    |  107    |  32     ----------------------------<  181    4.6     |  24     |  2.00   25 May 2018 05:09    137    |  109    |  24     ----------------------------<  127    4.2     |  24     |  2.17   24 May 2018 06:49    141    |  108    |  28     ----------------------------<  115    4.4     |  26     |  2.36   23 May 2018 15:55    137    |  106    |  27     ----------------------------<  104    4.7     |  25     |  2.36     Ca    8.5        27 May 2018 06:18  Ca    8.7        26 May 2018 06:20  Ca    8.1        25 May 2018 05:09  Ca    8.6        24 May 2018 06:49  Ca    9.0        23 May 2018 15:55  Phos  3.5       26 May 2018 06:20  Phos  4.0       25 May 2018 05:09  Mg     2.5       26 May 2018 06:20  Mg     2.3       25 May 2018 05:09    TPro  7.6    /  Alb  4.1    /  TBili  0.6    /  DBili  x      /  AST  13     /  ALT  16     /  AlkPhos  91     23 May 2018 15:55          Urine Studies:          RADIOLOGY & ADDITIONAL STUDIES:

## 2018-05-29 LAB
CULTURE RESULTS: SIGNIFICANT CHANGE UP
CULTURE RESULTS: SIGNIFICANT CHANGE UP
SPECIMEN SOURCE: SIGNIFICANT CHANGE UP
SPECIMEN SOURCE: SIGNIFICANT CHANGE UP

## 2018-05-31 DIAGNOSIS — N40.0 BENIGN PROSTATIC HYPERPLASIA WITHOUT LOWER URINARY TRACT SYMPTOMS: ICD-10-CM

## 2018-05-31 DIAGNOSIS — J20.9 ACUTE BRONCHITIS, UNSPECIFIED: ICD-10-CM

## 2018-05-31 DIAGNOSIS — I12.9 HYPERTENSIVE CHRONIC KIDNEY DISEASE WITH STAGE 1 THROUGH STAGE 4 CHRONIC KIDNEY DISEASE, OR UNSPECIFIED CHRONIC KIDNEY DISEASE: ICD-10-CM

## 2018-05-31 DIAGNOSIS — J94.2 HEMOTHORAX: ICD-10-CM

## 2018-05-31 DIAGNOSIS — N18.3 CHRONIC KIDNEY DISEASE, STAGE 3 (MODERATE): ICD-10-CM

## 2018-05-31 DIAGNOSIS — E78.5 HYPERLIPIDEMIA, UNSPECIFIED: ICD-10-CM

## 2018-05-31 DIAGNOSIS — I25.10 ATHEROSCLEROTIC HEART DISEASE OF NATIVE CORONARY ARTERY WITHOUT ANGINA PECTORIS: ICD-10-CM

## 2018-05-31 DIAGNOSIS — Z95.0 PRESENCE OF CARDIAC PACEMAKER: ICD-10-CM

## 2018-05-31 DIAGNOSIS — J94.8 OTHER SPECIFIED PLEURAL CONDITIONS: ICD-10-CM

## 2018-05-31 DIAGNOSIS — N17.9 ACUTE KIDNEY FAILURE, UNSPECIFIED: ICD-10-CM

## 2018-09-25 NOTE — DISCHARGE NOTE ADULT - NS AS DC FU INST LIST INST
Pt needed orders for a urine culture to drop off at lab bc pt cannot urinate when he is in the office all the time. Ordered and gave caregiver the kit to collect tube since she brought in an unusable sample today.
yes

## 2018-09-28 ENCOUNTER — APPOINTMENT (OUTPATIENT)
Dept: ELECTROPHYSIOLOGY | Facility: CLINIC | Age: 83
End: 2018-09-28

## 2018-10-01 ENCOUNTER — APPOINTMENT (OUTPATIENT)
Dept: ELECTROPHYSIOLOGY | Facility: CLINIC | Age: 83
End: 2018-10-01
Payer: COMMERCIAL

## 2018-10-01 VITALS
HEART RATE: 60 BPM | SYSTOLIC BLOOD PRESSURE: 155 MMHG | DIASTOLIC BLOOD PRESSURE: 60 MMHG | BODY MASS INDEX: 24.22 KG/M2 | WEIGHT: 173 LBS | HEIGHT: 71 IN

## 2018-10-01 PROBLEM — N18.3 CHRONIC KIDNEY DISEASE, STAGE 3 (MODERATE): Chronic | Status: ACTIVE | Noted: 2017-09-21

## 2018-10-01 PROBLEM — N40.0 BENIGN PROSTATIC HYPERPLASIA WITHOUT LOWER URINARY TRACT SYMPTOMS: Chronic | Status: ACTIVE | Noted: 2017-09-21

## 2018-10-01 PROBLEM — M48.00 SPINAL STENOSIS, SITE UNSPECIFIED: Chronic | Status: ACTIVE | Noted: 2017-09-21

## 2018-10-01 PROBLEM — I25.10 ATHEROSCLEROTIC HEART DISEASE OF NATIVE CORONARY ARTERY WITHOUT ANGINA PECTORIS: Chronic | Status: ACTIVE | Noted: 2017-09-21

## 2018-10-01 PROBLEM — I49.5 SICK SINUS SYNDROME: Chronic | Status: ACTIVE | Noted: 2017-09-21

## 2018-10-01 PROBLEM — E78.5 HYPERLIPIDEMIA, UNSPECIFIED: Chronic | Status: ACTIVE | Noted: 2017-09-21

## 2018-10-01 PROBLEM — I10 ESSENTIAL (PRIMARY) HYPERTENSION: Chronic | Status: ACTIVE | Noted: 2017-09-21

## 2018-10-01 PROBLEM — J90 PLEURAL EFFUSION, NOT ELSEWHERE CLASSIFIED: Chronic | Status: ACTIVE | Noted: 2017-09-21

## 2018-10-01 PROCEDURE — 93280 PM DEVICE PROGR EVAL DUAL: CPT

## 2019-01-01 ENCOUNTER — APPOINTMENT (OUTPATIENT)
Dept: ELECTROPHYSIOLOGY | Facility: CLINIC | Age: 84
End: 2019-01-01
Payer: COMMERCIAL

## 2019-01-01 ENCOUNTER — EMERGENCY (EMERGENCY)
Facility: HOSPITAL | Age: 84
LOS: 0 days | Discharge: HOME | End: 2019-12-22
Attending: EMERGENCY MEDICINE
Payer: COMMERCIAL

## 2019-01-01 VITALS
HEIGHT: 71 IN | WEIGHT: 174 LBS | HEART RATE: 59 BPM | OXYGEN SATURATION: 95 % | SYSTOLIC BLOOD PRESSURE: 152 MMHG | BODY MASS INDEX: 24.36 KG/M2 | DIASTOLIC BLOOD PRESSURE: 58 MMHG

## 2019-01-01 VITALS — WEIGHT: 173 LBS | BODY MASS INDEX: 24.22 KG/M2 | HEIGHT: 71 IN

## 2019-01-01 VITALS
RESPIRATION RATE: 17 BRPM | DIASTOLIC BLOOD PRESSURE: 57 MMHG | HEART RATE: 59 BPM | SYSTOLIC BLOOD PRESSURE: 144 MMHG | OXYGEN SATURATION: 97 %

## 2019-01-01 VITALS
SYSTOLIC BLOOD PRESSURE: 120 MMHG | TEMPERATURE: 98 F | RESPIRATION RATE: 18 BRPM | DIASTOLIC BLOOD PRESSURE: 56 MMHG | HEART RATE: 60 BPM | OXYGEN SATURATION: 96 %

## 2019-01-01 VITALS
SYSTOLIC BLOOD PRESSURE: 154 MMHG | DIASTOLIC BLOOD PRESSURE: 70 MMHG | WEIGHT: 174 LBS | HEIGHT: 71 IN | BODY MASS INDEX: 24.36 KG/M2 | HEART RATE: 59 BPM

## 2019-01-01 DIAGNOSIS — I25.10 ATHEROSCLEROTIC HEART DISEASE OF NATIVE CORONARY ARTERY WITHOUT ANGINA PECTORIS: ICD-10-CM

## 2019-01-01 DIAGNOSIS — Z95.0 PRESENCE OF CARDIAC PACEMAKER: ICD-10-CM

## 2019-01-01 DIAGNOSIS — Z79.899 OTHER LONG TERM (CURRENT) DRUG THERAPY: ICD-10-CM

## 2019-01-01 DIAGNOSIS — I12.9 HYPERTENSIVE CHRONIC KIDNEY DISEASE WITH STAGE 1 THROUGH STAGE 4 CHRONIC KIDNEY DISEASE, OR UNSPECIFIED CHRONIC KIDNEY DISEASE: ICD-10-CM

## 2019-01-01 DIAGNOSIS — Z79.82 LONG TERM (CURRENT) USE OF ASPIRIN: ICD-10-CM

## 2019-01-01 DIAGNOSIS — I10 ESSENTIAL (PRIMARY) HYPERTENSION: ICD-10-CM

## 2019-01-01 DIAGNOSIS — N18.3 CHRONIC KIDNEY DISEASE, STAGE 3 (MODERATE): ICD-10-CM

## 2019-01-01 DIAGNOSIS — N40.0 BENIGN PROSTATIC HYPERPLASIA WITHOUT LOWER URINARY TRACT SYMPTOMS: ICD-10-CM

## 2019-01-01 DIAGNOSIS — I49.5 SICK SINUS SYNDROME: ICD-10-CM

## 2019-01-01 DIAGNOSIS — Z98.1 ARTHRODESIS STATUS: Chronic | ICD-10-CM

## 2019-01-01 PROCEDURE — 93280 PM DEVICE PROGR EVAL DUAL: CPT

## 2019-01-01 PROCEDURE — 99282 EMERGENCY DEPT VISIT SF MDM: CPT

## 2019-01-01 PROCEDURE — 99284 EMERGENCY DEPT VISIT MOD MDM: CPT

## 2019-01-01 RX ORDER — PRAVASTATIN SODIUM 80 MG/1
80 TABLET ORAL DAILY
Qty: 90 | Refills: 0 | Status: ACTIVE | COMMUNITY

## 2019-01-01 RX ORDER — RANOLAZINE 500 MG/1
500 TABLET, FILM COATED, EXTENDED RELEASE ORAL
Refills: 5 | Status: ACTIVE | COMMUNITY

## 2019-01-01 RX ORDER — FUROSEMIDE 20 MG/1
20 TABLET ORAL DAILY
Qty: 30 | Refills: 3 | Status: ACTIVE | COMMUNITY

## 2019-01-01 RX ORDER — DILTIAZEM HYDROCHLORIDE 180 MG/1
180 CAPSULE, EXTENDED RELEASE ORAL
Refills: 0 | Status: ACTIVE | COMMUNITY

## 2019-01-01 RX ORDER — TAMSULOSIN HYDROCHLORIDE 0.4 MG/1
0.4 CAPSULE ORAL
Qty: 90 | Refills: 3 | Status: ACTIVE | COMMUNITY

## 2019-01-01 RX ORDER — MENTHOL/CAMPHOR 0.5 %-0.5%
1000 LOTION (ML) TOPICAL
Refills: 0 | Status: ACTIVE | COMMUNITY

## 2019-01-01 RX ORDER — LABETALOL HYDROCHLORIDE 200 MG/1
200 TABLET, FILM COATED ORAL
Refills: 0 | Status: ACTIVE | COMMUNITY

## 2019-01-01 RX ORDER — OMEGA-3/DHA/EPA/FISH OIL 35-113.5MG
1000 TABLET,CHEWABLE ORAL
Refills: 0 | Status: ACTIVE | COMMUNITY

## 2019-01-01 RX ORDER — ASPIRIN 81 MG
81 TABLET, DELAYED RELEASE (ENTERIC COATED) ORAL DAILY
Refills: 5 | Status: ACTIVE | COMMUNITY

## 2019-01-01 RX ORDER — SODIUM BICARBONATE 650 MG/1
TABLET ORAL
Refills: 0 | Status: ACTIVE | COMMUNITY

## 2019-01-01 RX ORDER — UBIDECARENONE 100 MG
100 CAPSULE ORAL
Refills: 0 | Status: ACTIVE | COMMUNITY

## 2019-01-01 RX ORDER — FINASTERIDE 5 MG/1
5 TABLET, FILM COATED ORAL DAILY
Qty: 30 | Refills: 3 | Status: ACTIVE | COMMUNITY

## 2019-01-01 RX ORDER — COLESEVELAM HYDROCHLORIDE 625 MG/1
625 TABLET, FILM COATED ORAL
Refills: 0 | Status: ACTIVE | COMMUNITY

## 2019-01-08 ENCOUNTER — APPOINTMENT (OUTPATIENT)
Dept: ELECTROPHYSIOLOGY | Facility: CLINIC | Age: 84
End: 2019-01-08
Payer: COMMERCIAL

## 2019-01-08 VITALS — HEART RATE: 60 BPM | WEIGHT: 172 LBS | BODY MASS INDEX: 24.08 KG/M2 | HEIGHT: 71 IN

## 2019-01-08 PROCEDURE — 93280 PM DEVICE PROGR EVAL DUAL: CPT

## 2019-12-16 PROBLEM — I49.5 SICK SINUS SYNDROME: Status: ACTIVE | Noted: 2017-05-17

## 2019-12-16 PROBLEM — Z95.0 CARDIAC PACEMAKER IN SITU: Status: ACTIVE | Noted: 2017-05-17

## 2019-12-22 NOTE — ED STATDOCS - OBJECTIVE STATEMENT
86 y/o m with PMHx of spinal stenosis, CKD, SSS, pleural effusion, HLD, BPH, CAD, HTN presenting to the ED c/o high BP reading on home monitoring device. BP found to be in 180s systolic. Pt took all her usual BP medications today, no recent medication change, follows up with cardiologist Dr. Peterson. Denies fever, chills, n/v/d, abd pain, HA, dizziness, SOB, cough, CP, palpitations.

## 2019-12-22 NOTE — ED STATDOCS - NSFOLLOWUPINSTRUCTIONS_ED_ALL_ED_FT
Hypertension    WHAT YOU NEED TO KNOW:    Hypertension is high blood pressure. Your blood pressure is the force of your blood moving against the walls of your arteries. Hypertension causes your blood pressure to get so high that your heart has to work much harder than normal. This can damage your heart. The cause of hypertension may not be known. This is called essential or primary hypertension. Hypertension caused by another medical condition, such as kidney disease, is called secondary hypertension.    DISCHARGE INSTRUCTIONS:    Call 911 for any of the following:     You have chest pain.      You have any of the following signs of a heart attack:   Squeezing, pressure, or pain in your chest       and any of the following:   Discomfort or pain in your back, neck, jaw, stomach, or arm       Shortness of breath      Nausea or vomiting      Lightheadedness or a sudden cold sweat      You become confused or have difficulty speaking.      You suddenly feel lightheaded or have trouble breathing.    Return to the emergency department if:     You have a severe headache or vision loss.      You have weakness in an arm or leg.     Contact your healthcare provider if:     You feel faint, dizzy, confused, or drowsy.       You have been taking your blood pressure medicine but your pressure is higher than your provider says it should be.      You have questions or concerns about your condition or care.    Medicines: You may need any of the following:     Antihypertensives may be used to help lower your blood pressure. Several kinds of medicines are available. Your healthcare provider will choose medicines based on the kind of hypertension you have. You may need more than one type of medicine. Take the medicine exactly as directed.       Diuretics help decrease extra fluid that collects in your body. This will help lower your blood pressure. You may urinate more often while you take this medicine.      Cholesterol medicine helps lower your cholesterol level. A low cholesterol level helps prevent heart disease and makes it easier to control your blood pressure.       Take your medicine as directed. Contact your healthcare provider if you think your medicine is not helping or if you have side effects. Tell him or her if you are allergic to any medicine. Keep a list of the medicines, vitamins, and herbs you take. Include the amounts, and when and why you take them. Bring the list or the pill bottles to follow-up visits. Carry your medicine list with you in case of an emergency.    Follow up with your healthcare provider as directed: You will need to return to have your blood pressure checked and to have other lab tests done. Write down your questions so you remember to ask them during your visits.     Stages of hypertension: Blood Pressure Readings         Normal blood pressure is 119/79 or lower. Your healthcare provider may only check your blood pressure each year if it stays at a normal level.      Elevated blood pressure is 120/79 to 129/79. This is sometimes called prehypertension. Your healthcare provider may suggest lifestyle changes to help lower your blood pressure to a normal level. He or she may then check it again in 3 to 6 months.      Stage 1 hypertension is 130/80 to 139/89. Your provider may recommend lifestyle changes, medication, and checks every 3 to 6 months until your blood pressure is controlled.      Stage 2 hypertension is 140/90 or higher. Your provider will recommend lifestyle changes and have you take 2 kinds of hypertension medicines. You will also need to have your blood pressure checked monthly until it is controlled.    Manage hypertension:     Check your blood pressure at home. Avoid smoking, caffeine, and exercise at least 30 minutes before checking your blood pressure. Sit and rest for 5 minutes before you take your blood pressure. Extend your arm and support it on a flat surface. Your arm should be at the same level as your heart. Follow the directions that came with your blood pressure monitor. Check your blood pressure 2 times, 1 minute apart, before you take your medicine in the morning. Also check your blood pressure before your evening meal. Keep a record of your readings and bring it to your follow-up visits. Ask your healthcare provider what your blood pressure should be. How to take a Blood Pressure           Manage any other health conditions you have. Health conditions such as diabetes can increase your risk for hypertension. Follow your healthcare provider's instructions and take all your medicines as directed.       Ask about all medicines. Certain medicines can increase your blood pressure. Examples include oral birth control pills, decongestants, herbal supplements, and NSAIDs, such as ibuprofen. Your healthcare provider can tell you which medicines are safe for you to take. This includes prescription and over-the-counter medicines.    Lifestyle changes you can make to manage hypertension:     Limit sodium (salt) as directed. Too much sodium can affect your fluid balance. Check labels to find low-sodium or no-salt-added foods. Some low-sodium foods use potassium salts for flavor. Too much potassium can also cause health problems. Your healthcare provider will tell you how much sodium and potassium are safe for you to have in a day. He or she may recommend that you limit sodium to 2,300 mg a day.           Follow the meal plan recommended by your healthcare provider. A dietitian or your provider can give you more information on low-sodium plans or the DASH (Dietary Approaches to Stop Hypertension) eating plan. The DASH plan is low in sodium, unhealthy fats, and total fat. It is high in potassium, calcium, and fiber.            Exercise to maintain a healthy weight. Exercise at least 30 minutes per day, on most days of the week. This will help decrease your blood pressure. Ask your healthcare provider about the best exercise plan for you. Walking for Exercise           Decrease stress. This may help lower your blood pressure. Learn ways to relax, such as deep breathing or listening to music.      Limit alcohol as directed. Alcohol can increase your blood pressure. A drink of alcohol is 12 ounces of beer, 5 ounces of wine, or 1½ ounces of liquor.      Do not smoke. Nicotine and other chemicals in cigarettes and cigars can increase your blood pressure and also cause lung damage. Ask your healthcare provider for information if you currently smoke and need help to quit. E-cigarettes or smokeless tobacco still contain nicotine. Talk to your healthcare provider before you use these products.     FOLLOW UP WITH YOUR DOCTOR OR CARDIOLOGIST THIS WEEK. RETURN TO ER FOR ANY WORSENING SYMPTOMS OR NEW CONCERNS.

## 2019-12-22 NOTE — ED STATDOCS - PROGRESS NOTE DETAILS
signed Nusrat Ramsay PA-C Pt seen initially in intake by Dr Leach.   87M here for BP check, home monitor was giving high readings. Pt asymptomatic. BP in ED not of concern. Recommend f/u card. return precautions given. Pt feeling well at DC, agrees with DC and plan of care.

## 2019-12-22 NOTE — ED STATDOCS - CHPI ED SYMPTOM NEG
no chills/no vomiting/no fever/no nausea/no shortness of breath/no chest pain/no dizziness/no palpitations

## 2019-12-22 NOTE — ED STATDOCS - PATIENT PORTAL LINK FT
You can access the FollowMyHealth Patient Portal offered by [facility] by registering at the following website: [email or web address]. By joining [email or web address]’s FollowMyHealth portal, you will also be able to view your health information using other applications (apps) compatible with our system.

## 2019-12-22 NOTE — ED ADULT TRIAGE NOTE - CHIEF COMPLAINT QUOTE
c/o high blood pressure on home monitoring device approx systolic 180's. hx HTN, pt took all BP meds. denies any CP, HA, blurred vision, dizzness, or weakness. pt has no complaints,

## 2020-01-01 ENCOUNTER — RESULT REVIEW (OUTPATIENT)
Age: 85
End: 2020-01-01

## 2020-01-01 ENCOUNTER — TRANSCRIPTION ENCOUNTER (OUTPATIENT)
Age: 85
End: 2020-01-01

## 2020-01-01 ENCOUNTER — APPOINTMENT (OUTPATIENT)
Dept: THORACIC SURGERY | Facility: HOSPITAL | Age: 85
End: 2020-01-01
Payer: COMMERCIAL

## 2020-01-01 ENCOUNTER — INPATIENT (INPATIENT)
Facility: HOSPITAL | Age: 85
LOS: 0 days | DRG: 180 | End: 2020-04-24
Attending: INTERNAL MEDICINE | Admitting: INTERNAL MEDICINE
Payer: COMMERCIAL

## 2020-01-01 ENCOUNTER — INPATIENT (INPATIENT)
Facility: HOSPITAL | Age: 85
LOS: 10 days | Discharge: HOME | DRG: 166 | End: 2020-03-22
Attending: FAMILY MEDICINE | Admitting: FAMILY MEDICINE
Payer: COMMERCIAL

## 2020-01-01 VITALS
SYSTOLIC BLOOD PRESSURE: 118 MMHG | HEART RATE: 86 BPM | RESPIRATION RATE: 18 BRPM | TEMPERATURE: 98 F | HEIGHT: 69 IN | WEIGHT: 149.91 LBS | DIASTOLIC BLOOD PRESSURE: 72 MMHG | OXYGEN SATURATION: 97 %

## 2020-01-01 VITALS
DIASTOLIC BLOOD PRESSURE: 49 MMHG | TEMPERATURE: 99 F | SYSTOLIC BLOOD PRESSURE: 144 MMHG | RESPIRATION RATE: 16 BRPM | HEART RATE: 60 BPM | OXYGEN SATURATION: 96 %

## 2020-01-01 VITALS — WEIGHT: 173.94 LBS | HEIGHT: 71 IN

## 2020-01-01 DIAGNOSIS — Z79.82 LONG TERM (CURRENT) USE OF ASPIRIN: ICD-10-CM

## 2020-01-01 DIAGNOSIS — N18.3 CHRONIC KIDNEY DISEASE, STAGE 3 (MODERATE): ICD-10-CM

## 2020-01-01 DIAGNOSIS — Z95.0 PRESENCE OF CARDIAC PACEMAKER: ICD-10-CM

## 2020-01-01 DIAGNOSIS — Z98.1 ARTHRODESIS STATUS: Chronic | ICD-10-CM

## 2020-01-01 DIAGNOSIS — I12.9 HYPERTENSIVE CHRONIC KIDNEY DISEASE WITH STAGE 1 THROUGH STAGE 4 CHRONIC KIDNEY DISEASE, OR UNSPECIFIED CHRONIC KIDNEY DISEASE: ICD-10-CM

## 2020-01-01 DIAGNOSIS — R50.9 FEVER, UNSPECIFIED: ICD-10-CM

## 2020-01-01 DIAGNOSIS — M48.00 SPINAL STENOSIS, SITE UNSPECIFIED: ICD-10-CM

## 2020-01-01 DIAGNOSIS — R06.03 ACUTE RESPIRATORY DISTRESS: ICD-10-CM

## 2020-01-01 DIAGNOSIS — Z88.8 ALLERGY STATUS TO OTHER DRUGS, MEDICAMENTS AND BIOLOGICAL SUBSTANCES: ICD-10-CM

## 2020-01-01 DIAGNOSIS — E78.5 HYPERLIPIDEMIA, UNSPECIFIED: ICD-10-CM

## 2020-01-01 DIAGNOSIS — Z51.5 ENCOUNTER FOR PALLIATIVE CARE: ICD-10-CM

## 2020-01-01 DIAGNOSIS — J90 PLEURAL EFFUSION, NOT ELSEWHERE CLASSIFIED: ICD-10-CM

## 2020-01-01 DIAGNOSIS — N40.0 BENIGN PROSTATIC HYPERPLASIA WITHOUT LOWER URINARY TRACT SYMPTOMS: ICD-10-CM

## 2020-01-01 DIAGNOSIS — Z98.1 ARTHRODESIS STATUS: ICD-10-CM

## 2020-01-01 DIAGNOSIS — Z96.89 PRESENCE OF OTHER SPECIFIED FUNCTIONAL IMPLANTS: Chronic | ICD-10-CM

## 2020-01-01 DIAGNOSIS — I10 ESSENTIAL (PRIMARY) HYPERTENSION: ICD-10-CM

## 2020-01-01 DIAGNOSIS — J95.812 POSTPROCEDURAL AIR LEAK: ICD-10-CM
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
## 2020-01-01 DIAGNOSIS — Z29.9 ENCOUNTER FOR PROPHYLACTIC MEASURES, UNSPECIFIED: ICD-10-CM

## 2020-01-01 DIAGNOSIS — R91.1 SOLITARY PULMONARY NODULE: ICD-10-CM

## 2020-01-01 DIAGNOSIS — I49.5 SICK SINUS SYNDROME: ICD-10-CM

## 2020-01-01 DIAGNOSIS — I25.10 ATHEROSCLEROTIC HEART DISEASE OF NATIVE CORONARY ARTERY WITHOUT ANGINA PECTORIS: ICD-10-CM

## 2020-01-01 DIAGNOSIS — J98.11 ATELECTASIS: ICD-10-CM

## 2020-01-01 DIAGNOSIS — C34.90 MALIGNANT NEOPLASM OF UNSPECIFIED PART OF UNSPECIFIED BRONCHUS OR LUNG: ICD-10-CM

## 2020-01-01 DIAGNOSIS — J91.0 MALIGNANT PLEURAL EFFUSION: ICD-10-CM

## 2020-01-01 DIAGNOSIS — J94.2 HEMOTHORAX: ICD-10-CM

## 2020-01-01 DIAGNOSIS — J84.10 PULMONARY FIBROSIS, UNSPECIFIED: ICD-10-CM

## 2020-01-01 DIAGNOSIS — J18.9 PNEUMONIA, UNSPECIFIED ORGANISM: ICD-10-CM

## 2020-01-01 DIAGNOSIS — R06.09 OTHER FORMS OF DYSPNEA: ICD-10-CM

## 2020-01-01 DIAGNOSIS — I34.0 NONRHEUMATIC MITRAL (VALVE) INSUFFICIENCY: ICD-10-CM

## 2020-01-01 DIAGNOSIS — C34.80 MALIGNANT NEOPLASM OF OVERLAPPING SITES OF UNSPECIFIED BRONCHUS AND LUNG: ICD-10-CM

## 2020-01-01 DIAGNOSIS — J86.9 PYOTHORAX WITHOUT FISTULA: ICD-10-CM

## 2020-01-01 LAB
ALBUMIN FLD-MCNC: 2.6 G/DL — SIGNIFICANT CHANGE UP
ALBUMIN SERPL ELPH-MCNC: 2.3 G/DL — LOW (ref 3.3–5)
ALBUMIN SERPL ELPH-MCNC: 3.1 G/DL — LOW (ref 3.3–5)
ALP SERPL-CCNC: 69 U/L — SIGNIFICANT CHANGE UP (ref 40–120)
ALP SERPL-CCNC: 88 U/L — SIGNIFICANT CHANGE UP (ref 40–120)
ALT FLD-CCNC: 10 U/L — LOW (ref 12–78)
ALT FLD-CCNC: 12 U/L — SIGNIFICANT CHANGE UP (ref 12–78)
ANION GAP SERPL CALC-SCNC: 17 MMOL/L — SIGNIFICANT CHANGE UP (ref 5–17)
ANION GAP SERPL CALC-SCNC: 4 MMOL/L — LOW (ref 5–17)
ANION GAP SERPL CALC-SCNC: 5 MMOL/L — SIGNIFICANT CHANGE UP (ref 5–17)
ANION GAP SERPL CALC-SCNC: 6 MMOL/L — SIGNIFICANT CHANGE UP (ref 5–17)
ANION GAP SERPL CALC-SCNC: 7 MMOL/L — SIGNIFICANT CHANGE UP (ref 5–17)
ANION GAP SERPL CALC-SCNC: 8 MMOL/L — SIGNIFICANT CHANGE UP (ref 5–17)
ANION GAP SERPL CALC-SCNC: 9 MMOL/L — SIGNIFICANT CHANGE UP (ref 5–17)
APPEARANCE UR: CLEAR — SIGNIFICANT CHANGE UP
APTT BLD: 28.4 SEC — LOW (ref 28.5–37)
APTT BLD: 30.1 SEC — SIGNIFICANT CHANGE UP (ref 27.5–36.3)
APTT BLD: 32.6 SEC — SIGNIFICANT CHANGE UP (ref 27.5–36.3)
AST SERPL-CCNC: 13 U/L — LOW (ref 15–37)
AST SERPL-CCNC: 17 U/L — SIGNIFICANT CHANGE UP (ref 15–37)
B PERT IGG+IGM PNL SER: ABNORMAL
BASOPHILS # BLD AUTO: 0.03 K/UL — SIGNIFICANT CHANGE UP (ref 0–0.2)
BASOPHILS # BLD AUTO: 0.04 K/UL — SIGNIFICANT CHANGE UP (ref 0–0.2)
BASOPHILS # BLD AUTO: 0.07 K/UL — SIGNIFICANT CHANGE UP (ref 0–0.2)
BASOPHILS NFR BLD AUTO: 0.4 % — SIGNIFICANT CHANGE UP (ref 0–2)
BILIRUB SERPL-MCNC: 0.5 MG/DL — SIGNIFICANT CHANGE UP (ref 0.2–1.2)
BILIRUB SERPL-MCNC: 0.7 MG/DL — SIGNIFICANT CHANGE UP (ref 0.2–1.2)
BILIRUB UR-MCNC: NEGATIVE — SIGNIFICANT CHANGE UP
BUN SERPL-MCNC: 18 MG/DL — SIGNIFICANT CHANGE UP (ref 7–23)
BUN SERPL-MCNC: 19 MG/DL — SIGNIFICANT CHANGE UP (ref 7–23)
BUN SERPL-MCNC: 19 MG/DL — SIGNIFICANT CHANGE UP (ref 7–23)
BUN SERPL-MCNC: 20 MG/DL — SIGNIFICANT CHANGE UP (ref 7–23)
BUN SERPL-MCNC: 21 MG/DL — SIGNIFICANT CHANGE UP (ref 7–23)
BUN SERPL-MCNC: 25 MG/DL — HIGH (ref 7–23)
BUN SERPL-MCNC: 25 MG/DL — HIGH (ref 7–23)
BUN SERPL-MCNC: 26 MG/DL — HIGH (ref 7–23)
BUN SERPL-MCNC: 28 MG/DL — HIGH (ref 7–23)
BUN SERPL-MCNC: 39 MG/DL — HIGH (ref 7–23)
CALCIUM SERPL-MCNC: 8.2 MG/DL — LOW (ref 8.5–10.1)
CALCIUM SERPL-MCNC: 8.3 MG/DL — LOW (ref 8.5–10.1)
CALCIUM SERPL-MCNC: 8.5 MG/DL — SIGNIFICANT CHANGE UP (ref 8.5–10.1)
CALCIUM SERPL-MCNC: 8.5 MG/DL — SIGNIFICANT CHANGE UP (ref 8.5–10.1)
CALCIUM SERPL-MCNC: 8.7 MG/DL — SIGNIFICANT CHANGE UP (ref 8.5–10.1)
CALCIUM SERPL-MCNC: 8.8 MG/DL — SIGNIFICANT CHANGE UP (ref 8.5–10.1)
CALCIUM SERPL-MCNC: 8.9 MG/DL — SIGNIFICANT CHANGE UP (ref 8.5–10.1)
CALCIUM SERPL-MCNC: 9 MG/DL — SIGNIFICANT CHANGE UP (ref 8.5–10.1)
CHLORIDE SERPL-SCNC: 110 MMOL/L — HIGH (ref 96–108)
CHLORIDE SERPL-SCNC: 111 MMOL/L — HIGH (ref 96–108)
CHLORIDE SERPL-SCNC: 112 MMOL/L — HIGH (ref 96–108)
CHLORIDE SERPL-SCNC: 112 MMOL/L — HIGH (ref 96–108)
CHLORIDE SERPL-SCNC: 113 MMOL/L — HIGH (ref 96–108)
CO2 SERPL-SCNC: 15 MMOL/L — LOW (ref 22–31)
CO2 SERPL-SCNC: 22 MMOL/L — SIGNIFICANT CHANGE UP (ref 22–31)
CO2 SERPL-SCNC: 22 MMOL/L — SIGNIFICANT CHANGE UP (ref 22–31)
CO2 SERPL-SCNC: 23 MMOL/L — SIGNIFICANT CHANGE UP (ref 22–31)
CO2 SERPL-SCNC: 23 MMOL/L — SIGNIFICANT CHANGE UP (ref 22–31)
CO2 SERPL-SCNC: 24 MMOL/L — SIGNIFICANT CHANGE UP (ref 22–31)
CO2 SERPL-SCNC: 25 MMOL/L — SIGNIFICANT CHANGE UP (ref 22–31)
CO2 SERPL-SCNC: 25 MMOL/L — SIGNIFICANT CHANGE UP (ref 22–31)
CO2 SERPL-SCNC: 26 MMOL/L — SIGNIFICANT CHANGE UP (ref 22–31)
CO2 SERPL-SCNC: 27 MMOL/L — SIGNIFICANT CHANGE UP (ref 22–31)
COLOR FLD: SIGNIFICANT CHANGE UP
COLOR SPEC: YELLOW — SIGNIFICANT CHANGE UP
CREAT SERPL-MCNC: 1.53 MG/DL — HIGH (ref 0.5–1.3)
CREAT SERPL-MCNC: 1.61 MG/DL — HIGH (ref 0.5–1.3)
CREAT SERPL-MCNC: 1.62 MG/DL — HIGH (ref 0.5–1.3)
CREAT SERPL-MCNC: 1.62 MG/DL — HIGH (ref 0.5–1.3)
CREAT SERPL-MCNC: 1.63 MG/DL — HIGH (ref 0.5–1.3)
CREAT SERPL-MCNC: 1.67 MG/DL — HIGH (ref 0.5–1.3)
CREAT SERPL-MCNC: 1.78 MG/DL — HIGH (ref 0.5–1.3)
CREAT SERPL-MCNC: 1.83 MG/DL — HIGH (ref 0.5–1.3)
CREAT SERPL-MCNC: 2 MG/DL — HIGH (ref 0.5–1.3)
CREAT SERPL-MCNC: 2.2 MG/DL — HIGH (ref 0.5–1.3)
CULTURE RESULTS: NO GROWTH — SIGNIFICANT CHANGE UP
CULTURE RESULTS: SIGNIFICANT CHANGE UP
DIFF PNL FLD: NEGATIVE — SIGNIFICANT CHANGE UP
EOSINOPHIL # BLD AUTO: 0.07 K/UL — SIGNIFICANT CHANGE UP (ref 0–0.5)
EOSINOPHIL # BLD AUTO: 0.09 K/UL — SIGNIFICANT CHANGE UP (ref 0–0.5)
EOSINOPHIL # BLD AUTO: 0.14 K/UL — SIGNIFICANT CHANGE UP (ref 0–0.5)
EOSINOPHIL NFR BLD AUTO: 0.4 % — SIGNIFICANT CHANGE UP (ref 0–6)
EOSINOPHIL NFR BLD AUTO: 1.2 % — SIGNIFICANT CHANGE UP (ref 0–6)
EOSINOPHIL NFR BLD AUTO: 1.5 % — SIGNIFICANT CHANGE UP (ref 0–6)
FLUID INTAKE SUBSTANCE CLASS: SIGNIFICANT CHANGE UP
FLUID SEGMENTED GRANULOCYTES: 74 % — SIGNIFICANT CHANGE UP
GLUCOSE FLD-MCNC: 2 MG/DL — SIGNIFICANT CHANGE UP
GLUCOSE SERPL-MCNC: 103 MG/DL — HIGH (ref 70–99)
GLUCOSE SERPL-MCNC: 105 MG/DL — HIGH (ref 70–99)
GLUCOSE SERPL-MCNC: 112 MG/DL — HIGH (ref 70–99)
GLUCOSE SERPL-MCNC: 118 MG/DL — HIGH (ref 70–99)
GLUCOSE SERPL-MCNC: 135 MG/DL — HIGH (ref 70–99)
GLUCOSE SERPL-MCNC: 138 MG/DL — HIGH (ref 70–99)
GLUCOSE SERPL-MCNC: 178 MG/DL — HIGH (ref 70–99)
GLUCOSE SERPL-MCNC: 94 MG/DL — SIGNIFICANT CHANGE UP (ref 70–99)
GLUCOSE SERPL-MCNC: 97 MG/DL — SIGNIFICANT CHANGE UP (ref 70–99)
GLUCOSE SERPL-MCNC: 99 MG/DL — SIGNIFICANT CHANGE UP (ref 70–99)
GLUCOSE UR QL: NEGATIVE MG/DL — SIGNIFICANT CHANGE UP
GRAM STN FLD: SIGNIFICANT CHANGE UP
HCT VFR BLD CALC: 29.1 % — LOW (ref 39–50)
HCT VFR BLD CALC: 30 % — LOW (ref 39–50)
HCT VFR BLD CALC: 30.7 % — LOW (ref 39–50)
HCT VFR BLD CALC: 31.9 % — LOW (ref 39–50)
HCT VFR BLD CALC: 32.8 % — LOW (ref 39–50)
HCT VFR BLD CALC: 32.9 % — LOW (ref 39–50)
HCT VFR BLD CALC: 36.2 % — LOW (ref 39–50)
HCT VFR BLD CALC: 44.4 % — SIGNIFICANT CHANGE UP (ref 39–50)
HGB BLD-MCNC: 10.4 G/DL — LOW (ref 13–17)
HGB BLD-MCNC: 10.6 G/DL — LOW (ref 13–17)
HGB BLD-MCNC: 10.6 G/DL — LOW (ref 13–17)
HGB BLD-MCNC: 12 G/DL — LOW (ref 13–17)
HGB BLD-MCNC: 13.8 G/DL — SIGNIFICANT CHANGE UP (ref 13–17)
HGB BLD-MCNC: 9.5 G/DL — LOW (ref 13–17)
HGB BLD-MCNC: 9.5 G/DL — LOW (ref 13–17)
HGB BLD-MCNC: 9.6 G/DL — LOW (ref 13–17)
IMM GRANULOCYTES NFR BLD AUTO: 0.5 % — SIGNIFICANT CHANGE UP (ref 0–1.5)
IMM GRANULOCYTES NFR BLD AUTO: 0.6 % — SIGNIFICANT CHANGE UP (ref 0–1.5)
IMM GRANULOCYTES NFR BLD AUTO: 1.4 % — SIGNIFICANT CHANGE UP (ref 0–1.5)
INR BLD: 1.04 RATIO — SIGNIFICANT CHANGE UP (ref 0.88–1.16)
INR BLD: 1.1 RATIO — SIGNIFICANT CHANGE UP (ref 0.88–1.16)
INR BLD: 1.14 RATIO — SIGNIFICANT CHANGE UP (ref 0.88–1.16)
KETONES UR-MCNC: ABNORMAL
LACTATE SERPL-SCNC: 1.9 MMOL/L — SIGNIFICANT CHANGE UP (ref 0.7–2)
LACTATE SERPL-SCNC: 2.9 MMOL/L — HIGH (ref 0.7–2)
LACTATE SERPL-SCNC: 5.8 MMOL/L — CRITICAL HIGH (ref 0.7–2)
LDH SERPL L TO P-CCNC: 151 U/L — SIGNIFICANT CHANGE UP (ref 84–241)
LDH SERPL L TO P-CCNC: 1825 U/L — SIGNIFICANT CHANGE UP
LEUKOCYTE ESTERASE UR-ACNC: NEGATIVE — SIGNIFICANT CHANGE UP
LIDOCAIN IGE QN: 77 U/L — SIGNIFICANT CHANGE UP (ref 73–393)
LYMPHOCYTES # BLD AUTO: 0.57 K/UL — LOW (ref 1–3.3)
LYMPHOCYTES # BLD AUTO: 0.77 K/UL — LOW (ref 1–3.3)
LYMPHOCYTES # BLD AUTO: 1.12 K/UL — SIGNIFICANT CHANGE UP (ref 1–3.3)
LYMPHOCYTES # BLD AUTO: 10.2 % — LOW (ref 13–44)
LYMPHOCYTES # BLD AUTO: 6.3 % — LOW (ref 13–44)
LYMPHOCYTES # BLD AUTO: 6.3 % — LOW (ref 13–44)
LYMPHOCYTES # FLD: 4 % — SIGNIFICANT CHANGE UP
MAGNESIUM SERPL-MCNC: 2.4 MG/DL — SIGNIFICANT CHANGE UP (ref 1.6–2.6)
MCHC RBC-ENTMCNC: 29.3 PG — SIGNIFICANT CHANGE UP (ref 27–34)
MCHC RBC-ENTMCNC: 29.4 PG — SIGNIFICANT CHANGE UP (ref 27–34)
MCHC RBC-ENTMCNC: 29.9 PG — SIGNIFICANT CHANGE UP (ref 27–34)
MCHC RBC-ENTMCNC: 30 PG — SIGNIFICANT CHANGE UP (ref 27–34)
MCHC RBC-ENTMCNC: 30.4 PG — SIGNIFICANT CHANGE UP (ref 27–34)
MCHC RBC-ENTMCNC: 30.5 PG — SIGNIFICANT CHANGE UP (ref 27–34)
MCHC RBC-ENTMCNC: 30.5 PG — SIGNIFICANT CHANGE UP (ref 27–34)
MCHC RBC-ENTMCNC: 30.9 GM/DL — LOW (ref 32–36)
MCHC RBC-ENTMCNC: 30.9 PG — SIGNIFICANT CHANGE UP (ref 27–34)
MCHC RBC-ENTMCNC: 31.1 GM/DL — LOW (ref 32–36)
MCHC RBC-ENTMCNC: 32 GM/DL — SIGNIFICANT CHANGE UP (ref 32–36)
MCHC RBC-ENTMCNC: 32.2 GM/DL — SIGNIFICANT CHANGE UP (ref 32–36)
MCHC RBC-ENTMCNC: 32.3 GM/DL — SIGNIFICANT CHANGE UP (ref 32–36)
MCHC RBC-ENTMCNC: 32.6 GM/DL — SIGNIFICANT CHANGE UP (ref 32–36)
MCHC RBC-ENTMCNC: 32.6 GM/DL — SIGNIFICANT CHANGE UP (ref 32–36)
MCHC RBC-ENTMCNC: 33.1 GM/DL — SIGNIFICANT CHANGE UP (ref 32–36)
MCV RBC AUTO: 92.1 FL — SIGNIFICANT CHANGE UP (ref 80–100)
MCV RBC AUTO: 93.2 FL — SIGNIFICANT CHANGE UP (ref 80–100)
MCV RBC AUTO: 93.3 FL — SIGNIFICANT CHANGE UP (ref 80–100)
MCV RBC AUTO: 93.5 FL — SIGNIFICANT CHANGE UP (ref 80–100)
MCV RBC AUTO: 94.3 FL — SIGNIFICANT CHANGE UP (ref 80–100)
MCV RBC AUTO: 94.7 FL — SIGNIFICANT CHANGE UP (ref 80–100)
MCV RBC AUTO: 94.8 FL — SIGNIFICANT CHANGE UP (ref 80–100)
MCV RBC AUTO: 94.8 FL — SIGNIFICANT CHANGE UP (ref 80–100)
MONOCYTES # BLD AUTO: 0.59 K/UL — SIGNIFICANT CHANGE UP (ref 0–0.9)
MONOCYTES # BLD AUTO: 0.75 K/UL — SIGNIFICANT CHANGE UP (ref 0–0.9)
MONOCYTES # BLD AUTO: 1.43 K/UL — HIGH (ref 0–0.9)
MONOCYTES NFR BLD AUTO: 6.5 % — SIGNIFICANT CHANGE UP (ref 2–14)
MONOCYTES NFR BLD AUTO: 8 % — SIGNIFICANT CHANGE UP (ref 2–14)
MONOCYTES NFR BLD AUTO: 9.9 % — SIGNIFICANT CHANGE UP (ref 2–14)
MONOS+MACROS # FLD: 22 % — SIGNIFICANT CHANGE UP
NEUTROPHILS # BLD AUTO: 14.85 K/UL — HIGH (ref 1.8–7.4)
NEUTROPHILS # BLD AUTO: 5.9 K/UL — SIGNIFICANT CHANGE UP (ref 1.8–7.4)
NEUTROPHILS # BLD AUTO: 7.65 K/UL — HIGH (ref 1.8–7.4)
NEUTROPHILS NFR BLD AUTO: 77.8 % — HIGH (ref 43–77)
NEUTROPHILS NFR BLD AUTO: 83.5 % — HIGH (ref 43–77)
NEUTROPHILS NFR BLD AUTO: 84.7 % — HIGH (ref 43–77)
NITRITE UR-MCNC: NEGATIVE — SIGNIFICANT CHANGE UP
NRBC # BLD: 0 /100 WBCS — SIGNIFICANT CHANGE UP (ref 0–0)
NT-PROBNP SERPL-SCNC: 799 PG/ML — HIGH (ref 0–450)
PH FLD: 7.06 — SIGNIFICANT CHANGE UP
PH UR: 5 — SIGNIFICANT CHANGE UP (ref 5–8)
PHOSPHATE SERPL-MCNC: 3.6 MG/DL — SIGNIFICANT CHANGE UP (ref 2.5–4.5)
PHOSPHATE SERPL-MCNC: 3.7 MG/DL — SIGNIFICANT CHANGE UP (ref 2.5–4.5)
PHOSPHATE SERPL-MCNC: 3.7 MG/DL — SIGNIFICANT CHANGE UP (ref 2.5–4.5)
PHOSPHATE SERPL-MCNC: 3.8 MG/DL — SIGNIFICANT CHANGE UP (ref 2.5–4.5)
PLATELET # BLD AUTO: 245 K/UL — SIGNIFICANT CHANGE UP (ref 150–400)
PLATELET # BLD AUTO: 250 K/UL — SIGNIFICANT CHANGE UP (ref 150–400)
PLATELET # BLD AUTO: 266 K/UL — SIGNIFICANT CHANGE UP (ref 150–400)
PLATELET # BLD AUTO: 280 K/UL — SIGNIFICANT CHANGE UP (ref 150–400)
PLATELET # BLD AUTO: 285 K/UL — SIGNIFICANT CHANGE UP (ref 150–400)
PLATELET # BLD AUTO: 288 K/UL — SIGNIFICANT CHANGE UP (ref 150–400)
PLATELET # BLD AUTO: 294 K/UL — SIGNIFICANT CHANGE UP (ref 150–400)
PLATELET # BLD AUTO: 473 K/UL — HIGH (ref 150–400)
POTASSIUM SERPL-MCNC: 3.6 MMOL/L — SIGNIFICANT CHANGE UP (ref 3.5–5.3)
POTASSIUM SERPL-MCNC: 3.7 MMOL/L — SIGNIFICANT CHANGE UP (ref 3.5–5.3)
POTASSIUM SERPL-MCNC: 3.8 MMOL/L — SIGNIFICANT CHANGE UP (ref 3.5–5.3)
POTASSIUM SERPL-MCNC: 3.9 MMOL/L — SIGNIFICANT CHANGE UP (ref 3.5–5.3)
POTASSIUM SERPL-MCNC: 4.2 MMOL/L — SIGNIFICANT CHANGE UP (ref 3.5–5.3)
POTASSIUM SERPL-MCNC: 4.4 MMOL/L — SIGNIFICANT CHANGE UP (ref 3.5–5.3)
POTASSIUM SERPL-MCNC: 4.4 MMOL/L — SIGNIFICANT CHANGE UP (ref 3.5–5.3)
POTASSIUM SERPL-MCNC: 4.7 MMOL/L — SIGNIFICANT CHANGE UP (ref 3.5–5.3)
POTASSIUM SERPL-MCNC: 4.8 MMOL/L — SIGNIFICANT CHANGE UP (ref 3.5–5.3)
POTASSIUM SERPL-MCNC: 5.1 MMOL/L — SIGNIFICANT CHANGE UP (ref 3.5–5.3)
POTASSIUM SERPL-SCNC: 3.6 MMOL/L — SIGNIFICANT CHANGE UP (ref 3.5–5.3)
POTASSIUM SERPL-SCNC: 3.7 MMOL/L — SIGNIFICANT CHANGE UP (ref 3.5–5.3)
POTASSIUM SERPL-SCNC: 3.8 MMOL/L — SIGNIFICANT CHANGE UP (ref 3.5–5.3)
POTASSIUM SERPL-SCNC: 3.9 MMOL/L — SIGNIFICANT CHANGE UP (ref 3.5–5.3)
POTASSIUM SERPL-SCNC: 4.2 MMOL/L — SIGNIFICANT CHANGE UP (ref 3.5–5.3)
POTASSIUM SERPL-SCNC: 4.4 MMOL/L — SIGNIFICANT CHANGE UP (ref 3.5–5.3)
POTASSIUM SERPL-SCNC: 4.4 MMOL/L — SIGNIFICANT CHANGE UP (ref 3.5–5.3)
POTASSIUM SERPL-SCNC: 4.7 MMOL/L — SIGNIFICANT CHANGE UP (ref 3.5–5.3)
POTASSIUM SERPL-SCNC: 4.8 MMOL/L — SIGNIFICANT CHANGE UP (ref 3.5–5.3)
POTASSIUM SERPL-SCNC: 5.1 MMOL/L — SIGNIFICANT CHANGE UP (ref 3.5–5.3)
PROT FLD-MCNC: 4.2 G/DL — SIGNIFICANT CHANGE UP
PROT SERPL-MCNC: 5.5 GM/DL — LOW (ref 6–8.3)
PROT SERPL-MCNC: 6.1 G/DL — SIGNIFICANT CHANGE UP (ref 6–8.3)
PROT SERPL-MCNC: 6.7 GM/DL — SIGNIFICANT CHANGE UP (ref 6–8.3)
PROT UR-MCNC: 15 MG/DL
PROTHROM AB SERPL-ACNC: 11.6 SEC — SIGNIFICANT CHANGE UP (ref 10–12.9)
PROTHROM AB SERPL-ACNC: 12.2 SEC — SIGNIFICANT CHANGE UP (ref 10–12.9)
PROTHROM AB SERPL-ACNC: 12.8 SEC — SIGNIFICANT CHANGE UP (ref 10–12.9)
RAPID RVP RESULT: SIGNIFICANT CHANGE UP
RBC # BLD: 3.07 M/UL — LOW (ref 4.2–5.8)
RBC # BLD: 3.21 M/UL — LOW (ref 4.2–5.8)
RBC # BLD: 3.24 M/UL — LOW (ref 4.2–5.8)
RBC # BLD: 3.42 M/UL — LOW (ref 4.2–5.8)
RBC # BLD: 3.48 M/UL — LOW (ref 4.2–5.8)
RBC # BLD: 3.53 M/UL — LOW (ref 4.2–5.8)
RBC # BLD: 3.93 M/UL — LOW (ref 4.2–5.8)
RBC # BLD: 4.69 M/UL — SIGNIFICANT CHANGE UP (ref 4.2–5.8)
RBC # FLD: 14.3 % — SIGNIFICANT CHANGE UP (ref 10.3–14.5)
RBC # FLD: 14.4 % — SIGNIFICANT CHANGE UP (ref 10.3–14.5)
RBC # FLD: 14.7 % — HIGH (ref 10.3–14.5)
RBC # FLD: 14.8 % — HIGH (ref 10.3–14.5)
RBC # FLD: 14.9 % — HIGH (ref 10.3–14.5)
RBC # FLD: 15 % — HIGH (ref 10.3–14.5)
RBC # FLD: 15.1 % — HIGH (ref 10.3–14.5)
RBC # FLD: 15.9 % — HIGH (ref 10.3–14.5)
RCV VOL RI: HIGH /UL (ref 0–0)
SARS-COV-2 RNA SPEC QL NAA+PROBE: SIGNIFICANT CHANGE UP
SODIUM SERPL-SCNC: 140 MMOL/L — SIGNIFICANT CHANGE UP (ref 135–145)
SODIUM SERPL-SCNC: 141 MMOL/L — SIGNIFICANT CHANGE UP (ref 135–145)
SODIUM SERPL-SCNC: 141 MMOL/L — SIGNIFICANT CHANGE UP (ref 135–145)
SODIUM SERPL-SCNC: 142 MMOL/L — SIGNIFICANT CHANGE UP (ref 135–145)
SODIUM SERPL-SCNC: 143 MMOL/L — SIGNIFICANT CHANGE UP (ref 135–145)
SP GR SPEC: 1.01 — SIGNIFICANT CHANGE UP (ref 1.01–1.02)
SPECIMEN SOURCE FLD: SIGNIFICANT CHANGE UP
SPECIMEN SOURCE: SIGNIFICANT CHANGE UP
TOTAL NUCLEATED CELL COUNT, BODY FLUID: 295 /UL — SIGNIFICANT CHANGE UP
TROPONIN I SERPL-MCNC: <0.015 NG/ML — SIGNIFICANT CHANGE UP (ref 0.01–0.04)
TUBE TYPE: SIGNIFICANT CHANGE UP
UROBILINOGEN FLD QL: NEGATIVE MG/DL — SIGNIFICANT CHANGE UP
WBC # BLD: 17.79 K/UL — HIGH (ref 3.8–10.5)
WBC # BLD: 7.44 K/UL — SIGNIFICANT CHANGE UP (ref 3.8–10.5)
WBC # BLD: 7.48 K/UL — SIGNIFICANT CHANGE UP (ref 3.8–10.5)
WBC # BLD: 7.58 K/UL — SIGNIFICANT CHANGE UP (ref 3.8–10.5)
WBC # BLD: 8.01 K/UL — SIGNIFICANT CHANGE UP (ref 3.8–10.5)
WBC # BLD: 8.15 K/UL — SIGNIFICANT CHANGE UP (ref 3.8–10.5)
WBC # BLD: 9.04 K/UL — SIGNIFICANT CHANGE UP (ref 3.8–10.5)
WBC # BLD: 9.4 K/UL — SIGNIFICANT CHANGE UP (ref 3.8–10.5)
WBC # FLD AUTO: 17.79 K/UL — HIGH (ref 3.8–10.5)
WBC # FLD AUTO: 7.44 K/UL — SIGNIFICANT CHANGE UP (ref 3.8–10.5)
WBC # FLD AUTO: 7.48 K/UL — SIGNIFICANT CHANGE UP (ref 3.8–10.5)
WBC # FLD AUTO: 7.58 K/UL — SIGNIFICANT CHANGE UP (ref 3.8–10.5)
WBC # FLD AUTO: 8.01 K/UL — SIGNIFICANT CHANGE UP (ref 3.8–10.5)
WBC # FLD AUTO: 8.15 K/UL — SIGNIFICANT CHANGE UP (ref 3.8–10.5)
WBC # FLD AUTO: 9.04 K/UL — SIGNIFICANT CHANGE UP (ref 3.8–10.5)
WBC # FLD AUTO: 9.4 K/UL — SIGNIFICANT CHANGE UP (ref 3.8–10.5)

## 2020-01-01 PROCEDURE — 99232 SBSQ HOSP IP/OBS MODERATE 35: CPT

## 2020-01-01 PROCEDURE — 86900 BLOOD TYPING SEROLOGIC ABO: CPT

## 2020-01-01 PROCEDURE — 71045 X-RAY EXAM CHEST 1 VIEW: CPT | Mod: 26

## 2020-01-01 PROCEDURE — 97116 GAIT TRAINING THERAPY: CPT | Mod: GP

## 2020-01-01 PROCEDURE — 84155 ASSAY OF PROTEIN SERUM: CPT

## 2020-01-01 PROCEDURE — C1729: CPT

## 2020-01-01 PROCEDURE — 93306 TTE W/DOPPLER COMPLETE: CPT

## 2020-01-01 PROCEDURE — 83880 ASSAY OF NATRIURETIC PEPTIDE: CPT

## 2020-01-01 PROCEDURE — 87040 BLOOD CULTURE FOR BACTERIA: CPT

## 2020-01-01 PROCEDURE — 99291 CRITICAL CARE FIRST HOUR: CPT

## 2020-01-01 PROCEDURE — 88305 TISSUE EXAM BY PATHOLOGIST: CPT | Mod: 26

## 2020-01-01 PROCEDURE — 71250 CT THORAX DX C-: CPT

## 2020-01-01 PROCEDURE — 71045 X-RAY EXAM CHEST 1 VIEW: CPT | Mod: 26,77

## 2020-01-01 PROCEDURE — 88108 CYTOPATH CONCENTRATE TECH: CPT

## 2020-01-01 PROCEDURE — 83690 ASSAY OF LIPASE: CPT

## 2020-01-01 PROCEDURE — 88360 TUMOR IMMUNOHISTOCHEM/MANUAL: CPT | Mod: 26,59

## 2020-01-01 PROCEDURE — 88331 PATH CONSLTJ SURG 1 BLK 1SPC: CPT

## 2020-01-01 PROCEDURE — 81001 URINALYSIS AUTO W/SCOPE: CPT

## 2020-01-01 PROCEDURE — 83615 LACTATE (LD) (LDH) ENZYME: CPT

## 2020-01-01 PROCEDURE — 85027 COMPLETE CBC AUTOMATED: CPT

## 2020-01-01 PROCEDURE — 93010 ELECTROCARDIOGRAM REPORT: CPT

## 2020-01-01 PROCEDURE — 32557 INSERT CATH PLEURA W/ IMAGE: CPT

## 2020-01-01 PROCEDURE — 86850 RBC ANTIBODY SCREEN: CPT

## 2020-01-01 PROCEDURE — 32556 INSERT CATH PLEURA W/O IMAGE: CPT | Mod: 59

## 2020-01-01 PROCEDURE — 83605 ASSAY OF LACTIC ACID: CPT

## 2020-01-01 PROCEDURE — 93005 ELECTROCARDIOGRAM TRACING: CPT

## 2020-01-01 PROCEDURE — 83735 ASSAY OF MAGNESIUM: CPT

## 2020-01-01 PROCEDURE — 84145 PROCALCITONIN (PCT): CPT

## 2020-01-01 PROCEDURE — 83986 ASSAY PH BODY FLUID NOS: CPT

## 2020-01-01 PROCEDURE — 84100 ASSAY OF PHOSPHORUS: CPT

## 2020-01-01 PROCEDURE — 97110 THERAPEUTIC EXERCISES: CPT | Mod: GP

## 2020-01-01 PROCEDURE — 88360 TUMOR IMMUNOHISTOCHEM/MANUAL: CPT

## 2020-01-01 PROCEDURE — 96361 HYDRATE IV INFUSION ADD-ON: CPT

## 2020-01-01 PROCEDURE — 71045 X-RAY EXAM CHEST 1 VIEW: CPT

## 2020-01-01 PROCEDURE — 71250 CT THORAX DX C-: CPT | Mod: 26

## 2020-01-01 PROCEDURE — ZZZZZ: CPT

## 2020-01-01 PROCEDURE — 88331 PATH CONSLTJ SURG 1 BLK 1SPC: CPT | Mod: 26

## 2020-01-01 PROCEDURE — 80053 COMPREHEN METABOLIC PANEL: CPT

## 2020-01-01 PROCEDURE — 88108 CYTOPATH CONCENTRATE TECH: CPT | Mod: 26

## 2020-01-01 PROCEDURE — 82945 GLUCOSE OTHER FLUID: CPT

## 2020-01-01 PROCEDURE — 97530 THERAPEUTIC ACTIVITIES: CPT | Mod: GP

## 2020-01-01 PROCEDURE — 82042 OTHER SOURCE ALBUMIN QUAN EA: CPT

## 2020-01-01 PROCEDURE — 99233 SBSQ HOSP IP/OBS HIGH 50: CPT

## 2020-01-01 PROCEDURE — 88342 IMHCHEM/IMCYTCHM 1ST ANTB: CPT | Mod: 26

## 2020-01-01 PROCEDURE — 96374 THER/PROPH/DIAG INJ IV PUSH: CPT

## 2020-01-01 PROCEDURE — 36415 COLL VENOUS BLD VENIPUNCTURE: CPT

## 2020-01-01 PROCEDURE — 85610 PROTHROMBIN TIME: CPT

## 2020-01-01 PROCEDURE — 87086 URINE CULTURE/COLONY COUNT: CPT

## 2020-01-01 PROCEDURE — 89051 BODY FLUID CELL COUNT: CPT

## 2020-01-01 PROCEDURE — 88342 IMHCHEM/IMCYTCHM 1ST ANTB: CPT

## 2020-01-01 PROCEDURE — 99239 HOSP IP/OBS DSCHRG MGMT >30: CPT

## 2020-01-01 PROCEDURE — 85025 COMPLETE CBC W/AUTO DIFF WBC: CPT

## 2020-01-01 PROCEDURE — 80048 BASIC METABOLIC PNL TOTAL CA: CPT

## 2020-01-01 PROCEDURE — 99497 ADVNCD CARE PLAN 30 MIN: CPT | Mod: NC

## 2020-01-01 PROCEDURE — 86901 BLOOD TYPING SEROLOGIC RH(D): CPT

## 2020-01-01 PROCEDURE — 87075 CULTR BACTERIA EXCEPT BLOOD: CPT

## 2020-01-01 PROCEDURE — 84484 ASSAY OF TROPONIN QUANT: CPT

## 2020-01-01 PROCEDURE — 87635 SARS-COV-2 COVID-19 AMP PRB: CPT

## 2020-01-01 PROCEDURE — 99223 1ST HOSP IP/OBS HIGH 75: CPT

## 2020-01-01 PROCEDURE — 93306 TTE W/DOPPLER COMPLETE: CPT | Mod: 26

## 2020-01-01 PROCEDURE — 85730 THROMBOPLASTIN TIME PARTIAL: CPT

## 2020-01-01 PROCEDURE — 84157 ASSAY OF PROTEIN OTHER: CPT

## 2020-01-01 PROCEDURE — 88305 TISSUE EXAM BY PATHOLOGIST: CPT

## 2020-01-01 PROCEDURE — 99233 SBSQ HOSP IP/OBS HIGH 50: CPT | Mod: 25

## 2020-01-01 PROCEDURE — 32609 THORACOSCOPY W/BX PLEURA: CPT

## 2020-01-01 PROCEDURE — 87070 CULTURE OTHR SPECIMN AEROBIC: CPT

## 2020-01-01 PROCEDURE — 87102 FUNGUS ISOLATION CULTURE: CPT

## 2020-01-01 PROCEDURE — 99223 1ST HOSP IP/OBS HIGH 75: CPT | Mod: GC,25

## 2020-01-01 PROCEDURE — 99291 CRITICAL CARE FIRST HOUR: CPT | Mod: 25

## 2020-01-01 RX ORDER — SODIUM CHLORIDE 9 MG/ML
1000 INJECTION, SOLUTION INTRAVENOUS
Refills: 0 | Status: DISCONTINUED | OUTPATIENT
Start: 2020-01-01 | End: 2020-01-01

## 2020-01-01 RX ORDER — ONDANSETRON 8 MG/1
4 TABLET, FILM COATED ORAL EVERY 6 HOURS
Refills: 0 | Status: DISCONTINUED | OUTPATIENT
Start: 2020-01-01 | End: 2020-01-01

## 2020-01-01 RX ORDER — LABETALOL HCL 100 MG
300 TABLET ORAL
Refills: 0 | Status: DISCONTINUED | OUTPATIENT
Start: 2020-01-01 | End: 2020-01-01

## 2020-01-01 RX ORDER — OXYCODONE HYDROCHLORIDE 5 MG/1
1 TABLET ORAL
Qty: 28 | Refills: 0
Start: 2020-01-01 | End: 2020-01-01

## 2020-01-01 RX ORDER — DILTIAZEM HCL 120 MG
180 CAPSULE, EXT RELEASE 24 HR ORAL DAILY
Refills: 0 | Status: DISCONTINUED | OUTPATIENT
Start: 2020-01-01 | End: 2020-01-01

## 2020-01-01 RX ORDER — CEFAZOLIN SODIUM 1 G
1000 VIAL (EA) INJECTION EVERY 8 HOURS
Refills: 0 | Status: COMPLETED | OUTPATIENT
Start: 2020-01-01 | End: 2020-01-01

## 2020-01-01 RX ORDER — FINASTERIDE 5 MG/1
1 TABLET, FILM COATED ORAL
Qty: 0 | Refills: 0 | DISCHARGE

## 2020-01-01 RX ORDER — LIDOCAINE 4 G/100G
1 CREAM TOPICAL ONCE
Refills: 0 | Status: DISCONTINUED | OUTPATIENT
Start: 2020-01-01 | End: 2020-01-01

## 2020-01-01 RX ORDER — IPRATROPIUM/ALBUTEROL SULFATE 18-103MCG
3 AEROSOL WITH ADAPTER (GRAM) INHALATION EVERY 6 HOURS
Refills: 0 | Status: DISCONTINUED | OUTPATIENT
Start: 2020-01-01 | End: 2020-01-01

## 2020-01-01 RX ORDER — ASPIRIN/CALCIUM CARB/MAGNESIUM 324 MG
81 TABLET ORAL DAILY
Refills: 0 | Status: DISCONTINUED | OUTPATIENT
Start: 2020-01-01 | End: 2020-01-01

## 2020-01-01 RX ORDER — HYDRALAZINE HCL 50 MG
10 TABLET ORAL EVERY 6 HOURS
Refills: 0 | Status: DISCONTINUED | OUTPATIENT
Start: 2020-01-01 | End: 2020-01-01

## 2020-01-01 RX ORDER — FUROSEMIDE 40 MG
1 TABLET ORAL
Qty: 0 | Refills: 0 | DISCHARGE

## 2020-01-01 RX ORDER — ACETAMINOPHEN 500 MG
975 TABLET ORAL EVERY 6 HOURS
Refills: 0 | Status: DISCONTINUED | OUTPATIENT
Start: 2020-01-01 | End: 2020-01-01

## 2020-01-01 RX ORDER — FUROSEMIDE 40 MG
20 TABLET ORAL DAILY
Refills: 0 | Status: DISCONTINUED | OUTPATIENT
Start: 2020-01-01 | End: 2020-01-01

## 2020-01-01 RX ORDER — SODIUM CHLORIDE 9 MG/ML
1000 INJECTION INTRAMUSCULAR; INTRAVENOUS; SUBCUTANEOUS ONCE
Refills: 0 | Status: COMPLETED | OUTPATIENT
Start: 2020-01-01 | End: 2020-01-01

## 2020-01-01 RX ORDER — ACETAMINOPHEN 500 MG
650 TABLET ORAL EVERY 4 HOURS
Refills: 0 | Status: DISCONTINUED | OUTPATIENT
Start: 2020-01-01 | End: 2020-01-01

## 2020-01-01 RX ORDER — HYDROMORPHONE HYDROCHLORIDE 2 MG/ML
1 INJECTION INTRAMUSCULAR; INTRAVENOUS; SUBCUTANEOUS
Refills: 0 | Status: DISCONTINUED | OUTPATIENT
Start: 2020-01-01 | End: 2020-01-01

## 2020-01-01 RX ORDER — VANCOMYCIN HCL 1 G
1000 VIAL (EA) INTRAVENOUS DAILY
Refills: 0 | Status: DISCONTINUED | OUTPATIENT
Start: 2020-01-01 | End: 2020-01-01

## 2020-01-01 RX ORDER — CEFTRIAXONE 500 MG/1
1000 INJECTION, POWDER, FOR SOLUTION INTRAMUSCULAR; INTRAVENOUS EVERY 24 HOURS
Refills: 0 | Status: DISCONTINUED | OUTPATIENT
Start: 2020-01-01 | End: 2020-01-01

## 2020-01-01 RX ORDER — CHOLECALCIFEROL (VITAMIN D3) 125 MCG
2000 CAPSULE ORAL DAILY
Refills: 0 | Status: DISCONTINUED | OUTPATIENT
Start: 2020-01-01 | End: 2020-01-01

## 2020-01-01 RX ORDER — LABETALOL HCL 100 MG
1 TABLET ORAL
Qty: 0 | Refills: 0 | DISCHARGE

## 2020-01-01 RX ORDER — ALBUTEROL 90 UG/1
2 AEROSOL, METERED ORAL EVERY 6 HOURS
Refills: 0 | Status: DISCONTINUED | OUTPATIENT
Start: 2020-01-01 | End: 2020-01-01

## 2020-01-01 RX ORDER — TAMSULOSIN HYDROCHLORIDE 0.4 MG/1
0.4 CAPSULE ORAL AT BEDTIME
Refills: 0 | Status: DISCONTINUED | OUTPATIENT
Start: 2020-01-01 | End: 2020-01-01

## 2020-01-01 RX ORDER — COLESEVELAM HYDROCHLORIDE 625 MG/1
1 TABLET, FILM COATED ORAL
Qty: 0 | Refills: 0 | DISCHARGE

## 2020-01-01 RX ORDER — UBIDECARENONE 100 MG
1 CAPSULE ORAL
Qty: 0 | Refills: 0 | DISCHARGE

## 2020-01-01 RX ORDER — PIPERACILLIN AND TAZOBACTAM 4; .5 G/20ML; G/20ML
3.38 INJECTION, POWDER, LYOPHILIZED, FOR SOLUTION INTRAVENOUS EVERY 8 HOURS
Refills: 0 | Status: DISCONTINUED | OUTPATIENT
Start: 2020-01-01 | End: 2020-01-01

## 2020-01-01 RX ORDER — HEPARIN SODIUM 5000 [USP'U]/ML
5000 INJECTION INTRAVENOUS; SUBCUTANEOUS THREE TIMES A DAY
Refills: 0 | Status: DISCONTINUED | OUTPATIENT
Start: 2020-01-01 | End: 2020-01-01

## 2020-01-01 RX ORDER — AZITHROMYCIN 500 MG/1
500 TABLET, FILM COATED ORAL EVERY 24 HOURS
Refills: 0 | Status: DISCONTINUED | OUTPATIENT
Start: 2020-01-01 | End: 2020-01-01

## 2020-01-01 RX ORDER — RANOLAZINE 500 MG/1
1 TABLET, FILM COATED, EXTENDED RELEASE ORAL
Qty: 0 | Refills: 0 | DISCHARGE

## 2020-01-01 RX ORDER — NOREPINEPHRINE BITARTRATE/D5W 8 MG/250ML
0.05 PLASTIC BAG, INJECTION (ML) INTRAVENOUS
Qty: 8 | Refills: 0 | Status: DISCONTINUED | OUTPATIENT
Start: 2020-01-01 | End: 2020-01-01

## 2020-01-01 RX ORDER — LIDOCAINE HCL 20 MG/ML
20 VIAL (ML) INJECTION ONCE
Refills: 0 | Status: DISCONTINUED | OUTPATIENT
Start: 2020-01-01 | End: 2020-01-01

## 2020-01-01 RX ORDER — RANOLAZINE 500 MG/1
500 TABLET, FILM COATED, EXTENDED RELEASE ORAL ONCE
Refills: 0 | Status: COMPLETED | OUTPATIENT
Start: 2020-01-01 | End: 2020-01-01

## 2020-01-01 RX ORDER — CEFTRIAXONE 500 MG/1
INJECTION, POWDER, FOR SOLUTION INTRAMUSCULAR; INTRAVENOUS
Refills: 0 | Status: DISCONTINUED | OUTPATIENT
Start: 2020-01-01 | End: 2020-01-01

## 2020-01-01 RX ORDER — PREGABALIN 225 MG/1
1 CAPSULE ORAL
Qty: 0 | Refills: 0 | DISCHARGE

## 2020-01-01 RX ORDER — FINASTERIDE 5 MG/1
5 TABLET, FILM COATED ORAL DAILY
Refills: 0 | Status: DISCONTINUED | OUTPATIENT
Start: 2020-01-01 | End: 2020-01-01

## 2020-01-01 RX ORDER — OXYCODONE HYDROCHLORIDE 5 MG/1
5 TABLET ORAL EVERY 4 HOURS
Refills: 0 | Status: DISCONTINUED | OUTPATIENT
Start: 2020-01-01 | End: 2020-01-01

## 2020-01-01 RX ORDER — DILTIAZEM HCL 120 MG
1 CAPSULE, EXT RELEASE 24 HR ORAL
Qty: 0 | Refills: 0 | DISCHARGE

## 2020-01-01 RX ORDER — SODIUM BICARBONATE 1 MEQ/ML
1 SYRINGE (ML) INTRAVENOUS
Qty: 0 | Refills: 0 | DISCHARGE

## 2020-01-01 RX ORDER — ATORVASTATIN CALCIUM 80 MG/1
20 TABLET, FILM COATED ORAL AT BEDTIME
Refills: 0 | Status: DISCONTINUED | OUTPATIENT
Start: 2020-01-01 | End: 2020-01-01

## 2020-01-01 RX ORDER — ASPIRIN/CALCIUM CARB/MAGNESIUM 324 MG
1 TABLET ORAL
Qty: 0 | Refills: 0 | DISCHARGE

## 2020-01-01 RX ORDER — TRAMADOL HYDROCHLORIDE 50 MG/1
50 TABLET ORAL EVERY 6 HOURS
Refills: 0 | Status: DISCONTINUED | OUTPATIENT
Start: 2020-01-01 | End: 2020-01-01

## 2020-01-01 RX ORDER — LABETALOL HCL 100 MG
200 TABLET ORAL THREE TIMES A DAY
Refills: 0 | Status: DISCONTINUED | OUTPATIENT
Start: 2020-01-01 | End: 2020-01-01

## 2020-01-01 RX ORDER — RANOLAZINE 500 MG/1
500 TABLET, FILM COATED, EXTENDED RELEASE ORAL DAILY
Refills: 0 | Status: DISCONTINUED | OUTPATIENT
Start: 2020-01-01 | End: 2020-01-01

## 2020-01-01 RX ORDER — CEFAZOLIN SODIUM 1 G
1000 VIAL (EA) INJECTION EVERY 8 HOURS
Refills: 0 | Status: DISCONTINUED | OUTPATIENT
Start: 2020-01-01 | End: 2020-01-01

## 2020-01-01 RX ORDER — OXYCODONE AND ACETAMINOPHEN 5; 325 MG/1; MG/1
1 TABLET ORAL EVERY 4 HOURS
Refills: 0 | Status: DISCONTINUED | OUTPATIENT
Start: 2020-01-01 | End: 2020-01-01

## 2020-01-01 RX ORDER — NOREPINEPHRINE BITARTRATE/D5W 8 MG/250ML
0.01 PLASTIC BAG, INJECTION (ML) INTRAVENOUS
Qty: 8 | Refills: 0 | Status: DISCONTINUED | OUTPATIENT
Start: 2020-01-01 | End: 2020-01-01

## 2020-01-01 RX ORDER — SODIUM BICARBONATE 1 MEQ/ML
325 SYRINGE (ML) INTRAVENOUS THREE TIMES A DAY
Refills: 0 | Status: DISCONTINUED | OUTPATIENT
Start: 2020-01-01 | End: 2020-01-01

## 2020-01-01 RX ORDER — MULTIVIT-MIN/FERROUS GLUCONATE 9 MG/15 ML
1 LIQUID (ML) ORAL
Qty: 0 | Refills: 0 | DISCHARGE

## 2020-01-01 RX ORDER — MORPHINE SULFATE 50 MG/1
2 CAPSULE, EXTENDED RELEASE ORAL ONCE
Refills: 0 | Status: DISCONTINUED | OUTPATIENT
Start: 2020-01-01 | End: 2020-01-01

## 2020-01-01 RX ORDER — CEFTRIAXONE 500 MG/1
1000 INJECTION, POWDER, FOR SOLUTION INTRAMUSCULAR; INTRAVENOUS ONCE
Refills: 0 | Status: COMPLETED | OUTPATIENT
Start: 2020-01-01 | End: 2020-01-01

## 2020-01-01 RX ORDER — FENTANYL CITRATE 50 UG/ML
50 INJECTION INTRAVENOUS
Refills: 0 | Status: DISCONTINUED | OUTPATIENT
Start: 2020-01-01 | End: 2020-01-01

## 2020-01-01 RX ORDER — PIPERACILLIN AND TAZOBACTAM 4; .5 G/20ML; G/20ML
3.38 INJECTION, POWDER, LYOPHILIZED, FOR SOLUTION INTRAVENOUS ONCE
Refills: 0 | Status: COMPLETED | OUTPATIENT
Start: 2020-01-01 | End: 2020-01-01

## 2020-01-01 RX ORDER — PREGABALIN 225 MG/1
1000 CAPSULE ORAL DAILY
Refills: 0 | Status: DISCONTINUED | OUTPATIENT
Start: 2020-01-01 | End: 2020-01-01

## 2020-01-01 RX ORDER — AZITHROMYCIN 500 MG/1
500 TABLET, FILM COATED ORAL ONCE
Refills: 0 | Status: COMPLETED | OUTPATIENT
Start: 2020-01-01 | End: 2020-01-01

## 2020-01-01 RX ORDER — AZITHROMYCIN 500 MG/1
TABLET, FILM COATED ORAL
Refills: 0 | Status: DISCONTINUED | OUTPATIENT
Start: 2020-01-01 | End: 2020-01-01

## 2020-01-01 RX ORDER — SENNA PLUS 8.6 MG/1
2 TABLET ORAL AT BEDTIME
Refills: 0 | Status: DISCONTINUED | OUTPATIENT
Start: 2020-01-01 | End: 2020-01-01

## 2020-01-01 RX ORDER — CHOLECALCIFEROL (VITAMIN D3) 125 MCG
1 CAPSULE ORAL
Qty: 0 | Refills: 0 | DISCHARGE

## 2020-01-01 RX ORDER — TAMSULOSIN HYDROCHLORIDE 0.4 MG/1
1 CAPSULE ORAL
Qty: 0 | Refills: 0 | DISCHARGE

## 2020-01-01 RX ORDER — OXYCODONE AND ACETAMINOPHEN 5; 325 MG/1; MG/1
2 TABLET ORAL EVERY 6 HOURS
Refills: 0 | Status: DISCONTINUED | OUTPATIENT
Start: 2020-01-01 | End: 2020-01-01

## 2020-01-01 RX ADMIN — HEPARIN SODIUM 5000 UNIT(S): 5000 INJECTION INTRAVENOUS; SUBCUTANEOUS at 13:00

## 2020-01-01 RX ADMIN — Medication 325 MILLIGRAM(S): at 05:35

## 2020-01-01 RX ADMIN — Medication 650 MILLIGRAM(S): at 05:38

## 2020-01-01 RX ADMIN — Medication 650 MILLIGRAM(S): at 21:16

## 2020-01-01 RX ADMIN — Medication 80 MILLIGRAM(S): at 22:10

## 2020-01-01 RX ADMIN — Medication 300 MILLIGRAM(S): at 10:07

## 2020-01-01 RX ADMIN — HEPARIN SODIUM 5000 UNIT(S): 5000 INJECTION INTRAVENOUS; SUBCUTANEOUS at 05:25

## 2020-01-01 RX ADMIN — FINASTERIDE 5 MILLIGRAM(S): 5 TABLET, FILM COATED ORAL at 13:06

## 2020-01-01 RX ADMIN — HEPARIN SODIUM 5000 UNIT(S): 5000 INJECTION INTRAVENOUS; SUBCUTANEOUS at 06:00

## 2020-01-01 RX ADMIN — Medication 80 MILLIGRAM(S): at 20:36

## 2020-01-01 RX ADMIN — HEPARIN SODIUM 5000 UNIT(S): 5000 INJECTION INTRAVENOUS; SUBCUTANEOUS at 13:31

## 2020-01-01 RX ADMIN — Medication 20 MILLIGRAM(S): at 12:35

## 2020-01-01 RX ADMIN — TAMSULOSIN HYDROCHLORIDE 0.4 MILLIGRAM(S): 0.4 CAPSULE ORAL at 21:14

## 2020-01-01 RX ADMIN — Medication 325 MILLIGRAM(S): at 13:24

## 2020-01-01 RX ADMIN — Medication 325 MILLIGRAM(S): at 05:55

## 2020-01-01 RX ADMIN — Medication 20 MILLIGRAM(S): at 11:12

## 2020-01-01 RX ADMIN — Medication 20 MILLIGRAM(S): at 09:56

## 2020-01-01 RX ADMIN — Medication 81 MILLIGRAM(S): at 09:21

## 2020-01-01 RX ADMIN — Medication 180 MILLIGRAM(S): at 13:06

## 2020-01-01 RX ADMIN — Medication 20 MILLIGRAM(S): at 09:21

## 2020-01-01 RX ADMIN — Medication 325 MILLIGRAM(S): at 21:57

## 2020-01-01 RX ADMIN — HEPARIN SODIUM 5000 UNIT(S): 5000 INJECTION INTRAVENOUS; SUBCUTANEOUS at 21:55

## 2020-01-01 RX ADMIN — Medication 1000 MILLIGRAM(S): at 02:44

## 2020-01-01 RX ADMIN — FINASTERIDE 5 MILLIGRAM(S): 5 TABLET, FILM COATED ORAL at 09:28

## 2020-01-01 RX ADMIN — SODIUM CHLORIDE 1000 MILLILITER(S): 9 INJECTION INTRAMUSCULAR; INTRAVENOUS; SUBCUTANEOUS at 15:35

## 2020-01-01 RX ADMIN — Medication 325 MILLIGRAM(S): at 06:59

## 2020-01-01 RX ADMIN — Medication 20 MILLIGRAM(S): at 09:54

## 2020-01-01 RX ADMIN — Medication 1000 MILLIGRAM(S): at 13:06

## 2020-01-01 RX ADMIN — Medication 10 MILLIGRAM(S): at 07:49

## 2020-01-01 RX ADMIN — Medication 300 MILLIGRAM(S): at 21:57

## 2020-01-01 RX ADMIN — Medication 81 MILLIGRAM(S): at 09:08

## 2020-01-01 RX ADMIN — HEPARIN SODIUM 5000 UNIT(S): 5000 INJECTION INTRAVENOUS; SUBCUTANEOUS at 13:24

## 2020-01-01 RX ADMIN — TAMSULOSIN HYDROCHLORIDE 0.4 MILLIGRAM(S): 0.4 CAPSULE ORAL at 20:31

## 2020-01-01 RX ADMIN — HEPARIN SODIUM 5000 UNIT(S): 5000 INJECTION INTRAVENOUS; SUBCUTANEOUS at 05:35

## 2020-01-01 RX ADMIN — Medication 81 MILLIGRAM(S): at 11:12

## 2020-01-01 RX ADMIN — HEPARIN SODIUM 5000 UNIT(S): 5000 INJECTION INTRAVENOUS; SUBCUTANEOUS at 21:06

## 2020-01-01 RX ADMIN — Medication 20 MILLIGRAM(S): at 10:07

## 2020-01-01 RX ADMIN — Medication 300 MILLIGRAM(S): at 20:35

## 2020-01-01 RX ADMIN — Medication 300 MILLIGRAM(S): at 13:05

## 2020-01-01 RX ADMIN — Medication 81 MILLIGRAM(S): at 09:56

## 2020-01-01 RX ADMIN — CEFTRIAXONE 1000 MILLIGRAM(S): 500 INJECTION, POWDER, FOR SOLUTION INTRAMUSCULAR; INTRAVENOUS at 09:56

## 2020-01-01 RX ADMIN — FINASTERIDE 5 MILLIGRAM(S): 5 TABLET, FILM COATED ORAL at 09:21

## 2020-01-01 RX ADMIN — Medication 325 MILLIGRAM(S): at 07:05

## 2020-01-01 RX ADMIN — Medication 325 MILLIGRAM(S): at 13:05

## 2020-01-01 RX ADMIN — HEPARIN SODIUM 5000 UNIT(S): 5000 INJECTION INTRAVENOUS; SUBCUTANEOUS at 22:18

## 2020-01-01 RX ADMIN — Medication 650 MILLIGRAM(S): at 12:23

## 2020-01-01 RX ADMIN — Medication 300 MILLIGRAM(S): at 22:19

## 2020-01-01 RX ADMIN — Medication 650 MILLIGRAM(S): at 22:16

## 2020-01-01 RX ADMIN — CEFTRIAXONE 1000 MILLIGRAM(S): 500 INJECTION, POWDER, FOR SOLUTION INTRAMUSCULAR; INTRAVENOUS at 14:43

## 2020-01-01 RX ADMIN — HEPARIN SODIUM 5000 UNIT(S): 5000 INJECTION INTRAVENOUS; SUBCUTANEOUS at 13:36

## 2020-01-01 RX ADMIN — Medication 300 MILLIGRAM(S): at 11:13

## 2020-01-01 RX ADMIN — Medication 325 MILLIGRAM(S): at 05:59

## 2020-01-01 RX ADMIN — HEPARIN SODIUM 5000 UNIT(S): 5000 INJECTION INTRAVENOUS; SUBCUTANEOUS at 20:34

## 2020-01-01 RX ADMIN — Medication 325 MILLIGRAM(S): at 13:38

## 2020-01-01 RX ADMIN — Medication 325 MILLIGRAM(S): at 20:35

## 2020-01-01 RX ADMIN — Medication 300 MILLIGRAM(S): at 09:29

## 2020-01-01 RX ADMIN — FINASTERIDE 5 MILLIGRAM(S): 5 TABLET, FILM COATED ORAL at 09:56

## 2020-01-01 RX ADMIN — Medication 325 MILLIGRAM(S): at 05:57

## 2020-01-01 RX ADMIN — Medication 81 MILLIGRAM(S): at 09:28

## 2020-01-01 RX ADMIN — HEPARIN SODIUM 5000 UNIT(S): 5000 INJECTION INTRAVENOUS; SUBCUTANEOUS at 05:59

## 2020-01-01 RX ADMIN — Medication 300 MILLIGRAM(S): at 10:05

## 2020-01-01 RX ADMIN — HEPARIN SODIUM 5000 UNIT(S): 5000 INJECTION INTRAVENOUS; SUBCUTANEOUS at 13:49

## 2020-01-01 RX ADMIN — TAMSULOSIN HYDROCHLORIDE 0.4 MILLIGRAM(S): 0.4 CAPSULE ORAL at 21:48

## 2020-01-01 RX ADMIN — HEPARIN SODIUM 5000 UNIT(S): 5000 INJECTION INTRAVENOUS; SUBCUTANEOUS at 05:23

## 2020-01-01 RX ADMIN — Medication 20 MILLIGRAM(S): at 09:08

## 2020-01-01 RX ADMIN — HEPARIN SODIUM 5000 UNIT(S): 5000 INJECTION INTRAVENOUS; SUBCUTANEOUS at 07:05

## 2020-01-01 RX ADMIN — Medication 80 MILLIGRAM(S): at 21:10

## 2020-01-01 RX ADMIN — TAMSULOSIN HYDROCHLORIDE 0.4 MILLIGRAM(S): 0.4 CAPSULE ORAL at 22:11

## 2020-01-01 RX ADMIN — Medication 300 MILLIGRAM(S): at 09:08

## 2020-01-01 RX ADMIN — Medication 650 MILLIGRAM(S): at 11:18

## 2020-01-01 RX ADMIN — Medication 325 MILLIGRAM(S): at 15:21

## 2020-01-01 RX ADMIN — Medication 250 MILLIGRAM(S): at 19:20

## 2020-01-01 RX ADMIN — Medication 180 MILLIGRAM(S): at 10:05

## 2020-01-01 RX ADMIN — Medication 325 MILLIGRAM(S): at 05:25

## 2020-01-01 RX ADMIN — Medication 325 MILLIGRAM(S): at 22:19

## 2020-01-01 RX ADMIN — Medication 325 MILLIGRAM(S): at 13:31

## 2020-01-01 RX ADMIN — HEPARIN SODIUM 5000 UNIT(S): 5000 INJECTION INTRAVENOUS; SUBCUTANEOUS at 20:31

## 2020-01-01 RX ADMIN — Medication 300 MILLIGRAM(S): at 21:11

## 2020-01-01 RX ADMIN — PIPERACILLIN AND TAZOBACTAM 200 GRAM(S): 4; .5 INJECTION, POWDER, LYOPHILIZED, FOR SOLUTION INTRAVENOUS at 18:45

## 2020-01-01 RX ADMIN — Medication 300 MILLIGRAM(S): at 13:03

## 2020-01-01 RX ADMIN — Medication 325 MILLIGRAM(S): at 05:51

## 2020-01-01 RX ADMIN — HEPARIN SODIUM 5000 UNIT(S): 5000 INJECTION INTRAVENOUS; SUBCUTANEOUS at 15:21

## 2020-01-01 RX ADMIN — Medication 325 MILLIGRAM(S): at 06:00

## 2020-01-01 RX ADMIN — TAMSULOSIN HYDROCHLORIDE 0.4 MILLIGRAM(S): 0.4 CAPSULE ORAL at 21:57

## 2020-01-01 RX ADMIN — Medication 20 MILLIGRAM(S): at 13:03

## 2020-01-01 RX ADMIN — HEPARIN SODIUM 5000 UNIT(S): 5000 INJECTION INTRAVENOUS; SUBCUTANEOUS at 21:57

## 2020-01-01 RX ADMIN — Medication 80 MILLIGRAM(S): at 20:31

## 2020-01-01 RX ADMIN — Medication 80 MILLIGRAM(S): at 21:57

## 2020-01-01 RX ADMIN — Medication 81 MILLIGRAM(S): at 10:08

## 2020-01-01 RX ADMIN — HEPARIN SODIUM 5000 UNIT(S): 5000 INJECTION INTRAVENOUS; SUBCUTANEOUS at 13:15

## 2020-01-01 RX ADMIN — HEPARIN SODIUM 5000 UNIT(S): 5000 INJECTION INTRAVENOUS; SUBCUTANEOUS at 05:51

## 2020-01-01 RX ADMIN — Medication 80 MILLIGRAM(S): at 21:11

## 2020-01-01 RX ADMIN — Medication 325 MILLIGRAM(S): at 20:30

## 2020-01-01 RX ADMIN — Medication 300 MILLIGRAM(S): at 09:54

## 2020-01-01 RX ADMIN — FINASTERIDE 5 MILLIGRAM(S): 5 TABLET, FILM COATED ORAL at 12:35

## 2020-01-01 RX ADMIN — Medication 300 MILLIGRAM(S): at 09:21

## 2020-01-01 RX ADMIN — Medication 80 MILLIGRAM(S): at 22:17

## 2020-01-01 RX ADMIN — HEPARIN SODIUM 5000 UNIT(S): 5000 INJECTION INTRAVENOUS; SUBCUTANEOUS at 21:14

## 2020-01-01 RX ADMIN — Medication 81 MILLIGRAM(S): at 10:04

## 2020-01-01 RX ADMIN — HEPARIN SODIUM 5000 UNIT(S): 5000 INJECTION INTRAVENOUS; SUBCUTANEOUS at 05:34

## 2020-01-01 RX ADMIN — Medication 325 MILLIGRAM(S): at 13:50

## 2020-01-01 RX ADMIN — HEPARIN SODIUM 5000 UNIT(S): 5000 INJECTION INTRAVENOUS; SUBCUTANEOUS at 13:37

## 2020-01-01 RX ADMIN — Medication 10 MILLIGRAM(S): at 14:43

## 2020-01-01 RX ADMIN — Medication 325 MILLIGRAM(S): at 21:48

## 2020-01-01 RX ADMIN — FINASTERIDE 5 MILLIGRAM(S): 5 TABLET, FILM COATED ORAL at 09:08

## 2020-01-01 RX ADMIN — Medication 300 MILLIGRAM(S): at 22:17

## 2020-01-01 RX ADMIN — Medication 325 MILLIGRAM(S): at 13:15

## 2020-01-01 RX ADMIN — TAMSULOSIN HYDROCHLORIDE 0.4 MILLIGRAM(S): 0.4 CAPSULE ORAL at 21:12

## 2020-01-01 RX ADMIN — HEPARIN SODIUM 5000 UNIT(S): 5000 INJECTION INTRAVENOUS; SUBCUTANEOUS at 13:05

## 2020-01-01 RX ADMIN — Medication 325 MILLIGRAM(S): at 05:36

## 2020-01-01 RX ADMIN — FINASTERIDE 5 MILLIGRAM(S): 5 TABLET, FILM COATED ORAL at 10:05

## 2020-01-01 RX ADMIN — HEPARIN SODIUM 5000 UNIT(S): 5000 INJECTION INTRAVENOUS; SUBCUTANEOUS at 06:59

## 2020-01-01 RX ADMIN — Medication 650 MILLIGRAM(S): at 22:44

## 2020-01-01 RX ADMIN — Medication 325 MILLIGRAM(S): at 21:11

## 2020-01-01 RX ADMIN — MORPHINE SULFATE 2 MILLIGRAM(S): 50 CAPSULE, EXTENDED RELEASE ORAL at 15:35

## 2020-01-01 RX ADMIN — Medication 300 MILLIGRAM(S): at 21:05

## 2020-01-01 RX ADMIN — Medication 1000 MILLIGRAM(S): at 20:30

## 2020-01-01 RX ADMIN — HEPARIN SODIUM 5000 UNIT(S): 5000 INJECTION INTRAVENOUS; SUBCUTANEOUS at 22:19

## 2020-01-01 RX ADMIN — SODIUM CHLORIDE 1000 MILLILITER(S): 9 INJECTION INTRAMUSCULAR; INTRAVENOUS; SUBCUTANEOUS at 16:35

## 2020-01-01 RX ADMIN — Medication 180 MILLIGRAM(S): at 09:28

## 2020-01-01 RX ADMIN — Medication 80 MILLIGRAM(S): at 22:20

## 2020-01-01 RX ADMIN — RANOLAZINE 500 MILLIGRAM(S): 500 TABLET, FILM COATED, EXTENDED RELEASE ORAL at 22:17

## 2020-01-01 RX ADMIN — AZITHROMYCIN 255 MILLIGRAM(S): 500 TABLET, FILM COATED ORAL at 14:43

## 2020-01-01 RX ADMIN — FINASTERIDE 5 MILLIGRAM(S): 5 TABLET, FILM COATED ORAL at 13:02

## 2020-01-01 RX ADMIN — TAMSULOSIN HYDROCHLORIDE 0.4 MILLIGRAM(S): 0.4 CAPSULE ORAL at 22:20

## 2020-01-01 RX ADMIN — Medication 650 MILLIGRAM(S): at 06:05

## 2020-01-01 RX ADMIN — HEPARIN SODIUM 5000 UNIT(S): 5000 INJECTION INTRAVENOUS; SUBCUTANEOUS at 13:50

## 2020-01-01 RX ADMIN — Medication 81 MILLIGRAM(S): at 12:35

## 2020-01-01 RX ADMIN — Medication 650 MILLIGRAM(S): at 16:52

## 2020-01-01 RX ADMIN — Medication 325 MILLIGRAM(S): at 13:00

## 2020-01-01 RX ADMIN — Medication 325 MILLIGRAM(S): at 21:14

## 2020-01-01 RX ADMIN — Medication 81 MILLIGRAM(S): at 09:54

## 2020-01-01 RX ADMIN — Medication 650 MILLIGRAM(S): at 15:59

## 2020-01-01 RX ADMIN — Medication 180 MILLIGRAM(S): at 12:35

## 2020-01-01 RX ADMIN — Medication 300 MILLIGRAM(S): at 20:32

## 2020-01-01 RX ADMIN — Medication 325 MILLIGRAM(S): at 05:22

## 2020-01-01 RX ADMIN — Medication 300 MILLIGRAM(S): at 09:59

## 2020-01-01 RX ADMIN — Medication 300 MILLIGRAM(S): at 22:10

## 2020-01-01 RX ADMIN — Medication 325 MILLIGRAM(S): at 22:10

## 2020-01-01 RX ADMIN — Medication 300 MILLIGRAM(S): at 22:09

## 2020-01-01 RX ADMIN — Medication 325 MILLIGRAM(S): at 13:36

## 2020-01-01 RX ADMIN — Medication 80 MILLIGRAM(S): at 21:14

## 2020-01-01 RX ADMIN — FINASTERIDE 5 MILLIGRAM(S): 5 TABLET, FILM COATED ORAL at 10:07

## 2020-01-01 RX ADMIN — Medication 325 MILLIGRAM(S): at 13:49

## 2020-01-01 RX ADMIN — Medication 80 MILLIGRAM(S): at 21:48

## 2020-01-01 RX ADMIN — Medication 650 MILLIGRAM(S): at 21:59

## 2020-01-01 RX ADMIN — Medication 325 MILLIGRAM(S): at 21:06

## 2020-01-01 RX ADMIN — Medication 180 MILLIGRAM(S): at 05:22

## 2020-01-01 RX ADMIN — Medication 81 MILLIGRAM(S): at 13:06

## 2020-01-01 RX ADMIN — TAMSULOSIN HYDROCHLORIDE 0.4 MILLIGRAM(S): 0.4 CAPSULE ORAL at 21:06

## 2020-01-01 RX ADMIN — Medication 300 MILLIGRAM(S): at 21:14

## 2020-01-01 RX ADMIN — HEPARIN SODIUM 5000 UNIT(S): 5000 INJECTION INTRAVENOUS; SUBCUTANEOUS at 05:55

## 2020-01-01 RX ADMIN — Medication 81 MILLIGRAM(S): at 13:02

## 2020-01-01 RX ADMIN — TAMSULOSIN HYDROCHLORIDE 0.4 MILLIGRAM(S): 0.4 CAPSULE ORAL at 20:43

## 2020-01-01 RX ADMIN — FINASTERIDE 5 MILLIGRAM(S): 5 TABLET, FILM COATED ORAL at 11:12

## 2020-01-01 RX ADMIN — HEPARIN SODIUM 5000 UNIT(S): 5000 INJECTION INTRAVENOUS; SUBCUTANEOUS at 22:09

## 2020-01-01 RX ADMIN — FINASTERIDE 5 MILLIGRAM(S): 5 TABLET, FILM COATED ORAL at 09:54

## 2020-01-01 RX ADMIN — Medication 325 MILLIGRAM(S): at 22:17

## 2020-01-01 RX ADMIN — SODIUM CHLORIDE 1000 MILLILITER(S): 9 INJECTION INTRAMUSCULAR; INTRAVENOUS; SUBCUTANEOUS at 21:00

## 2020-01-01 RX ADMIN — TAMSULOSIN HYDROCHLORIDE 0.4 MILLIGRAM(S): 0.4 CAPSULE ORAL at 22:18

## 2020-01-01 RX ADMIN — HEPARIN SODIUM 5000 UNIT(S): 5000 INJECTION INTRAVENOUS; SUBCUTANEOUS at 21:48

## 2020-01-01 RX ADMIN — Medication 650 MILLIGRAM(S): at 12:00

## 2020-03-11 NOTE — CONSULT NOTE ADULT - SUBJECTIVE AND OBJECTIVE BOX
HPI:  87M w/PMH CAD (on ASA only), HTN, HLD, BPH, SSS s/p PPM, CKD3, SVT, recurrent Rt pl eff, PTX s/p VATS decortication, Pleurodesis 2016, last admit 2018 for reccurent Rt hydropneumothorax, presents today c/o Rt sided Upper back pain w/ worsening BRENNER over past 10-14 days.  Pt states it's similar to when he's pleural effusion in the past.  He denies chest pain, cough, fever/chills, n/v/d, abd pain, dysuria, weakness.  In ED, Cr 2 (baseline), Trop neg, UA neg, RVP neg, Tmax 100.1, elevated bp (he's taken home meds today), CT ch- Large Rt pl eff, atelectasis, pulm nodule,   CT surgery made aware per ED MD.      Seen and examined by thoracic surgery. Imaging reviewed with Dr. Reyes. Patient with Hx of R VATS pleurodesis. Appears to have had RUL wedge resection as well, likely for PTX. Since patient has had recurrent pleural effusions require drainage likely secondary to trapped right lung. Patient with SOB and right back pain. Saturating well on RA. Denies fever, chills, cough, weight loss, HA, hemoptysis. Patient nonsmoker.     PAST MEDICAL & SURGICAL HISTORY:  Spinal stenosis  CKD (chronic kidney disease), stage III  SSS (sick sinus syndrome)  Pleural effusion  Hyperlipidemia  BPH (benign prostatic hyperplasia)  CAD (coronary artery disease)  Hypertension  Status post lumbar spinal fusion      REVIEW OF SYSTEMS  CONSTITUTIONAL: No fever.  EYES: No eye pain or discharge.  ENMT:  No sinus or throat pain  NECK: No pain or stiffness  RESPIRATORY: No cough, wheezing, chills or hemoptysis; ++ shortness of breath  CARDIOVASCULAR: No chest pain, palpitations, dizziness, or leg swelling  GASTROINTESTINAL: No abdominal or epigastric pain. No nausea, vomiting, or hematemesis; No diarrhea or constipation. No melena or hematochezia.  GENITOURINARY: No dysuria or incontinence  NEUROLOGICAL: No headaches, memory loss, loss of strength, numbness, or tremors  SKIN: No rashes.  MUSCULOSKELETAL: No joint pain or swelling; + RT BACK PAIN  PSYCHIATRIC: No depression, anxiety, mood swings, or difficulty sleeping      MEDICATIONS  (STANDING):  aspirin enteric coated 81 milliGRAM(s) Oral daily  azithromycin  IVPB      cefTRIAXone Injectable.      finasteride 5 milliGRAM(s) Oral daily  furosemide    Tablet 20 milliGRAM(s) Oral daily  heparin  Injectable 5000 Unit(s) SubCutaneous three times a day  labetalol 300 milliGRAM(s) Oral two times a day  lidocaine 1% Injectable 20 milliLiter(s) Local Injection once  pravastatin 80 milliGRAM(s) Oral at bedtime  ranolazine 500 milliGRAM(s) Oral once  sodium bicarbonate 325 milliGRAM(s) Oral three times a day  tamsulosin 0.4 milliGRAM(s) Oral at bedtime    MEDICATIONS  (PRN):  acetaminophen   Tablet .. 650 milliGRAM(s) Oral every 4 hours PRN Mild Pain (1 - 3)  hydrALAZINE Injectable 10 milliGRAM(s) IV Push every 6 hours PRN SBP>160  ondansetron Injectable 4 milliGRAM(s) IV Push every 6 hours PRN Nausea  oxycodone    5 mG/acetaminophen 325 mG 1 Tablet(s) Oral every 4 hours PRN Moderate Pain (4 - 6)  traMADol 50 milliGRAM(s) Oral every 6 hours PRN Moderate Pain (4 - 6)      Allergies    amlodipine (Unknown)  Crestor (Other)  Lipitor (Unknown)      SOCIAL HISTORY:  Smoking Hx: nonsmoker  Etoh Hx: denies  IVDA Hx: denies    FAMILY HISTORY:  No pertinent family history in first degree relatives      Vital Signs Last 24 Hrs  T(C): 36.8 (11 Mar 2020 17:40), Max: 37.8 (11 Mar 2020 08:12)  T(F): 98.3 (11 Mar 2020 17:40), Max: 100.1 (11 Mar 2020 08:12)  HR: 60 (11 Mar 2020 17:40) (60 - 72)  BP: 137/45 (11 Mar 2020 17:40) (137/45 - 174/60)  BP(mean): 93 (11 Mar 2020 09:38) (81 - 93)  RR: 18 (11 Mar 2020 17:40) (18 - 22)  SpO2: 98% (11 Mar 2020 17:40) (98% - 100%)    General: NAD  Neurology: Awake, nonfocal, CARRANZA x 4  Eyes: Scleras clear, PERRLA/ EOMI, Gross vision intact  ENT: Gross hearing intact, grossly patent pharynx, no stridor  Neck: Neck supple, trachea midline, No JVD  Respiratory: Absent BS on right side  CV: S1S2, no murmurs, rubs or gallops  Abdominal: Soft, NT, ND  Extremities: No edema  Psych: Oriented x 3, normal affect  Tubes: R PTC placed under US guidance, clamped at 1L output of serosanguineous fluid     LABS:                        12.0   9.04  )-----------( 294      ( 11 Mar 2020 08:35 )             36.2     03-11    140  |  110<H>  |  19  ----------------------------<  138<H>  3.9   |  22  |  2.00<H>    Ca    8.8      11 Mar 2020 08:35    TPro  6.7  /  Alb  3.1<L>  /  TBili  0.5  /  DBili  x   /  AST  13<L>  /  ALT  12  /  AlkPhos  88  03-11    PT/INR - ( 11 Mar 2020 08:35 )   PT: 11.6 sec;   INR: 1.04 ratio         PTT - ( 11 Mar 2020 08:35 )  PTT:30.1 sec  Urinalysis Basic - ( 11 Mar 2020 13:15 )    Color: Yellow / Appearance: Clear / S.015 / pH: x  Gluc: x / Ketone: Trace  / Bili: Negative / Urobili: Negative mg/dL   Blood: x / Protein: 15 mg/dL / Nitrite: Negative   Leuk Esterase: Negative / RBC: 0-2 /HPF / WBC 0-2   Sq Epi: x / Non Sq Epi: Occasional / Bacteria: Occasional        RADIOLOGY & ADDITIONAL STUDIES:  < from: CT Chest No Cont (20 @ 09:39) >  Large rightpleural effusion with pleural base soft tissue, concerning for metastasis.    There are 2 pulmonary nodules measuring up to 1.2 cm as above.    Complete atelectasis of the right middle and lower lobes.    Postsurgical changes in the right lung apex.    < end of copied text >      ASSESSMENT:   87yMalePAST MEDICAL & SURGICAL HISTORY:  Spinal stenosis  CKD (chronic kidney disease), stage III  SSS (sick sinus syndrome)  Pleural effusion  Hyperlipidemia  BPH (benign prostatic hyperplasia)  CAD (coronary artery disease)  Hypertension  Status post lumbar spinal fusion  HEALTH ISSUES - PROBLEM Dx:  Prophylactic measure: Prophylactic measure  CKD (chronic kidney disease), stage III: CKD (chronic kidney disease), stage III  Essential hypertension: Essential hypertension  Febrile: Febrile  Dyspnea on exertion: Dyspnea on exertion  Pleural effusion: Pleural effusion

## 2020-03-11 NOTE — CONSULT NOTE ADULT - ASSESSMENT
88yo M with PMH of CAD (on ASA), BPH, CKD III, HLD, HTN, SSS s/p PPM, spinal stenosis s/p fusion and R VATS, pleurodesis, RUL wedge resection 2016; presents today with recurrent right pleural effusion. 3/11 R PTC placed under US guidance. One liter serosang output. Pleural fluid sent for cytology and culture. Of note CT Chest revealed RUL (1.2 cm) and SAMREEN (0.8 cm) nodules.     Emelyn Daily PA  Thoracic Sx  #[id number]

## 2020-03-11 NOTE — H&P ADULT - PROBLEM SELECTOR PLAN 2
likely 2/2 pleural effusion, pna, other  treat underlying cause  monitor O2 sats  cardio/pulm cs for further input   will check TTE

## 2020-03-11 NOTE — CONSULT NOTE ADULT - PROBLEM SELECTOR RECOMMENDATION 9
Maintain R PTC to water seal, drain slowly  Instructions provided to RN for clamping  record output per shift  Daily CXR  FU pleural fluid labs, culture and cytology  pain control with percocet and tylenol  IS/OOB  duonebs prn  D/W Dr. Reyes and Dr. Pastrana

## 2020-03-11 NOTE — H&P ADULT - PROBLEM SELECTOR PLAN 4
w/ elevated bp  resume pt's home meds - dilt, labetalol, lasix  will add prn hydralazine for sbp>160  monitor bp and titrate meds as needed

## 2020-03-11 NOTE — H&P ADULT - ASSESSMENT
87M w/PMH CAD, HTN, HLD, BPH, SSS s/p PPM, CKD3, SVT, recurrent Rt pl eff, PTX s/p VATS decortication, Pleurodesis 2016, last admit 2018 for rec Rt hydropneumothorax, presents today c/o Rt sided Upper back pain w/ worsening BRENNER over past 10-14 days.      In ED, Cr 2 (baseline), Trop neg, UA neg, RVP neg, Tmax 100.1, elevated bp (he's taken home meds today), CT ch- Large Rt pl eff, atelectasis, pulm nodule,   CT surgery made aware per ED MD.

## 2020-03-11 NOTE — ED ADULT NURSE NOTE - OBJECTIVE STATEMENT
Patient is a 88 yo male with right sided "rib" pain and sob for the last week; patient reports that he has had similar pain/sob in 2016, 2017 and had "chest tube" placed; thoracics: Dr. Reyes; no fever or chills; no cough; no chest pain; no abdominal pain; no trauma or injury; has not seen pmd for sob in last week since symptoms started; unable to walk short distances 2/2 to shortness of breath.

## 2020-03-11 NOTE — H&P ADULT - HISTORY OF PRESENT ILLNESS
HPI:      PAST MEDICAL & SURGICAL HISTORY:  Spinal stenosis  CKD (chronic kidney disease), stage III  SSS (sick sinus syndrome)  Pleural effusion  Hyperlipidemia  BPH (benign prostatic hyperplasia)  CAD (coronary artery disease)  Hypertension  Status post lumbar spinal fusion      Review of Systems:   CONSTITUTIONAL: No fever.  EYES: No eye pain or discharge.  ENMT:  No sinus or throat pain  NECK: No pain or stiffness  RESPIRATORY: No cough, wheezing, chills or hemoptysis; No shortness of breath  CARDIOVASCULAR: No chest pain, palpitations, dizziness, or leg swelling  GASTROINTESTINAL: No abdominal or epigastric pain. No nausea, vomiting, or hematemesis; No diarrhea or constipation. No melena or hematochezia.  GENITOURINARY: No dysuria or incontinence  NEUROLOGICAL: No headaches, memory loss, loss of strength, numbness, or tremors  SKIN: No rashes.  LYMPH NODES: No enlarged glands  ENDOCRINE: No heat or cold intolerance; No hair loss  MUSCULOSKELETAL: No joint pain or swelling; No muscle, back, or extremity pain  PSYCHIATRIC: No depression, anxiety, mood swings, or difficulty sleeping  HEME/LYMPH: No easy bruising, or bleeding gums  ALLERY AND IMMUNOLOGIC: No hives or eczema    Allergies    amlodipine (Unknown)  Crestor (Other)  Lipitor (Unknown)    Intolerances        Social History:     FAMILY HISTORY:  No pertinent family history in first degree relatives      Home Medications:  Aspirin Enteric Coated 81 mg oral delayed release tablet: 1 tab(s) orally once a day (11 Mar 2020 10:57)  Co Q-10 100 mg oral capsule: 1 cap(s) orally once a day (11 Mar 2020 11:40)  dilTIAZem 180 mg/24 hours oral capsule, extended release: 1 cap(s) orally once a day (11 Mar 2020 10:57)  finasteride 5 mg oral tablet: 1 tab(s) orally once a day (at bedtime) (11 Mar 2020 10:57)  furosemide 20 mg oral tablet: 1 tab(s) orally once a day (11 Mar 2020 10:57)  labetalol 300 mg oral tablet: 1 tab(s) orally 2 times a day (11 Mar 2020 11:40)  pravastatin 80 mg oral tablet: 1 tab(s) orally once a day (11 Mar 2020 10:57)  Ranexa 500 mg oral tablet, extended release: 1 tab(s) orally once a day (11 Mar 2020 11:40)  sodium bicarbonate 325 mg oral tablet: 1 tab(s) orally 3 times a day (11 Mar 2020 10:57)  tamsulosin 0.4 mg oral capsule: 1 cap(s) orally once a day (11 Mar 2020 10:57)  Vitamin B12 1000 mcg oral tablet: 1 tab(s) orally once a day (11 Mar 2020 10:57)  Vitamin D3 2000 intl units oral tablet: 1 tab(s) orally once a day (11 Mar 2020 10:57)  Welchol 625 mg oral tablet: 1 tab(s) orally 2 times a day (11 Mar 2020 10:57)      MEDICATIONS  (STANDING):  aspirin enteric coated 81 milliGRAM(s) Oral daily  atorvastatin 20 milliGRAM(s) Oral at bedtime  azithromycin  IVPB      cefTRIAXone Injectable.      finasteride 5 milliGRAM(s) Oral daily  furosemide    Tablet 20 milliGRAM(s) Oral daily  heparin  Injectable 5000 Unit(s) SubCutaneous three times a day  labetalol 300 milliGRAM(s) Oral two times a day  ranolazine 500 milliGRAM(s) Oral once  sodium bicarbonate 325 milliGRAM(s) Oral three times a day  tamsulosin 0.4 milliGRAM(s) Oral at bedtime    MEDICATIONS  (PRN):  acetaminophen   Tablet .. 650 milliGRAM(s) Oral every 4 hours PRN Mild Pain (1 - 3)  hydrALAZINE Injectable 10 milliGRAM(s) IV Push every 6 hours PRN SBP>160  ondansetron Injectable 4 milliGRAM(s) IV Push every 6 hours PRN Nausea  traMADol 50 milliGRAM(s) Oral every 6 hours PRN Moderate Pain (4 - 6)      CAPILLARY BLOOD GLUCOSE        I&O's Summary      PHYSICAL EXAM:  Vital Signs Last 24 Hrs  T(C): 37.3 (11 Mar 2020 11:51), Max: 37.8 (11 Mar 2020 08:12)  T(F): 99.1 (11 Mar 2020 11:51), Max: 100.1 (11 Mar 2020 08:12)  HR: 60 (11 Mar 2020 11:51) (60 - 62)  BP: 169/58 (11 Mar 2020 11:51) (141/56 - 174/60)  BP(mean): 93 (11 Mar 2020 09:38) (81 - 93)  RR: 18 (11 Mar 2020 11:51) (18 - 22)  SpO2: 100% (11 Mar 2020 11:51) (98% - 100%)  GENERAL: NAD, well-developed  HEAD:  Atraumatic, Normocephalic  EYES: EOMI, PERRLA, conjunctiva and sclera clear  ENT: normal hearing, no nasal discharge, throat clear, dentition normal  NECK: Supple, No JVD, no LAD, no thyromegaly   CHEST/LUNG: Clear to auscultation bilaterally; No wheeze, respirations unlabored  HEART: Regular rate and rhythm; No murmurs, rubs, or gallops  ABDOMEN: Soft, Nontender, Nondistended; Bowel sounds present, no HSM  EXTREMITIES:  2+ Peripheral Pulses, No clubbing, cyanosis, or edema  PSYCH: AAOx3, normal behavior  NEUROLOGY: non-focal, sensory and cn 2-12 intact, speech/language intact  SKIN: No visible rashes or lesions    LABS:                        12.0   9.04  )-----------( 294      ( 11 Mar 2020 08:35 )             36.2     03-11    140  |  110<H>  |  19  ----------------------------<  138<H>  3.9   |  22  |  2.00<H>    Ca    8.8      11 Mar 2020 08:35    TPro  6.7  /  Alb  3.1<L>  /  TBili  0.5  /  DBili  x   /  AST  13<L>  /  ALT  12  /  AlkPhos  88  03-11    PT/INR - ( 11 Mar 2020 08:35 )   PT: 11.6 sec;   INR: 1.04 ratio         PTT - ( 11 Mar 2020 08:35 )  PTT:30.1 sec  CARDIAC MARKERS ( 11 Mar 2020 08:35 )  <0.015 ng/mL / x     / x     / x     / x          Urinalysis Basic - ( 11 Mar 2020 13:15 )    Color: Yellow / Appearance: Clear / S.015 / pH: x  Gluc: x / Ketone: Trace  / Bili: Negative / Urobili: Negative mg/dL   Blood: x / Protein: 15 mg/dL / Nitrite: Negative   Leuk Esterase: Negative / RBC: 0-2 /HPF / WBC 0-2   Sq Epi: x / Non Sq Epi: Occasional / Bacteria: Occasional        RADIOLOGY & ADDITIONAL TESTS:    Imaging Personally Reviewed:    EKG Personally Reviewed:  paced    Consultant(s) Notes Reviewed:    N/A    Care Discussed with Consultants/Other Providers:  ED MD HPI:  87M w/PMH CAD, HTN, HLD, BPH, SSS s/p PPM, CKD3, SVT, recurrent Rt pl eff, PTX s/p VATS decortication, Pleurodesis 2016, last admit 2018 for rec Rt hydropneumothorax, presents today c/o Rt sided Upper back pain w/ worsening BRENNER over past 10-14 days.  Pt states it's similar to when he's pleural effusion in the past.  He denies chest pain, cough, fever/chills, n/v/d, abd pain, dysuria, weakness.      In ED, Cr 2 (baseline), Trop neg, UA neg, RVP neg, Tmax 100.1, elevated bp (he's taken home meds today), CT ch- Large Rt pl eff, atelectasis, pulm nodule,   CT surgery made aware per ED MD.      PAST MEDICAL & SURGICAL HISTORY:  Spinal stenosis  CKD (chronic kidney disease), stage III  SSS (sick sinus syndrome)  Pleural effusion  Hyperlipidemia  BPH (benign prostatic hyperplasia)  CAD (coronary artery disease)  Hypertension  Status post lumbar spinal fusion      Review of Systems:   CONSTITUTIONAL: No fever.  EYES: No eye pain or discharge.  ENMT:  No sinus or throat pain  NECK: No pain or stiffness  RESPIRATORY: No cough, wheezing, chills or hemoptysis; ++ shortness of breath  CARDIOVASCULAR: No chest pain, palpitations, dizziness, or leg swelling  GASTROINTESTINAL: No abdominal or epigastric pain. No nausea, vomiting, or hematemesis; No diarrhea or constipation. No melena or hematochezia.  GENITOURINARY: No dysuria or incontinence  NEUROLOGICAL: No headaches, memory loss, loss of strength, numbness, or tremors  SKIN: No rashes.  MUSCULOSKELETAL: No joint pain or swelling; + RT BACK PAIN  PSYCHIATRIC: No depression, anxiety, mood swings, or difficulty sleeping      Allergies  amlodipine (Unknown)  Crestor (Other)  Lipitor (Unknown)      Social History:   LIVES W/ WIFE- SHE'S CURRENTLY IN REHAB   IND ADLs  DENIES SMOKING/ETOH    FAMILY HISTORY:  unknown to this elderly gentleman    Home Medications:  Aspirin Enteric Coated 81 mg oral delayed release tablet: 1 tab(s) orally once a day (11 Mar 2020 10:57)  Co Q-10 100 mg oral capsule: 1 cap(s) orally once a day (11 Mar 2020 11:40)  dilTIAZem 180 mg/24 hours oral capsule, extended release: 1 cap(s) orally once a day (11 Mar 2020 10:57)  finasteride 5 mg oral tablet: 1 tab(s) orally once a day (at bedtime) (11 Mar 2020 10:57)  furosemide 20 mg oral tablet: 1 tab(s) orally once a day (11 Mar 2020 10:57)  labetalol 300 mg oral tablet: 1 tab(s) orally 2 times a day (11 Mar 2020 11:40)  pravastatin 80 mg oral tablet: 1 tab(s) orally once a day (11 Mar 2020 10:57)  Ranexa 500 mg oral tablet, extended release: 1 tab(s) orally once a day (11 Mar 2020 11:40)  sodium bicarbonate 325 mg oral tablet: 1 tab(s) orally 3 times a day (11 Mar 2020 10:57)  tamsulosin 0.4 mg oral capsule: 1 cap(s) orally once a day (11 Mar 2020 10:57)  Vitamin B12 1000 mcg oral tablet: 1 tab(s) orally once a day (11 Mar 2020 10:57)  Vitamin D3 2000 intl units oral tablet: 1 tab(s) orally once a day (11 Mar 2020 10:57)  Welchol 625 mg oral tablet: 1 tab(s) orally 2 times a day (11 Mar 2020 10:57)    MEDICATIONS  (STANDING):  aspirin enteric coated 81 milliGRAM(s) Oral daily  atorvastatin 20 milliGRAM(s) Oral at bedtime  azithromycin  IVPB      cefTRIAXone Injectable.      finasteride 5 milliGRAM(s) Oral daily  furosemide    Tablet 20 milliGRAM(s) Oral daily  heparin  Injectable 5000 Unit(s) SubCutaneous three times a day  labetalol 300 milliGRAM(s) Oral two times a day  ranolazine 500 milliGRAM(s) Oral once  sodium bicarbonate 325 milliGRAM(s) Oral three times a day  tamsulosin 0.4 milliGRAM(s) Oral at bedtime    MEDICATIONS  (PRN):  acetaminophen   Tablet .. 650 milliGRAM(s) Oral every 4 hours PRN Mild Pain (1 - 3)  hydrALAZINE Injectable 10 milliGRAM(s) IV Push every 6 hours PRN SBP>160  ondansetron Injectable 4 milliGRAM(s) IV Push every 6 hours PRN Nausea  traMADol 50 milliGRAM(s) Oral every 6 hours PRN Moderate Pain (4 - 6)        PHYSICAL EXAM:  Vital Signs Last 24 Hrs  T(C): 37.3 (11 Mar 2020 11:51), Max: 37.8 (11 Mar 2020 08:12)  T(F): 99.1 (11 Mar 2020 11:51), Max: 100.1 (11 Mar 2020 08:12)  HR: 60 (11 Mar 2020 11:51) (60 - 62)  BP: 169/58 (11 Mar 2020 11:51) (141/56 - 174/60)  BP(mean): 93 (11 Mar 2020 09:38) (81 - 93)  RR: 18 (11 Mar 2020 11:51) (18 - 22)  SpO2: 100% (11 Mar 2020 11:51) (98% - 100%)  GENERAL: NAD, cachetic  HEAD:  Atraumatic, Normocephalic  EYES: EOMI, PERRLA, conjunctiva and sclera clear  ENT: normal hearing, no nasal discharge, throat clear, dentition normal for age  NECK: Supple, No JVD, no LAD, no thyromegaly   CHEST/LUNG: decreased BS over RT lung base, No wheeze, respirations unlabored  HEART: Regular rate and rhythm; No murmurs, rubs, or gallops  ABDOMEN: Soft, Nontender, Nondistended; Bowel sounds present, no HSM  EXTREMITIES:  2+ Peripheral Pulses, No clubbing, cyanosis, or edema  PSYCH: AAOx3, normal behavior  NEUROLOGY: non-focal, sensory and cn 2-12 intact, speech/language intact  SKIN: No visible rashes or lesions    LABS:                        12.0   9.04  )-----------( 294      ( 11 Mar 2020 08:35 )             36.2     03-11    140  |  110<H>  |  19  ----------------------------<  138<H>  3.9   |  22  |  2.00<H>    Ca    8.8      11 Mar 2020 08:35    TPro  6.7  /  Alb  3.1<L>  /  TBili  0.5  /  DBili  x   /  AST  13<L>  /  ALT  12  /  AlkPhos  88  03-11    PT/INR - ( 11 Mar 2020 08:35 )   PT: 11.6 sec;   INR: 1.04 ratio         PTT - ( 11 Mar 2020 08:35 )  PTT:30.1 sec  CARDIAC MARKERS ( 11 Mar 2020 08:35 )  <0.015 ng/mL / x     / x     / x     / x          Urinalysis Basic - ( 11 Mar 2020 13:15 )  Color: Yellow / Appearance: Clear / S.015 / pH: x  Gluc: x / Ketone: Trace  / Bili: Negative / Urobili: Negative mg/dL   Blood: x / Protein: 15 mg/dL / Nitrite: Negative   Leuk Esterase: Negative / RBC: 0-2 /HPF / WBC 0-2   Sq Epi: x / Non Sq Epi: Occasional / Bacteria: Occasional        RADIOLOGY & ADDITIONAL TESTS:  Imaging Personally Reviewed:  Ct chest- Large Rt pl effusion, Rt pulm nodule, atelectasis    EKG Personally Reviewed:  paced    Consultant(s) Notes Reviewed:    N/A    Care Discussed with Consultants/Other Providers:  ED MD

## 2020-03-11 NOTE — PROCEDURE NOTE - NSPROCDETAILS_GEN_ALL_CORE
ultrasound assessment of fluid (location)/ultrasound assessment of fluid (size)/Seldinger technique/secured in place/sterile dressing applied

## 2020-03-11 NOTE — ED ADULT NURSE NOTE - NSIMPLEMENTINTERV_GEN_ALL_ED
Implemented All Fall with Harm Risk Interventions:  [place] to call system. Call bell, personal items and telephone within reach. Instruct patient to call for assistance. Room bathroom lighting operational. Non-slip footwear when patient is off stretcher. Physically safe environment: no spills, clutter or unnecessary equipment. Stretcher in lowest position, wheels locked, appropriate side rails in place. Provide visual cue, wrist band, yellow gown, etc. Monitor gait and stability. Monitor for mental status changes and reorient to person, place, and time. Review medications for side effects contributing to fall risk. Reinforce activity limits and safety measures with patient and family. Provide visual clues: red socks.

## 2020-03-11 NOTE — ED PROVIDER NOTE - PROGRESS NOTE DETAILS
Annette DO: patient with large right pleural effusion; CT chest ordered; thoracics team- Dr. Miah Reyes consulted and will see patient. Annette DO: Patient endorsed to medicine team for admission.

## 2020-03-11 NOTE — ED PROVIDER NOTE - OBJECTIVE STATEMENT
Patient is a 86 yo male with right sided "rib" pain and sob for the last week; patient reports that he has had similar pain/sob in 2016, 2017 and had "chest tube" placed; thoracics: Dr. Reyes; no fever or chills; no cough; no chest pain; no abdominal pain; no trauma or injury; has not seen pmd for sob in last week since symptoms started; unable to walk short distances 2/2 to shortness of breath.

## 2020-03-11 NOTE — H&P ADULT - PROBLEM SELECTOR PLAN 3
possibly 2/2 pleural effusion, underlying pna  will treat empirically w/ IV rocephin, azithro  monitor vitals  check cultures  ID cs for further recs

## 2020-03-11 NOTE — ED ADULT TRIAGE NOTE - CHIEF COMPLAINT QUOTE
Patient comes in with shortness of breath and vomiting. Patient states that yesterday he had right sided rib pain. When patient woke up this morning, he had trouble breathing. Patient states symptoms are worse on exertion. Denies fevers, abd pain.

## 2020-03-11 NOTE — H&P ADULT - PROBLEM SELECTOR PLAN 1
will cs CTS, pulm, cardio  monitor O2 sats  may need thora- check PF analysis  abx as below  monitor vitals, resp status

## 2020-03-11 NOTE — ED ADULT NURSE REASSESSMENT NOTE - NS ED NURSE REASSESS COMMENT FT1
Report received from ROMA Maher. pt A&O x3, sitting up in bed, O2 sat 100% RA. pt aware of plan of care. c/o R sided chest pain, does not want pain medication. no other complaints at this time.

## 2020-03-12 NOTE — PROGRESS NOTE ADULT - SUBJECTIVE AND OBJECTIVE BOX
CHIEF COMPLAINT:    SUBJECTIVE:     REVIEW OF SYSTEMS:    CONSTITUTIONAL: No weakness, fevers or chills  EYES/ENT: No visual changes;  No vertigo or throat pain   NECK: No pain or stiffness  RESPIRATORY: No cough, wheezing, hemoptysis; No shortness of breath  CARDIOVASCULAR: No chest pain or palpitations  GASTROINTESTINAL: No abdominal or epigastric pain. No nausea, vomiting, or hematemesis; No diarrhea or constipation. No melena or hematochezia.  GENITOURINARY: No dysuria, frequency or hematuria  NEUROLOGICAL: No numbness or weakness  SKIN: No itching, burning, rashes, or lesions   All other review of systems is negative unless indicated above    Vital Signs Last 24 Hrs  T(C): 37 (12 Mar 2020 08:04), Max: 37.3 (11 Mar 2020 11:51)  T(F): 98.6 (12 Mar 2020 08:04), Max: 99.1 (11 Mar 2020 11:51)  HR: 59 (12 Mar 2020 08:04) (59 - 72)  BP: 144/38 (12 Mar 2020 08:04) (133/42 - 174/60)  BP(mean): 93 (11 Mar 2020 09:38) (93 - 93)  RR: 18 (12 Mar 2020 08:04) (18 - 22)  SpO2: 96% (12 Mar 2020 08:04) (96% - 100%)    I&O's Summary    11 Mar 2020 07:01  -  12 Mar 2020 07:00  --------------------------------------------------------  IN: 0 mL / OUT: 1125 mL / NET: -1125 mL    12 Mar 2020 07:01  -  12 Mar 2020 09:01  --------------------------------------------------------  IN: 0 mL / OUT: 150 mL / NET: -150 mL        CAPILLARY BLOOD GLUCOSE          PHYSICAL EXAM:    Constitutional: NAD, awake and alert, well-developed  HEENT: PERR, EOMI, Normal Hearing, MMM  Neck: Soft and supple, No LAD, No JVD  Respiratory: Breath sounds are clear bilaterally, No wheezing, rales or rhonchi  Cardiovascular: S1 and S2, regular rate and rhythm, no Murmurs, gallops or rubs  Gastrointestinal: Bowel Sounds present, soft, nontender, nondistended, no guarding, no rebound  Extremities: No peripheral edema  Vascular: 2+ peripheral pulses  Neurological: A/O x 3, no focal deficits  Musculoskeletal: 5/5 strength b/l upper and lower extremities  Skin: No rashes    MEDICATIONS:  MEDICATIONS  (STANDING):  aspirin enteric coated 81 milliGRAM(s) Oral daily  azithromycin  IVPB      azithromycin  IVPB 500 milliGRAM(s) IV Intermittent every 24 hours  cefTRIAXone Injectable. 1000 milliGRAM(s) IV Push every 24 hours  cefTRIAXone Injectable.      finasteride 5 milliGRAM(s) Oral daily  furosemide    Tablet 20 milliGRAM(s) Oral daily  heparin  Injectable 5000 Unit(s) SubCutaneous three times a day  labetalol 300 milliGRAM(s) Oral two times a day  lidocaine   Patch 1 Patch Transdermal once  lidocaine 1% Injectable 20 milliLiter(s) Local Injection once  pravastatin 80 milliGRAM(s) Oral at bedtime  sodium bicarbonate 325 milliGRAM(s) Oral three times a day  tamsulosin 0.4 milliGRAM(s) Oral at bedtime      LABS: All Labs Reviewed:                        9.5    7.58  )-----------( 245      ( 12 Mar 2020 07:55 )             30.7     03-12    143  |  111<H>  |  20  ----------------------------<  103<H>  3.8   |  23  |  1.78<H>    Ca    8.3<L>      12 Mar 2020 07:55    TPro  5.5<L>  /  Alb  x   /  TBili  x   /  DBili  x   /  AST  x   /  ALT  x   /  AlkPhos  x   03-12    PT/INR - ( 11 Mar 2020 08:35 )   PT: 11.6 sec;   INR: 1.04 ratio         PTT - ( 11 Mar 2020 08:35 )  PTT:30.1 sec  CARDIAC MARKERS ( 11 Mar 2020 08:35 )  <0.015 ng/mL / x     / x     / x     / x          Blood Culture: 03-11 @ 17:00  Organism --  Gram Stain Blood -- Gram Stain   polymorphonuclear leukocytes seen  No organisms seen  by cytocentrifuge  Specimen Source .Body Fluid Pleural Fluid  Culture-Blood --              Assessment and Plan:   	  87M w/PMH CAD, HTN, HLD, BPH, SSS s/p PPM, CKD3, SVT, recurrent Rt pl eff, PTX s/p VATS decortication, Pleurodesis 2016, last admit 2018 for rec Rt hydropneumothorax, presents today c/o Rt sided Upper back pain w/ worsening BRENNER over past 10-14 days.      In ED, Cr 2 (baseline), Trop neg, UA neg, RVP neg, Tmax 100.1, elevated bp (he's taken home meds today), CT ch- Large Rt pl eff, atelectasis, pulm nodule,   CT surgery made aware per ED MD.         Problem/Plan - 1:  ·  Problem: Pleural effusion.  Plan: will cs CTS, pulm, cardio  monitor O2 sats  may need thora- check PF analysis  abx as below  monitor vitals, resp status.      Problem/Plan - 2:  ·  Problem: Dyspnea on exertion.  Plan: likely 2/2 pleural effusion, pna, other  treat underlying cause  monitor O2 sats  cardio/pulm cs for further input   will check TTE.      Problem/Plan - 3:  ·  Problem: Febrile.  Plan: possibly 2/2 pleural effusion, underlying pna  will treat empirically w/ IV rocephin, azithro  monitor vitals  check cultures  ID cs for further recs.      Problem/Plan - 4:  ·  Problem: Essential hypertension.  Plan: w/ elevated bp  resume pt's home meds - dilt, labetalol, lasix  will add prn hydralazine for sbp>160  monitor bp and titrate meds as needed.      Problem/Plan - 5:  ·  Problem: CKD (chronic kidney disease), stage III.  Plan: stable and at baseline   monitor labs.      Problem/Plan - 6:  Problem: Prophylactic measure. Plan: SQH. CHIEF COMPLAINT/diagnosis: right sided effusion/ recurrent effusion    SUBJECTIVE: no complaints; patient comfortable in bed with chest  tube in place.    REVIEW OF SYSTEMS:    CONSTITUTIONAL: No weakness, fevers or chills  EYES/ENT: No visual changes;  No vertigo or throat pain   NECK: No pain or stiffness  RESPIRATORY: No cough, wheezing, hemoptysis; No shortness of breath  CARDIOVASCULAR: No chest pain or palpitations  GASTROINTESTINAL: No abdominal or epigastric pain. No nausea, vomiting, or hematemesis; No diarrhea or constipation. No melena or hematochezia.  GENITOURINARY: No dysuria, frequency or hematuria  NEUROLOGICAL: No numbness or weakness  SKIN: No itching, burning, rashes, or lesions   All other review of systems is negative unless indicated above    Vital Signs Last 24 Hrs  T(C): 37 (12 Mar 2020 08:04), Max: 37.3 (11 Mar 2020 11:51)  T(F): 98.6 (12 Mar 2020 08:04), Max: 99.1 (11 Mar 2020 11:51)  HR: 59 (12 Mar 2020 08:04) (59 - 72)  BP: 144/38 (12 Mar 2020 08:04) (133/42 - 174/60)  BP(mean): 93 (11 Mar 2020 09:38) (93 - 93)  RR: 18 (12 Mar 2020 08:04) (18 - 22)  SpO2: 96% (12 Mar 2020 08:04) (96% - 100%)    I&O's Summary    11 Mar 2020 07:01  -  12 Mar 2020 07:00  --------------------------------------------------------  IN: 0 mL / OUT: 1125 mL / NET: -1125 mL    12 Mar 2020 07:01  -  12 Mar 2020 09:01  --------------------------------------------------------  IN: 0 mL / OUT: 150 mL / NET: -150 mL        CAPILLARY BLOOD GLUCOSE          PHYSICAL EXAM:    Constitutional: NAD, awake and alert, well-developed  HEENT: PERR, EOMI, Normal Hearing, MMM  Neck: Soft and supple, No LAD, No JVD  Respiratory: Breath sounds are clear bilaterally, No wheezing, rales or rhonchi  Cardiovascular: S1 and S2, regular rate and rhythm, no Murmurs, gallops or rubs  Gastrointestinal: Bowel Sounds present, soft, nontender, nondistended, no guarding, no rebound  Extremities: No peripheral edema  Vascular: 2+ peripheral pulses  Neurological: A/O x 3, no focal deficits  Musculoskeletal: 5/5 strength b/l upper and lower extremities  Skin: No rashes    MEDICATIONS:  MEDICATIONS  (STANDING):  aspirin enteric coated 81 milliGRAM(s) Oral daily  azithromycin  IVPB      azithromycin  IVPB 500 milliGRAM(s) IV Intermittent every 24 hours  cefTRIAXone Injectable. 1000 milliGRAM(s) IV Push every 24 hours  cefTRIAXone Injectable.      finasteride 5 milliGRAM(s) Oral daily  furosemide    Tablet 20 milliGRAM(s) Oral daily  heparin  Injectable 5000 Unit(s) SubCutaneous three times a day  labetalol 300 milliGRAM(s) Oral two times a day  lidocaine   Patch 1 Patch Transdermal once  lidocaine 1% Injectable 20 milliLiter(s) Local Injection once  pravastatin 80 milliGRAM(s) Oral at bedtime  sodium bicarbonate 325 milliGRAM(s) Oral three times a day  tamsulosin 0.4 milliGRAM(s) Oral at bedtime      LABS: All Labs Reviewed:                        9.5    7.58  )-----------( 245      ( 12 Mar 2020 07:55 )             30.7     03-12    143  |  111<H>  |  20  ----------------------------<  103<H>  3.8   |  23  |  1.78<H>    Ca    8.3<L>      12 Mar 2020 07:55    TPro  5.5<L>  /  Alb  x   /  TBili  x   /  DBili  x   /  AST  x   /  ALT  x   /  AlkPhos  x   03-12    PT/INR - ( 11 Mar 2020 08:35 )   PT: 11.6 sec;   INR: 1.04 ratio         PTT - ( 11 Mar 2020 08:35 )  PTT:30.1 sec  CARDIAC MARKERS ( 11 Mar 2020 08:35 )  <0.015 ng/mL / x     / x     / x     / x          Blood Culture: 03-11 @ 17:00  Organism --  Gram Stain Blood -- Gram Stain   polymorphonuclear leukocytes seen  No organisms seen  by cytocentrifuge  Specimen Source .Body Fluid Pleural Fluid  Culture-Blood --              Assessment and Plan:   	  87M w/PMH CAD, HTN, HLD, BPH, SSS s/p PPM, CKD3, SVT, recurrent Rt pl eff, PTX s/p VATS decortication, Pleurodesis 2016, last admit 2018 for rec Rt hydropneumothorax, presents today c/o Rt sided Upper back pain w/ worsening BRENNER over past 10-14 days.      In ED, Cr 2 (baseline), Trop neg, UA neg, RVP neg, Tmax 100.1, elevated bp (he's taken home meds today), CT ch- Large Rt pl eff, atelectasis, pulm nodule,   CT surgery made aware per ED MD.         Problem/Plan - 1:  ·  Problem: Pleural effusion.  Plan: will cs CTS, pulm, cardio  monitor O2 sats  may need thora- check PF analysis  abx as below  monitor vitals, resp status.      Problem/Plan - 2:  ·  Problem: Dyspnea on exertion.  Plan: likely 2/2 pleural effusion, pna, other  treat underlying cause  monitor O2 sats  cardio/pulm cs for further input   will check TTE.      Problem/Plan - 3:  ·  Problem: Febrile.  Plan: possibly 2/2 pleural effusion, underlying pna  will treat empirically w/ IV rocephin, azithro  monitor vitals  check cultures  ID cs for further recs.      Problem/Plan - 4:  ·  Problem: Essential hypertension.  Plan: w/ elevated bp  resume pt's home meds - dilt, labetalol, lasix  will add prn hydralazine for sbp>160  monitor bp and titrate meds as needed.      Problem/Plan - 5:  ·  Problem: CKD (chronic kidney disease), stage III.  Plan: stable and at baseline   monitor labs.      Problem/Plan - 6:  Problem: Prophylactic measure. Plan: SQH.

## 2020-03-12 NOTE — CONSULT NOTE ADULT - ASSESSMENT
86 y/o Male with h/o CAD, HTN, HLD, BPH, SSS s/p PPM, CKD stage 3, SVT, recurrent right pleural effusion, PTX s/p VATS decortication, pleurodesis 2016 was admitted on 3/11 for right sided upper back pain associated with worsening BRENNER over past 10-14 days.  Pt states it's similar to when he's pleural effusion in the past.  He denies fever/chills. In ER he was noted with fever to 100.1F and received ceftriaxone and azithromycin.     1. Dyspnea. Right pleuritic chest pain. Large right pleural effusion with RLL pulmonary atelectasis. 88 y/o Male with h/o CAD, HTN, HLD, BPH, SSS s/p PPM, CKD stage 3, SVT, recurrent right pleural effusion, PTX s/p VATS decortication, pleurodesis 2016 was admitted on 3/11 for right sided upper back pain associated with worsening BRENNER over past 10-14 days.  Pt states it's similar to when he's pleural effusion in the past.  He denies fever/chills. In ER he was noted with fever to 100.1F and received ceftriaxone and azithromycin.     1. Dyspnea. Right pleuritic chest pain. Large right pleural effusion with RLL pulmonary atelectasis. Trapped lung. CRF stage 3.  -no fever or cough  -received ceftriaxone and azithromycin  -no clinical evidence of pneumonia; jin trapped lung  -would d/c further abx coverage  -fluid id being drained  -f/u culture  -old chart reviewed to assess prior cultures  -monitor temps  -f/u CBC  -supportive care  2. Other issues:   -care per medicine

## 2020-03-12 NOTE — CONSULT NOTE ADULT - SUBJECTIVE AND OBJECTIVE BOX
Patient is a 87y old  Male who presents with a chief complaint of BRENNER, Rt back pain (11 Mar 2020 18:28)      HPI:  HPI:  87M w/PMH CAD, HTN, HLD, BPH, SSS s/p PPM, CKD3, SVT, recurrent Rt pl eff, PTX s/p VATS decortication, Pleurodesis , last admit 2018 for rec Rt hydropneumothorax, presents today c/o Rt sided Upper back pain w/ worsening BRENNER over past 10-14 days.  Pt states it's similar to when he's pleural effusion in the past.  He denies chest pain, cough, fever/chills, n/v/d, abd pain, dysuria, weakness.      In ED, Cr 2 (baseline), Trop neg, UA neg, RVP neg, Tmax 100.1, elevated bp (he's taken home meds today), CT ch- Large Rt pl eff, atelectasis, pulm nodule,   CT surgery made aware per ED MD.      pt is well known from his prior admissions / not recently followed up   now admitted and required repeat CT right chest for further eval  no smoker    Allergies  amlodipine (Unknown)  Crestor (Other)  Lipitor (Unknown)    Social History:   LIVES W/ WIFE- SHE'S CURRENTLY IN REHAB   IND ADLs  DENIES SMOKING/ETOH    Home Medications:  Aspirin Enteric Coated 81 mg oral delayed release tablet: 1 tab(s) orally once a day (11 Mar 2020 10:57)  Co Q-10 100 mg oral capsule: 1 cap(s) orally once a day (11 Mar 2020 11:40)  dilTIAZem 180 mg/24 hours oral capsule, extended release: 1 cap(s) orally once a day (11 Mar 2020 10:57)  finasteride 5 mg oral tablet: 1 tab(s) orally once a day (at bedtime) (11 Mar 2020 10:57)  furosemide 20 mg oral tablet: 1 tab(s) orally once a day (11 Mar 2020 10:57)  labetalol 300 mg oral tablet: 1 tab(s) orally 2 times a day (11 Mar 2020 11:40)  pravastatin 80 mg oral tablet: 1 tab(s) orally once a day (11 Mar 2020 10:57)  Ranexa 500 mg oral tablet, extended release: 1 tab(s) orally once a day (11 Mar 2020 11:40)  sodium bicarbonate 325 mg oral tablet: 1 tab(s) orally 3 times a day (11 Mar 2020 10:57)  tamsulosin 0.4 mg oral capsule: 1 cap(s) orally once a day (11 Mar 2020 10:57)  Vitamin B12 1000 mcg oral tablet: 1 tab(s) orally once a day (11 Mar 2020 10:57)  Vitamin D3 2000 intl units oral tablet: 1 tab(s) orally once a day (11 Mar 2020 10:57)  Welchol 625 mg oral tablet: 1 tab(s) orally 2 times a day (11 Mar 2020 10:57)    PAST MEDICAL & SURGICAL HISTORY:  Spinal stenosis  CKD (chronic kidney disease), stage III  SSS (sick sinus syndrome)  Pleural effusion  Hyperlipidemia  BPH (benign prostatic hyperplasia)  CAD (coronary artery disease)  Hypertension  Status post lumbar spinal fusion      PREVIOUS DIAGNOSTIC TESTING:      MEDICATIONS  (STANDING):  aspirin enteric coated 81 milliGRAM(s) Oral daily  azithromycin  IVPB      azithromycin  IVPB 500 milliGRAM(s) IV Intermittent every 24 hours  cefTRIAXone Injectable. 1000 milliGRAM(s) IV Push every 24 hours  cefTRIAXone Injectable.      finasteride 5 milliGRAM(s) Oral daily  furosemide    Tablet 20 milliGRAM(s) Oral daily  heparin  Injectable 5000 Unit(s) SubCutaneous three times a day  labetalol 300 milliGRAM(s) Oral two times a day  lidocaine   Patch 1 Patch Transdermal once  lidocaine 1% Injectable 20 milliLiter(s) Local Injection once  pravastatin 80 milliGRAM(s) Oral at bedtime  sodium bicarbonate 325 milliGRAM(s) Oral three times a day  tamsulosin 0.4 milliGRAM(s) Oral at bedtime    MEDICATIONS  (PRN):  acetaminophen   Tablet .. 650 milliGRAM(s) Oral every 4 hours PRN Mild Pain (1 - 3)  albuterol/ipratropium for Nebulization 3 milliLiter(s) Nebulizer every 6 hours PRN Shortness of Breath and/or Wheezing  hydrALAZINE Injectable 10 milliGRAM(s) IV Push every 6 hours PRN SBP>160  ondansetron Injectable 4 milliGRAM(s) IV Push every 6 hours PRN Nausea  oxycodone    5 mG/acetaminophen 325 mG 1 Tablet(s) Oral every 4 hours PRN Severe Pain (7 - 10)  traMADol 50 milliGRAM(s) Oral every 6 hours PRN Moderate Pain (4 - 6)      FAMILY HISTORY:  No pertinent family history in first degree relatives  SOCIAL HISTORY:  non smoker    REVIEW OF SYSTEM:  Pertinent items are noted in HPI.  Constitutional negative for chills, fevers, sweats and some weight loss  throat, and face:  negative for epistaxis, pos nasal congestion, sore throat and   tinnitus  Respiratory: negative for cough,pos dyspnea on exertion, pleuritic chest pain  and wheezing  Cardiovascular:  pos for right sided chest pain, dyspnea and palpitations  Gastrointestinal: negative for abdominal pain, diarrhea, nausea and vomiting  Genitourinary: negative for dysuria, frequency and urinary incontinence  Skin:  negative for redness, rash, pruritus, swelling, dryness and   fissures  Hematologic/lymphatic: negative for bleeding and easy bruising      Vital Signs Last 24 Hrs  T(C): 37 (12 Mar 2020 08:04), Max: 37.3 (11 Mar 2020 11:51)  T(F): 98.6 (12 Mar 2020 08:04), Max: 99.1 (11 Mar 2020 11:51)  HR: 59 (12 Mar 2020 08:04) (59 - 72)  BP: 144/38 (12 Mar 2020 08:04) (133/42 - 174/60)  BP(mean): 93 (11 Mar 2020 09:38) (93 - 93)  RR: 18 (12 Mar 2020 08:04) (18 - 22)  SpO2: 96% (12 Mar 2020 08:04) (96% - 100%)    I&O's Summary    11 Mar 2020 07:01  -  12 Mar 2020 07:00  --------------------------------------------------------  IN: 0 mL / OUT: 1125 mL / NET: -1125 mL      PHYSICAL EXAM  General Appearance: cooperative, no acute distress, right sided CT in place   HEENT: PERRL, conjunctiva clear,  Neck: Supple, , no adenopathy  Lungs: decreased breath sounds right mid-lower lung fields no wheezing, left side clear  Heart: Regular rate and rhythm, S1, S2 normal, no murmur, rub or gallop  Abdomen: Soft, non-tender, bowel sounds active , no hepatosplenomegaly  Extremities: no cyanosis or edema, no joint swelling  Skin: Skin color, texture normal, no rashes   Neurologic: Alert and oriented X3 , cranial nerves intact, sensory and motor normal,    ECG:    LABS:                          12.0   9.04  )-----------( 294      ( 11 Mar 2020 08:35 )             36.2     03-11    140  |  110<H>  |  19  ----------------------------<  138<H>  3.9   |  22  |  2.00<H>    Ca    8.8      11 Mar 2020 08:35    TPro  6.7  /  Alb  3.1<L>  /  TBili  0.5  /  DBili  x   /  AST  13<L>  /  ALT  12  /  AlkPhos  88      CARDIAC MARKERS ( 11 Mar 2020 08:35 )  <0.015 ng/mL / x     / x     / x     / x            Pro BNP  799  @ 08:35  D Dimer  --  @ 08:35    PT/INR - ( 11 Mar 2020 08:35 )   PT: 11.6 sec;   INR: 1.04 ratio         PTT - ( 11 Mar 2020 08:35 )  PTT:30.1 sec  Urinalysis Basic - ( 11 Mar 2020 13:15 )    Color: Yellow / Appearance: Clear / S.015 / pH: x  Gluc: x / Ketone: Trace  / Bili: Negative / Urobili: Negative mg/dL   Blood: x / Protein: 15 mg/dL / Nitrite: Negative   Leuk Esterase: Negative / RBC: 0-2 /HPF / WBC 0-2   Sq Epi: x / Non Sq Epi: Occasional / Bacteria: Occasional      < from: Xray Chest 1 View- PORTABLE-Urgent (20 @ 09:12) >  PROCEDURE DATE:  2020    < from: CT Chest No Cont (20 @ 09:39) >  mediastinal margin in the right upper lobe on image #61/series 2.    PLEURA: Large right pleural effusion. There is soft tissue attenuation circumferentially around the pleura, assessment is limited without intravenous contrast. There is a small amount of air, likely in the right pleural space as seen on image #38/series 2, correlate with recent intervention.    MEDIASTINUM AND CARLITA: No lymphadenopathy.    VESSELS: The thoracic aorta and main pulmonary artery are normal in caliber. There is heavy coronary artery calcification.    HEART: Heart size is normal. There are pacemaker leads in the right atrium and right ventricle. Trace pericardial effusion.    CHEST WALL AND LOWER NECK: The thyroid gland is within normal limits. There is no supraclavicular or axillary lymphadenopathy.There is a pacemaker generator pack in the left upper chest wall.    VISUALIZED UPPER ABDOMEN: The imaged adrenal glands are within normal limits. The imaged portions of the unenhanced liver, spleen, pancreas are within normal limits. There is a partially imaged large cyst at the upper pole of the left kidney measuring 8.3 x 5.8 cm.    BONES: Multilevel degenerative change of the thoracic spine with osteophytes, degenerative disc disease and facet joint arthropathy.    IMPRESSION:     Large rightpleural effusion with pleural base soft tissue, concerning for metastasis.    There are 2 pulmonary nodules measuring up to 1.2 cm as above.    Complete atelectasis of the right middle and lower lobes.    Postsurgical changes in the right lung apex.    Other incidental findings are as above.    < end of copied text >        INTERPRETATION:  AP erect chest on 2020 at 9:02 AM. Patient has right-sided chest pain.    Heart suggest normal size. Left-sided pacemaker again noted.    Suture line in the right paratracheal area is better seen on study of May 26, 2018.    On the study there was a loculated pneumothorax at the right base with adjacent atelectasis.    Present film shows a large right effusion.    IMPRESSION: Large right effusion.    < end of copied text >        RADIOLOGY & ADDITIONAL STUDIES:    ct scan images from 2018 and current ct scans reveiwed

## 2020-03-12 NOTE — CONSULT NOTE ADULT - SUBJECTIVE AND OBJECTIVE BOX
Patient is a 87y old  Male who presents with a chief complaint of BRENNER, Rt back pain (12 Mar 2020 09:00)    HPI:  HPI:  88 y/o Male with h/o CAD, HTN, HLD, BPH, SSS s/p PPM, CKD stage 3, SVT, recurrent right pleural effusion, PTX s/p VATS decortication, pleurodesis 2016 was admitted on 3/11 for right sided upper back pain associated with worsening BRENNER over past 10-14 days.  Pt states it's similar to when he's pleural effusion in the past.  He denies fever/chills. In ER he was noted with fever to 100.1F and received ceftriaxone and azithromycin.      PAST MEDICAL & SURGICAL HISTORY:  Spinal stenosis  CKD (chronic kidney disease), stage III  SSS (sick sinus syndrome)  Pleural effusion  Hyperlipidemia  BPH (benign prostatic hyperplasia)  CAD (coronary artery disease)  Hypertension  Status post lumbar spinal fusion    Meds: per reconciliation sheet, noted below  MEDICATIONS  (STANDING):  aspirin enteric coated 81 milliGRAM(s) Oral daily  azithromycin  IVPB      azithromycin  IVPB 500 milliGRAM(s) IV Intermittent every 24 hours  cefTRIAXone Injectable. 1000 milliGRAM(s) IV Push every 24 hours  cefTRIAXone Injectable.      finasteride 5 milliGRAM(s) Oral daily  furosemide    Tablet 20 milliGRAM(s) Oral daily  heparin  Injectable 5000 Unit(s) SubCutaneous three times a day  labetalol 300 milliGRAM(s) Oral two times a day  lidocaine   Patch 1 Patch Transdermal once  lidocaine 1% Injectable 20 milliLiter(s) Local Injection once  pravastatin 80 milliGRAM(s) Oral at bedtime  sodium bicarbonate 325 milliGRAM(s) Oral three times a day  tamsulosin 0.4 milliGRAM(s) Oral at bedtime    MEDICATIONS  (PRN):  acetaminophen   Tablet .. 650 milliGRAM(s) Oral every 4 hours PRN Mild Pain (1 - 3)  albuterol/ipratropium for Nebulization 3 milliLiter(s) Nebulizer every 6 hours PRN Shortness of Breath and/or Wheezing  hydrALAZINE Injectable 10 milliGRAM(s) IV Push every 6 hours PRN SBP>160  ondansetron Injectable 4 milliGRAM(s) IV Push every 6 hours PRN Nausea  oxycodone    5 mG/acetaminophen 325 mG 1 Tablet(s) Oral every 4 hours PRN Severe Pain (7 - 10)  traMADol 50 milliGRAM(s) Oral every 6 hours PRN Moderate Pain (4 - 6)    Allergies    amlodipine (Unknown)  Crestor (Other)  Lipitor (Unknown)    Intolerances      Social: no smoking, no alcohol, no illegal drugs; no recent travel, no exposure to TB  FAMILY HISTORY:  No pertinent family history in first degree relatives    no history of premature cardiovascular disease in first degree relatives    ROS: the patient denies fever, no chills, no HA, no seizures, no dizziness, no sore throat, no nasal congestion, no blurry vision, has right side pleuritic CP, no palpitations, has SOB, no cough, no abdominal pain, no diarrhea, no N/V, no dysuria, no leg pain, no claudication, no rash, no joint aches, no rectal pain or bleeding, no night sweats  All other systems reviewed and are negative    Vital Signs Last 24 Hrs  T(C): 37 (12 Mar 2020 08:04), Max: 37.3 (11 Mar 2020 11:51)  T(F): 98.6 (12 Mar 2020 08:04), Max: 99.1 (11 Mar 2020 11:51)  HR: 68 (12 Mar 2020 09:58) (59 - 72)  BP: 144/38 (12 Mar 2020 08:04) (133/42 - 169/58)  BP(mean): --  RR: 18 (12 Mar 2020 08:04) (18 - 18)  SpO2: 96% (12 Mar 2020 08:04) (96% - 100%)  Daily     Daily     PE:    Constitutional:  No acute distress  HEENT: NC/AT, EOMI, PERRLA, conjunctivae clear; ears and nose atraumatic; pharynx benign  Neck: supple; thyroid not palpable  Back: no tenderness  Respiratory: respiratory effort normal; crackles at right base; decreased BS at right base  Cardiovascular: S1S2 regular, no murmurs  Abdomen: soft, not tender, not distended, positive BS; no liver or spleen organomegaly  Genitourinary: no suprapubic tenderness  Lymphatic: no LN palpable  Musculoskeletal: no muscle tenderness, no joint swelling or tenderness  Extremities: no pedal edema  Neurological/ Psychiatric: AxOx3, moving all extremities  Skin: no rashes; no palpable lesions    Labs: all available labs reviewed                        9.5    7.58  )-----------( 245      ( 12 Mar 2020 07:55 )             30.7     03-12    143  |  111<H>  |  20  ----------------------------<  103<H>  3.8   |  23  |  1.78<H>    Ca    8.3<L>      12 Mar 2020 07:55    TPro  5.5<L>  /  Alb  x   /  TBili  x   /  DBili  x   /  AST  x   /  ALT  x   /  AlkPhos  x   03-12     LIVER FUNCTIONS - ( 12 Mar 2020 07:55 )  Alb: x     / Pro: 5.5 gm/dL / ALK PHOS: x     / ALT: x     / AST: x     / GGT: x           Urinalysis Basic - ( 11 Mar 2020 13:15 )    Color: Yellow / Appearance: Clear / S.015 / pH: x  Gluc: x / Ketone: Trace  / Bili: Negative / Urobili: Negative mg/dL   Blood: x / Protein: 15 mg/dL / Nitrite: Negative   Leuk Esterase: Negative / RBC: 0-2 /HPF / WBC 0-2   Sq Epi: x / Non Sq Epi: Occasional / Bacteria: Occasional      Culture - Fungal, Body Fluid (collected 11 Mar 2020 17:00)  Source: .Body Fluid Pleural Fluid  Preliminary Report (12 Mar 2020 08:22):    Testing in progress    Culture - Body Fluid with Gram Stain (collected 11 Mar 2020 17:00)  Source: .Body Fluid Pleural Fluid  Gram Stain (12 Mar 2020 06:05):    polymorphonuclear leukocytes seen    No organisms seen    by cytocentrifuge    Radiology: all available radiological tests reviewed    < from: CT Chest No Cont (20 @ 09:39) >  Large rightpleural effusion with pleural base soft tissue, concerning for metastasis.  There are 2 pulmonary nodules measuring up to 1.2 cm as above.  Complete atelectasis of the right middle and lower lobes.  Postsurgical changes in the right lung apex.    < end of copied text >      Advanced directives addressed: full resuscitation

## 2020-03-12 NOTE — PROGRESS NOTE ADULT - SUBJECTIVE AND OBJECTIVE BOX
Subjective:  88yo M with PMH of CAD (on ASA), BPH, CKD III, HLD, HTN, SSS s/p PPM, spinal stenosis s/p fusion and R VATS, pleurodesis 5/2016 admitted 3/11 with recurrent right pleural effusion. 3/11 R PTC placed under US guidance. One liter serosang output. Of note CT Chest revealed RUL (1.2 cm) and SAMREEN (0.8 cm) nodules.   3/12/2020 Pt seen, breathing improved. CT to waterseal.    Vital Signs:  Vital Signs Last 24 Hrs  T(C): 37 (03-12-20 @ 08:04), Max: 37.1 (03-11-20 @ 14:36)  T(F): 98.6 (03-12-20 @ 08:04), Max: 98.7 (03-11-20 @ 14:36)  HR: 68 (03-12-20 @ 09:58) (59 - 72)  BP: 144/38 (03-12-20 @ 08:04) (133/42 - 167/60)  RR: 18 (03-12-20 @ 08:04) (18 - 18)  SpO2: 96% (03-12-20 @ 08:04) (96% - 100%) on (O2)    Telemetry/Alarms:    Relevant labs, radiology and Medications reviewed                        9.5    7.58  )-----------( 245      ( 12 Mar 2020 07:55 )             30.7     03-12    143  |  111<H>  |  20  ----------------------------<  103<H>  3.8   |  23  |  1.78<H>    Ca    8.3<L>      12 Mar 2020 07:55    TPro  5.5<L>  /  Alb  x   /  TBili  x   /  DBili  x   /  AST  x   /  ALT  x   /  AlkPhos  x   03-12    PT/INR - ( 11 Mar 2020 08:35 )   PT: 11.6 sec;   INR: 1.04 ratio         PTT - ( 11 Mar 2020 08:35 )  PTT:30.1 sec  MEDICATIONS  (STANDING):  aspirin enteric coated 81 milliGRAM(s) Oral daily  finasteride 5 milliGRAM(s) Oral daily  furosemide    Tablet 20 milliGRAM(s) Oral daily  heparin  Injectable 5000 Unit(s) SubCutaneous three times a day  labetalol 300 milliGRAM(s) Oral two times a day  lidocaine   Patch 1 Patch Transdermal once  lidocaine 1% Injectable 20 milliLiter(s) Local Injection once  pravastatin 80 milliGRAM(s) Oral at bedtime  sodium bicarbonate 325 milliGRAM(s) Oral three times a day  tamsulosin 0.4 milliGRAM(s) Oral at bedtime    MEDICATIONS  (PRN):  acetaminophen   Tablet .. 650 milliGRAM(s) Oral every 4 hours PRN Mild Pain (1 - 3)  ALBUTerol    90 MICROgram(s) HFA Inhaler 2 Puff(s) Inhalation every 6 hours PRN Shortness of Breath and/or Wheezing  hydrALAZINE Injectable 10 milliGRAM(s) IV Push every 6 hours PRN SBP>160  ondansetron Injectable 4 milliGRAM(s) IV Push every 6 hours PRN Nausea  oxycodone    5 mG/acetaminophen 325 mG 1 Tablet(s) Oral every 4 hours PRN Severe Pain (7 - 10)  traMADol 50 milliGRAM(s) Oral every 6 hours PRN Moderate Pain (4 - 6)      Physical exam  Gen NAD  Neuro AAOx3  Card RRR  Pulm equal expansion, decreased bases  Abd soft  Ext warm    Tubes: right pigtail to WS, no AL, 1.3L out    I&O's Summary    11 Mar 2020 07:01  -  12 Mar 2020 07:00  --------------------------------------------------------  IN: 0 mL / OUT: 1125 mL / NET: -1125 mL    12 Mar 2020 07:01  -  12 Mar 2020 14:30  --------------------------------------------------------  IN: 0 mL / OUT: 250 mL / NET: -250 mL        Assessment  87y Male  w/ PAST MEDICAL & SURGICAL HISTORY:  Spinal stenosis  CKD (chronic kidney disease), stage III  SSS (sick sinus syndrome)  Pleural effusion  Hyperlipidemia  BPH (benign prostatic hyperplasia)  CAD (coronary artery disease)  Hypertension  Status post lumbar spinal fusion  admitted with complaints of Patient is a 87y old  Male who presents with a chief complaint of BRENNER, Rt back pain (12 Mar 2020 10:06)  .  88yo M with PMH of CAD (on ASA), BPH, CKD III, HLD, HTN, SSS s/p PPM, spinal stenosis s/p fusion and R VATS, pleurodesis 5/2016 admitted 3/11 with recurrent right pleural effusion. 3/11 R PTC placed under US guidance. One liter serosang output. Of note CT Chest revealed RUL (1.2 cm) and SAMREEN (0.8 cm) nodules.       PLAN  cont CT to waterseal  daily CXR while in  f/u all labs sent    Discussed with Cardiothoracic Team at AM rounds. Subjective:  86yo M with PMH of CAD (on ASA), BPH, CKD III, HLD, HTN, SSS s/p PPM, spinal stenosis s/p fusion and R VATS, pleurodesis 5/2016 admitted 3/11 with recurrent right pleural effusion. 3/11 R PTC placed under US guidance. One liter serosang output. Of note CT Chest revealed RUL (1.2 cm) and SAMREEN (0.8 cm) nodules.   3/12/2020 Pt seen, breathing improved. CT to waterseal.    Vital Signs:  Vital Signs Last 24 Hrs  T(C): 37 (03-12-20 @ 08:04), Max: 37.1 (03-11-20 @ 14:36)  T(F): 98.6 (03-12-20 @ 08:04), Max: 98.7 (03-11-20 @ 14:36)  HR: 68 (03-12-20 @ 09:58) (59 - 72)  BP: 144/38 (03-12-20 @ 08:04) (133/42 - 167/60)  RR: 18 (03-12-20 @ 08:04) (18 - 18)  SpO2: 96% (03-12-20 @ 08:04) (96% - 100%) on (O2)    Telemetry/Alarms:    Relevant labs, radiology and Medications reviewed                        9.5    7.58  )-----------( 245      ( 12 Mar 2020 07:55 )             30.7     03-12    143  |  111<H>  |  20  ----------------------------<  103<H>  3.8   |  23  |  1.78<H>    Ca    8.3<L>      12 Mar 2020 07:55    TPro  5.5<L>  /  Alb  x   /  TBili  x   /  DBili  x   /  AST  x   /  ALT  x   /  AlkPhos  x   03-12    PT/INR - ( 11 Mar 2020 08:35 )   PT: 11.6 sec;   INR: 1.04 ratio         PTT - ( 11 Mar 2020 08:35 )  PTT:30.1 sec    CXR- still w effusion, chest tube in place,    MEDICATIONS  (STANDING):  aspirin enteric coated 81 milliGRAM(s) Oral daily  finasteride 5 milliGRAM(s) Oral daily  furosemide    Tablet 20 milliGRAM(s) Oral daily  heparin  Injectable 5000 Unit(s) SubCutaneous three times a day  labetalol 300 milliGRAM(s) Oral two times a day  lidocaine   Patch 1 Patch Transdermal once  lidocaine 1% Injectable 20 milliLiter(s) Local Injection once  pravastatin 80 milliGRAM(s) Oral at bedtime  sodium bicarbonate 325 milliGRAM(s) Oral three times a day  tamsulosin 0.4 milliGRAM(s) Oral at bedtime    MEDICATIONS  (PRN):  acetaminophen   Tablet .. 650 milliGRAM(s) Oral every 4 hours PRN Mild Pain (1 - 3)  ALBUTerol    90 MICROgram(s) HFA Inhaler 2 Puff(s) Inhalation every 6 hours PRN Shortness of Breath and/or Wheezing  hydrALAZINE Injectable 10 milliGRAM(s) IV Push every 6 hours PRN SBP>160  ondansetron Injectable 4 milliGRAM(s) IV Push every 6 hours PRN Nausea  oxycodone    5 mG/acetaminophen 325 mG 1 Tablet(s) Oral every 4 hours PRN Severe Pain (7 - 10)  traMADol 50 milliGRAM(s) Oral every 6 hours PRN Moderate Pain (4 - 6)      Physical exam  Gen NAD  Neuro AAOx3  Card RRR  Pulm equal expansion, decreased bases  Abd soft  Ext warm    Tubes: right pigtail to WS, no AL, 1.3L out    I&O's Summary    11 Mar 2020 07:01  -  12 Mar 2020 07:00  --------------------------------------------------------  IN: 0 mL / OUT: 1125 mL / NET: -1125 mL    12 Mar 2020 07:01  -  12 Mar 2020 14:30  --------------------------------------------------------  IN: 0 mL / OUT: 250 mL / NET: -250 mL        Assessment  87y Male  w/ PAST MEDICAL & SURGICAL HISTORY:  Spinal stenosis  CKD (chronic kidney disease), stage III  SSS (sick sinus syndrome)  Pleural effusion  Hyperlipidemia  BPH (benign prostatic hyperplasia)  CAD (coronary artery disease)  Hypertension  Status post lumbar spinal fusion  admitted with complaints of Patient is a 87y old  Male who presents with a chief complaint of BRENNER, Rt back pain (12 Mar 2020 10:06)  .  86yo M with PMH of CAD (on ASA), BPH, CKD III, HLD, HTN, SSS s/p PPM, spinal stenosis s/p fusion and R VATS, pleurodesis 5/2016 admitted 3/11 with recurrent right pleural effusion. 3/11 R PTC placed under US guidance. One liter serosang output. Of note CT Chest revealed RUL (1.2 cm) and SAMREEN (0.8 cm) nodules.       PLAN  cont CT to waterseal  daily CXR while in  f/u all labs sent    Discussed with Cardiothoracic Team at AM rounds.

## 2020-03-12 NOTE — CONSULT NOTE ADULT - ASSESSMENT
87M w/PMH CAD, HTN, HLD, BPH, SSS s/p PPM, CKD3, SVT, recurrent Rt pl eff, PTX s/p VATS decortication, Pleurodesis 2016, last admit 2018 for rec Rt hydropneumothorax, presents today c/o Rt sided Upper back pain w/ worsening BRENNER over past 10-14 days.  Pt states it's similar to when he's pleural effusion in the past.  He denies chest pain, cough, fever/chills, n/v/d, abd pain, dysuria, weakness.      In ED, Cr 2 (baseline), Trop neg, UA neg, RVP neg, Tmax 100.1, elevated bp (he's taken home meds today), CT ch- Large Rt pl eff, atelectasis, pulm nodule,   CT surgery made aware per ED MD.      pt is well known from his prior admissions / not recently followed up   now admitted and required repeat CT right chest for further eval    Assessment / Plan:  Recurrent right sided pleural effusion  failed prior pleurodesis  Hx hydropneumothorax spont  prior VATS decortication / failed  r/o malignancy with overall presentation  no evidence of acute infection but not ruled out / low grade temp  stable resp status    agree with CT placement  get pleural fluid analysis / cytology and cultures  ask for fluid cell count and LDH  thoracic surgery eval in progress  ct abd / pelvis   will look at outpt workup / oupt PET scan  Dr Valadez is covering 3/13-3/16

## 2020-03-13 NOTE — PROGRESS NOTE ADULT - SUBJECTIVE AND OBJECTIVE BOX
CHIEF COMPLAINT/Diagnosis: right sided pleural effusions/ chest tube    SUBJECTIVE: no complaints    REVIEW OF SYSTEMS:    CONSTITUTIONAL: No weakness, fevers or chills  EYES/ENT: No visual changes;  No vertigo or throat pain   NECK: No pain or stiffness  RESPIRATORY: No cough, wheezing, hemoptysis; No shortness of breath  CARDIOVASCULAR: No chest pain or palpitations  GASTROINTESTINAL: No abdominal or epigastric pain. No nausea, vomiting, or hematemesis; No diarrhea or constipation. No melena or hematochezia.  GENITOURINARY: No dysuria, frequency or hematuria  NEUROLOGICAL: No numbness or weakness  SKIN: No itching, burning, rashes, or lesions   All other review of systems is negative unless indicated above    Vital Signs Last 24 Hrs  T(C): 37.1 (13 Mar 2020 15:10), Max: 37.1 (13 Mar 2020 15:10)  T(F): 98.7 (13 Mar 2020 15:10), Max: 98.7 (13 Mar 2020 15:10)  HR: 59 (13 Mar 2020 15:10) (59 - 64)  BP: 151/32 (13 Mar 2020 15:10) (137/40 - 154/45)  BP(mean): --  RR: 18 (13 Mar 2020 15:10) (18 - 18)  SpO2: 97% (13 Mar 2020 15:10) (96% - 97%)    I&O's Summary    12 Mar 2020 07:01  -  13 Mar 2020 07:00  --------------------------------------------------------  IN: 0 mL / OUT: 650 mL / NET: -650 mL    13 Mar 2020 07:01  -  13 Mar 2020 15:28  --------------------------------------------------------  IN: 0 mL / OUT: 200 mL / NET: -200 mL        CAPILLARY BLOOD GLUCOSE          PHYSICAL EXAM:    Constitutional: NAD, awake and alert, well-developed  HEENT: PERR, EOMI, Normal Hearing, MMM  Neck: Soft and supple, No LAD, No JVD  Respiratory: Breath sounds are clear bilaterally, No wheezing, rales or rhonchi  Cardiovascular: S1 and S2, regular rate and rhythm, no Murmurs, gallops or rubs  Gastrointestinal: Bowel Sounds present, soft, nontender, nondistended, no guarding, no rebound  Extremities: No peripheral edema  Vascular: 2+ peripheral pulses  Neurological: A/O x 3, no focal deficits  Musculoskeletal: 5/5 strength b/l upper and lower extremities  Skin: No rashes    MEDICATIONS:  MEDICATIONS  (STANDING):  aspirin enteric coated 81 milliGRAM(s) Oral daily  finasteride 5 milliGRAM(s) Oral daily  furosemide    Tablet 20 milliGRAM(s) Oral daily  heparin  Injectable 5000 Unit(s) SubCutaneous three times a day  labetalol 300 milliGRAM(s) Oral two times a day  lidocaine   Patch 1 Patch Transdermal once  lidocaine 1% Injectable 20 milliLiter(s) Local Injection once  pravastatin 80 milliGRAM(s) Oral at bedtime  sodium bicarbonate 325 milliGRAM(s) Oral three times a day  tamsulosin 0.4 milliGRAM(s) Oral at bedtime      LABS: All Labs Reviewed:                        9.5    7.58  )-----------( 245      ( 12 Mar 2020 07:55 )             30.7     03-13    141  |  111<H>  |  19  ----------------------------<  105<H>  3.7   |  24  |  1.63<H>    Ca    8.5      13 Mar 2020 08:13    TPro  5.5<L>  /  Alb  x   /  TBili  x   /  DBili  x   /  AST  x   /  ALT  x   /  AlkPhos  x   03-12          Blood Culture: 03-11 @ 17:00  Organism --  Gram Stain Blood -- Gram Stain   polymorphonuclear leukocytes seen  No organisms seen  by cytocentrifuge  Specimen Source .Body Fluid Pleural Fluid  Culture-Blood --    03-11 @ 13:15  Organism --  Gram Stain Blood -- Gram Stain --  Specimen Source .Urine None  Culture-Blood --    03-11 @ 08:35  Organism --  Gram Stain Blood -- Gram Stain --  Specimen Source .Blood None  Culture-Blood --        Assessment and Plan:   	  87M w/PMH CAD, HTN, HLD, BPH, SSS s/p PPM, CKD3, SVT, recurrent Rt pl eff, PTX s/p VATS decortication, Pleurodesis 2016, last admit 2018 for rec Rt hydropneumothorax, presents today c/o Rt sided Upper back pain w/ worsening BRENNER over past 10-14 days.      In ED, Cr 2 (baseline), Trop neg, UA neg, RVP neg, Tmax 100.1, elevated bp (he's taken home meds today), CT ch- Large Rt pl eff, atelectasis, pulm nodule,   CT surgery made aware per ED MD.         Problem/Plan - 1:  ·  Problem: Pleural effusion.  Plan: will cs CTS, pulm, cardio  monitor O2 sats  -s/p chest tube >> fluid sent; path pending  -educated on deep breathing excercies, educated on incentive spirometer       Problem/Plan - 2:  ·  Problem: Dyspnea on exertion.  Plan: likely 2/2 pleural effusion, pna, other  treat underlying cause  monitor O2 sats  cardio/pulm cs for further input   will check TTE.      Problem/Plan - 3:  ·  Problem: Febrile.  Plan: possibly 2/2 pleural effusion, underlying pna  will treat empirically w/ IV rocephin, azithro  monitor vitals  check cultures  ID cs for further recs.      Problem/Plan - 4:  ·  Problem: Essential hypertension.  Plan: w/ elevated bp  resume pt's home meds - dilt, labetalol, lasix  will add prn hydralazine for sbp>160  monitor bp and titrate meds as needed.      Problem/Plan - 5:  ·  Problem: CKD (chronic kidney disease), stage III.  Plan: stable and at baseline   monitor labs.      Problem/Plan - 6:  Problem: Prophylactic measure. Plan: SQH.

## 2020-03-13 NOTE — PROGRESS NOTE ADULT - ASSESSMENT
87M w/PMH CAD, HTN, HLD, BPH, SSS s/p PPM, CKD3, SVT, recurrent Rt pl eff, PTX s/p VATS decortication, Pleurodesis 2016, last admit 2018 for rec Rt hydropneumothorax, presents today c/o Rt sided Upper back pain w/ worsening BRENNER over past 10-14 days.  Pt states it's similar to when he's pleural effusion in the past.  He denies chest pain, cough, fever/chills, n/v/d, abd pain, dysuria, weakness.    In ED, Cr 2 (baseline), Trop neg, UA neg, RVP neg, Tmax 100.1, elevated bp (he's taken home meds today), CT ch- Large Rt pl eff, atelectasis, pulm nodule,   CT surgery made aware per ED MD.    Recurrent right sided pleural effusion  failed prior pleurodesis  Hx hydropneumothorax spont  prior VATS decortication / failed  S/p thoracentesis; found to have a complicated pleural effusion/empyema, LDH >1000, pH 7.06  CTD/Rheumatic effusion cannot be excluded either.  Follow up JERROD/RF  Follow up cultures/cytology (high suspicion)   Will continue to monitor

## 2020-03-13 NOTE — PROGRESS NOTE ADULT - SUBJECTIVE AND OBJECTIVE BOX
Subjective:  86yo M with PMH of CAD (on ASA), BPH, CKD III, HLD, HTN, SSS s/p PPM, spinal stenosis s/p fusion and R VATS, pleurodesis 5/2016 admitted 3/11 with recurrent right pleural effusion. 3/11 R PTC placed under US guidance. One liter serosang output. Of note CT Chest revealed RUL (1.2 cm) and SAMREEN (0.8 cm) nodules.   3/12/2020 Pt seen, breathing improved. CT to waterseal.  3/13/2020 Pt seen, states feels SOB w walking. Drained 250cc last 24 hours, exudative effusion, cytology pending. Cultures NTD    Vital Signs:  Vital Signs Last 24 Hrs  T(C): 36.9 (03-13-20 @ 08:23), Max: 37.2 (03-12-20 @ 15:20)  T(F): 98.5 (03-13-20 @ 08:23), Max: 99 (03-12-20 @ 15:20)  HR: 64 (03-13-20 @ 09:50) (59 - 64)  BP: 149/48 (03-13-20 @ 08:23) (137/40 - 155/44)  RR: 18 (03-13-20 @ 08:23) (18 - 18)  SpO2: 96% (03-13-20 @ 08:23) (96% - 97%) on (O2)    Telemetry/Alarms:    Relevant labs, radiology and Medications reviewed                        9.5    7.58  )-----------( 245      ( 12 Mar 2020 07:55 )             30.7     03-13    141  |  111<H>  |  19  ----------------------------<  105<H>  3.7   |  24  |  1.63<H>    Ca    8.5      13 Mar 2020 08:13    TPro  5.5<L>  /  Alb  x   /  TBili  x   /  DBili  x   /  AST  x   /  ALT  x   /  AlkPhos  x   03-12      MEDICATIONS  (STANDING):  aspirin enteric coated 81 milliGRAM(s) Oral daily  finasteride 5 milliGRAM(s) Oral daily  furosemide    Tablet 20 milliGRAM(s) Oral daily  heparin  Injectable 5000 Unit(s) SubCutaneous three times a day  labetalol 300 milliGRAM(s) Oral two times a day  lidocaine   Patch 1 Patch Transdermal once  lidocaine 1% Injectable 20 milliLiter(s) Local Injection once  pravastatin 80 milliGRAM(s) Oral at bedtime  sodium bicarbonate 325 milliGRAM(s) Oral three times a day  tamsulosin 0.4 milliGRAM(s) Oral at bedtime    MEDICATIONS  (PRN):  acetaminophen   Tablet .. 650 milliGRAM(s) Oral every 4 hours PRN Mild Pain (1 - 3)  ALBUTerol    90 MICROgram(s) HFA Inhaler 2 Puff(s) Inhalation every 6 hours PRN Shortness of Breath and/or Wheezing  hydrALAZINE Injectable 10 milliGRAM(s) IV Push every 6 hours PRN SBP>160  ondansetron Injectable 4 milliGRAM(s) IV Push every 6 hours PRN Nausea  oxycodone    5 mG/acetaminophen 325 mG 1 Tablet(s) Oral every 4 hours PRN Severe Pain (7 - 10)  traMADol 50 milliGRAM(s) Oral every 6 hours PRN Moderate Pain (4 - 6)      Physical exam  Gen NAD  Neuro AAox3  Card RRR  Pulm decreased right side  Abd soft  Ext warm    Tubes: rght pigtail to waterseal, no AL, drained 250cc last 24 hours    I&O's Summary    12 Mar 2020 07:01  -  13 Mar 2020 07:00  --------------------------------------------------------  IN: 0 mL / OUT: 650 mL / NET: -650 mL        Assessment  86yo M with PMH of CAD (on ASA), BPH, CKD III, HLD, HTN, SSS s/p PPM, spinal stenosis s/p fusion and R VATS, pleurodesis 5/2016 admitted 3/11 with recurrent right pleural effusion. 3/11 R PTC placed under US guidance. One liter serosang output. Of note CT Chest revealed RUL (1.2 cm) and SAMREEN (0.8 cm) nodules. Exudative effusion, cytology pending. Cultures NTD    PLAN  cont chest tube to waterseal  daily CXR  f/u cytology      Discussed with Cardiothoracic Team at AM rounds.

## 2020-03-13 NOTE — PROGRESS NOTE ADULT - SUBJECTIVE AND OBJECTIVE BOX
Patient is a 87y old  Male who presents with a chief complaint of BRENNER, Rt back pain (11 Mar 2020 18:28)      HPI:  HPI:  87M w/PMH CAD, HTN, HLD, BPH, SSS s/p PPM, CKD3, SVT, recurrent Rt pl eff, PTX s/p VATS decortication, Pleurodesis , last admit 2018 for rec Rt hydropneumothorax, presents today c/o Rt sided Upper back pain w/ worsening BRENNER over past 10-14 days.  Pt states it's similar to when he's pleural effusion in the past.  He denies chest pain, cough, fever/chills, n/v/d, abd pain, dysuria, weakness.    In ED, Cr 2 (baseline), Trop neg, UA neg, RVP neg, Tmax 100.1, elevated bp (he's taken home meds today), CT ch- Large Rt pl eff, atelectasis, pulm nodule,   CT surgery made aware per ED MD.    pt is well known from his prior admissions / not recently followed up   now admitted and required repeat CT right chest for further eval  no smoker  Allergies  amlodipine (Unknown)  Crestor (Other)  Lipitor (Unknown)    3/13/2020  Patient      MEDICATIONS  (STANDING):  aspirin enteric coated 81 milliGRAM(s) Oral daily  finasteride 5 milliGRAM(s) Oral daily  furosemide    Tablet 20 milliGRAM(s) Oral daily  heparin  Injectable 5000 Unit(s) SubCutaneous three times a day  labetalol 300 milliGRAM(s) Oral two times a day  lidocaine   Patch 1 Patch Transdermal once  lidocaine 1% Injectable 20 milliLiter(s) Local Injection once  pravastatin 80 milliGRAM(s) Oral at bedtime  sodium bicarbonate 325 milliGRAM(s) Oral three times a day  tamsulosin 0.4 milliGRAM(s) Oral at bedtime    MEDICATIONS  (PRN):  acetaminophen   Tablet .. 650 milliGRAM(s) Oral every 4 hours PRN Mild Pain (1 - 3)  ALBUTerol    90 MICROgram(s) HFA Inhaler 2 Puff(s) Inhalation every 6 hours PRN Shortness of Breath and/or Wheezing  hydrALAZINE Injectable 10 milliGRAM(s) IV Push every 6 hours PRN SBP>160  ondansetron Injectable 4 milliGRAM(s) IV Push every 6 hours PRN Nausea  oxycodone    5 mG/acetaminophen 325 mG 1 Tablet(s) Oral every 4 hours PRN Severe Pain (7 - 10)  traMADol 50 milliGRAM(s) Oral every 6 hours PRN Moderate Pain (4 - 6)      PAST MEDICAL & SURGICAL HISTORY:  Spinal stenosis  CKD (chronic kidney disease), stage III  SSS (sick sinus syndrome)  Pleural effusion  Hyperlipidemia  BPH (benign prostatic hyperplasia)  CAD (coronary artery disease)  Hypertension  Status post lumbar spinal fusion      PREVIOUS DIAGNOSTIC TESTING:      MEDICATIONS  (STANDING):  aspirin enteric coated 81 milliGRAM(s) Oral daily  azithromycin  IVPB      azithromycin  IVPB 500 milliGRAM(s) IV Intermittent every 24 hours  cefTRIAXone Injectable. 1000 milliGRAM(s) IV Push every 24 hours  cefTRIAXone Injectable.      finasteride 5 milliGRAM(s) Oral daily  furosemide    Tablet 20 milliGRAM(s) Oral daily  heparin  Injectable 5000 Unit(s) SubCutaneous three times a day  labetalol 300 milliGRAM(s) Oral two times a day  lidocaine   Patch 1 Patch Transdermal once  lidocaine 1% Injectable 20 milliLiter(s) Local Injection once  pravastatin 80 milliGRAM(s) Oral at bedtime  sodium bicarbonate 325 milliGRAM(s) Oral three times a day  tamsulosin 0.4 milliGRAM(s) Oral at bedtime    MEDICATIONS  (PRN):  acetaminophen   Tablet .. 650 milliGRAM(s) Oral every 4 hours PRN Mild Pain (1 - 3)  albuterol/ipratropium for Nebulization 3 milliLiter(s) Nebulizer every 6 hours PRN Shortness of Breath and/or Wheezing  hydrALAZINE Injectable 10 milliGRAM(s) IV Push every 6 hours PRN SBP>160  ondansetron Injectable 4 milliGRAM(s) IV Push every 6 hours PRN Nausea  oxycodone    5 mG/acetaminophen 325 mG 1 Tablet(s) Oral every 4 hours PRN Severe Pain (7 - 10)  traMADol 50 milliGRAM(s) Oral every 6 hours PRN Moderate Pain (4 - 6)      FAMILY HISTORY:  No pertinent family history in first degree relatives  SOCIAL HISTORY:  non smoker      Vital Signs Last 24 Hrs  T(C): 36.9 (13 Mar 2020 08:23), Max: 37.2 (12 Mar 2020 15:20)  T(F): 98.5 (13 Mar 2020 08:23), Max: 99 (12 Mar 2020 15:20)  HR: 59 (13 Mar 2020 08:23) (59 - 68)  BP: 149/48 (13 Mar 2020 08:23) (137/40 - 155/44)  BP(mean): --  RR: 18 (13 Mar 2020 08:23) (18 - 18)  SpO2: 96% (13 Mar 2020 08:23) (96% - 97%)    I&O's Summary    11 Mar 2020 07:01  -  12 Mar 2020 07:00  --------------------------------------------------------  IN: 0 mL / OUT: 1125 mL / NET: -1125 mL      PHYSICAL EXAM  General Appearance: cooperative, no acute distress, right sided CT in place   HEENT: PERRL, conjunctiva clear,  Neck: Supple, , no adenopathy  Lungs: decreased breath sounds right mid-lower lung fields no wheezing, left side clear  Heart: Regular rate and rhythm, S1, S2 normal, no murmur, rub or gallop  Abdomen: Soft, non-tender, bowel sounds active , no hepatosplenomegaly  Extremities: no cyanosis or edema, no joint swelling  Skin: Skin color, texture normal, no rashes   Neurologic: Alert and oriented X3 , cranial nerves intact, sensory and motor normal,    ECG:    LABS:                          12.0   9.04  )-----------( 294      ( 11 Mar 2020 08:35 )             36.2     03-11    140  |  110<H>  |  19  ----------------------------<  138<H>  3.9   |  22  |  2.00<H>    Ca    8.8      11 Mar 2020 08:35    TPro  6.7  /  Alb  3.1<L>  /  TBili  0.5  /  DBili  x   /  AST  13<L>  /  ALT  12  /  AlkPhos  88  03-11    CARDIAC MARKERS ( 11 Mar 2020 08:35 )  <0.015 ng/mL / x     / x     / x     / x            Pro BNP  799  @ 08:35  D Dimer  --  @ 08:35    PT/INR - ( 11 Mar 2020 08:35 )   PT: 11.6 sec;   INR: 1.04 ratio         PTT - ( 11 Mar 2020 08:35 )  PTT:30.1 sec  Urinalysis Basic - ( 11 Mar 2020 13:15 )    Color: Yellow / Appearance: Clear / S.015 / pH: x  Gluc: x / Ketone: Trace  / Bili: Negative / Urobili: Negative mg/dL   Blood: x / Protein: 15 mg/dL / Nitrite: Negative   Leuk Esterase: Negative / RBC: 0-2 /HPF / WBC 0-2   Sq Epi: x / Non Sq Epi: Occasional / Bacteria: Occasional      < from: Xray Chest 1 View- PORTABLE-Urgent (20 @ 09:12) >  PROCEDURE DATE:  2020    < from: CT Chest No Cont (20 @ 09:39) >  mediastinal margin in the right upper lobe on image #61/series 2.    PLEURA: Large right pleural effusion. There is soft tissue attenuation circumferentially around the pleura, assessment is limited without intravenous contrast. There is a small amount of air, likely in the right pleural space as seen on image #38/series 2, correlate with recent intervention.    MEDIASTINUM AND CARLITA: No lymphadenopathy.    VESSELS: The thoracic aorta and main pulmonary artery are normal in caliber. There is heavy coronary artery calcification.    HEART: Heart size is normal. There are pacemaker leads in the right atrium and right ventricle. Trace pericardial effusion.    CHEST WALL AND LOWER NECK: The thyroid gland is within normal limits. There is no supraclavicular or axillary lymphadenopathy.There is a pacemaker generator pack in the left upper chest wall.    VISUALIZED UPPER ABDOMEN: The imaged adrenal glands are within normal limits. The imaged portions of the unenhanced liver, spleen, pancreas are within normal limits. There is a partially imaged large cyst at the upper pole of the left kidney measuring 8.3 x 5.8 cm.    BONES: Multilevel degenerative change of the thoracic spine with osteophytes, degenerative disc disease and facet joint arthropathy.    IMPRESSION:     Large rightpleural effusion with pleural base soft tissue, concerning for metastasis.    There are 2 pulmonary nodules measuring up to 1.2 cm as above.    Complete atelectasis of the right middle and lower lobes.    Postsurgical changes in the right lung apex.    Other incidental findings are as above.    < end of copied text >        INTERPRETATION:  AP erect chest on 2020 at 9:02 AM. Patient has right-sided chest pain.    Heart suggest normal size. Left-sided pacemaker again noted.    Suture line in the right paratracheal area is better seen on study of May 26, 2018.    On the study there was a loculated pneumothorax at the right base with adjacent atelectasis.    Present film shows a large right effusion.    IMPRESSION: Large right effusion.    < end of copied text >        RADIOLOGY & ADDITIONAL STUDIES:    ct scan images from 2018 and current ct scans reveiwed

## 2020-03-14 NOTE — PROGRESS NOTE ADULT - SUBJECTIVE AND OBJECTIVE BOX
CHIEF COMPLAINT/Diagnosis:  right pleural effusion/ right sided chest tube    SUBJECTIVE: no complaints    REVIEW OF SYSTEMS:    CONSTITUTIONAL: No weakness, fevers or chills  EYES/ENT: No visual changes;  No vertigo or throat pain   NECK: No pain or stiffness  RESPIRATORY: No cough, wheezing, hemoptysis; No shortness of breath  CARDIOVASCULAR: No chest pain or palpitations  GASTROINTESTINAL: No abdominal or epigastric pain. No nausea, vomiting, or hematemesis; No diarrhea or constipation. No melena or hematochezia.  GENITOURINARY: No dysuria, frequency or hematuria  NEUROLOGICAL: No numbness or weakness  SKIN: No itching, burning, rashes, or lesions   All other review of systems is negative unless indicated above    Vital Signs Last 24 Hrs  T(C): 37.6 (14 Mar 2020 08:26), Max: 37.6 (14 Mar 2020 08:26)  T(F): 99.6 (14 Mar 2020 08:26), Max: 99.6 (14 Mar 2020 08:26)  HR: 60 (14 Mar 2020 08:26) (59 - 66)  BP: 164/41 (14 Mar 2020 08:26) (151/32 - 168/43)  BP(mean): --  RR: 18 (14 Mar 2020 08:26) (18 - 18)  SpO2: 92% (14 Mar 2020 08:26) (92% - 97%)    I&O's Summary    13 Mar 2020 07:01  -  14 Mar 2020 07:00  --------------------------------------------------------  IN: 0 mL / OUT: 450 mL / NET: -450 mL        CAPILLARY BLOOD GLUCOSE          PHYSICAL EXAM:    Constitutional: NAD, awake and alert, well-developed  HEENT: PERR, EOMI, Normal Hearing, MMM  Neck: Soft and supple, No LAD, No JVD  Respiratory: Breath sounds are clear bilaterally, No wheezing, rales or rhonchi  Cardiovascular: S1 and S2, regular rate and rhythm, no Murmurs, gallops or rubs  Gastrointestinal: Bowel Sounds present, soft, nontender, nondistended, no guarding, no rebound  Extremities: No peripheral edema  Vascular: 2+ peripheral pulses  Neurological: A/O x 3, no focal deficits  Musculoskeletal: 5/5 strength b/l upper and lower extremities  Skin: No rashes    MEDICATIONS:  MEDICATIONS  (STANDING):  aspirin enteric coated 81 milliGRAM(s) Oral daily  finasteride 5 milliGRAM(s) Oral daily  furosemide    Tablet 20 milliGRAM(s) Oral daily  heparin  Injectable 5000 Unit(s) SubCutaneous three times a day  labetalol 300 milliGRAM(s) Oral two times a day  lidocaine   Patch 1 Patch Transdermal once  lidocaine 1% Injectable 20 milliLiter(s) Local Injection once  pravastatin 80 milliGRAM(s) Oral at bedtime  sodium bicarbonate 325 milliGRAM(s) Oral three times a day  tamsulosin 0.4 milliGRAM(s) Oral at bedtime      LABS: All Labs Reviewed:                        10.4   8.01  )-----------( 250      ( 14 Mar 2020 08:45 )             31.9     03-14    141  |  112<H>  |  18  ----------------------------<  135<H>  3.6   |  22  |  1.62<H>    Ca    8.2<L>      14 Mar 2020 08:45            Blood Culture: 03-11 @ 17:00  Organism --  Gram Stain Blood -- Gram Stain   polymorphonuclear leukocytes seen  No organisms seen  by cytocentrifuge  Specimen Source .Body Fluid Pleural Fluid  Culture-Blood --    03-11 @ 13:15  Organism --  Gram Stain Blood -- Gram Stain --  Specimen Source .Urine None  Culture-Blood --    03-11 @ 08:35  Organism --  Gram Stain Blood -- Gram Stain --  Specimen Source .Blood None  Culture-Blood --            Assessment and Plan:   	  87M w/PMH CAD, HTN, HLD, BPH, SSS s/p PPM, CKD3, SVT, recurrent Rt pl eff, PTX s/p VATS decortication, Pleurodesis 2016, last admit 2018 for rec Rt hydropneumothorax, presents today c/o Rt sided Upper back pain w/ worsening BRENNER over past 10-14 days.      In ED, Cr 2 (baseline), Trop neg, UA neg, RVP neg, Tmax 100.1, elevated bp (he's taken home meds today), CT ch- Large Rt pl eff, atelectasis, pulm nodule,   CT surgery made aware per ED MD.         Problem/Plan - 1:  ·  Problem: Pleural effusion.  Plan: will cs CTS, pulm, cardio  monitor O2 sats  -s/p chest tube >> fluid sent; path pending  -educated on deep breathing excercises, educated on incentive spirometer       Problem/Plan - 2:  ·  Problem: Dyspnea on exertion.  Plan: likely 2/2 pleural effusion, pna, other  treat underlying cause  monitor O2 sats  cardio/pulm cs for further input   will check TTE.      Problem/Plan - 3:  ·  Problem: Febrile.  Plan: possibly 2/2 pleural effusion, underlying pna  will treat empirically w/ IV rocephin, azithro  monitor vitals  check cultures  ID cs for further recs.      Problem/Plan - 4:  ·  Problem: Essential hypertension.  Plan: w/ elevated bp  resume pt's home meds - dilt, labetalol, lasix  will add prn hydralazine for sbp>160  monitor bp and titrate meds as needed.      Problem/Plan - 5:  ·  Problem: CKD (chronic kidney disease), stage III.  Plan: stable and at baseline   monitor labs.      Problem/Plan - 6:  Problem: Prophylactic measure. Plan: SQH.

## 2020-03-14 NOTE — PROGRESS NOTE ADULT - SUBJECTIVE AND OBJECTIVE BOX
Patient is a 87y old  Male who presents with a chief complaint of BRENNER, Rt back pain (11 Mar 2020 18:28)      HPI:  HPI:  87M w/PMH CAD, HTN, HLD, BPH, SSS s/p PPM, CKD3, SVT, recurrent Rt pl eff, PTX s/p VATS decortication, Pleurodesis , last admit 2018 for rec Rt hydropneumothorax, presents today c/o Rt sided Upper back pain w/ worsening BRENNER over past 10-14 days.  Pt states it's similar to when he's pleural effusion in the past.  He denies chest pain, cough, fever/chills, n/v/d, abd pain, dysuria, weakness.    In ED, Cr 2 (baseline), Trop neg, UA neg, RVP neg, Tmax 100.1, elevated bp (he's taken home meds today), CT ch- Large Rt pl eff, atelectasis, pulm nodule,   CT surgery made aware per ED MD.    pt is well known from his prior admissions / not recently followed up   now admitted and required repeat CT right chest for further eval  no smoker  Allergies  amlodipine (Unknown)  Crestor (Other)  Lipitor (Unknown)    3/13/2020  Patient underwent thoracostomy  No acute pulmonary events occurred overnight    3/14/2020  Patient denies any pain at the site of the thoracostomy  2L sanguinous fluid continues to drain    MEDICATIONS  (STANDING):  aspirin enteric coated 81 milliGRAM(s) Oral daily  finasteride 5 milliGRAM(s) Oral daily  furosemide    Tablet 20 milliGRAM(s) Oral daily  heparin  Injectable 5000 Unit(s) SubCutaneous three times a day  labetalol 300 milliGRAM(s) Oral two times a day  lidocaine   Patch 1 Patch Transdermal once  lidocaine 1% Injectable 20 milliLiter(s) Local Injection once  pravastatin 80 milliGRAM(s) Oral at bedtime  sodium bicarbonate 325 milliGRAM(s) Oral three times a day  tamsulosin 0.4 milliGRAM(s) Oral at bedtime    MEDICATIONS  (PRN):  acetaminophen   Tablet .. 650 milliGRAM(s) Oral every 4 hours PRN Mild Pain (1 - 3)  ALBUTerol    90 MICROgram(s) HFA Inhaler 2 Puff(s) Inhalation every 6 hours PRN Shortness of Breath and/or Wheezing  hydrALAZINE Injectable 10 milliGRAM(s) IV Push every 6 hours PRN SBP>160  ondansetron Injectable 4 milliGRAM(s) IV Push every 6 hours PRN Nausea  oxycodone    5 mG/acetaminophen 325 mG 1 Tablet(s) Oral every 4 hours PRN Severe Pain (7 - 10)  traMADol 50 milliGRAM(s) Oral every 6 hours PRN Moderate Pain (4 - 6)        Vital Signs Last 24 Hrs  T(C): 37.6 (14 Mar 2020 08:26), Max: 37.6 (14 Mar 2020 08:26)  T(F): 99.6 (14 Mar 2020 08:26), Max: 99.6 (14 Mar 2020 08:26)  HR: 60 (14 Mar 2020 08:) (59 - 66)  BP: 164/41 (14 Mar 2020 08:) (151/32 - 168/43)  BP(mean): --  RR: 18 (14 Mar 2020 08:26) (18 - 18)  SpO2: 92% (14 Mar 2020 08:) (92% - 97%)      PHYSICAL EXAM  General Appearance: cooperative, no acute distress, right sided CT in place   HEENT: PERRL, conjunctiva clear,  Neck: Supple, , no adenopathy  Lungs: decreased breath sounds right mid-lower lung fields no wheezing, left side clear, Right sided thoracostomy in place   Heart: Regular rate and rhythm, S1, S2 normal, no murmur, rub or gallop  Abdomen: Soft, non-tender, bowel sounds active , no hepatosplenomegaly  Extremities: no cyanosis or edema, no joint swelling  Skin: Skin color, texture normal, no rashes   Neurologic: Alert and oriented X3 , cranial nerves intact, sensory and motor normal,    ECG:    LABS:                          12.0   9.04  )-----------( 294      ( 11 Mar 2020 08:35 )             36.2     03-11    140  |  110<H>  |  19  ----------------------------<  138<H>  3.9   |  22  |  2.00<H>    Ca    8.8      11 Mar 2020 08:35    TPro  6.7  /  Alb  3.1<L>  /  TBili  0.5  /  DBili  x   /  AST  13<L>  /  ALT  12  /  AlkPhos  88  03-    CARDIAC MARKERS ( 11 Mar 2020 08:35 )  <0.015 ng/mL / x     / x     / x     / x            Pro BNP  799  @ 08:35  D Dimer  --  @ 08:35    PT/INR - ( 11 Mar 2020 08:35 )   PT: 11.6 sec;   INR: 1.04 ratio         PTT - ( 11 Mar 2020 08:35 )  PTT:30.1 sec  Urinalysis Basic - ( 11 Mar 2020 13:15 )    Color: Yellow / Appearance: Clear / S.015 / pH: x  Gluc: x / Ketone: Trace  / Bili: Negative / Urobili: Negative mg/dL   Blood: x / Protein: 15 mg/dL / Nitrite: Negative   Leuk Esterase: Negative / RBC: 0-2 /HPF / WBC 0-2   Sq Epi: x / Non Sq Epi: Occasional / Bacteria: Occasional      < from: Xray Chest 1 View- PORTABLE-Urgent (20 @ 09:12) >  PROCEDURE DATE:  2020    < from: CT Chest No Cont (20 @ 09:39) >  mediastinal margin in the right upper lobe on image #61/series 2.    PLEURA: Large right pleural effusion. There is soft tissue attenuation circumferentially around the pleura, assessment is limited without intravenous contrast. There is a small amount of air, likely in the right pleural space as seen on image #38/series 2, correlate with recent intervention.    MEDIASTINUM AND CARLITA: No lymphadenopathy.    VESSELS: The thoracic aorta and main pulmonary artery are normal in caliber. There is heavy coronary artery calcification.    HEART: Heart size is normal. There are pacemaker leads in the right atrium and right ventricle. Trace pericardial effusion.    CHEST WALL AND LOWER NECK: The thyroid gland is within normal limits. There is no supraclavicular or axillary lymphadenopathy.There is a pacemaker generator pack in the left upper chest wall.    VISUALIZED UPPER ABDOMEN: The imaged adrenal glands are within normal limits. The imaged portions of the unenhanced liver, spleen, pancreas are within normal limits. There is a partially imaged large cyst at the upper pole of the left kidney measuring 8.3 x 5.8 cm.    BONES: Multilevel degenerative change of the thoracic spine with osteophytes, degenerative disc disease and facet joint arthropathy.    IMPRESSION:     Large rightpleural effusion with pleural base soft tissue, concerning for metastasis.    There are 2 pulmonary nodules measuring up to 1.2 cm as above.    Complete atelectasis of the right middle and lower lobes.    Postsurgical changes in the right lung apex.    Other incidental findings are as above.    < end of copied text >        INTERPRETATION:  AP erect chest on 2020 at 9:02 AM. Patient has right-sided chest pain.    Heart suggest normal size. Left-sided pacemaker again noted.    Suture line in the right paratracheal area is better seen on study of May 26, 2018.    On the study there was a loculated pneumothorax at the right base with adjacent atelectasis.    Present film shows a large right effusion.    IMPRESSION: Large right effusion.    < end of copied text >        RADIOLOGY & ADDITIONAL STUDIES:    ct scan images from 2018 and current ct scans reveiwed

## 2020-03-14 NOTE — PROGRESS NOTE ADULT - ASSESSMENT
87M w/PMH CAD, HTN, HLD, BPH, SSS s/p PPM, CKD3, SVT, recurrent Rt pl eff, PTX s/p VATS decortication, Pleurodesis 2016, last admit 2018 for rec Rt hydropneumothorax, presents today c/o Rt sided Upper back pain w/ worsening BRENNER over past 10-14 days.  Pt states it's similar to when he's pleural effusion in the past.  He denies chest pain, cough, fever/chills, n/v/d, abd pain, dysuria, weakness.    In ED, Cr 2 (baseline), Trop neg, UA neg, RVP neg, Tmax 100.1, elevated bp (he's taken home meds today), CT ch- Large Rt pl eff, atelectasis, pulm nodule,   CT surgery made aware per ED MD.    Recurrent right sided pleural effusion  failed prior pleurodesis  Hx hydropneumothorax spont  prior VATS decortication / failed  S/p thoracentesis with thoracostomy in place; found to have a complicated pleural effusion/empyema, LDH >1000, pH 7.06  CTD/Rheumatic effusion cannot be excluded either.  Follow up JERROD/RF  Follow up cytology (high suspicion)   Appreciate CTS recommendations   Will continue to monitor

## 2020-03-15 NOTE — PROGRESS NOTE ADULT - SUBJECTIVE AND OBJECTIVE BOX
Patient is a 87y old  Male who presents with a chief complaint of BRENNER, Rt back pain (11 Mar 2020 18:28)      HPI:  HPI:  87M w/PMH CAD, HTN, HLD, BPH, SSS s/p PPM, CKD3, SVT, recurrent Rt pl eff, PTX s/p VATS decortication, Pleurodesis , last admit 2018 for rec Rt hydropneumothorax, presents today c/o Rt sided Upper back pain w/ worsening BRENNER over past 10-14 days.  Pt states it's similar to when he's pleural effusion in the past.  He denies chest pain, cough, fever/chills, n/v/d, abd pain, dysuria, weakness.    In ED, Cr 2 (baseline), Trop neg, UA neg, RVP neg, Tmax 100.1, elevated bp (he's taken home meds today), CT ch- Large Rt pl eff, atelectasis, pulm nodule,   CT surgery made aware per ED MD.    pt is well known from his prior admissions / not recently followed up   now admitted and required repeat CT right chest for further eval  no smoker  Allergies  amlodipine (Unknown)  Crestor (Other)  Lipitor (Unknown)    3/13/2020  Patient underwent thoracostomy  No acute pulmonary events occurred overnight    3/14/2020  Patient denies any pain at the site of the thoracostomy  2L sanguinous fluid continues to drain    MEDICATIONS  (STANDING):  aspirin enteric coated 81 milliGRAM(s) Oral daily  finasteride 5 milliGRAM(s) Oral daily  furosemide    Tablet 20 milliGRAM(s) Oral daily  heparin  Injectable 5000 Unit(s) SubCutaneous three times a day  labetalol 300 milliGRAM(s) Oral two times a day  lidocaine   Patch 1 Patch Transdermal once  lidocaine 1% Injectable 20 milliLiter(s) Local Injection once  pravastatin 80 milliGRAM(s) Oral at bedtime  sodium bicarbonate 325 milliGRAM(s) Oral three times a day  tamsulosin 0.4 milliGRAM(s) Oral at bedtime    MEDICATIONS  (PRN):  acetaminophen   Tablet .. 650 milliGRAM(s) Oral every 4 hours PRN Mild Pain (1 - 3)  ALBUTerol    90 MICROgram(s) HFA Inhaler 2 Puff(s) Inhalation every 6 hours PRN Shortness of Breath and/or Wheezing  hydrALAZINE Injectable 10 milliGRAM(s) IV Push every 6 hours PRN SBP>160  ondansetron Injectable 4 milliGRAM(s) IV Push every 6 hours PRN Nausea  oxycodone    5 mG/acetaminophen 325 mG 1 Tablet(s) Oral every 4 hours PRN Severe Pain (7 - 10)  traMADol 50 milliGRAM(s) Oral every 6 hours PRN Moderate Pain (4 - 6)      Vital Signs Last 24 Hrs  T(C): 37.1 (15 Mar 2020 07:57), Max: 37.1 (15 Mar 2020 07:57)  T(F): 98.8 (15 Mar 2020 07:57), Max: 98.8 (15 Mar 2020 07:57)  HR: 60 (15 Mar 2020 07:57) (59 - 60)  BP: 143/38 (15 Mar 2020 07:57) (138/38 - 147/32)  BP(mean): --  RR: 18 (15 Mar 2020 07:57) (17 - 18)  SpO2: 96% (15 Mar 2020 07:57) (96% - 98%)    PHYSICAL EXAM  General Appearance: cooperative, no acute distress, right sided CT in place   HEENT: PERRL, conjunctiva clear,  Neck: Supple, , no adenopathy  Lungs: decreased breath sounds right mid-lower lung fields no wheezing, left side clear, Right sided thoracostomy in place   Heart: Regular rate and rhythm, S1, S2 normal, no murmur, rub or gallop  Abdomen: Soft, non-tender, bowel sounds active , no hepatosplenomegaly  Extremities: no cyanosis or edema, no joint swelling  Skin: Skin color, texture normal, no rashes   Neurologic: Alert and oriented X3 , cranial nerves intact, sensory and motor normal,    ECG:    LABS:                          12.0   9.04  )-----------( 294      ( 11 Mar 2020 08:35 )             36.2     03-11    140  |  110<H>  |  19  ----------------------------<  138<H>  3.9   |  22  |  2.00<H>    Ca    8.8      11 Mar 2020 08:35    TPro  6.7  /  Alb  3.1<L>  /  TBili  0.5  /  DBili  x   /  AST  13<L>  /  ALT  12  /  AlkPhos  88  03-    CARDIAC MARKERS ( 11 Mar 2020 08:35 )  <0.015 ng/mL / x     / x     / x     / x            Pro BNP  799  @ 08:35  D Dimer  --  @ 08:35    PT/INR - ( 11 Mar 2020 08:35 )   PT: 11.6 sec;   INR: 1.04 ratio         PTT - ( 11 Mar 2020 08:35 )  PTT:30.1 sec  Urinalysis Basic - ( 11 Mar 2020 13:15 )    Color: Yellow / Appearance: Clear / S.015 / pH: x  Gluc: x / Ketone: Trace  / Bili: Negative / Urobili: Negative mg/dL   Blood: x / Protein: 15 mg/dL / Nitrite: Negative   Leuk Esterase: Negative / RBC: 0-2 /HPF / WBC 0-2   Sq Epi: x / Non Sq Epi: Occasional / Bacteria: Occasional      < from: Xray Chest 1 View- PORTABLE-Urgent (20 @ 09:12) >  PROCEDURE DATE:  2020    < from: CT Chest No Cont (20 @ 09:39) >  mediastinal margin in the right upper lobe on image #61/series 2.    PLEURA: Large right pleural effusion. There is soft tissue attenuation circumferentially around the pleura, assessment is limited without intravenous contrast. There is a small amount of air, likely in the right pleural space as seen on image #38/series 2, correlate with recent intervention.    MEDIASTINUM AND CARLITA: No lymphadenopathy.    VESSELS: The thoracic aorta and main pulmonary artery are normal in caliber. There is heavy coronary artery calcification.    HEART: Heart size is normal. There are pacemaker leads in the right atrium and right ventricle. Trace pericardial effusion.    CHEST WALL AND LOWER NECK: The thyroid gland is within normal limits. There is no supraclavicular or axillary lymphadenopathy.There is a pacemaker generator pack in the left upper chest wall.    VISUALIZED UPPER ABDOMEN: The imaged adrenal glands are within normal limits. The imaged portions of the unenhanced liver, spleen, pancreas are within normal limits. There is a partially imaged large cyst at the upper pole of the left kidney measuring 8.3 x 5.8 cm.    BONES: Multilevel degenerative change of the thoracic spine with osteophytes, degenerative disc disease and facet joint arthropathy.    IMPRESSION:     Large rightpleural effusion with pleural base soft tissue, concerning for metastasis.    There are 2 pulmonary nodules measuring up to 1.2 cm as above.    Complete atelectasis of the right middle and lower lobes.    Postsurgical changes in the right lung apex.    Other incidental findings are as above.    < end of copied text >        INTERPRETATION:  AP erect chest on 2020 at 9:02 AM. Patient has right-sided chest pain.    Heart suggest normal size. Left-sided pacemaker again noted.    Suture line in the right paratracheal area is better seen on study of May 26, 2018.    On the study there was a loculated pneumothorax at the right base with adjacent atelectasis.    Present film shows a large right effusion.    IMPRESSION: Large right effusion.    < end of copied text >        RADIOLOGY & ADDITIONAL STUDIES:    ct scan images from 2018 and current ct scans reveiwed

## 2020-03-15 NOTE — PROGRESS NOTE ADULT - SUBJECTIVE AND OBJECTIVE BOX
CHIEF COMPLAINT/Diagnosis: right sided recurrent effusion/ chest tube placement    SUBJECTIVE: no complaints    REVIEW OF SYSTEMS:    CONSTITUTIONAL: No weakness, fevers or chills  EYES/ENT: No visual changes;  No vertigo or throat pain   NECK: No pain or stiffness  RESPIRATORY: No cough, wheezing, hemoptysis; No shortness of breath  CARDIOVASCULAR: No chest pain or palpitations  GASTROINTESTINAL: No abdominal or epigastric pain. No nausea, vomiting, or hematemesis; No diarrhea or constipation. No melena or hematochezia.  GENITOURINARY: No dysuria, frequency or hematuria  NEUROLOGICAL: No numbness or weakness  SKIN: No itching, burning, rashes, or lesions   All other review of systems is negative unless indicated above    Vital Signs Last 24 Hrs  T(C): 37.1 (15 Mar 2020 07:57), Max: 37.1 (15 Mar 2020 07:57)  T(F): 98.8 (15 Mar 2020 07:57), Max: 98.8 (15 Mar 2020 07:57)  HR: 60 (15 Mar 2020 07:57) (59 - 60)  BP: 143/38 (15 Mar 2020 07:57) (138/38 - 147/32)  BP(mean): --  RR: 18 (15 Mar 2020 07:57) (17 - 18)  SpO2: 96% (15 Mar 2020 07:57) (96% - 98%)    I&O's Summary    14 Mar 2020 07:01  -  15 Mar 2020 07:00  --------------------------------------------------------  IN: 0 mL / OUT: 350 mL / NET: -350 mL        CAPILLARY BLOOD GLUCOSE          PHYSICAL EXAM:    Constitutional: NAD, awake and alert, well-developed  HEENT: PERR, EOMI, Normal Hearing, MMM  Neck: Soft and supple, No LAD, No JVD  Respiratory: Breath sounds are clear bilaterally, No wheezing, rales or rhonchi  Cardiovascular: S1 and S2, regular rate and rhythm, no Murmurs, gallops or rubs  Gastrointestinal: Bowel Sounds present, soft, nontender, nondistended, no guarding, no rebound  Extremities: No peripheral edema  Vascular: 2+ peripheral pulses  Neurological: A/O x 3, no focal deficits  Musculoskeletal: 5/5 strength b/l upper and lower extremities  Skin: No rashes    MEDICATIONS:  MEDICATIONS  (STANDING):  aspirin enteric coated 81 milliGRAM(s) Oral daily  finasteride 5 milliGRAM(s) Oral daily  furosemide    Tablet 20 milliGRAM(s) Oral daily  heparin  Injectable 5000 Unit(s) SubCutaneous three times a day  labetalol 300 milliGRAM(s) Oral two times a day  lidocaine   Patch 1 Patch Transdermal once  lidocaine 1% Injectable 20 milliLiter(s) Local Injection once  pravastatin 80 milliGRAM(s) Oral at bedtime  sodium bicarbonate 325 milliGRAM(s) Oral three times a day  tamsulosin 0.4 milliGRAM(s) Oral at bedtime      LABS: All Labs Reviewed:                        10.4   8.01  )-----------( 250      ( 14 Mar 2020 08:45 )             31.9     03-14    141  |  112<H>  |  18  ----------------------------<  135<H>  3.6   |  22  |  1.62<H>    Ca    8.2<L>      14 Mar 2020 08:45            Blood Culture: 03-11 @ 17:00  Organism --  Gram Stain Blood -- Gram Stain   polymorphonuclear leukocytes seen  No organisms seen  by cytocentrifuge  Specimen Source .Body Fluid Pleural Fluid  Culture-Blood --    03-11 @ 13:15  Organism --  Gram Stain Blood -- Gram Stain --  Specimen Source .Urine None  Culture-Blood --    03-11 @ 08:35  Organism --  Gram Stain Blood -- Gram Stain --  Specimen Source .Blood None  Culture-Blood --          Assessment and Plan:   	  87M w/PMH CAD, HTN, HLD, BPH, SSS s/p PPM, CKD3, SVT, recurrent Rt pl eff, PTX s/p VATS decortication, Pleurodesis 2016, last admit 2018 for rec Rt hydropneumothorax, presents today c/o Rt sided Upper back pain w/ worsening BRENNER over past 10-14 days.      In ED, Cr 2 (baseline), Trop neg, UA neg, RVP neg, Tmax 100.1, elevated bp (he's taken home meds today), CT ch- Large Rt pl eff, atelectasis, pulm nodule,   CT surgery made aware per ED MD.         Problem/Plan - 1:  ·  Problem: Pleural effusion.  Plan: will cs CTS, pulm, cardio  monitor O2 sats  -s/p chest tube >> fluid sent; path/cytology still  pending  -educated on deep breathing excercises, educated on incentive spirometer  -chest tube still draining good amount. hopefull decrease and removal this week.        Problem/Plan - 2:  ·  Problem: Dyspnea on exertion.  Plan: likely 2/2 pleural effusion, pna, other  treat underlying cause  monitor O2 sats  cardio/pulm cs for further input   will check TTE.      Problem/Plan - 3:  ·  Problem: Febrile.  Plan: possibly 2/2 pleural effusion, underlying pna  will treat empirically w/ IV rocephin, azithro  monitor vitals  check cultures  ID cs for further recs.      Problem/Plan - 4:  ·  Problem: Essential hypertension.  Plan: w/ elevated bp  resume pt's home meds - dilt, labetalol, lasix  will add prn hydralazine for sbp>160  monitor bp and titrate meds as needed.      Problem/Plan - 5:  ·  Problem: CKD (chronic kidney disease), stage III.  Plan: stable and at baseline   monitor labs.      Problem/Plan - 6:  Problem: Prophylactic measure. Plan: SQH.      signout for AM attending:  Very pleasant Patient,  has had recurrent effusions on the right sided for many years.   we sent cytology / pathology , still pending  ct surgery will repeat ct chest, and based on new study, will remove chest tube.

## 2020-03-16 NOTE — PROGRESS NOTE ADULT - ASSESSMENT
87M w/PMH CAD, HTN, HLD, BPH, SSS s/p PPM, CKD3, SVT, recurrent Rt pl eff, PTX s/p VATS decortication, Pleurodesis 2016, last admit 2018 for rec Rt hydropneumothorax, presents today c/o Rt sided Upper back pain w/ worsening BRENNER over past 10-14 days.    In ED, Cr 2 (baseline), Trop neg, UA neg, RVP neg, Tmax 100.1, elevated bp (he's taken home meds today), CT ch- Large Rt pl eff, atelectasis, pulm nodule,   CT surgery made aware per ED MD.      #Recurrent  Pleural effusion  -failed prior pleurodesis  -Hx hydropneumothorax spont  -prior VATS decortication / failed  -monitor O2 sats  -S/p thoracentesis with thoracostomy in place 3/11; Exudative; found to have a complicated pleural effusion/empyema, LDH >1000, pH 7.06 fluid sent; path/cytology still  pending  -Cultures NGTD  -Follow up JERROD/RF  -Follow up cytology (high suspicion) -educated on deep breathing excercises, educated on incentive spirometer  -chest tube still draining good amount. hopefull decrease and removal this week.   -Pulm and CTS consult appreciated cont chest tube to waterseal  -daily CXR  -f/u cytology    #Dyspnea on exertion likely 2/2 pleural effusion, pna, other  -treat underlying cause  -monitor O2 sats  -cardio/pulm cs for further input   -check TTE.     #Febrile possibly 2/2 pleural effusion and  underlying pna  -S/P  IV rocephin, azithro  -ID cs appreciated - no need for further ABX    # Soft tissue attenuation is again seen circumferentially around the right pleura,  # .2 cm pleural-based lung nodule at the medial right upper lobe   # 2.3 x 2.1 cm soft tissue nodular density medial to the caudate lobe, not seen in 2018.  Malignancy is not excluded.    # 1.3 x 2.0 cm soft tissue nodule, possibly a lymph node at the subcarinal region (image 60, series 2), is new from 2018. Several additional subcentimeter mediastinal nodes are not enlarged by CT criteria and are without significant change from prior studies.     -Malignancy is not excluded will need outpaitent  PET-CT    #Hpertension.  Plan: w/ elevated bp  -resume pt's home meds - dilt, labetalol, lasix  -will add prn hydralazine for sbp>160  -monitor bp and titrate meds as needed.     #CKD (chronic kidney disease), stage III.  Plan: stable and at baseline   -monitor labs.     #Prophylactic measure. SQH.    signout for AM attending:  Very pleasant Patient,  has had recurrent effusions on the right sided for many years.   we sent cytology / pathology , still pending  ct surgery will repeat ct chest, and based on new study, will remove chest tube.

## 2020-03-16 NOTE — PROGRESS NOTE ADULT - PROBLEM SELECTOR PLAN 1
Right, exudative  s/p R PTC  maintain tube to suction and record output per shift  Daily CXR  Known trapped lung  FU cytology  Will require PET scan as outpatient for follow up of nodules and pleural thickening seen on CT  Planning to remove tube when output decreases    D/W Dr. Victoriano Daily PA  Thoracic Sx  #[id number]

## 2020-03-16 NOTE — PROGRESS NOTE ADULT - SUBJECTIVE AND OBJECTIVE BOX
HOSPITALIST PROGRESS NOTE:  SUBJECTIVE:  PCP: Cardio- Dillon OROZCO- Eric  PCP- Kel[place]    Chief Complaint: Patient is a 87y old  Male who presents with a chief complaint of BRENNER, Rt back pain (16 Mar 2020 09:28)    HPI:  87M w/PMH CAD, HTN, HLD, BPH, SSS s/p PPM, CKD3, SVT, recurrent Rt pl eff, PTX s/p VATS decortication, Pleurodesis 2016, last admit 2018 for rec Rt hydropneumothorax, presents today c/o Rt sided Upper back pain w/ worsening BRENNER over past 10-14 days.  Pt states it's similar to when he's pleural effusion in the past.  He denies chest pain, cough, fever/chills, n/v/d, abd pain, dysuria, weakness.    In ED, Cr 2 (baseline), Trop neg, UA neg, RVP neg, Tmax 100.1, elevated bp (he's taken home meds today), CT ch- Large Rt pl eff, atelectasis, pulm nodule,   CT surgery made aware per ED MD.      3/16: Above reviewed;  Patient has no complaints; Denies any dyspnea     Allergies:  amlodipine (Unknown)  Crestor (Other)  Lipitor (Unknown)    REVIEW OF SYSTEMS:  See HPI. All other review of systems is negative unless indicated above.     OBJECTIVE  Physical Exam:  Vital Signs:    Vital Signs Last 24 Hrs  T(C): 36.7 (16 Mar 2020 08:42), Max: 37.1 (15 Mar 2020 15:22)  T(F): 98 (16 Mar 2020 08:42), Max: 98.8 (15 Mar 2020 15:22)  HR: 60 (16 Mar 2020 11:16) (59 - 60)  BP: 149/41 (16 Mar 2020 11:16) (136/42 - 151/43)  BP(mean): --  RR: 17 (16 Mar 2020 08:42) (17 - 18)  SpO2: 96% (16 Mar 2020 08:42) (96% - 97%)  I&O's Summary    15 Mar 2020 07:01  -  16 Mar 2020 07:00  --------------------------------------------------------  IN: 0 mL / OUT: 500 mL / NET: -500 mL    Constitutional: NAD, awake and alert, well-developed  Neurological: AAO x 3, no focal deficits  HEENT: PERRLA, EOMI, MMM  Neck: Soft and supple, No LAD, No JVD  Respiratory: Breath sounds are clear bilaterally, No wheezing, rales or rhonchi +Right chest Tube  Cardiovascular: S1 and S2, regular rate and rhythm; no Murmurs, gallops or rubs  Gastrointestinal: Bowel Sounds present, soft, nontender, nondistended, no guarding, no rebound tenderness  Back: No CVA tenderness   Extremities: No peripheral edema  Vascular: 2+ peripheral pulses  Musculoskeletal: 5/5 strength b/l upper and lower extremities  Skin: No rashes  Breast: Deferred  Rectal: Deferred    MEDICATIONS  (STANDING):  aspirin enteric coated 81 milliGRAM(s) Oral daily  finasteride 5 milliGRAM(s) Oral daily  furosemide    Tablet 20 milliGRAM(s) Oral daily  heparin  Injectable 5000 Unit(s) SubCutaneous three times a day  labetalol 300 milliGRAM(s) Oral two times a day  lidocaine   Patch 1 Patch Transdermal once  lidocaine 1% Injectable 20 milliLiter(s) Local Injection once  pravastatin 80 milliGRAM(s) Oral at bedtime  sodium bicarbonate 325 milliGRAM(s) Oral three times a day  tamsulosin 0.4 milliGRAM(s) Oral at bedtime    Lab Results:  CBC    .		Differential:	[] Automated		[] Manual  Chemistry      MICROBIOLOGY/CULTURES:  Culture Results:   No growth (03-11 @ 17:00)  Culture Results:   Testing in progress (03-11 @ 17:00)  Culture Results:   No growth (03-11 @ 13:15)  Culture Results:   No growth at 5 days. (03-11 @ 08:35)  Culture Results:   No growth at 5 days. (03-11 @ 08:35)    RADIOLOGY/EKG:    < from: Xray Chest 1 View- PORTABLE-Routine (03.15.20 @ 10:12) >    IMPRESSION: There is left-sided pacemaker. The heart is normal in size. There remains a right-sided chest tube with a moderate-sized right pleural effusion and right base atelectasis. There is no pneumothorax.        < end of copied text >    < from: CT Chest No Cont (03.16.20 @ 08:30) >    IMPRESSION:  1.  Patient is status post interval right-sided chest tube placement with significant interval decrease in size of the right pleural effusion, with small residual loculated appearing components. New air is seen within the right pleural space, presumably related to recent intervention. Improved aeration of the right lower and middle lobes with persistent rounded appearing atelectasis of the posterior right lower lobe; superimposed infectious process would be difficult to exclude.     2.  Soft tissue attenuation is again seen circumferentially around the right pleura, similar to the prior study.  Although this finding could represent sequelae of prior pleurodesis, cannot exclude malignancy/metastatic disease.     Consider tissue biopsy and/or PET-CT for further evaluation.    3.   There is a 1.2 cm pleural-based lung nodule at the medial right upper lobe (image 64, series 2), similar to the prior study but not seen on more remote prior studies.  Malignancy is not excluded.  This too could be further assessed with PET-CT.    4. A 2.3 x 2.1 cm soft tissue nodular density medial to the caudate lobe, not seen in 2018.  Malignancy is not excluded.  This too could be further assessed with PET-CT.    5.  A 1.3 x 2.0 cm soft tissue nodule, possibly a lymph node at the subcarinal region (image 60, series 2), is new from 2018. Several additional subcentimeter mediastinal nodes are not enlarged by CT criteria and are without significant change from prior studies.   Malignancy is not excluded.     This too could be further assessed with PET-CT.    6.  Mildly enlarged main pulmonary artery, correlate for pulmonary artery hypertension.    7.  Pacemaker. Coronary artery calcifications and/or stents.    8.  Other findings, as above.       < end of copied text >

## 2020-03-16 NOTE — PROGRESS NOTE ADULT - ASSESSMENT
87M w/PMH CAD, HTN, HLD, BPH, SSS s/p PPM, CKD3, SVT, recurrent Rt pl eff, PTX s/p VATS decortication, Pleurodesis 2016, last admit 2018 for rec Rt hydropneumothorax, presents today c/o Rt sided Upper back pain w/ worsening BRENNER over past 10-14 days.  Pt states it's similar to when he's pleural effusion in the past.  He denies chest pain, cough, fever/chills, n/v/d, abd pain, dysuria, weakness.    In ED, Cr 2 (baseline), Trop neg, UA neg, RVP neg, Tmax 100.1, elevated bp (he's taken home meds today), CT ch- Large Rt pl eff, atelectasis, pulm nodule,   CT surgery made aware per ED MD.    Recurrent right sided pleural effusion  failed prior pleurodesis  Hx hydropneumothorax spont  prior VATS decortication / failed  S/p thoracentesis with thoracostomy in place; found to have a complicated pleural effusion/empyema, LDH >1000, pH 7.06  CTD/Rheumatic effusion cannot be excluded either.  Follow up JERROD/RF  Follow up cytology (high suspicion)   Reviewed CT Chest; improvement noted, small hydropneumothorax, likely trapped/entrapped lung  Appreciate CTS recommendations   Will continue to monitor

## 2020-03-16 NOTE — PROGRESS NOTE ADULT - SUBJECTIVE AND OBJECTIVE BOX
Subjective: Patient doing well. No complaints Denies fever, chills, SOB, pain.     Vital Signs:  Vital Signs Last 24 Hrs  T(C): 36.7 (03-16-20 @ 16:50), Max: 37 (03-15-20 @ 20:40)  T(F): 98.1 (03-16-20 @ 16:50), Max: 98.6 (03-15-20 @ 20:40)  HR: 60 (03-16-20 @ 16:50) (59 - 60)  BP: 129/39 (03-16-20 @ 16:50) (129/39 - 151/43)  RR: 18 (03-16-20 @ 16:50) (17 - 18)  SpO2: 97% (03-16-20 @ 16:50) (96% - 97%) on (O2)    I&O's Detail    15 Mar 2020 07:01  -  16 Mar 2020 07:00  --------------------------------------------------------  IN:  Total IN: 0 mL    OUT:    Chest Tube: 350 mL    Voided: 150 mL  Total OUT: 500 mL    Total NET: -500 mL        General: NAD  Neurology: Awake, nonfocal, CARRANZA x 4  Eyes: Scleras clear, PERRLA/ EOMI, Gross vision intact  ENT: Gross hearing intact, grossly patent pharynx, no stridor  Neck: Neck supple, trachea midline, No JVD  Respiratory: Decreased at right base  CV: S1S2, no murmurs, rubs or gallops  Abdominal: Soft, NT, ND  Extremities: No edema  Psych: Oriented x 3, normal affect  Tubes: R PTC patent, to WS when seen, 350cc out in last 24H, no air leak     Relevant labs, radiology and Medications reviewed  < from: CT Chest No Cont (03.16.20 @ 08:30) >  1.  Patient is status post interval right-sided chest tube placement with significant interval decrease in size of the right pleural effusion, with small residual loculated appearing components. New air is seen within the right pleural space, presumably related to recent intervention. Improved aeration of the right lower and middle lobes with persistent rounded appearing atelectasis of the posterior right lower lobe; superimposed infectious process would be difficult to exclude.     2.  Soft tissue attenuation is again seen circumferentially around the right pleura, similar to the prior study.  Although this finding could represent sequelae of prior pleurodesis, cannot exclude malignancy/metastatic disease.     Consider tissue biopsy and/or PET-CT for further evaluation.    3.   There is a 1.2 cm pleural-based lung nodule at the medial right upper lobe (image 64, series 2), similar to the prior study but not seen on more remote prior studies.  Malignancy is not excluded.  This too could be further assessed with PET-CT.    4. A 2.3 x 2.1 cm soft tissue nodular density medial to the caudate lobe, not seen in 2018.  Malignancy is not excluded.  This too could be further assessed with PET-CT.    5.  A 1.3 x 2.0 cm soft tissue nodule, possibly a lymph node at the subcarinal region (image 60, series 2), is new from 2018. Several additional subcentimeter mediastinal nodes are not enlarged by CT criteria and are without significant change from prior studies.   Malignancy is not excluded.     This too could be further assessed with PET-CT.    6.  Mildly enlarged main pulmonary artery, correlate for pulmonary artery hypertension.    7.  Pacemaker. Coronary artery calcifications and/or stents.    8.  Other findings, as above.     < end of copied text >              MEDICATIONS  (STANDING):  aspirin enteric coated 81 milliGRAM(s) Oral daily  finasteride 5 milliGRAM(s) Oral daily  furosemide    Tablet 20 milliGRAM(s) Oral daily  heparin  Injectable 5000 Unit(s) SubCutaneous three times a day  labetalol 300 milliGRAM(s) Oral two times a day  lidocaine   Patch 1 Patch Transdermal once  lidocaine 1% Injectable 20 milliLiter(s) Local Injection once  pravastatin 80 milliGRAM(s) Oral at bedtime  sodium bicarbonate 325 milliGRAM(s) Oral three times a day  tamsulosin 0.4 milliGRAM(s) Oral at bedtime    MEDICATIONS  (PRN):  acetaminophen   Tablet .. 650 milliGRAM(s) Oral every 4 hours PRN Mild Pain (1 - 3)  ALBUTerol    90 MICROgram(s) HFA Inhaler 2 Puff(s) Inhalation every 6 hours PRN Shortness of Breath and/or Wheezing  hydrALAZINE Injectable 10 milliGRAM(s) IV Push every 6 hours PRN SBP>160  ondansetron Injectable 4 milliGRAM(s) IV Push every 6 hours PRN Nausea  oxycodone    5 mG/acetaminophen 325 mG 1 Tablet(s) Oral every 4 hours PRN Severe Pain (7 - 10)  traMADol 50 milliGRAM(s) Oral every 6 hours PRN Moderate Pain (4 - 6)        Assessment  87y Male  w/ PAST MEDICAL & SURGICAL HISTORY:  Spinal stenosis  CKD (chronic kidney disease), stage III  SSS (sick sinus syndrome)  Pleural effusion  Hyperlipidemia  BPH (benign prostatic hyperplasia)  CAD (coronary artery disease)  Hypertension  Status post lumbar spinal fusion  admitted with complaints of Patient is a 87y old  Male who presents with a chief complaint of BRENNER, Rt back pain (16 Mar 2020 15:13)  patient underwent Chest tube placement with US guidance

## 2020-03-16 NOTE — PROGRESS NOTE ADULT - SUBJECTIVE AND OBJECTIVE BOX
Patient is a 87y old  Male who presents with a chief complaint of BRENNER, Rt back pain (11 Mar 2020 18:28)      HPI:  HPI:  87M w/PMH CAD, HTN, HLD, BPH, SSS s/p PPM, CKD3, SVT, recurrent Rt pl eff, PTX s/p VATS decortication, Pleurodesis , last admit 2018 for rec Rt hydropneumothorax, presents today c/o Rt sided Upper back pain w/ worsening BRENNER over past 10-14 days.  Pt states it's similar to when he's pleural effusion in the past.  He denies chest pain, cough, fever/chills, n/v/d, abd pain, dysuria, weakness.    In ED, Cr 2 (baseline), Trop neg, UA neg, RVP neg, Tmax 100.1, elevated bp (he's taken home meds today), CT ch- Large Rt pl eff, atelectasis, pulm nodule,   CT surgery made aware per ED MD.    pt is well known from his prior admissions / not recently followed up   now admitted and required repeat CT right chest for further eval  no smoker  Allergies  amlodipine (Unknown)  Crestor (Other)  Lipitor (Unknown)    3/16  Underwent CT Chest today  No acute pulmonary events occurred overnight     MEDICATIONS  (STANDING):  aspirin enteric coated 81 milliGRAM(s) Oral daily  finasteride 5 milliGRAM(s) Oral daily  furosemide    Tablet 20 milliGRAM(s) Oral daily  heparin  Injectable 5000 Unit(s) SubCutaneous three times a day  labetalol 300 milliGRAM(s) Oral two times a day  lidocaine   Patch 1 Patch Transdermal once  lidocaine 1% Injectable 20 milliLiter(s) Local Injection once  pravastatin 80 milliGRAM(s) Oral at bedtime  sodium bicarbonate 325 milliGRAM(s) Oral three times a day  tamsulosin 0.4 milliGRAM(s) Oral at bedtime    MEDICATIONS  (PRN):  acetaminophen   Tablet .. 650 milliGRAM(s) Oral every 4 hours PRN Mild Pain (1 - 3)  ALBUTerol    90 MICROgram(s) HFA Inhaler 2 Puff(s) Inhalation every 6 hours PRN Shortness of Breath and/or Wheezing  hydrALAZINE Injectable 10 milliGRAM(s) IV Push every 6 hours PRN SBP>160  ondansetron Injectable 4 milliGRAM(s) IV Push every 6 hours PRN Nausea  oxycodone    5 mG/acetaminophen 325 mG 1 Tablet(s) Oral every 4 hours PRN Severe Pain (7 - 10)  traMADol 50 milliGRAM(s) Oral every 6 hours PRN Moderate Pain (4 - 6)    Vital Signs Last 24 Hrs  T(C): 36.7 (16 Mar 2020 08:42), Max: 37.1 (15 Mar 2020 15:22)  T(F): 98 (16 Mar 2020 08:42), Max: 98.8 (15 Mar 2020 15:22)  HR: 59 (16 Mar 2020 08:42) (59 - 60)  BP: 139/47 (16 Mar 2020 08:42) (136/42 - 151/43)  BP(mean): --  RR: 17 (16 Mar 2020 08:42) (17 - 18)  SpO2: 96% (16 Mar 2020 08:42) (96% - 97%)    PHYSICAL EXAM  General Appearance: cooperative, no acute distress, right sided CT in place   HEENT: PERRL, conjunctiva clear,  Neck: Supple, , no adenopathy  Lungs: decreased breath sounds right mid-lower lung fields no wheezing, left side clear, Right sided thoracostomy in place   Heart: Regular rate and rhythm, S1, S2 normal, no murmur, rub or gallop  Abdomen: Soft, non-tender, bowel sounds active , no hepatosplenomegaly  Extremities: no cyanosis or edema, no joint swelling  Skin: Skin color, texture normal, no rashes   Neurologic: Alert and oriented X3 , cranial nerves intact, sensory and motor normal,    ECG:    LABS:                          12.0   9.04  )-----------( 294      ( 11 Mar 2020 08:35 )             36.2     03-11    140  |  110<H>  |  19  ----------------------------<  138<H>  3.9   |  22  |  2.00<H>    Ca    8.8      11 Mar 2020 08:35    TPro  6.7  /  Alb  3.1<L>  /  TBili  0.5  /  DBili  x   /  AST  13<L>  /  ALT  12  /  AlkPhos  88  03-11    CARDIAC MARKERS ( 11 Mar 2020 08:35 )  <0.015 ng/mL / x     / x     / x     / x            Pro BNP  799  @ 08:35  D Dimer  --  @ 08:35    PT/INR - ( 11 Mar 2020 08:35 )   PT: 11.6 sec;   INR: 1.04 ratio         PTT - ( 11 Mar 2020 08:35 )  PTT:30.1 sec  Urinalysis Basic - ( 11 Mar 2020 13:15 )    Color: Yellow / Appearance: Clear / S.015 / pH: x  Gluc: x / Ketone: Trace  / Bili: Negative / Urobili: Negative mg/dL   Blood: x / Protein: 15 mg/dL / Nitrite: Negative   Leuk Esterase: Negative / RBC: 0-2 /HPF / WBC 0-2   Sq Epi: x / Non Sq Epi: Occasional / Bacteria: Occasional      < from: Xray Chest 1 View- PORTABLE-Urgent (20 @ 09:12) >  PROCEDURE DATE:  2020    < from: CT Chest No Cont (20 @ 09:39) >  mediastinal margin in the right upper lobe on image #61/series 2.    PLEURA: Large right pleural effusion. There is soft tissue attenuation circumferentially around the pleura, assessment is limited without intravenous contrast. There is a small amount of air, likely in the right pleural space as seen on image #38/series 2, correlate with recent intervention.    MEDIASTINUM AND CARLITA: No lymphadenopathy.    VESSELS: The thoracic aorta and main pulmonary artery are normal in caliber. There is heavy coronary artery calcification.    HEART: Heart size is normal. There are pacemaker leads in the right atrium and right ventricle. Trace pericardial effusion.    CHEST WALL AND LOWER NECK: The thyroid gland is within normal limits. There is no supraclavicular or axillary lymphadenopathy.There is a pacemaker generator pack in the left upper chest wall.    VISUALIZED UPPER ABDOMEN: The imaged adrenal glands are within normal limits. The imaged portions of the unenhanced liver, spleen, pancreas are within normal limits. There is a partially imaged large cyst at the upper pole of the left kidney measuring 8.3 x 5.8 cm.    BONES: Multilevel degenerative change of the thoracic spine with osteophytes, degenerative disc disease and facet joint arthropathy.    IMPRESSION:     Large rightpleural effusion with pleural base soft tissue, concerning for metastasis.    There are 2 pulmonary nodules measuring up to 1.2 cm as above.    Complete atelectasis of the right middle and lower lobes.    Postsurgical changes in the right lung apex.    Other incidental findings are as above.    < end of copied text >        INTERPRETATION:  AP erect chest on 2020 at 9:02 AM. Patient has right-sided chest pain.    Heart suggest normal size. Left-sided pacemaker again noted.    Suture line in the right paratracheal area is better seen on study of May 26, 2018.    On the study there was a loculated pneumothorax at the right base with adjacent atelectasis.    Present film shows a large right effusion.    IMPRESSION: Large right effusion.    < end of copied text >        RADIOLOGY & ADDITIONAL STUDIES:    ct scan images from 2018 and current ct scans reveiwed

## 2020-03-16 NOTE — PROGRESS NOTE ADULT - ASSESSMENT
88yo M with PMH of CAD (on ASA), BPH, CKD III, HLD, HTN, SSS s/p PPM, spinal stenosis s/p fusion and R VATS, pleurodesis 5/2016 admitted 3/11 with recurrent right pleural effusion. 3/11 R PTC placed under US guidance. One liter serosang output. Of note CT Chest revealed RUL (1.2 cm) and SAMREEN (0.8 cm) nodules. Exudative effusion, cytology pending. Cultures NGTD. Repeat CT chest with residual right effusion, lung nodules as seen previously and pleural thickening.

## 2020-03-17 NOTE — PHYSICAL THERAPY INITIAL EVALUATION ADULT - PRECAUTIONS/LIMITATIONS, REHAB EVAL
chest tube removed/surgical precautions chest tube removed 3/17/surgical precautions chest tube/PTC  removed 3/17/surgical precautions

## 2020-03-17 NOTE — PROGRESS NOTE ADULT - SUBJECTIVE AND OBJECTIVE BOX
Patient is a 87y old  Male who presents with a chief complaint of BRENNER, Rt back pain (11 Mar 2020 18:28)      HPI:  HPI:  87M w/PMH CAD, HTN, HLD, BPH, SSS s/p PPM, CKD3, SVT, recurrent Rt pl eff, PTX s/p VATS decortication, Pleurodesis , last admit 2018 for rec Rt hydropneumothorax, presents today c/o Rt sided Upper back pain w/ worsening BRENNER over past 10-14 days.  Pt states it's similar to when he's pleural effusion in the past.  He denies chest pain, cough, fever/chills, n/v/d, abd pain, dysuria, weakness.    In ED, Cr 2 (baseline), Trop neg, UA neg, RVP neg, Tmax 100.1, elevated bp (he's taken home meds today), CT ch- Large Rt pl eff, atelectasis, pulm nodule,   CT surgery made aware per ED MD.    pt is well known from his prior admissions / not recently followed up   now admitted and required repeat CT right chest for further eval  no smoker  Allergies  amlodipine (Unknown)  Crestor (Other)  Lipitor (Unknown)    3/16  Underwent CT Chest today  No acute pulmonary events occurred overnight   3/17  data discussed with RN staff at bedside   today  OUTpt PET scan needed and pt is made aware of DDX  MEDICATIONS  (STANDING):  aspirin enteric coated 81 milliGRAM(s) Oral daily  finasteride 5 milliGRAM(s) Oral daily  furosemide    Tablet 20 milliGRAM(s) Oral daily  heparin  Injectable 5000 Unit(s) SubCutaneous three times a day  labetalol 300 milliGRAM(s) Oral two times a day  lidocaine   Patch 1 Patch Transdermal once  lidocaine 1% Injectable 20 milliLiter(s) Local Injection once  pravastatin 80 milliGRAM(s) Oral at bedtime  sodium bicarbonate 325 milliGRAM(s) Oral three times a day  tamsulosin 0.4 milliGRAM(s) Oral at bedtime    MEDICATIONS  (PRN):  acetaminophen   Tablet .. 650 milliGRAM(s) Oral every 4 hours PRN Mild Pain (1 - 3)  ALBUTerol    90 MICROgram(s) HFA Inhaler 2 Puff(s) Inhalation every 6 hours PRN Shortness of Breath and/or Wheezing  hydrALAZINE Injectable 10 milliGRAM(s) IV Push every 6 hours PRN SBP>160  ondansetron Injectable 4 milliGRAM(s) IV Push every 6 hours PRN Nausea  oxycodone    5 mG/acetaminophen 325 mG 1 Tablet(s) Oral every 4 hours PRN Severe Pain (7 - 10)  traMADol 50 milliGRAM(s) Oral every 6 hours PRN Moderate Pain (4 - 6)    Vital Signs Last 24 Hrs  T(C): 36.6 (17 Mar 2020 07:27), Max: 36.8 (16 Mar 2020 21:12)  T(F): 97.8 (17 Mar 2020 07:27), Max: 98.2 (16 Mar 2020 21:12)  HR: 60 (17 Mar 2020 08:20) (59 - 60)  BP: 175/43 (17 Mar 2020 08:20) (129/39 - 175/43)  BP(mean): --  RR: 18 (17 Mar 2020 07:27) (17 - 18)  SpO2: 97% (17 Mar 2020 07:27) (96% - 98%)    PHYSICAL EXAM  General Appearance: cooperative, no acute distress, right sided CT in place   HEENT: PERRL, conjunctiva clear,  Neck: Supple, , no adenopathy  Lungs: decreased breath sounds right mid-lower lung fields no wheezing, left side clear, Right sided thoracostomy in place   Heart: Regular rate and rhythm, S1, S2 normal, no murmur, rub or gallop  Abdomen: Soft, non-tender, bowel sounds active , no hepatosplenomegaly  Extremities: no cyanosis or edema, no joint swelling  Skin: Skin color, texture normal, no rashes   Neurologic: Alert and oriented X3 , cranial nerves intact, sensory and motor normal,    ECG:    LABS:                          12.0   9.04  )-----------( 294      ( 11 Mar 2020 08:35 )             36.2     03-11    140  |  110<H>  |  19  ----------------------------<  138<H>  3.9   |  22  |  2.00<H>    Ca    8.8      11 Mar 2020 08:35    TPro  6.7  /  Alb  3.1<L>  /  TBili  0.5  /  DBili  x   /  AST  13<L>  /  ALT  12  /  AlkPhos  88  03-11    CARDIAC MARKERS ( 11 Mar 2020 08:35 )  <0.015 ng/mL / x     / x     / x     / x            Pro BNP  799  @ 08:35  D Dimer  --  @ 08:35    PT/INR - ( 11 Mar 2020 08:35 )   PT: 11.6 sec;   INR: 1.04 ratio         PTT - ( 11 Mar 2020 08:35 )  PTT:30.1 sec  Urinalysis Basic - ( 11 Mar 2020 13:15 )    Color: Yellow / Appearance: Clear / S.015 / pH: x  Gluc: x / Ketone: Trace  / Bili: Negative / Urobili: Negative mg/dL   Blood: x / Protein: 15 mg/dL / Nitrite: Negative   Leuk Esterase: Negative / RBC: 0-2 /HPF / WBC 0-2   Sq Epi: x / Non Sq Epi: Occasional / Bacteria: Occasional      < from: Xray Chest 1 View- PORTABLE-Urgent (20 @ 09:12) >  PROCEDURE DATE:  2020    < from: CT Chest No Cont (20 @ 09:39) >  mediastinal margin in the right upper lobe on image #61/series 2.    PLEURA: Large right pleural effusion. There is soft tissue attenuation circumferentially around the pleura, assessment is limited without intravenous contrast. There is a small amount of air, likely in the right pleural space as seen on image #38/series 2, correlate with recent intervention.    MEDIASTINUM AND CARLITA: No lymphadenopathy.    VESSELS: The thoracic aorta and main pulmonary artery are normal in caliber. There is heavy coronary artery calcification.    HEART: Heart size is normal. There are pacemaker leads in the right atrium and right ventricle. Trace pericardial effusion.    CHEST WALL AND LOWER NECK: The thyroid gland is within normal limits. There is no supraclavicular or axillary lymphadenopathy.There is a pacemaker generator pack in the left upper chest wall.    VISUALIZED UPPER ABDOMEN: The imaged adrenal glands are within normal limits. The imaged portions of the unenhanced liver, spleen, pancreas are within normal limits. There is a partially imaged large cyst at the upper pole of the left kidney measuring 8.3 x 5.8 cm.    BONES: Multilevel degenerative change of the thoracic spine with osteophytes, degenerative disc disease and facet joint arthropathy.    IMPRESSION:     Large rightpleural effusion with pleural base soft tissue, concerning for metastasis.    There are 2 pulmonary nodules measuring up to 1.2 cm as above.    Complete atelectasis of the right middle and lower lobes.    Postsurgical changes in the right lung apex.    Other incidental findings are as above.    < end of copied text >        INTERPRETATION:  AP erect chest on 2020 at 9:02 AM. Patient has right-sided chest pain.    Heart suggest normal size. Left-sided pacemaker again noted.    Suture line in the right paratracheal area is better seen on study of May 26, 2018.    On the study there was a loculated pneumothorax at the right base with adjacent atelectasis.    Present film shows a large right effusion.    IMPRESSION: Large right effusion.    < end of copied text >    < from: Xray Chest 1 View- PORTABLE-Routine (03.15.20 @ 10:12) >        INTERPRETATION:  AP chest with comparison to 3/14/2020.    Clinical indications: Right-sided chest tube.    IMPRESSION: There is left-sided pacemaker. The heart is normal in size. There remains a right-sided chest tube with a moderate-sized right pleural effusion and right base atelectasis. There is no pneumothorax.    < end of copied text >      RADIOLOGY & ADDITIONAL STUDIES:    ct scan images from 2018 and current ct scans reveiwed

## 2020-03-17 NOTE — PHYSICAL THERAPY INITIAL EVALUATION ADULT - GENERAL OBSERVATIONS, REHAB EVAL
supine recumbent in bed wearing eyeglasses, speaks with [place] accent,very pleasant and receptive,denies pain/SOB

## 2020-03-17 NOTE — PROGRESS NOTE ADULT - PROBLEM SELECTOR PLAN 1
Right, exudative  s/p R PTC, now removed. Post-pull CXR stable.   Known trapped lung  Will FU cytology  Patient stable for DC home from thoracic standpoint  Please have patient make appointment to follow up with Dr. Reyes within 3-4 weeks at either [facility] or [facility]. he can call #[phone] to make appointment.   D/W Dr. Victoriano Daily PA  Thoracic Sx  #[phone]

## 2020-03-17 NOTE — PHYSICAL THERAPY INITIAL EVALUATION ADULT - PATIENT PROFILE REVIEW, REHAB EVAL
yes yes/pt with h/o R pleural effusion for years pt with h/o R pleural effusion & chronic PTx  for years,last hospitalized May 2018 with hydropneumothorax,he is s/p VATS/decortication/pleurodesis 5/2016 (failed)/yes

## 2020-03-17 NOTE — PROGRESS NOTE ADULT - SUBJECTIVE AND OBJECTIVE BOX
Subjective: Patient doing well. No complaints. Denies SOB, CP, cough, fever, chills.     Vital Signs:  Vital Signs Last 24 Hrs  T(C): 36.6 (03-17-20 @ 07:27), Max: 36.8 (03-16-20 @ 21:12)  T(F): 97.8 (03-17-20 @ 07:27), Max: 98.2 (03-16-20 @ 21:12)  HR: 60 (03-17-20 @ 08:20) (60 - 60)  BP: 129/35 (03-17-20 @ 11:34) (129/35 - 175/43)  RR: 18 (03-17-20 @ 07:27) (17 - 18)  SpO2: 97% (03-17-20 @ 07:27) (96% - 98%) on (O2)    I&O's Detail    16 Mar 2020 07:01  -  17 Mar 2020 07:00  --------------------------------------------------------  IN:  Total IN: 0 mL    OUT:    Chest Tube: 120 mL  Total OUT: 120 mL    Total NET: -120 mL      17 Mar 2020 07:01  -  17 Mar 2020 12:19  --------------------------------------------------------  IN:  Total IN: 0 mL    OUT:    Chest Tube: 40 mL  Total OUT: 40 mL    Total NET: -40 mL      General: WN/WD NAD  Neurology: Awake, nonfocal, CARRANZA x 4  Eyes: Scleras clear, PERRLA/ EOMI, Gross vision intact  ENT: Gross hearing intact, grossly patent pharynx, no stridor  Neck: Neck supple, trachea midline, No JVD  Respiratory: decreased at right base  CV: S1S2, no murmurs, rubs or gallops  Abdominal: Soft, NT, ND  Extremities: No edema  Psych: Oriented x 3, normal affect  Tubes: R PTC no air leak, 250cc out in last 24H on sxn    Relevant labs, radiology and Medications reviewed            MEDICATIONS  (STANDING):  aspirin enteric coated 81 milliGRAM(s) Oral daily  finasteride 5 milliGRAM(s) Oral daily  furosemide    Tablet 20 milliGRAM(s) Oral daily  heparin  Injectable 5000 Unit(s) SubCutaneous three times a day  labetalol 300 milliGRAM(s) Oral two times a day  lidocaine   Patch 1 Patch Transdermal once  lidocaine 1% Injectable 20 milliLiter(s) Local Injection once  pravastatin 80 milliGRAM(s) Oral at bedtime  sodium bicarbonate 325 milliGRAM(s) Oral three times a day  tamsulosin 0.4 milliGRAM(s) Oral at bedtime    MEDICATIONS  (PRN):  acetaminophen   Tablet .. 650 milliGRAM(s) Oral every 4 hours PRN Mild Pain (1 - 3)  ALBUTerol    90 MICROgram(s) HFA Inhaler 2 Puff(s) Inhalation every 6 hours PRN Shortness of Breath and/or Wheezing  hydrALAZINE Injectable 10 milliGRAM(s) IV Push every 6 hours PRN SBP>160  ondansetron Injectable 4 milliGRAM(s) IV Push every 6 hours PRN Nausea  oxycodone    5 mG/acetaminophen 325 mG 1 Tablet(s) Oral every 4 hours PRN Severe Pain (7 - 10)  traMADol 50 milliGRAM(s) Oral every 6 hours PRN Moderate Pain (4 - 6)        Assessment  87y Male  w/ PAST MEDICAL & SURGICAL HISTORY:  Spinal stenosis  CKD (chronic kidney disease), stage III  SSS (sick sinus syndrome)  Pleural effusion  Hyperlipidemia  BPH (benign prostatic hyperplasia)  CAD (coronary artery disease)  Hypertension  Status post lumbar spinal fusion  admitted with complaints of Patient is a 87y old  Male who presents with a chief complaint of BRENNER, Rt back pain (17 Mar 2020 08:23)  patient underwent Chest tube placement with US guidance

## 2020-03-17 NOTE — PHYSICAL THERAPY INITIAL EVALUATION ADULT - PERTINENT HX OF CURRENT PROBLEM, REHAB EVAL
recurrent R pleural effusion,SOB/BRENNER recurrent R pleural effusion ,SOB/BRENNER,R upper back pain worsening recurrent R pleural effusion ,SOB/BRENNER,R upper back pain worsening,h/o trapped R lung

## 2020-03-17 NOTE — PROGRESS NOTE ADULT - ASSESSMENT
87M w/PMH CAD, HTN, HLD, BPH, SSS s/p PPM, CKD3, SVT, recurrent Rt pl eff, PTX s/p VATS decortication, Pleurodesis 2016, last admit 2018 for rec Rt hydropneumothorax, presents today c/o Rt sided Upper back pain w/ worsening BRENNER over past 10-14 days.  Pt states it's similar to when he's pleural effusion in the past.  He denies chest pain, cough, fever/chills, n/v/d, abd pain, dysuria, weakness.    In ED, Cr 2 (baseline), Trop neg, UA neg, RVP neg, Tmax 100.1, elevated bp (he's taken home meds today), CT ch- Large Rt pl eff, atelectasis, pulm nodule,   CT surgery made aware per ED MD.    Recurrent right sided pleural effusion  failed prior pleurodesis  Hx hydropneumothorax spont  prior VATS decortication / failed  S/p thoracentesis with thoracostomy in place; found to have a complicated pleural effusion/empyema, LDH >1000, pH 7.06  CTD/Rheumatic effusion cannot be excluded either.  Follow up JERROD/RF  Follow up cytology (high suspicion)  / pending  Reviewed CT Chest; improvement noted, small hydropneumothorax, likely trapped/entrapped lung  Appreciate CTS recommendations   Will continue to monitor   will need outpt PET scan for eval

## 2020-03-17 NOTE — PROGRESS NOTE ADULT - ASSESSMENT
87M w/PMH CAD, HTN, HLD, BPH, SSS s/p PPM, CKD3, SVT, recurrent Rt pl eff, PTX s/p VATS decortication, Pleurodesis 2016, last admit 2018 for rec Rt hydropneumothorax, presents today c/o Rt sided Upper back pain w/ worsening BRENNER over past 10-14 days.    In ED, Cr 2 (baseline), Trop neg, UA neg, RVP neg, Tmax 100.1, elevated bp (he's taken home meds today), CT ch- Large Rt pl eff, atelectasis, pulm nodule,   CT surgery made aware per ED MD.      #Recurrent  Pleural effusion  -failed prior pleurodesis  -Hx hydropneumothorax spont  -prior VATS decortication / failed  -monitor O2 sats  -S/p thoracentesis with thoracostomy in place 3/11 removed on 3/17; Exudative; found to have a complicated pleural effusion/empyema, LDH >1000, pH 7.06 fluid sent; path/cytology - MALIGNANT  -Cultures NGTD  -Follow up JERROD/RF  -educated on deep breathing exercises educated on incentive spirometer  -daily CXR  -f/u cytology    #Dyspnea on exertion likely 2/2 pleural effusion, pna, other  -treat underlying cause  -monitor O2 sats  -cardio/pulm cs for further input   -check TTE Estimated left ventricular ejection fraction is 60-65 %.    #Febrile possibly 2/2 pleural effusion and  underlying pna  -S/P  IV rocephin, azithro  -ID cs appreciated - no need for further ABX    # Soft tissue attenuation is again seen circumferentially around the right pleura,  # .2 cm pleural-based lung nodule at the medial right upper lobe   # 2.3 x 2.1 cm soft tissue nodular density medial to the caudate lobe, not seen in 2018.  Malignancy is not excluded.    # 1.3 x 2.0 cm soft tissue nodule, possibly a lymph node at the subcarinal region (image 60, series 2), is new from 2018. Several additional subcentimeter mediastinal nodes are not enlarged by CT criteria and are without significant change from prior studies.     -Malignancy is not excluded will need outpaitent  PET-CT  -FLuid Cytology is Malignant - Discussed with patients son  -Discussed with CTS paitent may need further tissue biopsy and pleurex  -Will discuss with [facility]    #Hpertension - uncontrolled   -resume pt's home meds - dilt, labetalol, lasix  -will add prn hydralazine for sbp>160  -monitor bp and titrate meds as needed.     #CKD (chronic kidney disease), stage III.  Plan: stable and at baseline   -monitor labs.     #Prophylactic measure. SQH.    signout for AM attending:  Very pleasant Patient,  has had recurrent effusions on the right sided for many years. cytology shows malignancy, awaiting hemeoinc  and possible pleurex

## 2020-03-17 NOTE — PHYSICAL THERAPY INITIAL EVALUATION ADULT - DIAGNOSIS, PT EVAL
recurrent R pleural effusion (cytology+malignant) pleural based soft tissue mass,uncontrolled HTN ,febrile syndrome,?Pneumonia recurrent R pleural effusion (cytology+malignant) pleural based soft tissue mass, pulmonary nodules, uncontrolled HTN ,febrile syndrome,?Pneumonia

## 2020-03-17 NOTE — PHYSICAL THERAPY INITIAL EVALUATION ADULT - REFERRING PHYSICIAN, REHAB EVAL
[facility] ROSARIO De Santiago 3/17/20 [facility] for [facility]

## 2020-03-17 NOTE — PROGRESS NOTE ADULT - SUBJECTIVE AND OBJECTIVE BOX
HOSPITALIST PROGRESS NOTE:  SUBJECTIVE:  PCP: Cardio- Dillon  CTS- Eric  PCP- Kelby    Chief Complaint: Patient is a 87y old  Male who presents with a chief complaint of BRENNER, Rt back pain (16 Mar 2020 09:28)    HPI:  87M w/PMH CAD, HTN, HLD, BPH, SSS s/p PPM, CKD3, SVT, recurrent Rt pl eff, PTX s/p VATS decortication, Pleurodesis 2016, last admit 2018 for rec Rt hydropneumothorax, presents today c/o Rt sided Upper back pain w/ worsening BRENNER over past 10-14 days.  Pt states it's similar to when he's pleural effusion in the past.  He denies chest pain, cough, fever/chills, n/v/d, abd pain, dysuria, weakness.    In ED, Cr 2 (baseline), Trop neg, UA neg, RVP neg, Tmax 100.1, elevated bp (he's taken home meds today), CT ch- Large Rt pl eff, atelectasis, pulm nodule,   CT surgery made aware per ED MD.      3/16: Above reviewed;  Patient has no complaints; Denies any dyspnea   3/17:  Patient has no complaints; Chest tube removed;  Discussed with CTS    Allergies:  amlodipine (Unknown)  Crestor (Other)  Lipitor (Unknown)    REVIEW OF SYSTEMS:  See HPI. All other review of systems is negative unless indicated above.     OBJECTIVE  Physical Exam:  Vital Signs Last 24 Hrs  T(C): 36.6 (17 Mar 2020 07:27), Max: 36.8 (16 Mar 2020 21:12)  T(F): 97.8 (17 Mar 2020 07:27), Max: 98.2 (16 Mar 2020 21:12)  HR: 60 (17 Mar 2020 08:20) (60 - 60)  BP: 129/35 (17 Mar 2020 11:34) (129/35 - 175/43)  BP(mean): --  RR: 18 (17 Mar 2020 07:27) (17 - 18)  SpO2: 97% (17 Mar 2020 07:27) (96% - 98%)    Constitutional: NAD, awake and alert, well-developed  Neurological: AAO x 3, no focal deficits  HEENT: PERRLA, EOMI, MMM  Neck: Soft and supple, No LAD, No JVD  Respiratory: Breath sounds are clear bilaterally, No wheezing, rales or rhonchi  Abd: soft nondistended; no rebound tenderness  Back: No CVA tenderness   Extremities: No peripheral edema  Vascular: 2+ peripheral pulses  Musculoskeletal: 5/5 strength b/l upper and lower extremities  Skin: No rashes  Breast: Deferred  Rectal: Deferred    MEDICATIONS  (STANDING):  aspirin enteric coated 81 milliGRAM(s) Oral daily  finasteride 5 milliGRAM(s) Oral daily  furosemide    Tablet 20 milliGRAM(s) Oral daily  heparin  Injectable 5000 Unit(s) SubCutaneous three times a day  labetalol 300 milliGRAM(s) Oral two times a day  lidocaine   Patch 1 Patch Transdermal once  lidocaine 1% Injectable 20 milliLiter(s) Local Injection once  pravastatin 80 milliGRAM(s) Oral at bedtime  sodium bicarbonate 325 milliGRAM(s) Oral three times a day  tamsulosin 0.4 milliGRAM(s) Oral at bedtime    Lab Results:  CBC    .		Differential:	[] Automated		[] Manual  Chemistry      MICROBIOLOGY/CULTURES:  Culture Results:   No growth at 5 days. (03-11 @ 17:00)  Culture Results:   Testing in progress (03-11 @ 17:00)  Culture Results:   No growth (03-11 @ 13:15)  Culture Results:   No growth at 5 days. (03-11 @ 08:35)  Culture Results:   No growth at 5 days. (03-11 @ 08:35)      RADIOLOGY/EKG:    < from: Xray Chest 1 View- PORTABLE-Routine (03.15.20 @ 10:12) >    IMPRESSION: There is left-sided pacemaker. The heart is normal in size. There remains a right-sided chest tube with a moderate-sized right pleural effusion and right base atelectasis. There is no pneumothorax.        < end of copied text >    < from: CT Chest No Cont (03.16.20 @ 08:30) >    IMPRESSION:  1.  Patient is status post interval right-sided chest tube placement with significant interval decrease in size of the right pleural effusion, with small residual loculated appearing components. New air is seen within the right pleural space, presumably related to recent intervention. Improved aeration of the right lower and middle lobes with persistent rounded appearing atelectasis of the posterior right lower lobe; superimposed infectious process would be difficult to exclude.     2.  Soft tissue attenuation is again seen circumferentially around the right pleura, similar to the prior study.  Although this finding could represent sequelae of prior pleurodesis, cannot exclude malignancy/metastatic disease.     Consider tissue biopsy and/or PET-CT for further evaluation.    3.   There is a 1.2 cm pleural-based lung nodule at the medial right upper lobe (image 64, series 2), similar to the prior study but not seen on more remote prior studies.  Malignancy is not excluded.  This too could be further assessed with PET-CT.    4. A 2.3 x 2.1 cm soft tissue nodular density medial to the caudate lobe, not seen in 2018.  Malignancy is not excluded.  This too could be further assessed with PET-CT.    5.  A 1.3 x 2.0 cm soft tissue nodule, possibly a lymph node at the subcarinal region (image 60, series 2), is new from 2018. Several additional subcentimeter mediastinal nodes are not enlarged by CT criteria and are without significant change from prior studies.   Malignancy is not excluded.     This too could be further assessed with PET-CT.    6.  Mildly enlarged main pulmonary artery, correlate for pulmonary artery hypertension.    7.  Pacemaker. Coronary artery calcifications and/or stents.    8.  Other findings, as above.       < end of copied text >    < from: TTE Echo Complete w/o contrast w/ Doppler (03.11.20 @ 21:46) >     Impression     Summary     Fibrocalcific changes noted to the mitral valve leaflets with preserved   leaflet excursion.   Mild mitral annular calcification is present.   Mild (1+) mitral regurgitation is present.   EAreversal of the mitral inflow consistent with reduced compliance of the   left ventricle.   Fibrocalcific changes noted to the Aortic valve leaflets with preserved   leaflet excursion.   Normal appearing tricuspid valve structure.   Mild to Moderate Tricuspid regurgitation is present.   Mild pulmonary hypertension.   Pulmonic valve not well seen.   Trace pulmonic valvular regurgitation is present.   The left atrium is mildly dilated.   Estimated left ventricular ejection fraction is 60-65 %.   The left ventricle is normal in size, wall thickness, wall motion and   contractility as seen in limited views.   Normal appearing right atrium.   Normal appearing right ventricle structure and function.   A device wire is seen in the RV and RA.   IVC was not visualized within the subcostal view.   No evidence of pericardial effusion.   No evidence of pleural effusion.      < end of copied text >

## 2020-03-18 NOTE — PROGRESS NOTE ADULT - SUBJECTIVE AND OBJECTIVE BOX
HOSPITALIST PROGRESS NOTE:  SUBJECTIVE:  PCP: Cardio- Dillon  CTS- Eric  PCP- Kelby    Chief Complaint: Patient is a 87y old  Male who presents with a chief complaint of BRENNER, Rt back pain (16 Mar 2020 09:28)    HPI:  87M w/PMH CAD, HTN, HLD, BPH, SSS s/p PPM, CKD3, SVT, recurrent Rt pl eff, PTX s/p VATS decortication, Pleurodesis 2016, last admit 2018 for rec Rt hydropneumothorax, presents today c/o Rt sided Upper back pain w/ worsening BRENNER over past 10-14 days.  Pt states it's similar to when he's pleural effusion in the past.  He denies chest pain, cough, fever/chills, n/v/d, abd pain, dysuria, weakness.    In ED, Cr 2 (baseline), Trop neg, UA neg, RVP neg, Tmax 100.1, elevated bp (he's taken home meds today), CT ch- Large Rt pl eff, atelectasis, pulm nodule,   CT surgery made aware per ED MD.      3/16: Above reviewed;  Patient has no complaints; Denies any dyspnea   3/17:  Patient has no complaints; Chest tube removed;  Discussed with CTS  3/18:  Discussed the results of the cytology which is malignant with patient and son; CTS wants VATS for chantale    Allergies:  amlodipine (Unknown)  Crestor (Other)  Lipitor (Unknown)    REVIEW OF SYSTEMS:  See HPI. All other review of systems is negative unless indicated above.     OBJECTIVE  Physical Exam:  Vital Signs Last 24 Hrs  T(C): 36.9 (18 Mar 2020 11:15), Max: 37.1 (17 Mar 2020 21:26)  T(F): 98.5 (18 Mar 2020 11:15), Max: 98.7 (17 Mar 2020 21:26)  HR: 60 (18 Mar 2020 11:15) (59 - 60)  BP: 145/40 (18 Mar 2020 11:15) (143/42 - 148/36)  BP(mean): --  RR: 18 (18 Mar 2020 11:15) (18 - 18)  SpO2: 96% (18 Mar 2020 11:15) (96% - 97%)    Constitutional: NAD, awake and alert, well-developed  Neurological: AAO x 3, no focal deficits  HEENT: PERRLA, EOMI, MMM  Neck: Soft and supple, No LAD, No JVD  Respiratory: Breath sounds are clear bilaterally, No wheezing, rales or rhonchi  Abd: soft nondistended; no rebound tenderness  Back: No CVA tenderness   Extremities: No peripheral edema  Vascular: 2+ peripheral pulses  Musculoskeletal: 5/5 strength b/l upper and lower extremities  Skin: No rashes  Breast: Deferred  Rectal: Deferred    MEDICATIONS  (STANDING):  aspirin enteric coated 81 milliGRAM(s) Oral daily  finasteride 5 milliGRAM(s) Oral daily  furosemide    Tablet 20 milliGRAM(s) Oral daily  heparin  Injectable 5000 Unit(s) SubCutaneous three times a day  labetalol 300 milliGRAM(s) Oral two times a day  lidocaine   Patch 1 Patch Transdermal once  lidocaine 1% Injectable 20 milliLiter(s) Local Injection once  pravastatin 80 milliGRAM(s) Oral at bedtime  sodium bicarbonate 325 milliGRAM(s) Oral three times a day  tamsulosin 0.4 milliGRAM(s) Oral at bedtime    Lab Results:  CBC  CBC Full  -  ( 18 Mar 2020 07:20 )  WBC Count : 8.15 K/uL  RBC Count : 3.48 M/uL  Hemoglobin : 10.6 g/dL  Hematocrit : 32.8 %  Platelet Count - Automated : 288 K/uL  Mean Cell Volume : 94.3 fl  Mean Cell Hemoglobin : 30.5 pg  Mean Cell Hemoglobin Concentration : 32.3 gm/dL  Auto Neutrophil # : x  Auto Lymphocyte # : x  Auto Monocyte # : x  Auto Eosinophil # : x  Auto Basophil # : x  Auto Neutrophil % : x  Auto Lymphocyte % : x  Auto Monocyte % : x  Auto Eosinophil % : x  Auto Basophil % : x    .		Differential:	[] Automated		[] Manual  Chemistry                        10.6   8.15  )-----------( 288      ( 18 Mar 2020 07:20 )             32.8     03-18    143  |  113<H>  |  21  ----------------------------<  97  4.7   |  26  |  1.62<H>    Ca    9.0      18 Mar 2020 07:20  Phos  3.7     03-18  Mg     2.4     03-18      MICROBIOLOGY/CULTURES:  Culture Results:   No growth at 5 days. (03-11 @ 17:00)  Culture Results:   Testing in progress (03-11 @ 17:00)      MICROBIOLOGY/CULTURES:  Culture Results:   No growth at 5 days. (03-11 @ 17:00)  Culture Results:   Testing in progress (03-11 @ 17:00)  Culture Results:   No growth (03-11 @ 13:15)  Culture Results:   No growth at 5 days. (03-11 @ 08:35)  Culture Results:   No growth at 5 days. (03-11 @ 08:35)      RADIOLOGY/EKG:    < from: Xray Chest 1 View- PORTABLE-Routine (03.15.20 @ 10:12) >    IMPRESSION: There is left-sided pacemaker. The heart is normal in size. There remains a right-sided chest tube with a moderate-sized right pleural effusion and right base atelectasis. There is no pneumothorax.        < end of copied text >    < from: CT Chest No Cont (03.16.20 @ 08:30) >    IMPRESSION:  1.  Patient is status post interval right-sided chest tube placement with significant interval decrease in size of the right pleural effusion, with small residual loculated appearing components. New air is seen within the right pleural space, presumably related to recent intervention. Improved aeration of the right lower and middle lobes with persistent rounded appearing atelectasis of the posterior right lower lobe; superimposed infectious process would be difficult to exclude.     2.  Soft tissue attenuation is again seen circumferentially around the right pleura, similar to the prior study.  Although this finding could represent sequelae of prior pleurodesis, cannot exclude malignancy/metastatic disease.     Consider tissue biopsy and/or PET-CT for further evaluation.    3.   There is a 1.2 cm pleural-based lung nodule at the medial right upper lobe (image 64, series 2), similar to the prior study but not seen on more remote prior studies.  Malignancy is not excluded.  This too could be further assessed with PET-CT.    4. A 2.3 x 2.1 cm soft tissue nodular density medial to the caudate lobe, not seen in 2018.  Malignancy is not excluded.  This too could be further assessed with PET-CT.    5.  A 1.3 x 2.0 cm soft tissue nodule, possibly a lymph node at the subcarinal region (image 60, series 2), is new from 2018. Several additional subcentimeter mediastinal nodes are not enlarged by CT criteria and are without significant change from prior studies.   Malignancy is not excluded.     This too could be further assessed with PET-CT.    6.  Mildly enlarged main pulmonary artery, correlate for pulmonary artery hypertension.    7.  Pacemaker. Coronary artery calcifications and/or stents.    8.  Other findings, as above.       < end of copied text >    < from: TTE Echo Complete w/o contrast w/ Doppler (03.11.20 @ 21:46) >     Impression     Summary     Fibrocalcific changes noted to the mitral valve leaflets with preserved   leaflet excursion.   Mild mitral annular calcification is present.   Mild (1+) mitral regurgitation is present.   EAreversal of the mitral inflow consistent with reduced compliance of the   left ventricle.   Fibrocalcific changes noted to the Aortic valve leaflets with preserved   leaflet excursion.   Normal appearing tricuspid valve structure.   Mild to Moderate Tricuspid regurgitation is present.   Mild pulmonary hypertension.   Pulmonic valve not well seen.   Trace pulmonic valvular regurgitation is present.   The left atrium is mildly dilated.   Estimated left ventricular ejection fraction is 60-65 %.   The left ventricle is normal in size, wall thickness, wall motion and   contractility as seen in limited views.   Normal appearing right atrium.   Normal appearing right ventricle structure and function.   A device wire is seen in the RV and RA.   IVC was not visualized within the subcostal view.   No evidence of pericardial effusion.   No evidence of pleural effusion.      < end of copied text >

## 2020-03-18 NOTE — CONSULT NOTE ADULT - PROBLEM SELECTOR RECOMMENDATION 9
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  agree with repeat biopsy to do molecular testing ( EGFR, ALK-1 , ROS) and PDL-1 testing.  if inadequate, may consider liquid biopsy  also need to ensure that this is not a mesothelioma  patient advised to see me as outpatient in 2 weeks. 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  agree with repeat biopsy to do molecular testing ( EGFR, ALK-1 , ROS) and PDL-1 testing.  if inadequate, may consider liquid biopsy  also need to ensure that this is not a mesothelioma ( although no h/o asbestos exposure)  patient advised to see me as outpatient in 2 weeks.

## 2020-03-18 NOTE — PROGRESS NOTE ADULT - PROBLEM SELECTOR PLAN 1
Right, exudative  s/p R PTC, now removed. Post-pull CXR stable.   Known trapped lung  cytology: adenocarcinoma  appreciate Oncology eval  tissue biopsy needed for further testing  Patient amendable to Right VATS, right lung biopsy, and PleurX catheter placement under maximum sedation tomorrow  Cardiology consulted for clearance   D/W Dr. Victoriano Daiyl PA  Thoracic Sx  #[phone]

## 2020-03-18 NOTE — PROGRESS NOTE ADULT - ASSESSMENT
87M w/PMH CAD, HTN, HLD, BPH, SSS s/p PPM, CKD3, SVT, recurrent Rt pl eff, PTX s/p VATS decortication, Pleurodesis 2016, last admit 2018 for rec Rt hydropneumothorax, presents today c/o Rt sided Upper back pain w/ worsening BRENNER over past 10-14 days.  Pt states it's similar to when he's pleural effusion in the past.  He denies chest pain, cough, fever/chills, n/v/d, abd pain, dysuria, weakness.    In ED, Cr 2 (baseline), Trop neg, UA neg, RVP neg, Tmax 100.1, elevated bp (he's taken home meds today), CT ch- Large Rt pl eff, atelectasis, pulm nodule,   CT surgery made aware per ED MD.    Recurrent right sided pleural effusion  failed prior pleurodesis  Hx hydropneumothorax spont  prior VATS decortication / failed  S/p thoracentesis with thoracostomy in place; found to have a complicated pleural effusion/empyema, LDH >1000, pH 7.06  CTD/Rheumatic effusion cannot be excluded either.  Follow up JERROD/RF  Follow up cytology (high suspicion)  / positive for malignant cells  Reviewed CT Chest; improvement noted, small hydropneumothorax, likely trapped/entrapped lung  Appreciate CTS recommendations   Will continue to monitor   will need outpt PET scan for eval  thoracic surgery eval in progress

## 2020-03-18 NOTE — PROGRESS NOTE ADULT - ASSESSMENT
86yo M with PMH of mild Pulm HTN, CAD (on ASA), BPH, CKD III, HLD, HTN, SSS s/p PPM, spinal stenosis s/p fusion and R VATS, pleurodesis 5/2016 admitted 3/11 with recurrent right pleural effusion. 3/11 R PTC placed under US guidance. One liter serosang output. Of note CT Chest revealed RUL (1.2 cm) and SAMREEN (0.8 cm) nodules. Exudative effusion, Cultures NGTD. Repeat CT chest with residual right effusion, lung nodules as seen previously and pleural thickening.  Cytology +adenocarcinoma.

## 2020-03-18 NOTE — PROGRESS NOTE ADULT - ASSESSMENT
87M w/PMH CAD, HTN, HLD, BPH, SSS s/p PPM, CKD3, SVT, recurrent Rt pl eff, PTX s/p VATS decortication, Pleurodesis 2016, last admit 2018 for rec Rt hydropneumothorax, presents today c/o Rt sided Upper back pain w/ worsening BRENNER over past 10-14 days.    In ED, Cr 2 (baseline), Trop neg, UA neg, RVP neg, Tmax 100.1, elevated bp (he's taken home meds today), CT ch- Large Rt pl eff, atelectasis, pulm nodule,   CT surgery made aware per ED MD.      #Recurrent  Pleural effusion  -failed prior pleurodesis  -Hx hydropneumothorax spont  -prior VATS decortication / failed  -monitor O2 sats  -S/p thoracentesis with thoracostomy in place 3/11 removed on 3/17; Exudative; found to have a complicated pleural effusion/empyema, LDH >1000, pH 7.06 fluid sent; path/cytology - MALIGNANT  -Cultures NGTD  -Follow up JERROD/RF  -educated on deep breathing exercises educated on incentive spirometer  -CYS consult appreciated - VATS for chantale  -Cardio consult for clearance - Dr Hopkins    #Preoperative clearance   -Based on RCRI, patient is high risk of perioperative cardiac events for necessary procedure. patient and family understand the risks. Patient is medically optimized for surgery.  -cardio consult     #Dyspnea on exertion likely 2/2 pleural effusion, pna, other  -treat underlying cause  -monitor O2 sats  -cardio/pulm cs for further input   -check TTE Estimated left ventricular ejection fraction is 60-65 %.    #Febrile possibly 2/2 pleural effusion and  underlying pna  -S/P  IV rocephin, azithro  -ID cs appreciated - no need for further ABX    # Soft tissue attenuation is again seen circumferentially around the right pleura,  # .2 cm pleural-based lung nodule at the medial right upper lobe   # 2.3 x 2.1 cm soft tissue nodular density medial to the caudate lobe, not seen in 2018.  Malignancy is not excluded.    # 1.3 x 2.0 cm soft tissue nodule, possibly a lymph node at the subcarinal region (image 60, series 2), is new from 2018. Several additional subcentimeter mediastinal nodes are not enlarged by CT criteria and are without significant change from prior studies.     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  if inadequate, may consider liquid biopsy; FU outpatient in 2 weeks    #Hpertension - uncontrolled   -resume pt's home meds - dilt, labetalol, lasix  -will add prn hydralazine for sbp>160  -monitor bp and titrate meds as needed.     #CKD (chronic kidney disease), stage III.  Plan: stable and at baseline   -monitor labs.     #Prophylactic measure. SQH.    signout for AM attending:  Very pleasant Patient,  has had recurrent effusions on the right sided for many years. cytology shows malignancy, going for VATS chantale

## 2020-03-18 NOTE — PROGRESS NOTE ADULT - SUBJECTIVE AND OBJECTIVE BOX
Subjective: Patient with no complaints. Denies pain, SOB, palpitations, dizziness, fever, chills, cough.     Vital Signs:  Vital Signs Last 24 Hrs  T(C): 36.9 (03-18-20 @ 11:15), Max: 37.2 (03-17-20 @ 15:55)  T(F): 98.5 (03-18-20 @ 11:15), Max: 98.9 (03-17-20 @ 15:55)  HR: 60 (03-18-20 @ 11:15) (59 - 60)  BP: 145/40 (03-18-20 @ 11:15) (143/42 - 149/49)  RR: 18 (03-18-20 @ 11:15) (18 - 18)  SpO2: 96% (03-18-20 @ 11:15) (96% - 98%) on (O2)    I&O's Detail    17 Mar 2020 07:01  -  18 Mar 2020 07:00  --------------------------------------------------------  IN:    Oral Fluid: 100 mL  Total IN: 100 mL    OUT:    Chest Tube: 40 mL    Voided: 400 mL  Total OUT: 440 mL    Total NET: -340 mL          General: NAD  Neurology: Awake, nonfocal, CARRANZA x 4  Eyes: Scleras clear, PERRLA/ EOMI, Gross vision intact  ENT: Gross hearing intact, grossly patent pharynx, no stridor  Neck: Neck supple, trachea midline, No JVD  Respiratory: Decreased at right base  CV: S1S2, no murmurs, rubs or gallops  Abdominal: Soft, NT, ND  Extremities: No erika  Psych: Oriented x 3, normal affect      Relevant labs, radiology and Medications reviewed                        10.6   8.15  )-----------( 288      ( 18 Mar 2020 07:20 )             32.8     03-18    143  |  113<H>  |  21  ----------------------------<  97  4.7   |  26  |  1.62<H>    Ca    9.0      18 Mar 2020 07:20  Phos  3.7     03-18  Mg     2.4     03-18        MEDICATIONS  (STANDING):  aspirin enteric coated 81 milliGRAM(s) Oral daily  diltiazem    milliGRAM(s) Oral daily  finasteride 5 milliGRAM(s) Oral daily  furosemide    Tablet 20 milliGRAM(s) Oral daily  heparin  Injectable 5000 Unit(s) SubCutaneous three times a day  labetalol 300 milliGRAM(s) Oral two times a day  lidocaine   Patch 1 Patch Transdermal once  lidocaine 1% Injectable 20 milliLiter(s) Local Injection once  pravastatin 80 milliGRAM(s) Oral at bedtime  sodium bicarbonate 325 milliGRAM(s) Oral three times a day  tamsulosin 0.4 milliGRAM(s) Oral at bedtime    MEDICATIONS  (PRN):  acetaminophen   Tablet .. 650 milliGRAM(s) Oral every 4 hours PRN Mild Pain (1 - 3)  ALBUTerol    90 MICROgram(s) HFA Inhaler 2 Puff(s) Inhalation every 6 hours PRN Shortness of Breath and/or Wheezing  hydrALAZINE Injectable 10 milliGRAM(s) IV Push every 6 hours PRN SBP>160  ondansetron Injectable 4 milliGRAM(s) IV Push every 6 hours PRN Nausea  oxycodone    5 mG/acetaminophen 325 mG 1 Tablet(s) Oral every 4 hours PRN Severe Pain (7 - 10)  traMADol 50 milliGRAM(s) Oral every 6 hours PRN Moderate Pain (4 - 6)        Assessment  87y Male  w/ PAST MEDICAL & SURGICAL HISTORY:  Spinal stenosis  CKD (chronic kidney disease), stage III  SSS (sick sinus syndrome)  Pleural effusion  Hyperlipidemia  BPH (benign prostatic hyperplasia)  CAD (coronary artery disease)  Hypertension  Status post lumbar spinal fusion  admitted with complaints of Patient is a 87y old  Male who presents with a chief complaint of BRENNRE, Rt back pain (18 Mar 2020 10:00)   patient underwent Chest tube placement with US guidance

## 2020-03-18 NOTE — CONSULT NOTE ADULT - SUBJECTIVE AND OBJECTIVE BOX
Patient is a 87y old  Male who presents with a chief complaint of BRENNER, Rt back pain (18 Mar 2020 08:09)    87M Never smoker w/PMH CAD, HTN, HLD, BPH, SSS s/p PPM, CKD3, SVT, recurrent Rt pl eff, PTX s/p VATS decortication, Pleurodesis 2016 histology at that time was negative for any malignancy  .  he is now admitted with 2018 for rec Rt hydropneumothorax,  pleural fluid now positive for adenocarcinoma  fluid not adequate for further testing particularly molecular testing    he is due for repeat biopsy and pleurex catheter today      PAST MEDICAL & SURGICAL HISTORY:  Spinal stenosis  CKD (chronic kidney disease), stage III  SSS (sick sinus syndrome)  Pleural effusion  Hyperlipidemia  BPH (benign prostatic hyperplasia)  CAD (coronary artery disease)  Hypertension  Status post lumbar spinal fusion      Review of Systems:   CONSTITUTIONAL: No fever.  EYES: No eye pain or discharge.  ENMT:  No sinus or throat pain  NECK: No pain or stiffness  RESPIRATORY: No cough, wheezing, chills or hemoptysis; ++ shortness of breath  CARDIOVASCULAR: No chest pain, palpitations, dizziness, or leg swelling  GASTROINTESTINAL: No abdominal or epigastric pain. No nausea, vomiting, or hematemesis; No diarrhea or constipation. No melena or hematochezia.  GENITOURINARY: No dysuria or incontinence  NEUROLOGICAL: No headaches, memory loss, loss of strength, numbness, or tremors  SKIN: No rashes.  MUSCULOSKELETAL: No joint pain or swelling; + RT BACK PAIN  PSYCHIATRIC: No depression, anxiety, mood swings, or difficulty sleeping      Allergies  amlodipine (Unknown)  Crestor (Other)  Lipitor (Unknown)      Social History:   LIVES W/ WIFE- SHE'S CURRENTLY IN REHAB   NEVER SMOKER      FAMILY HISTORY:  unknown to this elderly gentleman    Home Medications:  Aspirin Enteric Coated 81 mg oral delayed release tablet: 1 tab(s) orally once a day (11 Mar 2020 10:57)  Co Q-10 100 mg oral capsule: 1 cap(s) orally once a day (11 Mar 2020 11:40)  dilTIAZem 180 mg/24 hours oral capsule, extended release: 1 cap(s) orally once a day (11 Mar 2020 10:57)  finasteride 5 mg oral tablet: 1 tab(s) orally once a day (at bedtime) (11 Mar 2020 10:57)  furosemide 20 mg oral tablet: 1 tab(s) orally once a day (11 Mar 2020 10:57)  labetalol 300 mg oral tablet: 1 tab(s) orally 2 times a day (11 Mar 2020 11:40)  pravastatin 80 mg oral tablet: 1 tab(s) orally once a day (11 Mar 2020 10:57)  Ranexa 500 mg oral tablet, extended release: 1 tab(s) orally once a day (11 Mar 2020 11:40)  sodium bicarbonate 325 mg oral tablet: 1 tab(s) orally 3 times a day (11 Mar 2020 10:57)  tamsulosin 0.4 mg oral capsule: 1 cap(s) orally once a day (11 Mar 2020 10:57)  Vitamin B12 1000 mcg oral tablet: 1 tab(s) orally once a day (11 Mar 2020 10:57)  Vitamin D3 2000 intl units oral tablet: 1 tab(s) orally once a day (11 Mar 2020 10:57)  Welchol 625 mg oral tablet: 1 tab(s) orally 2 times a day (11 Mar 2020 10:57)    MEDICATIONS  (STANDING):  aspirin enteric coated 81 milliGRAM(s) Oral daily  atorvastatin 20 milliGRAM(s) Oral at bedtime  azithromycin  IVPB      cefTRIAXone Injectable.      finasteride 5 milliGRAM(s) Oral daily  furosemide    Tablet 20 milliGRAM(s) Oral daily  heparin  Injectable 5000 Unit(s) SubCutaneous three times a day  labetalol 300 milliGRAM(s) Oral two times a day  ranolazine 500 milliGRAM(s) Oral once  sodium bicarbonate 325 milliGRAM(s) Oral three times a day  tamsulosin 0.4 milliGRAM(s) Oral at bedtime    MEDICATIONS  (PRN):  acetaminophen   Tablet .. 650 milliGRAM(s) Oral every 4 hours PRN Mild Pain (1 - 3)  hydrALAZINE Injectable 10 milliGRAM(s) IV Push every 6 hours PRN SBP>160  ondansetron Injectable 4 milliGRAM(s) IV Push every 6 hours PRN Nausea  traMADol 50 milliGRAM(s) Oral every 6 hours PRN Moderate Pain (4 - 6)        PHYSICAL EXAM:  Vital Signs Last 24 Hrs  T(C): 37.3 (11 Mar 2020 11:51), Max: 37.8 (11 Mar 2020 08:12)  T(F): 99.1 (11 Mar 2020 11:51), Max: 100.1 (11 Mar 2020 08:12)  HR: 60 (11 Mar 2020 11:51) (60 - 62)  BP: 169/58 (11 Mar 2020 11:51) (141/56 - 174/60)  BP(mean): 93 (11 Mar 2020 09:38) (81 - 93)  RR: 18 (11 Mar 2020 11:51) (18 - 22)  SpO2: 100% (11 Mar 2020 11:51) (98% - 100%)      GENERAL:     Patient not examined  looks well and comfortable  not short of breath at rest   able to ambulate with walker                          10.6   8.15  )-----------( 288      ( 18 Mar 2020 07:20 )             32.8     03-18    143  |  113<H>  |  21  ----------------------------<  97  4.7   |  26  |  1.62<H>    Ca    9.0      18 Mar 2020 07:20  Phos  3.7     03-18  Mg     2.4     03-18              PATH  adenocarcinoma+  tissue not enough for molecular testing        RADIOLOGY & ADDITIONAL STUDIES:    loculated right hydropneumothorax Patient is a 87y old  Male who presents with a chief complaint of BRENNER, Rt back pain (18 Mar 2020 08:09)    87M Never smoker and with no history of asbestos exposure w/PMH CAD, HTN, HLD, BPH, SSS s/p PPM, CKD3, SVT, recurrent Rt pl eff, PTX s/p VATS decortication, Pleurodesis 2016 histology at that time was negative for any malignancy  .  he is now admitted with 2018 for rec Rt hydropneumothorax,  pleural fluid now positive for adenocarcinoma  fluid not adequate for further testing particularly molecular testing    he is due for repeat biopsy and pleurex catheter today      PAST MEDICAL & SURGICAL HISTORY:  Spinal stenosis  CKD (chronic kidney disease), stage III  SSS (sick sinus syndrome)  Pleural effusion  Hyperlipidemia  BPH (benign prostatic hyperplasia)  CAD (coronary artery disease)  Hypertension  Status post lumbar spinal fusion      Review of Systems:   CONSTITUTIONAL: No fever.  EYES: No eye pain or discharge.  ENMT:  No sinus or throat pain  NECK: No pain or stiffness  RESPIRATORY: No cough, wheezing, chills or hemoptysis; ++ shortness of breath  CARDIOVASCULAR: No chest pain, palpitations, dizziness, or leg swelling  GASTROINTESTINAL: No abdominal or epigastric pain. No nausea, vomiting, or hematemesis; No diarrhea or constipation. No melena or hematochezia.  GENITOURINARY: No dysuria or incontinence  NEUROLOGICAL: No headaches, memory loss, loss of strength, numbness, or tremors  SKIN: No rashes.  MUSCULOSKELETAL: No joint pain or swelling; + RT BACK PAIN  PSYCHIATRIC: No depression, anxiety, mood swings, or difficulty sleeping      Allergies  amlodipine (Unknown)  Crestor (Other)  Lipitor (Unknown)      Social History:   LIVES W/ WIFE- SHE'S CURRENTLY IN REHAB   NEVER SMOKER      FAMILY HISTORY:  unknown to this elderly gentleman    Home Medications:  Aspirin Enteric Coated 81 mg oral delayed release tablet: 1 tab(s) orally once a day (11 Mar 2020 10:57)  Co Q-10 100 mg oral capsule: 1 cap(s) orally once a day (11 Mar 2020 11:40)  dilTIAZem 180 mg/24 hours oral capsule, extended release: 1 cap(s) orally once a day (11 Mar 2020 10:57)  finasteride 5 mg oral tablet: 1 tab(s) orally once a day (at bedtime) (11 Mar 2020 10:57)  furosemide 20 mg oral tablet: 1 tab(s) orally once a day (11 Mar 2020 10:57)  labetalol 300 mg oral tablet: 1 tab(s) orally 2 times a day (11 Mar 2020 11:40)  pravastatin 80 mg oral tablet: 1 tab(s) orally once a day (11 Mar 2020 10:57)  Ranexa 500 mg oral tablet, extended release: 1 tab(s) orally once a day (11 Mar 2020 11:40)  sodium bicarbonate 325 mg oral tablet: 1 tab(s) orally 3 times a day (11 Mar 2020 10:57)  tamsulosin 0.4 mg oral capsule: 1 cap(s) orally once a day (11 Mar 2020 10:57)  Vitamin B12 1000 mcg oral tablet: 1 tab(s) orally once a day (11 Mar 2020 10:57)  Vitamin D3 2000 intl units oral tablet: 1 tab(s) orally once a day (11 Mar 2020 10:57)  Welchol 625 mg oral tablet: 1 tab(s) orally 2 times a day (11 Mar 2020 10:57)    MEDICATIONS  (STANDING):  aspirin enteric coated 81 milliGRAM(s) Oral daily  atorvastatin 20 milliGRAM(s) Oral at bedtime  azithromycin  IVPB      cefTRIAXone Injectable.      finasteride 5 milliGRAM(s) Oral daily  furosemide    Tablet 20 milliGRAM(s) Oral daily  heparin  Injectable 5000 Unit(s) SubCutaneous three times a day  labetalol 300 milliGRAM(s) Oral two times a day  ranolazine 500 milliGRAM(s) Oral once  sodium bicarbonate 325 milliGRAM(s) Oral three times a day  tamsulosin 0.4 milliGRAM(s) Oral at bedtime    MEDICATIONS  (PRN):  acetaminophen   Tablet .. 650 milliGRAM(s) Oral every 4 hours PRN Mild Pain (1 - 3)  hydrALAZINE Injectable 10 milliGRAM(s) IV Push every 6 hours PRN SBP>160  ondansetron Injectable 4 milliGRAM(s) IV Push every 6 hours PRN Nausea  traMADol 50 milliGRAM(s) Oral every 6 hours PRN Moderate Pain (4 - 6)        PHYSICAL EXAM:  Vital Signs Last 24 Hrs  T(C): 37.3 (11 Mar 2020 11:51), Max: 37.8 (11 Mar 2020 08:12)  T(F): 99.1 (11 Mar 2020 11:51), Max: 100.1 (11 Mar 2020 08:12)  HR: 60 (11 Mar 2020 11:51) (60 - 62)  BP: 169/58 (11 Mar 2020 11:51) (141/56 - 174/60)  BP(mean): 93 (11 Mar 2020 09:38) (81 - 93)  RR: 18 (11 Mar 2020 11:51) (18 - 22)  SpO2: 100% (11 Mar 2020 11:51) (98% - 100%)      GENERAL:     Patient not examined  looks well and comfortable  not short of breath at rest   able to ambulate with walker                          10.6   8.15  )-----------( 288      ( 18 Mar 2020 07:20 )             32.8     03-18    143  |  113<H>  |  21  ----------------------------<  97  4.7   |  26  |  1.62<H>    Ca    9.0      18 Mar 2020 07:20  Phos  3.7     03-18  Mg     2.4     03-18              PATH  adenocarcinoma+  tissue not enough for molecular testing        RADIOLOGY & ADDITIONAL STUDIES:    loculated right hydropneumothorax

## 2020-03-18 NOTE — CONSULT NOTE ADULT - PROBLEM SELECTOR RECOMMENDATION 2
as above   for pleurex and repeat biopsy
may represent scarring   FU cytology  Will require outpatient follow up with repeat imaging

## 2020-03-18 NOTE — PROGRESS NOTE ADULT - SUBJECTIVE AND OBJECTIVE BOX
Patient is a 87y old  Male who presents with a chief complaint of BRENNER, Rt back pain (11 Mar 2020 18:28)      HPI:  HPI:  87M w/PMH CAD, HTN, HLD, BPH, SSS s/p PPM, CKD3, SVT, recurrent Rt pl eff, PTX s/p VATS decortication, Pleurodesis , last admit 2018 for rec Rt hydropneumothorax, presents today c/o Rt sided Upper back pain w/ worsening BRENNER over past 10-14 days.  Pt states it's similar to when he's pleural effusion in the past.  He denies chest pain, cough, fever/chills, n/v/d, abd pain, dysuria, weakness.    In ED, Cr 2 (baseline), Trop neg, UA neg, RVP neg, Tmax 100.1, elevated bp (he's taken home meds today), CT ch- Large Rt pl eff, atelectasis, pulm nodule,   CT surgery made aware per ED MD.    pt is well known from his prior admissions / not recently followed up   now admitted and required repeat CT right chest for further eval  no smoker  Allergies  amlodipine (Unknown)  Crestor (Other)  Lipitor (Unknown)    3/16  Underwent CT Chest today  No acute pulmonary events occurred overnight   3/17  data discussed with RN staff at bedside   today  OUTpt PET scan needed and pt is made aware of DDX  3/18  pleural fluid cytology positive for malignant cells  additional tissue bx scheduled for molrecular testing  NSC lung ca supsectred  MEDICATIONS  (STANDING):  aspirin enteric coated 81 milliGRAM(s) Oral daily  finasteride 5 milliGRAM(s) Oral daily  furosemide    Tablet 20 milliGRAM(s) Oral daily  heparin  Injectable 5000 Unit(s) SubCutaneous three times a day  labetalol 300 milliGRAM(s) Oral two times a day  lidocaine   Patch 1 Patch Transdermal once  lidocaine 1% Injectable 20 milliLiter(s) Local Injection once  pravastatin 80 milliGRAM(s) Oral at bedtime  sodium bicarbonate 325 milliGRAM(s) Oral three times a day  tamsulosin 0.4 milliGRAM(s) Oral at bedtime    MEDICATIONS  (PRN):  acetaminophen   Tablet .. 650 milliGRAM(s) Oral every 4 hours PRN Mild Pain (1 - 3)  ALBUTerol    90 MICROgram(s) HFA Inhaler 2 Puff(s) Inhalation every 6 hours PRN Shortness of Breath and/or Wheezing  hydrALAZINE Injectable 10 milliGRAM(s) IV Push every 6 hours PRN SBP>160  ondansetron Injectable 4 milliGRAM(s) IV Push every 6 hours PRN Nausea  oxycodone    5 mG/acetaminophen 325 mG 1 Tablet(s) Oral every 4 hours PRN Severe Pain (7 - 10)  traMADol 50 milliGRAM(s) Oral every 6 hours PRN Moderate Pain (4 - 6)    Vital Signs Last 24 Hrs  T(C): 36.6 (18 Mar 2020 05:11), Max: 37.2 (17 Mar 2020 15:55)  T(F): 97.8 (18 Mar 2020 05:11), Max: 98.9 (17 Mar 2020 15:55)  HR: 60 (18 Mar 2020 05:11) (59 - 60)  BP: 143/42 (18 Mar 2020 05:11) (129/35 - 175/43)  BP(mean): --  RR: 18 (18 Mar 2020 05:11) (18 - 18)  SpO2: 97% (18 Mar 2020 05:11) (96% - 98%)  PHYSICAL EXAM  General Appearance: cooperative, no acute distress, right sided CT in place   HEENT: PERRL, conjunctiva clear,  Neck: Supple, , no adenopathy  Lungs: decreased breath sounds right mid-lower lung fields no wheezing, left side clear, Right sided thoracostomy in place   Heart: Regular rate and rhythm, S1, S2 normal, no murmur, rub or gallop  Abdomen: Soft, non-tender, bowel sounds active , no hepatosplenomegaly  Extremities: no cyanosis or edema, no joint swelling  Skin: Skin color, texture normal, no rashes   Neurologic: Alert and oriented X3 , cranial nerves intact, sensory and motor normal,    ECG:    LABS:                          12.0   9.04  )-----------( 294      ( 11 Mar 2020 08:35 )             36.2     03-11    140  |  110<H>  |  19  ----------------------------<  138<H>  3.9   |  22  |  2.00<H>    Ca    8.8      11 Mar 2020 08:35    TPro  6.7  /  Alb  3.1<L>  /  TBili  0.5  /  DBili  x   /  AST  13<L>  /  ALT  12  /  AlkPhos  88      CARDIAC MARKERS ( 11 Mar 2020 08:35 )  <0.015 ng/mL / x     / x     / x     / x            Pro BNP  799  @ 08:35  D Dimer  --  @ 08:35    PT/INR - ( 11 Mar 2020 08:35 )   PT: 11.6 sec;   INR: 1.04 ratio         PTT - ( 11 Mar 2020 08:35 )  PTT:30.1 sec  Urinalysis Basic - ( 11 Mar 2020 13:15 )    Color: Yellow / Appearance: Clear / S.015 / pH: x  Gluc: x / Ketone: Trace  / Bili: Negative / Urobili: Negative mg/dL   Blood: x / Protein: 15 mg/dL / Nitrite: Negative   Leuk Esterase: Negative / RBC: 0-2 /HPF / WBC 0-2   Sq Epi: x / Non Sq Epi: Occasional / Bacteria: Occasional      < from: Xray Chest 1 View- PORTABLE-Urgent (20 @ 09:12) >  PROCEDURE DATE:  2020    < from: CT Chest No Cont (20 @ 09:39) >  mediastinal margin in the right upper lobe on image #61/series 2.    PLEURA: Large right pleural effusion. There is soft tissue attenuation circumferentially around the pleura, assessment is limited without intravenous contrast. There is a small amount of air, likely in the right pleural space as seen on image #38/series 2, correlate with recent intervention.    MEDIASTINUM AND CARLITA: No lymphadenopathy.    VESSELS: The thoracic aorta and main pulmonary artery are normal in caliber. There is heavy coronary artery calcification.    HEART: Heart size is normal. There are pacemaker leads in the right atrium and right ventricle. Trace pericardial effusion.    CHEST WALL AND LOWER NECK: The thyroid gland is within normal limits. There is no supraclavicular or axillary lymphadenopathy.There is a pacemaker generator pack in the left upper chest wall.    VISUALIZED UPPER ABDOMEN: The imaged adrenal glands are within normal limits. The imaged portions of the unenhanced liver, spleen, pancreas are within normal limits. There is a partially imaged large cyst at the upper pole of the left kidney measuring 8.3 x 5.8 cm.    BONES: Multilevel degenerative change of the thoracic spine with osteophytes, degenerative disc disease and facet joint arthropathy.    IMPRESSION:     Large rightpleural effusion with pleural base soft tissue, concerning for metastasis.    There are 2 pulmonary nodules measuring up to 1.2 cm as above.    Complete atelectasis of the right middle and lower lobes.    Postsurgical changes in the right lung apex.    Other incidental findings are as above.    < end of copied text >        INTERPRETATION:  AP erect chest on 2020 at 9:02 AM. Patient has right-sided chest pain.    Heart suggest normal size. Left-sided pacemaker again noted.    Suture line in the right paratracheal area is better seen on study of May 26, 2018.    On the study there was a loculated pneumothorax at the right base with adjacent atelectasis.    Present film shows a large right effusion.    IMPRESSION: Large right effusion.    < end of copied text >    < from: Xray Chest 1 View- PORTABLE-Routine (03.15.20 @ 10:12) >        INTERPRETATION:  AP chest with comparison to 3/14/2020.    Clinical indications: Right-sided chest tube.    IMPRESSION: There is left-sided pacemaker. The heart is normal in size. There remains a right-sided chest tube with a moderate-sized right pleural effusion and right base atelectasis. There is no pneumothorax.    < end of copied text >      RADIOLOGY & ADDITIONAL STUDIES:    ct scan images from 2018 and current ct scans reveiwed

## 2020-03-19 NOTE — CONSULT NOTE ADULT - REASON FOR ADMISSION
BRENNER, Rt back pain

## 2020-03-19 NOTE — BRIEF OPERATIVE NOTE - OPERATION/FINDINGS
Bloody pleural fluid, large amount of clots. Pleurx catheter not placed due to increased risk of infection with multiple clots/bloody pleural fluid. Frozen pleural specimen showed fibrosis. Additional pleural biopsies sent for permenant

## 2020-03-19 NOTE — PROGRESS NOTE ADULT - ASSESSMENT
87M w/PMH CAD, HTN, HLD, BPH, SSS s/p PPM, CKD3, SVT, recurrent Rt pl eff, PTX s/p VATS decortication, Pleurodesis 2016, last admit 2018 for rec Rt hydropneumothorax, presents today c/o Rt sided Upper back pain w/ worsening BRENNER over past 10-14 days.    In ED, Cr 2 (baseline), Trop neg, UA neg, RVP neg, Tmax 100.1, elevated bp (he's taken home meds today), CT ch- Large Rt pl eff, atelectasis, pulm nodule,   CT surgery made aware per ED MD.      #Recurrent  Pleural effusion  -failed prior pleurodesis  -Hx hydropneumothorax spont  -prior VATS decortication / failed  -monitor O2 sats  -S/p thoracentesis with thoracostomy in place 3/11 removed on 3/17; Exudative; found to have a complicated pleural effusion/empyema, LDH >1000, pH 7.06 fluid sent; path/cytology - MALIGNANT  -Cultures NGTD  -Follow up JERROD/RF  -educated on deep breathing exercises educated on incentive spirometer  -CTS consult appreciated   -S/P VATS    #Preoperative clearance   -Based on RCRI, patient is high risk of perioperative cardiac events for necessary procedure. patient and family understand the risks. Patient is medically optimized for surgery.  -cardio consult appreciated     #Dyspnea on exertion likely 2/2 pleural effusion, pna, other  -treat underlying cause  -monitor O2 sats  -cardio/pulm cs for further input   -check TTE Estimated left ventricular ejection fraction is 60-65 %.    #Febrile possibly 2/2 pleural effusion and  underlying pna  -S/P  IV rocephin, azithro  -ID cs appreciated - no need for further ABX    # Soft tissue attenuation is again seen circumferentially around the right pleura,  # .2 cm pleural-based lung nodule at the medial right upper lobe   # 2.3 x 2.1 cm soft tissue nodular density medial to the caudate lobe, not seen in 2018.  Malignancy is not excluded.    # 1.3 x 2.0 cm soft tissue nodule, possibly a lymph node at the subcarinal region (image 60, series 2), is new from 2018. Several additional subcentimeter mediastinal nodes are not enlarged by CT criteria and are without significant change from prior studies.     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  if inadequate, may consider liquid biopsy; FU outpatient in 2 weeks    #Hpertension - better controlled   -resume pt's home meds - dilt, labetalol, lasix  -will add prn hydralazine for sbp>160  -monitor bp and titrate meds as needed.     #CKD (chronic kidney disease), stage III.  Plan: stable and at baseline   -monitor labs.     #Prophylactic measure. SQH.    signout for AM attending:  Very pleasant Patient,  has had recurrent effusions on the right sided for many years. cytology shows malignancy, going for VATS chantale

## 2020-03-19 NOTE — PROGRESS NOTE ADULT - SUBJECTIVE AND OBJECTIVE BOX
Subjective:  88yo M with PMH of mild Pulm HTN, CAD (on ASA), BPH, CKD III, HLD, HTN, SSS s/p PPM, spinal stenosis s/p fusion and R VATS, pleurodesis 5/2016 admitted 3/11 with recurrent right pleural effusion. 3/11 R PTC placed under US guidance. One liter serosang output. Of note CT Chest revealed RUL (1.2 cm) and SAMREEN (0.8 cm) nodules. Exudative effusion, Cultures NGTD. Repeat CT chest with residual right effusion, lung nodules as seen previously and pleural thickening.  Cytology +adenocarcinoma. Tube removed 3/17.  3/19/2020 Pt seen this am, NPO for VATS, pleurx catheter placement and pleural bx. No new complaints.    Vital Signs:  Vital Signs Last 24 Hrs  T(C): 36.7 (03-19-20 @ 11:10), Max: 37.2 (03-18-20 @ 20:45)  T(F): 98.1 (03-19-20 @ 11:10), Max: 99 (03-18-20 @ 20:45)  HR: 60 (03-19-20 @ 11:30) (60 - 62)  BP: 147/46 (03-19-20 @ 11:30) (125/51 - 166/49)  RR: 24 (03-19-20 @ 11:30) (17 - 26)  SpO2: 100% (03-19-20 @ 11:30) (94% - 100%) on (O2)    Telemetry/Alarms:    Relevant labs, radiology and Medications reviewed                        9.6    7.44  )-----------( 266      ( 19 Mar 2020 08:11 )             30.0     03-19    140  |  110<H>  |  25<H>  ----------------------------<  94  4.4   |  25  |  1.53<H>    Ca    8.5      19 Mar 2020 08:11  Phos  3.7     03-18  Mg     2.4     03-18      PT/INR - ( 18 Mar 2020 17:35 )   PT: 12.2 sec;   INR: 1.10 ratio         PTT - ( 18 Mar 2020 17:35 )  PTT:32.6 sec  MEDICATIONS  (STANDING):  aspirin enteric coated 81 milliGRAM(s) Oral daily  diltiazem    milliGRAM(s) Oral daily  finasteride 5 milliGRAM(s) Oral daily  furosemide    Tablet 20 milliGRAM(s) Oral daily  heparin  Injectable 5000 Unit(s) SubCutaneous three times a day  labetalol 300 milliGRAM(s) Oral two times a day  lidocaine   Patch 1 Patch Transdermal once  sodium bicarbonate 325 milliGRAM(s) Oral three times a day  tamsulosin 0.4 milliGRAM(s) Oral at bedtime    MEDICATIONS  (PRN):  acetaminophen   Tablet .. 650 milliGRAM(s) Oral every 4 hours PRN Mild Pain (1 - 3)  ALBUTerol    90 MICROgram(s) HFA Inhaler 2 Puff(s) Inhalation every 6 hours PRN Shortness of Breath and/or Wheezing  hydrALAZINE Injectable 10 milliGRAM(s) IV Push every 6 hours PRN SBP>160  ondansetron Injectable 4 milliGRAM(s) IV Push every 6 hours PRN Nausea  oxyCODONE    IR 5 milliGRAM(s) Oral every 4 hours PRN Mild Pain (1 - 3)  senna 2 Tablet(s) Oral at bedtime PRN Constipation      Physical exam  Gen NAD  Neuro AAox3  Card RRR  Pulm decreased right  Abd soft  Ext warm    Tubes:    I&O's Summary    18 Mar 2020 07:01  -  19 Mar 2020 07:00  --------------------------------------------------------  IN: 0 mL / OUT: 200 mL / NET: -200 mL    19 Mar 2020 07:01  -  19 Mar 2020 11:35  --------------------------------------------------------  IN: 300 mL / OUT: 100 mL / NET: 200 mL        Assessment  88yo M with PMH of mild Pulm HTN, CAD (on ASA), BPH, CKD III, HLD, HTN, SSS s/p PPM, spinal stenosis s/p fusion and R VATS, pleurodesis 5/2016 admitted 3/11 with recurrent right pleural effusion. 3/11 R PTC placed under US guidance. One liter serosang output. Of note CT Chest revealed RUL (1.2 cm) and SAMREEN (0.8 cm) nodules. Exudative effusion, Cultures NGTD. Repeat CT chest with residual right effusion, lung nodules as seen previously and pleural thickening.  Cytology +adenocarcinoma.  NPO for VATS, pleurx catheter placement and pleural bx.    PLAN  NPO for OR  cardiology note appreciated      Discussed with Cardiothoracic Team at AM rounds.

## 2020-03-19 NOTE — PROGRESS NOTE ADULT - SUBJECTIVE AND OBJECTIVE BOX
HOSPITALIST PROGRESS NOTE:  SUBJECTIVE:  PCP: Cardio- Dillon  CTS- Eric  PCP- Kelby    Chief Complaint: Patient is a 87y old  Male who presents with a chief complaint of BRENNER, Rt back pain (16 Mar 2020 09:28)    HPI:  87M w/PMH CAD, HTN, HLD, BPH, SSS s/p PPM, CKD3, SVT, recurrent Rt pl eff, PTX s/p VATS decortication, Pleurodesis 2016, last admit 2018 for rec Rt hydropneumothorax, presents today c/o Rt sided Upper back pain w/ worsening BRENNER over past 10-14 days.  Pt states it's similar to when he's pleural effusion in the past.  He denies chest pain, cough, fever/chills, n/v/d, abd pain, dysuria, weakness.    In ED, Cr 2 (baseline), Trop neg, UA neg, RVP neg, Tmax 100.1, elevated bp (he's taken home meds today), CT ch- Large Rt pl eff, atelectasis, pulm nodule,   CT surgery made aware per ED MD.      3/16: Above reviewed;  Patient has no complaints; Denies any dyspnea   3/17:  Patient has no complaints; Chest tube removed;  Discussed with CTS  3/18:  Discussed the results of the cytology which is malignant with patient and son; CTS wants VATS for chantale  3/19:  Patient had VATS procedure this morning, NO issues overnight     Allergies:  amlodipine (Unknown)  Crestor (Other)  Lipitor (Unknown)    REVIEW OF SYSTEMS:  See HPI. All other review of systems is negative unless indicated above.     OBJECTIVE  Physical Exam:  Vital Signs Last 24 Hrs  T(C): 36.6 (19 Mar 2020 12:21), Max: 37.2 (18 Mar 2020 20:45)  T(F): 97.9 (19 Mar 2020 12:21), Max: 99 (18 Mar 2020 20:45)  HR: 60 (19 Mar 2020 12:21) (60 - 62)  BP: 142/45 (19 Mar 2020 12:21) (125/51 - 166/49)  BP(mean): --  RR: 18 (19 Mar 2020 12:21) (17 - 26)  SpO2: 99% (19 Mar 2020 12:21) (94% - 100%)    Constitutional: NAD, awake and alert, well-developed  Neurological: AAO x 3, no focal deficits  HEENT: PERRLA, EOMI, MMM  Neck: Soft and supple, No LAD, No JVD  Respiratory: Breath sounds are clear bilaterally, No wheezing, rales or rhonchi  Abd: soft nondistended; no rebound tenderness  Back: No CVA tenderness   Extremities: No peripheral edema  Vascular: 2+ peripheral pulses  Musculoskeletal: 5/5 strength b/l upper and lower extremities  Skin: No rashes  Breast: Deferred  Rectal: Deferred    MEDICATIONS  (STANDING):  aspirin enteric coated 81 milliGRAM(s) Oral daily  finasteride 5 milliGRAM(s) Oral daily  furosemide    Tablet 20 milliGRAM(s) Oral daily  heparin  Injectable 5000 Unit(s) SubCutaneous three times a day  labetalol 300 milliGRAM(s) Oral two times a day  lidocaine   Patch 1 Patch Transdermal once  lidocaine 1% Injectable 20 milliLiter(s) Local Injection once  pravastatin 80 milliGRAM(s) Oral at bedtime  sodium bicarbonate 325 milliGRAM(s) Oral three times a day  tamsulosin 0.4 milliGRAM(s) Oral at bedtime    Lab Results:  CBC  CBC Full  -  ( 19 Mar 2020 08:11 )  WBC Count : 7.44 K/uL  RBC Count : 3.21 M/uL  Hemoglobin : 9.6 g/dL  Hematocrit : 30.0 %  Platelet Count - Automated : 266 K/uL  Mean Cell Volume : 93.5 fl  Mean Cell Hemoglobin : 29.9 pg  Mean Cell Hemoglobin Concentration : 32.0 gm/dL  Auto Neutrophil # : x  Auto Lymphocyte # : x  Auto Monocyte # : x  Auto Eosinophil # : x  Auto Basophil # : x  Auto Neutrophil % : x  Auto Lymphocyte % : x  Auto Monocyte % : x  Auto Eosinophil % : x  Auto Basophil % : x    .		Differential:	[] Automated		[] Manual  Chemistry                        9.6    7.44  )-----------( 266      ( 19 Mar 2020 08:11 )             30.0     03-19    140  |  110<H>  |  25<H>  ----------------------------<  94  4.4   |  25  |  1.53<H>    Ca    8.5      19 Mar 2020 08:11  Phos  3.7     03-18  Mg     2.4     03-18    PT/INR - ( 18 Mar 2020 17:35 )   PT: 12.2 sec;   INR: 1.10 ratio    PTT - ( 18 Mar 2020 17:35 )  PTT:32.6 sec    MICROBIOLOGY/CULTURES:  Culture Results:   No growth at 5 days. (03-11 @ 17:00)  Culture Results:   Testing in progress (03-11 @ 17:00)  Culture Results:   No growth (03-11 @ 13:15)  Culture Results:   No growth at 5 days. (03-11 @ 08:35)  Culture Results:   No growth at 5 days. (03-11 @ 08:35)      RADIOLOGY/EKG:    < from: Xray Chest 1 View- PORTABLE-Routine (03.15.20 @ 10:12) >    IMPRESSION: There is left-sided pacemaker. The heart is normal in size. There remains a right-sided chest tube with a moderate-sized right pleural effusion and right base atelectasis. There is no pneumothorax.        < end of copied text >    < from: CT Chest No Cont (03.16.20 @ 08:30) >    IMPRESSION:  1.  Patient is status post interval right-sided chest tube placement with significant interval decrease in size of the right pleural effusion, with small residual loculated appearing components. New air is seen within the right pleural space, presumably related to recent intervention. Improved aeration of the right lower and middle lobes with persistent rounded appearing atelectasis of the posterior right lower lobe; superimposed infectious process would be difficult to exclude.     2.  Soft tissue attenuation is again seen circumferentially around the right pleura, similar to the prior study.  Although this finding could represent sequelae of prior pleurodesis, cannot exclude malignancy/metastatic disease.     Consider tissue biopsy and/or PET-CT for further evaluation.    3.   There is a 1.2 cm pleural-based lung nodule at the medial right upper lobe (image 64, series 2), similar to the prior study but not seen on more remote prior studies.  Malignancy is not excluded.  This too could be further assessed with PET-CT.    4. A 2.3 x 2.1 cm soft tissue nodular density medial to the caudate lobe, not seen in 2018.  Malignancy is not excluded.  This too could be further assessed with PET-CT.    5.  A 1.3 x 2.0 cm soft tissue nodule, possibly a lymph node at the subcarinal region (image 60, series 2), is new from 2018. Several additional subcentimeter mediastinal nodes are not enlarged by CT criteria and are without significant change from prior studies.   Malignancy is not excluded.     This too could be further assessed with PET-CT.    6.  Mildly enlarged main pulmonary artery, correlate for pulmonary artery hypertension.    7.  Pacemaker. Coronary artery calcifications and/or stents.    8.  Other findings, as above.       < end of copied text >    < from: TTE Echo Complete w/o contrast w/ Doppler (03.11.20 @ 21:46) >     Impression     Summary     Fibrocalcific changes noted to the mitral valve leaflets with preserved   leaflet excursion.   Mild mitral annular calcification is present.   Mild (1+) mitral regurgitation is present.   EAreversal of the mitral inflow consistent with reduced compliance of the   left ventricle.   Fibrocalcific changes noted to the Aortic valve leaflets with preserved   leaflet excursion.   Normal appearing tricuspid valve structure.   Mild to Moderate Tricuspid regurgitation is present.   Mild pulmonary hypertension.   Pulmonic valve not well seen.   Trace pulmonic valvular regurgitation is present.   The left atrium is mildly dilated.   Estimated left ventricular ejection fraction is 60-65 %.   The left ventricle is normal in size, wall thickness, wall motion and   contractility as seen in limited views.   Normal appearing right atrium.   Normal appearing right ventricle structure and function.   A device wire is seen in the RV and RA.   IVC was not visualized within the subcostal view.   No evidence of pericardial effusion.   No evidence of pleural effusion.      < end of copied text >

## 2020-03-19 NOTE — CONSULT NOTE ADULT - SUBJECTIVE AND OBJECTIVE BOX
Patient is a 87y old  Male who presents with a chief complaint of BRENNER, Rt back pain (18 Mar 2020 16:00)    ________________________________  S. BEHAR is a 87y year old Male with a past medical history of CAD, HTN, HLD, BPH, SSS s/p PPM, CKD3, SVT, recurrent Rt pl eff, PTX s/p VATS decortication, Pleurodesis 2016, last admit 2018 for rec Rt hydropneumothorax, presents today c/o Rt sided Upper back pain w/ worsening BRENNER over past 10-14 days.  Pt states it's similar to when he's pleural effusion in the past.  He denies chest pain, cough, fever/chills, n/v/d, abd pain, dysuria, weakness.      In ED, Cr 2 (baseline), Trop neg, UA neg, RVP neg, Tmax 100.1, elevated bp (he's taken home meds today), CT ch- Large Rt pl eff, atelectasis, pulm nodule,       Echo 3/11  Mild mitral annular calcification is present.   Mild (1+) mitral regurgitation is present.   EA reversal of the mitral inflow consistent with reduced compliance of the   left ventricle.   Fibrocalcific changes noted to the Aortic valve leaflets with preserved   leaflet excursion.   Normal appearing tricuspid valve structure.   Mild to Moderate Tricuspid regurgitation is present.   Mild pulmonary hypertension.   Pulmonic valve not well seen.   Trace pulmonic valvular regurgitation is present.   The left atrium is mildly dilated.   Estimated left ventricular ejection fraction is 60-65 %.   The left ventricle is normal in size, wall thickness, wall motion and   contractility as seen in limited views.   Normal appearing right atrium.   Normal appearing right ventricle structure and function.   A device wire is seen in the RV and RA.   IVC was not visualized within the subcostal view.   ________________________________  Review of systems: A 10 point review of system has been performed, and is negative except for what has been mentioned in the above history of present illness.     PAST MEDICAL & SURGICAL HISTORY:  Spinal stenosis  CKD (chronic kidney disease), stage III  SSS (sick sinus syndrome)  Pleural effusion  Hyperlipidemia  BPH (benign prostatic hyperplasia)  CAD (coronary artery disease)  Hypertension  Status post lumbar spinal fusion    FAMILY HISTORY:  No pertinent family history in first degree relatives     The patient denies any history of premature CAD or sudden cardiac death.    SOCIAL HISTORY: The patient denies any history of tobacco abuse, alcohol abuse or illicit drug use.     Home Medications:  Aspirin Enteric Coated 81 mg oral delayed release tablet: 1 tab(s) orally once a day (11 Mar 2020 10:57)  Co Q-10 100 mg oral capsule: 1 cap(s) orally once a day (11 Mar 2020 11:40)  dilTIAZem 180 mg/24 hours oral capsule, extended release: 1 cap(s) orally once a day (11 Mar 2020 10:57)  finasteride 5 mg oral tablet: 1 tab(s) orally once a day (at bedtime) (11 Mar 2020 10:57)  furosemide 20 mg oral tablet: 1 tab(s) orally once a day (11 Mar 2020 10:57)  labetalol 300 mg oral tablet: 1 tab(s) orally 2 times a day (11 Mar 2020 11:40)  pravastatin 80 mg oral tablet: 1 tab(s) orally once a day (11 Mar 2020 10:57)  Ranexa 500 mg oral tablet, extended release: 1 tab(s) orally once a day (11 Mar 2020 11:40)  sodium bicarbonate 325 mg oral tablet: 1 tab(s) orally 3 times a day (11 Mar 2020 10:57)  tamsulosin 0.4 mg oral capsule: 1 cap(s) orally once a day (11 Mar 2020 10:57)  Vitamin B12 1000 mcg oral tablet: 1 tab(s) orally once a day (11 Mar 2020 10:57)  Vitamin D3 2000 intl units oral tablet: 1 tab(s) orally once a day (11 Mar 2020 10:57)  Welchol 625 mg oral tablet: 1 tab(s) orally 2 times a day (11 Mar 2020 10:57)    MEDICATIONS  (STANDING):  aspirin enteric coated 81 milliGRAM(s) Oral daily  diltiazem    milliGRAM(s) Oral daily  finasteride 5 milliGRAM(s) Oral daily  furosemide    Tablet 20 milliGRAM(s) Oral daily  heparin  Injectable 5000 Unit(s) SubCutaneous three times a day  labetalol 300 milliGRAM(s) Oral two times a day  lidocaine   Patch 1 Patch Transdermal once  pravastatin 80 milliGRAM(s) Oral at bedtime  sodium bicarbonate 325 milliGRAM(s) Oral three times a day  tamsulosin 0.4 milliGRAM(s) Oral at bedtime    MEDICATIONS  (PRN):  acetaminophen   Tablet .. 650 milliGRAM(s) Oral every 4 hours PRN Mild Pain (1 - 3)  ALBUTerol    90 MICROgram(s) HFA Inhaler 2 Puff(s) Inhalation every 6 hours PRN Shortness of Breath and/or Wheezing  hydrALAZINE Injectable 10 milliGRAM(s) IV Push every 6 hours PRN SBP>160  ondansetron Injectable 4 milliGRAM(s) IV Push every 6 hours PRN Nausea    Vital Signs Last 24 Hrs  T(C): 36.7 (19 Mar 2020 06:54), Max: 37.2 (18 Mar 2020 20:45)  T(F): 98 (19 Mar 2020 06:54), Max: 99 (18 Mar 2020 20:45)  HR: 60 (19 Mar 2020 06:54) (60 - 62)  BP: 137/47 (19 Mar 2020 06:54) (136/43 - 145/40)  BP(mean): --  RR: 17 (19 Mar 2020 06:54) (17 - 18)  SpO2: 96% (19 Mar 2020 06:54) (95% - 96%)  I&O's Summary    18 Mar 2020 07:01  -  19 Mar 2020 07:00  --------------------------------------------------------  IN: 0 mL / OUT: 200 mL / NET: -200 mL    19 Mar 2020 07:01  -  19 Mar 2020 08:48  --------------------------------------------------------  IN: 0 mL / OUT: 100 mL / NET: -100 mL      ________________________________  GENERAL APPEARANCE:  No acute distress  HEAD: normocephalic, atraumatic  NECK: supple, no jugular venous distention, no carotid bruit    HEART: Regular rate and rhythm, S1, S2 normal, 2/6 regurgitant murmur    CHEST:  No anterior chest wall tenderness    LUNGS: dec breath sounds on R side    ABDOMEN: soft, nontender, nondistended, with positive bowel sounds appreciated  EXTREMITIES: no clubbing, cyanosis, or edema.   NEURO: Alert and oriented x3  PSYC:  Normal affect  SKIN:  Dry  ________________________________   TELEMETRY:    ECG:    LABS:                        9.6    7.44  )-----------( 266      ( 19 Mar 2020 08:11 )             30.0             03-19    140  |  110<H>  |  25<H>  ----------------------------<  94  4.4   |  25  |  1.53<H>    Ca    8.5      19 Mar 2020 08:11  Phos  3.7     03-18  Mg     2.4     03-18        PT/INR - ( 18 Mar 2020 17:35 )   PT: 12.2 sec;   INR: 1.10 ratio         PTT - ( 18 Mar 2020 17:35 )  PTT:32.6 sec      PT/INR - ( 18 Mar 2020 17:35 )   PT: 12.2 sec;   INR: 1.10 ratio         PTT - ( 18 Mar 2020 17:35 )  PTT:32.6 sec      ________________________________    RADIOLOGY & ADDITIONAL STUDIES: pleural effusion  ________________________________      ASSESSMENT:  Pre op CV eval  CKD  CAD  HTN  SSS s/p dual chamber PPM      PLAN:    Pt can proceed with CT surg procedure at increase but not prohibitive CV risk.  full note to follow  __________________________________________________________________________  Thank you for allowing me to participate in the care of your patient. Please contact me should any questions arise.    GRACIE Hopkins DO, [facility]   Patient is a 87y old  Male who presents with a chief complaint of BRENNER, Rt back pain (18 Mar 2020 16:00)    ________________________________  S. BEHAR is a 87y year old Male with a past medical history of sick sinus syndrome status post dual-chamber pacemaker, coronary atherosclerosis noted on CAT scan, history of chronic kidney disease, hypertension, hyperlipidemia, BPH, history of recurrent pleural effusion with prior VATS and pleurodesis in 2016 and adenocarcinoma who was admitted for right back pain with shortness of breath with recurrent pleural effusion and scheduled for CT surgery later today.  Currently his respiratory status is stable.  Denies any chest pain.  No palpitations.  No syncope.  No fevers.    Thus far, cardiovascular work-up with an echocardiogram which showed mild mitral valve regurgitation with preserved LVEF and pacemaker wire in the right heart.  There was mild to moderate tricuspid valve regurgitation with mild pulmonary hypertension.  Chest CT shows dilated pulmonary artery.    Echo 3/11  Mild mitral annular calcification is present.   Mild (1+) mitral regurgitation is present.   EA reversal of the mitral inflow consistent with reduced compliance of the   left ventricle.   Fibrocalcific changes noted to the Aortic valve leaflets with preserved   leaflet excursion.   Normal appearing tricuspid valve structure.   Mild to Moderate Tricuspid regurgitation is present.   Mild pulmonary hypertension.   Pulmonic valve not well seen.   Trace pulmonic valvular regurgitation is present.   The left atrium is mildly dilated.   Estimated left ventricular ejection fraction is 60-65 %.   The left ventricle is normal in size, wall thickness, wall motion and   contractility as seen in limited views.   Normal appearing right atrium.   Normal appearing right ventricle structure and function.   A device wire is seen in the RV and RA.   IVC was not visualized within the subcostal view.   ________________________________  Review of systems: A 10 point review of system has been performed, and is negative except for what has been mentioned in the above history of present illness.     PAST MEDICAL & SURGICAL HISTORY:  Spinal stenosis  CKD (chronic kidney disease), stage III  SSS (sick sinus syndrome)  Pleural effusion  Hyperlipidemia  BPH (benign prostatic hyperplasia)  CAD (coronary artery disease)  Hypertension  Status post lumbar spinal fusion    FAMILY HISTORY:  No pertinent family history in first degree relatives     SOCIAL HISTORY: hx of smoking     Home Medications:  Aspirin Enteric Coated 81 mg oral delayed release tablet: 1 tab(s) orally once a day (11 Mar 2020 10:57)  Co Q-10 100 mg oral capsule: 1 cap(s) orally once a day (11 Mar 2020 11:40)  dilTIAZem 180 mg/24 hours oral capsule, extended release: 1 cap(s) orally once a day (11 Mar 2020 10:57)  finasteride 5 mg oral tablet: 1 tab(s) orally once a day (at bedtime) (11 Mar 2020 10:57)  furosemide 20 mg oral tablet: 1 tab(s) orally once a day (11 Mar 2020 10:57)  labetalol 300 mg oral tablet: 1 tab(s) orally 2 times a day (11 Mar 2020 11:40)  pravastatin 80 mg oral tablet: 1 tab(s) orally once a day (11 Mar 2020 10:57)  Ranexa 500 mg oral tablet, extended release: 1 tab(s) orally once a day (11 Mar 2020 11:40)  sodium bicarbonate 325 mg oral tablet: 1 tab(s) orally 3 times a day (11 Mar 2020 10:57)  tamsulosin 0.4 mg oral capsule: 1 cap(s) orally once a day (11 Mar 2020 10:57)  Vitamin B12 1000 mcg oral tablet: 1 tab(s) orally once a day (11 Mar 2020 10:57)  Vitamin D3 2000 intl units oral tablet: 1 tab(s) orally once a day (11 Mar 2020 10:57)  Welchol 625 mg oral tablet: 1 tab(s) orally 2 times a day (11 Mar 2020 10:57)    MEDICATIONS  (STANDING):  aspirin enteric coated 81 milliGRAM(s) Oral daily  diltiazem    milliGRAM(s) Oral daily  finasteride 5 milliGRAM(s) Oral daily  furosemide    Tablet 20 milliGRAM(s) Oral daily  heparin  Injectable 5000 Unit(s) SubCutaneous three times a day  labetalol 300 milliGRAM(s) Oral two times a day  lidocaine   Patch 1 Patch Transdermal once  pravastatin 80 milliGRAM(s) Oral at bedtime  sodium bicarbonate 325 milliGRAM(s) Oral three times a day  tamsulosin 0.4 milliGRAM(s) Oral at bedtime    MEDICATIONS  (PRN):  acetaminophen   Tablet .. 650 milliGRAM(s) Oral every 4 hours PRN Mild Pain (1 - 3)  ALBUTerol    90 MICROgram(s) HFA Inhaler 2 Puff(s) Inhalation every 6 hours PRN Shortness of Breath and/or Wheezing  hydrALAZINE Injectable 10 milliGRAM(s) IV Push every 6 hours PRN SBP>160  ondansetron Injectable 4 milliGRAM(s) IV Push every 6 hours PRN Nausea    Vital Signs Last 24 Hrs  T(C): 36.7 (19 Mar 2020 06:54), Max: 37.2 (18 Mar 2020 20:45)  T(F): 98 (19 Mar 2020 06:54), Max: 99 (18 Mar 2020 20:45)  HR: 60 (19 Mar 2020 06:54) (60 - 62)  BP: 137/47 (19 Mar 2020 06:54) (136/43 - 145/40)  BP(mean): --  RR: 17 (19 Mar 2020 06:54) (17 - 18)  SpO2: 96% (19 Mar 2020 06:54) (95% - 96%)  I&O's Summary    18 Mar 2020 07:01  -  19 Mar 2020 07:00  --------------------------------------------------------  IN: 0 mL / OUT: 200 mL / NET: -200 mL    19 Mar 2020 07:01  -  19 Mar 2020 08:48  --------------------------------------------------------  IN: 0 mL / OUT: 100 mL / NET: -100 mL      ________________________________  GENERAL APPEARANCE:  No acute distress  HEAD: normocephalic, atraumatic  NECK: supple, no jugular venous distention, no carotid bruit    HEART: Regular rate and rhythm, S1, S2 normal, 2/6 regurgitant murmur    CHEST:  No anterior chest wall tenderness    LUNGS: dec breath sounds on R side    ABDOMEN: soft, nontender, nondistended, with positive bowel sounds appreciated  EXTREMITIES: no clubbing, cyanosis, or edema.   NEURO: Alert and oriented x3  PSYC:  Normal affect  SKIN:  Dry  ________________________________   TELEMETRY:    ECG:    LABS:                        9.6    7.44  )-----------( 266      ( 19 Mar 2020 08:11 )             30.0             03-19    140  |  110<H>  |  25<H>  ----------------------------<  94  4.4   |  25  |  1.53<H>    Ca    8.5      19 Mar 2020 08:11  Phos  3.7     03-18  Mg     2.4     03-18        PT/INR - ( 18 Mar 2020 17:35 )   PT: 12.2 sec;   INR: 1.10 ratio         PTT - ( 18 Mar 2020 17:35 )  PTT:32.6 sec      PT/INR - ( 18 Mar 2020 17:35 )   PT: 12.2 sec;   INR: 1.10 ratio         PTT - ( 18 Mar 2020 17:35 )  PTT:32.6 sec      ________________________________    RADIOLOGY & ADDITIONAL STUDIES:   CT chest without contrast  1.  Patient is status post interval right-sided chest tube placement with significant interval decrease in size of the right pleural effusion, with small residual loculated appearing components. New air is seen within the right pleural space, presumably related to recent intervention. Improved aeration of the right lower and middle lobes with persistent rounded appearing atelectasis of the posterior right lower lobe; superimposed infectious process would be difficult to exclude.     2.  Soft tissue attenuation is again seen circumferentially around the right pleura, similar to the prior study.  Although this finding could represent sequelae of prior pleurodesis, cannot exclude malignancy/metastatic disease.     Consider tissue biopsy and/or PET-CT for further evaluation.    3.   There is a 1.2 cm pleural-based lung nodule at the medial right upper lobe (image 64, series 2), similar to the prior study but not seen on more remote prior studies.  Malignancy is not excluded.  This too could be further assessed with PET-CT.    4. A 2.3 x 2.1 cm soft tissue nodular density medial to the caudate lobe, not seen in 2018.  Malignancy is not excluded.  This too could be further assessed with PET-CT.    5.  A 1.3 x 2.0 cm soft tissue nodule, possibly a lymph node at the subcarinal region (image 60, series 2), is new from 2018. Several additional subcentimeter mediastinal nodes are not enlarged by CT criteria and are without significant change from prior studies.   Malignancy is not excluded.     This too could be further assessed with PET-CT.    6.  Mildly enlarged main pulmonary artery, correlate for pulmonary artery hypertension.    7.  Pacemaker. Coronary artery calcifications and/or stents.    8.  Other findings, as above.   ________________________________  Assessment:  Recurrent pleural effusions requiring VATS  History of coronary atherosclerosis noted on CAT scan  Adenocarcinoma  Sick sinus syndrome status pacemaker implantation  Pulmonary hypertension  Mild mitral valve regurgitation with prese EF rved LVEF  Hypertension CKD  Preop CV evaluation      Plan:  From a cardiac standpoint, he is stable for planned VATS procedure for added pathology and molecular testing for underlying adenocarcinoma.  No cardiac contraindication.  Continue with aspirin.  Continue with diltiazem.  Continue oral diuretic.  Renal function appears stable.  Hematology oncology pulmonary evaluation noted.  DVT prophylaxis  GRACIE Hopkins DO, FACC

## 2020-03-20 NOTE — PROGRESS NOTE ADULT - SUBJECTIVE AND OBJECTIVE BOX
Subjective:  88yo M with PMH of mild Pulm HTN, CAD (on ASA), BPH, CKD III, HLD, HTN, SSS s/p PPM, spinal stenosis s/p fusion and R VATS, pleurodesis 5/2016 admitted 3/11 with recurrent right pleural effusion. 3/11 R PTC placed under US guidance. One liter serosang output. Of note CT Chest revealed RUL (1.2 cm) and SAMREEN (0.8 cm) nodules. Exudative effusion, Cultures NGTD. Repeat CT chest with residual right effusion, lung nodules as seen previously and pleural thickening.  Cytology +adenocarcinoma. Tube removed 3/17.  3/19/2020 Pt seen this am, NPO for VATS, pleurx catheter placement and pleural bx. No new complaints.  3/20/2020 Pt seen, no new complaints. Explained to pt why pleurx not placed in OR and that multiple biopsies were sent. Stevie drain to waterseal, drained 280cc, serosang fluid.    Vital Signs:  Vital Signs Last 24 Hrs  T(C): 37.3 (03-20-20 @ 05:00), Max: 37.3 (03-20-20 @ 05:00)  T(F): 99.1 (03-20-20 @ 05:00), Max: 99.1 (03-20-20 @ 05:00)  HR: 60 (03-20-20 @ 05:00) (60 - 60)  BP: 135/41 (03-20-20 @ 05:00) (124/44 - 166/49)  RR: 18 (03-20-20 @ 05:00) (18 - 26)  SpO2: 94% (03-20-20 @ 05:00) (94% - 100%) on (O2)    Telemetry/Alarms:    Relevant labs, radiology and Medications reviewed                        10.6   9.40  )-----------( 280      ( 20 Mar 2020 07:17 )             32.9     03-20    140  |  111<H>  |  26<H>  ----------------------------<  118<H>  4.8   |  23  |  1.83<H>    Ca    8.7      20 Mar 2020 07:17  Phos  3.7     03-20  Mg     2.4     03-20      PT/INR - ( 18 Mar 2020 17:35 )   PT: 12.2 sec;   INR: 1.10 ratio         PTT - ( 18 Mar 2020 17:35 )  PTT:32.6 sec  MEDICATIONS  (STANDING):  aspirin enteric coated 81 milliGRAM(s) Oral daily  ceFAZolin  Injectable. 1000 milliGRAM(s) IV Push every 8 hours  diltiazem    milliGRAM(s) Oral daily  finasteride 5 milliGRAM(s) Oral daily  heparin  Injectable 5000 Unit(s) SubCutaneous three times a day  labetalol 300 milliGRAM(s) Oral two times a day  lidocaine   Patch 1 Patch Transdermal once  pravastatin 80 milliGRAM(s) Oral at bedtime  sodium bicarbonate 325 milliGRAM(s) Oral three times a day  tamsulosin 0.4 milliGRAM(s) Oral at bedtime    MEDICATIONS  (PRN):  acetaminophen   Tablet .. 650 milliGRAM(s) Oral every 4 hours PRN Mild Pain (1 - 3)  ALBUTerol    90 MICROgram(s) HFA Inhaler 2 Puff(s) Inhalation every 6 hours PRN Shortness of Breath and/or Wheezing  hydrALAZINE Injectable 10 milliGRAM(s) IV Push every 6 hours PRN SBP>160  ondansetron Injectable 4 milliGRAM(s) IV Push every 6 hours PRN Nausea  oxyCODONE    IR 5 milliGRAM(s) Oral every 4 hours PRN Mild Pain (1 - 3)  senna 2 Tablet(s) Oral at bedtime PRN Constipation      Physical exam  Gen NAD  Neuro AAox3  Card RRR  Pulm decreased right side  Abd soft  Ext warm    Tubes: Right Stevie drain to waterseal, 280cc serosang fluid drained    I&O's Summary    19 Mar 2020 07:01  -  20 Mar 2020 07:00  --------------------------------------------------------  IN: 300 mL / OUT: 880 mL / NET: -580 mL        Assessment  88yo M with PMH of mild Pulm HTN, CAD (on ASA), BPH, CKD III, HLD, HTN, SSS s/p PPM, spinal stenosis s/p fusion and R VATS, pleurodesis 5/2016 admitted 3/11 with recurrent right pleural effusion. 3/11 R PTC placed under US guidance. One liter serosang output. Of note CT Chest revealed RUL (1.2 cm) and SAMREEN (0.8 cm) nodules. Exudative effusion, Cultures NGTD. Repeat CT chest with residual right effusion, lung nodules as seen previously and pleural thickening.  Cytology +adenocarcinoma. Tube removed 3/17. POD 1 Right VATS evacuation of clots, pleural biopsies and placement of stevie drain       PLAN  will order to clamp stevie drain at midnight tonight  CXR in am, will remove stevie based on CXR results  No plans for further thoracic intervention  Pleural fluid will most likely re-accumulate will treat w thoracentesis if clinically indicated in future    Discussed with Cardiothoracic Team at AM rounds.

## 2020-03-20 NOTE — PROGRESS NOTE ADULT - SUBJECTIVE AND OBJECTIVE BOX
Patient is a 87y old  Male who presents with a chief complaint of BRENNER, Rt back pain (11 Mar 2020 18:28)      HPI:  HPI:  87M w/PMH CAD, HTN, HLD, BPH, SSS s/p PPM, CKD3, SVT, recurrent Rt pl eff, PTX s/p VATS decortication, Pleurodesis , last admit 2018 for rec Rt hydropneumothorax, presents today c/o Rt sided Upper back pain w/ worsening BRENNER over past 10-14 days.  Pt states it's similar to when he's pleural effusion in the past.  He denies chest pain, cough, fever/chills, n/v/d, abd pain, dysuria, weakness.    In ED, Cr 2 (baseline), Trop neg, UA neg, RVP neg, Tmax 100.1, elevated bp (he's taken home meds today), CT ch- Large Rt pl eff, atelectasis, pulm nodule,   CT surgery made aware per ED MD.    pt is well known from his prior admissions / not recently followed up   now admitted and required repeat CT right chest for further eval  no smoker  Allergies  amlodipine (Unknown)  Crestor (Other)  Lipitor (Unknown)    3/20  s/p VAT    MEDICATIONS  (STANDING):  aspirin enteric coated 81 milliGRAM(s) Oral daily  finasteride 5 milliGRAM(s) Oral daily  furosemide    Tablet 20 milliGRAM(s) Oral daily  heparin  Injectable 5000 Unit(s) SubCutaneous three times a day  labetalol 300 milliGRAM(s) Oral two times a day  lidocaine   Patch 1 Patch Transdermal once  lidocaine 1% Injectable 20 milliLiter(s) Local Injection once  pravastatin 80 milliGRAM(s) Oral at bedtime  sodium bicarbonate 325 milliGRAM(s) Oral three times a day  tamsulosin 0.4 milliGRAM(s) Oral at bedtime    MEDICATIONS  (PRN):  acetaminophen   Tablet .. 650 milliGRAM(s) Oral every 4 hours PRN Mild Pain (1 - 3)  ALBUTerol    90 MICROgram(s) HFA Inhaler 2 Puff(s) Inhalation every 6 hours PRN Shortness of Breath and/or Wheezing  hydrALAZINE Injectable 10 milliGRAM(s) IV Push every 6 hours PRN SBP>160  ondansetron Injectable 4 milliGRAM(s) IV Push every 6 hours PRN Nausea  oxycodone    5 mG/acetaminophen 325 mG 1 Tablet(s) Oral every 4 hours PRN Severe Pain (7 - 10)  traMADol 50 milliGRAM(s) Oral every 6 hours PRN Moderate Pain (4 - 6)    Vital Signs Last 24 Hrs  T(C): 36.6 (18 Mar 2020 05:11), Max: 37.2 (17 Mar 2020 15:55)  T(F): 97.8 (18 Mar 2020 05:11), Max: 98.9 (17 Mar 2020 15:55)  HR: 60 (18 Mar 2020 05:11) (59 - 60)  BP: 143/42 (18 Mar 2020 05:11) (129/35 - 175/43)  BP(mean): --  RR: 18 (18 Mar 2020 05:11) (18 - 18)  SpO2: 97% (18 Mar 2020 05:11) (96% - 98%)  PHYSICAL EXAM  General Appearance: cooperative, no acute distress, right sided CT in place   HEENT: PERRL, conjunctiva clear,  Neck: Supple, , no adenopathy  Lungs: decreased breath sounds right mid-lower lung fields no wheezing, left side clear, Right sided thoracostomy in place   Heart: Regular rate and rhythm, S1, S2 normal, no murmur, rub or gallop  Abdomen: Soft, non-tender, bowel sounds active , no hepatosplenomegaly  Extremities: no cyanosis or edema, no joint swelling  Skin: Skin color, texture normal, no rashes   Neurologic: Alert and oriented X3 , cranial nerves intact, sensory and motor normal,    ECG:    LABS:                          12.0   9.04  )-----------( 294      ( 11 Mar 2020 08:35 )             36.2     03-11    140  |  110<H>  |  19  ----------------------------<  138<H>  3.9   |  22  |  2.00<H>    Ca    8.8      11 Mar 2020 08:35    TPro  6.7  /  Alb  3.1<L>  /  TBili  0.5  /  DBili  x   /  AST  13<L>  /  ALT  12  /  AlkPhos  88  03-11    CARDIAC MARKERS ( 11 Mar 2020 08:35 )  <0.015 ng/mL / x     / x     / x     / x            Pro BNP  799 03-11 @ 08:35  D Dimer  --  @ 08:35    PT/INR - ( 11 Mar 2020 08:35 )   PT: 11.6 sec;   INR: 1.04 ratio         PTT - ( 11 Mar 2020 08:35 )  PTT:30.1 sec  Urinalysis Basic - ( 11 Mar 2020 13:15 )    Color: Yellow / Appearance: Clear / S.015 / pH: x  Gluc: x / Ketone: Trace  / Bili: Negative / Urobili: Negative mg/dL   Blood: x / Protein: 15 mg/dL / Nitrite: Negative   Leuk Esterase: Negative / RBC: 0-2 /HPF / WBC 0-2   Sq Epi: x / Non Sq Epi: Occasional / Bacteria: Occasional      < from: Xray Chest 1 View- PORTABLE-Urgent (20 @ 09:12) >  PROCEDURE DATE:  2020    < from: CT Chest No Cont (20 @ 09:39) >  mediastinal margin in the right upper lobe on image #61/series 2.    PLEURA: Large right pleural effusion. There is soft tissue attenuation circumferentially around the pleura, assessment is limited without intravenous contrast. There is a small amount of air, likely in the right pleural space as seen on image #38/series 2, correlate with recent intervention.    MEDIASTINUM AND CARLITA: No lymphadenopathy.    VESSELS: The thoracic aorta and main pulmonary artery are normal in caliber. There is heavy coronary artery calcification.    HEART: Heart size is normal. There are pacemaker leads in the right atrium and right ventricle. Trace pericardial effusion.    CHEST WALL AND LOWER NECK: The thyroid gland is within normal limits. There is no supraclavicular or axillary lymphadenopathy.There is a pacemaker generator pack in the left upper chest wall.    VISUALIZED UPPER ABDOMEN: The imaged adrenal glands are within normal limits. The imaged portions of the unenhanced liver, spleen, pancreas are within normal limits. There is a partially imaged large cyst at the upper pole of the left kidney measuring 8.3 x 5.8 cm.    BONES: Multilevel degenerative change of the thoracic spine with osteophytes, degenerative disc disease and facet joint arthropathy.    IMPRESSION:     Large rightpleural effusion with pleural base soft tissue, concerning for metastasis.    There are 2 pulmonary nodules measuring up to 1.2 cm as above.    Complete atelectasis of the right middle and lower lobes.    Postsurgical changes in the right lung apex.    Other incidental findings are as above.    < end of copied text >        INTERPRETATION:  AP erect chest on 2020 at 9:02 AM. Patient has right-sided chest pain.    Heart suggest normal size. Left-sided pacemaker again noted.    Suture line in the right paratracheal area is better seen on study of May 26, 2018.    On the study there was a loculated pneumothorax at the right base with adjacent atelectasis.    Present film shows a large right effusion.    IMPRESSION: Large right effusion.    < end of copied text >    < from: Xray Chest 1 View- PORTABLE-Routine (03.15.20 @ 10:12) >        INTERPRETATION:  AP chest with comparison to 3/14/2020.    Clinical indications: Right-sided chest tube.    IMPRESSION: There is left-sided pacemaker. The heart is normal in size. There remains a right-sided chest tube with a moderate-sized right pleural effusion and right base atelectasis. There is no pneumothorax.    < end of copied text >      RADIOLOGY & ADDITIONAL STUDIES:    ct scan images from 2018 and current ct scans reveiwed Patient is a 87y old  Male who presents with a chief complaint of BRENNER, Rt back pain (11 Mar 2020 18:28)      HPI:  HPI:  87M w/PMH CAD, HTN, HLD, BPH, SSS s/p PPM, CKD3, SVT, recurrent Rt pl eff, PTX s/p VATS decortication, Pleurodesis , last admit 2018 for rec Rt hydropneumothorax, presents today c/o Rt sided Upper back pain w/ worsening BRENNER over past 10-14 days.  Pt states it's similar to when he's pleural effusion in the past.  He denies chest pain, cough, fever/chills, n/v/d, abd pain, dysuria, weakness.    In ED, Cr 2 (baseline), Trop neg, UA neg, RVP neg, Tmax 100.1, elevated bp (he's taken home meds today), CT ch- Large Rt pl eff, atelectasis, pulm nodule,   CT surgery made aware per ED MD.    pt is well known from his prior admissions / not recently followed up   now admitted and required repeat CT right chest for further eval  no smoker  Allergies  amlodipine (Unknown)  Crestor (Other)  Lipitor (Unknown)    3/20  s/p VATS of the right pleura    MEDICATIONS  (STANDING):  aspirin enteric coated 81 milliGRAM(s) Oral daily  finasteride 5 milliGRAM(s) Oral daily  furosemide    Tablet 20 milliGRAM(s) Oral daily  heparin  Injectable 5000 Unit(s) SubCutaneous three times a day  labetalol 300 milliGRAM(s) Oral two times a day  lidocaine   Patch 1 Patch Transdermal once  lidocaine 1% Injectable 20 milliLiter(s) Local Injection once  pravastatin 80 milliGRAM(s) Oral at bedtime  sodium bicarbonate 325 milliGRAM(s) Oral three times a day  tamsulosin 0.4 milliGRAM(s) Oral at bedtime    MEDICATIONS  (PRN):  acetaminophen   Tablet .. 650 milliGRAM(s) Oral every 4 hours PRN Mild Pain (1 - 3)  ALBUTerol    90 MICROgram(s) HFA Inhaler 2 Puff(s) Inhalation every 6 hours PRN Shortness of Breath and/or Wheezing  hydrALAZINE Injectable 10 milliGRAM(s) IV Push every 6 hours PRN SBP>160  ondansetron Injectable 4 milliGRAM(s) IV Push every 6 hours PRN Nausea  oxycodone    5 mG/acetaminophen 325 mG 1 Tablet(s) Oral every 4 hours PRN Severe Pain (7 - 10)  traMADol 50 milliGRAM(s) Oral every 6 hours PRN Moderate Pain (4 - 6)    Vital Signs Last 24 Hrs  T(C): 36.6 (18 Mar 2020 05:11), Max: 37.2 (17 Mar 2020 15:55)  T(F): 97.8 (18 Mar 2020 05:11), Max: 98.9 (17 Mar 2020 15:55)  HR: 60 (18 Mar 2020 05:11) (59 - 60)  BP: 143/42 (18 Mar 2020 05:11) (129/35 - 175/43)  BP(mean): --  RR: 18 (18 Mar 2020 05:11) (18 - 18)  SpO2: 97% (18 Mar 2020 05:11) (96% - 98%)  PHYSICAL EXAM  General Appearance: cooperative, no acute distress, right sided CT in place   HEENT: PERRL, conjunctiva clear,  Neck: Supple, , no adenopathy  Lungs: decreased breath sounds right mid-lower lung fields no wheezing, left side clear, Right sided thoracostomy in place   Heart: Regular rate and rhythm, S1, S2 normal, no murmur, rub or gallop  Abdomen: Soft, non-tender, bowel sounds active , no hepatosplenomegaly  Extremities: no cyanosis or edema, no joint swelling  Skin: Skin color, texture normal, no rashes   Neurologic: Alert and oriented X3 , cranial nerves intact, sensory and motor normal,    ECG:    LABS:                          12.0   9.04  )-----------( 294      ( 11 Mar 2020 08:35 )             36.2     03-11    140  |  110<H>  |  19  ----------------------------<  138<H>  3.9   |  22  |  2.00<H>    Ca    8.8      11 Mar 2020 08:35    TPro  6.7  /  Alb  3.1<L>  /  TBili  0.5  /  DBili  x   /  AST  13<L>  /  ALT  12  /  AlkPhos  88  03-11    CARDIAC MARKERS ( 11 Mar 2020 08:35 )  <0.015 ng/mL / x     / x     / x     / x            Pro BNP  799  @ 08:35  D Dimer  --  @ 08:35    PT/INR - ( 11 Mar 2020 08:35 )   PT: 11.6 sec;   INR: 1.04 ratio         PTT - ( 11 Mar 2020 08:35 )  PTT:30.1 sec  Urinalysis Basic - ( 11 Mar 2020 13:15 )    Color: Yellow / Appearance: Clear / S.015 / pH: x  Gluc: x / Ketone: Trace  / Bili: Negative / Urobili: Negative mg/dL   Blood: x / Protein: 15 mg/dL / Nitrite: Negative   Leuk Esterase: Negative / RBC: 0-2 /HPF / WBC 0-2   Sq Epi: x / Non Sq Epi: Occasional / Bacteria: Occasional      < from: Xray Chest 1 View- PORTABLE-Urgent (20 @ 09:12) >  PROCEDURE DATE:  2020    < from: CT Chest No Cont (20 @ 09:39) >  mediastinal margin in the right upper lobe on image #61/series 2.    PLEURA: Large right pleural effusion. There is soft tissue attenuation circumferentially around the pleura, assessment is limited without intravenous contrast. There is a small amount of air, likely in the right pleural space as seen on image #38/series 2, correlate with recent intervention.    MEDIASTINUM AND CARLITA: No lymphadenopathy.    VESSELS: The thoracic aorta and main pulmonary artery are normal in caliber. There is heavy coronary artery calcification.    HEART: Heart size is normal. There are pacemaker leads in the right atrium and right ventricle. Trace pericardial effusion.    CHEST WALL AND LOWER NECK: The thyroid gland is within normal limits. There is no supraclavicular or axillary lymphadenopathy.There is a pacemaker generator pack in the left upper chest wall.    VISUALIZED UPPER ABDOMEN: The imaged adrenal glands are within normal limits. The imaged portions of the unenhanced liver, spleen, pancreas are within normal limits. There is a partially imaged large cyst at the upper pole of the left kidney measuring 8.3 x 5.8 cm.    BONES: Multilevel degenerative change of the thoracic spine with osteophytes, degenerative disc disease and facet joint arthropathy.    IMPRESSION:     Large rightpleural effusion with pleural base soft tissue, concerning for metastasis.    There are 2 pulmonary nodules measuring up to 1.2 cm as above.    Complete atelectasis of the right middle and lower lobes.    Postsurgical changes in the right lung apex.    Other incidental findings are as above.    < end of copied text >        INTERPRETATION:  AP erect chest on 2020 at 9:02 AM. Patient has right-sided chest pain.    Heart suggest normal size. Left-sided pacemaker again noted.    Suture line in the right paratracheal area is better seen on study of May 26, 2018.    On the study there was a loculated pneumothorax at the right base with adjacent atelectasis.    Present film shows a large right effusion.    IMPRESSION: Large right effusion.    < end of copied text >    < from: Xray Chest 1 View- PORTABLE-Routine (03.15.20 @ 10:12) >        INTERPRETATION:  AP chest with comparison to 3/14/2020.    Clinical indications: Right-sided chest tube.    IMPRESSION: There is left-sided pacemaker. The heart is normal in size. There remains a right-sided chest tube with a moderate-sized right pleural effusion and right base atelectasis. There is no pneumothorax.    < end of copied text >      RADIOLOGY & ADDITIONAL STUDIES:    ct scan images from 2018 and current ct scans reveiwed

## 2020-03-20 NOTE — PROGRESS NOTE ADULT - SUBJECTIVE AND OBJECTIVE BOX
HOSPITALIST PROGRESS NOTE:  SUBJECTIVE:  PCP: Cardio- Dillon  CTS- Eric  PCP- Kelby    Chief Complaint: Patient is a 87y old  Male who presents with a chief complaint of BRENNER, Rt back pain (16 Mar 2020 09:28)    HPI:  87M w/PMH CAD, HTN, HLD, BPH, SSS s/p PPM, CKD3, SVT, recurrent Rt pl eff, PTX s/p VATS decortication, Pleurodesis 2016, last admit 2018 for rec Rt hydropneumothorax, presents today c/o Rt sided Upper back pain w/ worsening BRENNER over past 10-14 days.  Pt states it's similar to when he's pleural effusion in the past.  He denies chest pain, cough, fever/chills, n/v/d, abd pain, dysuria, weakness.    In ED, Cr 2 (baseline), Trop neg, UA neg, RVP neg, Tmax 100.1, elevated bp (he's taken home meds today), CT ch- Large Rt pl eff, atelectasis, pulm nodule,   CT surgery made aware per ED MD.      3/16: Above reviewed;  Patient has no complaints; Denies any dyspnea   3/17:  Patient has no complaints; Chest tube removed;  Discussed with CTS  3/18:  Discussed the results of the cytology which is malignant with patient and son; CTS wants VATS for chantale  3/19:  Patient had VATS procedure this morning, NO issues overnight   3/20:  CT draining. no cp, dyspnea    Allergies:  amlodipine (Unknown)  Crestor (Other)  Lipitor (Unknown)    REVIEW OF SYSTEMS:  See HPI. All other review of systems is negative unless indicated above.     OBJECTIVE  Physical Exam:  Vital Signs Last 24 Hrs  T(C): 36.5 (20 Mar 2020 09:31), Max: 37.3 (20 Mar 2020 05:00)  T(F): 97.7 (20 Mar 2020 09:31), Max: 99.1 (20 Mar 2020 05:00)  HR: 59 (20 Mar 2020 09:31) (59 - 60)  BP: 143/38 (20 Mar 2020 09:31) (124/44 - 148/42)  BP(mean): --  RR: 17 (20 Mar 2020 09:31) (17 - 18)  SpO2: 97% (20 Mar 2020 09:31) (94% - 97%)    Constitutional: NAD, awake and alert, well-developed  Neurological: AAO x 3, no focal deficits  HEENT: PERRLA, EOMI, MMM  Neck: Soft and supple, No LAD, No JVD  Respiratory: Breath sounds are clear bilaterally, No wheezing, rales or rhonchi  Abd: soft nondistended; no rebound tenderness  Back: No CVA tenderness   Extremities: No peripheral edema  Vascular: 2+ peripheral pulses  Musculoskeletal: 5/5 strength b/l upper and lower extremities  Skin: No rashes  Breast: Deferred  Rectal: Deferred    MEDICATIONS  (STANDING):  aspirin enteric coated 81 milliGRAM(s) Oral daily  finasteride 5 milliGRAM(s) Oral daily  furosemide    Tablet 20 milliGRAM(s) Oral daily  heparin  Injectable 5000 Unit(s) SubCutaneous three times a day  labetalol 300 milliGRAM(s) Oral two times a day  lidocaine   Patch 1 Patch Transdermal once  lidocaine 1% Injectable 20 milliLiter(s) Local Injection once  pravastatin 80 milliGRAM(s) Oral at bedtime  sodium bicarbonate 325 milliGRAM(s) Oral three times a day  tamsulosin 0.4 milliGRAM(s) Oral at bedtime    Lab Results:  CBC  CBC Full  -  ( 19 Mar 2020 08:11 )  WBC Count : 7.44 K/uL  RBC Count : 3.21 M/uL  Hemoglobin : 9.6 g/dL  Hematocrit : 30.0 %  Platelet Count - Automated : 266 K/uL  Mean Cell Volume : 93.5 fl  Mean Cell Hemoglobin : 29.9 pg  Mean Cell Hemoglobin Concentration : 32.0 gm/dL  Auto Neutrophil # : x  Auto Lymphocyte # : x  Auto Monocyte # : x  Auto Eosinophil # : x  Auto Basophil # : x  Auto Neutrophil % : x  Auto Lymphocyte % : x  Auto Monocyte % : x  Auto Eosinophil % : x  Auto Basophil % : x    .		Differential:	[] Automated		[] Manual  Chemistry                        9.6    7.44  )-----------( 266      ( 19 Mar 2020 08:11 )             30.0     03-19    140  |  110<H>  |  25<H>  ----------------------------<  94  4.4   |  25  |  1.53<H>    Ca    8.5      19 Mar 2020 08:11  Phos  3.7     03-18  Mg     2.4     03-18    PT/INR - ( 18 Mar 2020 17:35 )   PT: 12.2 sec;   INR: 1.10 ratio    PTT - ( 18 Mar 2020 17:35 )  PTT:32.6 sec    MICROBIOLOGY/CULTURES:  Culture Results:   No growth at 5 days. (03-11 @ 17:00)  Culture Results:   Testing in progress (03-11 @ 17:00)  Culture Results:   No growth (03-11 @ 13:15)  Culture Results:   No growth at 5 days. (03-11 @ 08:35)  Culture Results:   No growth at 5 days. (03-11 @ 08:35)      RADIOLOGY/EKG:    < from: Xray Chest 1 View- PORTABLE-Routine (03.15.20 @ 10:12) >    IMPRESSION: There is left-sided pacemaker. The heart is normal in size. There remains a right-sided chest tube with a moderate-sized right pleural effusion and right base atelectasis. There is no pneumothorax.        < end of copied text >    < from: CT Chest No Cont (03.16.20 @ 08:30) >    IMPRESSION:  1.  Patient is status post interval right-sided chest tube placement with significant interval decrease in size of the right pleural effusion, with small residual loculated appearing components. New air is seen within the right pleural space, presumably related to recent intervention. Improved aeration of the right lower and middle lobes with persistent rounded appearing atelectasis of the posterior right lower lobe; superimposed infectious process would be difficult to exclude.     2.  Soft tissue attenuation is again seen circumferentially around the right pleura, similar to the prior study.  Although this finding could represent sequelae of prior pleurodesis, cannot exclude malignancy/metastatic disease.     Consider tissue biopsy and/or PET-CT for further evaluation.    3.   There is a 1.2 cm pleural-based lung nodule at the medial right upper lobe (image 64, series 2), similar to the prior study but not seen on more remote prior studies.  Malignancy is not excluded.  This too could be further assessed with PET-CT.    4. A 2.3 x 2.1 cm soft tissue nodular density medial to the caudate lobe, not seen in 2018.  Malignancy is not excluded.  This too could be further assessed with PET-CT.    5.  A 1.3 x 2.0 cm soft tissue nodule, possibly a lymph node at the subcarinal region (image 60, series 2), is new from 2018. Several additional subcentimeter mediastinal nodes are not enlarged by CT criteria and are without significant change from prior studies.   Malignancy is not excluded.     This too could be further assessed with PET-CT.    6.  Mildly enlarged main pulmonary artery, correlate for pulmonary artery hypertension.    7.  Pacemaker. Coronary artery calcifications and/or stents.    8.  Other findings, as above.       < end of copied text >    < from: TTE Echo Complete w/o contrast w/ Doppler (03.11.20 @ 21:46) >     Impression     Summary     Fibrocalcific changes noted to the mitral valve leaflets with preserved   leaflet excursion.   Mild mitral annular calcification is present.   Mild (1+) mitral regurgitation is present.   EAreversal of the mitral inflow consistent with reduced compliance of the   left ventricle.   Fibrocalcific changes noted to the Aortic valve leaflets with preserved   leaflet excursion.   Normal appearing tricuspid valve structure.   Mild to Moderate Tricuspid regurgitation is present.   Mild pulmonary hypertension.   Pulmonic valve not well seen.   Trace pulmonic valvular regurgitation is present.   The left atrium is mildly dilated.   Estimated left ventricular ejection fraction is 60-65 %.   The left ventricle is normal in size, wall thickness, wall motion and   contractility as seen in limited views.   Normal appearing right atrium.   Normal appearing right ventricle structure and function.   A device wire is seen in the RV and RA.   IVC was not visualized within the subcostal view.   No evidence of pericardial effusion.   No evidence of pleural effusion.      < end of copied text >

## 2020-03-20 NOTE — PROGRESS NOTE ADULT - ASSESSMENT
Ass:  S/P Right VAT  Plan:  Pt is comfortable without distress.  CXR reviewed=small increase in effusion on the right, Discussed with Isabel Garcia, Thoracic PA.  She will follow up

## 2020-03-20 NOTE — PROGRESS NOTE ADULT - ASSESSMENT
87M w/PMH CAD, HTN, HLD, BPH, SSS s/p PPM, CKD3, SVT, recurrent Rt pl eff, PTX s/p VATS decortication, Pleurodesis 2016, last admit 2018 for rec Rt hydropneumothorax, presents today c/o Rt sided Upper back pain w/ worsening BRENNER over past 10-14 days.    In ED, Cr 2 (baseline), Trop neg, UA neg, RVP neg, Tmax 100.1, elevated bp (he's taken home meds today), CT ch- Large Rt pl eff, atelectasis, pulm nodule,   CT surgery made aware per ED MD.      #Recurrent  Pleural effusion  -failed prior pleurodesis  -Hx hydropneumothorax spont  -prior VATS decortication / failed  -monitor O2 sats  -S/p thoracentesis with thoracostomy in place 3/11 removed on 3/17; Exudative; found to have a complicated pleural effusion/empyema, LDH >1000, pH 7.06 fluid sent; path/cytology - MALIGNANT  -Cultures NGTD  -Follow up JERROD/RF  -educated on deep breathing exercises educated on incentive spirometer  -CTS consult appreciated  clamp stevie drain at midnight tonight, FU CXR in AM  -S/P VATS with RIght CT   Pleural fluid will most likely re-accumulate will treat w thoracentesis if clinically indicated in future    #Dyspnea on exertion likely 2/2 pleural effusion, pna, other  -treat underlying cause  -monitor O2 sats  -cardio/pulm cs for further input   -check TTE Estimated left ventricular ejection fraction is 60-65 %.    #Febrile possibly 2/2 pleural effusion and  underlying pna  -S/P  IV rocephin, azithro  -ID cs appreciated - no need for further ABX    # Soft tissue attenuation is again seen circumferentially around the right pleura,  # .2 cm pleural-based lung nodule at the medial right upper lobe   # 2.3 x 2.1 cm soft tissue nodular density medial to the caudate lobe, not seen in 2018.  Malignancy is not excluded.    # 1.3 x 2.0 cm soft tissue nodule, possibly a lymph node at the subcarinal region (image 60, series 2), is new from 2018. Several additional subcentimeter mediastinal nodes are not enlarged by CT criteria and are without significant change from prior studies.     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  if inadequate, may consider liquid biopsy; FU outpatient in 2 weeks    #Hpertension - better controlled   -resume pt's home meds - dilt, labetalol, lasix  -will add prn hydralazine for sbp>160  -monitor bp and titrate meds as needed.     #CKD (chronic kidney disease), stage III.  Plan: stable and at baseline   -monitor labs.     #Prophylactic measure. SQH.    signout for AM attending:  Very pleasant Patient,  has had recurrent effusions on the right sided for many years. cytology shows malignancy, S/P VATS chantale

## 2020-03-20 NOTE — PROGRESS NOTE ADULT - SUBJECTIVE AND OBJECTIVE BOX
Patient is a 87y old  Male who presents with a chief complaint of BRENNER, Rt back pain (19 Mar 2020 14:10)  Subjective:  88yo M with PMH of mild Pulm HTN, CAD (on ASA), BPH, CKD III, HLD, HTN, SSS s/p PPM, spinal stenosis s/p fusion and R VATS, pleurodesis 5/2016 admitted 3/11 with recurrent right pleural effusion. 3/11 R PTC placed under US guidance. One liter serosang output. Of note CT Chest revealed RUL (1.2 cm) and SAMREEN (0.8 cm) nodules. Exudative effusion, Cultures NGTD. Repeat CT chest with residual right effusion, lung nodules as seen previously and pleural thickening.  Cytology +adenocarcinoma. Tube removed 3/17.    3/19/2020 Pt seen this am, NPO for VATS, pleurx catheter placement and pleural bx. No new complaints.    In ED, Cr 2 (baseline), Trop neg, UA neg, RVP neg, Tmax 100.1, elevated bp (he's taken home meds today), CT ch- Large Rt pl eff, atelectasis, pulm nodule,   CT surgery made aware per ED MD.      3/20 called by RN for increase in air leak,          Pt is awake and alert without complaints.        PAST MEDICAL & SURGICAL HISTORY:  Spinal stenosis  CKD (chronic kidney disease), stage III  SSS (sick sinus syndrome)  Pleural effusion  Hyperlipidemia  BPH (benign prostatic hyperplasia)  CAD (coronary artery disease)  Hypertension  Status post lumbar spinal fusion      MEDICATIONS  (STANDING):  aspirin enteric coated 81 milliGRAM(s) Oral daily  ceFAZolin  Injectable. 1000 milliGRAM(s) IV Push every 8 hours  diltiazem    milliGRAM(s) Oral daily  finasteride 5 milliGRAM(s) Oral daily  furosemide    Tablet 20 milliGRAM(s) Oral daily  heparin  Injectable 5000 Unit(s) SubCutaneous three times a day  labetalol 300 milliGRAM(s) Oral two times a day  lidocaine   Patch 1 Patch Transdermal once  pravastatin 80 milliGRAM(s) Oral at bedtime  sodium bicarbonate 325 milliGRAM(s) Oral three times a day  tamsulosin 0.4 milliGRAM(s) Oral at bedtime      PHYSICAL EXAM:T(C): 37.3 (03-20-20 @ 05:00), Max: 37.3 (03-20-20 @ 05:00)  HR: 60 (03-20-20 @ 05:00) (60 - 60)  BP: 135/41 (03-20-20 @ 05:00) (124/44 - 166/49)  RR: 18 (03-20-20 @ 05:00) (17 - 26)  SpO2: 94% (03-20-20 @ 05:00) (94% - 100%)  Wt(kg): --    Skin:  warm and dry    Respiratory: decreased breath sounds on the right    Cardiovascular:  S1S2 reg, no M    Neurological:  A & O X3, CN II-XII grossly intact                            9.6    7.44  )-----------( 266      ( 19 Mar 2020 08:11 )             30.0       03-19    140  |  110<H>  |  25<H>  ----------------------------<  94  4.4   |  25  |  1.53<H>    Ca    8.5      19 Mar 2020 08:11    CXR:  slight increase in fluid on the right

## 2020-03-20 NOTE — PROGRESS NOTE ADULT - ASSESSMENT
87M w/PMH CAD, HTN, HLD, BPH, SSS s/p PPM, CKD3, SVT, recurrent Rt pl eff, PTX s/p VATS decortication, Pleurodesis 2016, last admit 2018 for rec Rt hydropneumothorax, presents today c/o Rt sided Upper back pain w/ worsening BRENNER over past 10-14 days.  Pt states it's similar to when he's pleural effusion in the past.  He denies chest pain, cough, fever/chills, n/v/d, abd pain, dysuria, weakness.    In ED, Cr 2 (baseline), Trop neg, UA neg, RVP neg, Tmax 100.1, elevated bp (he's taken home meds today), CT ch- Large Rt pl eff, atelectasis, pulm nodule,   CT surgery made aware per ED MD.    Recurrent right sided pleural effusion  failed prior pleurodesis  Hx hydropneumothorax spont  prior VATS decortication / failed  S/p thoracentesis with thoracostomy in place; found to have a complicated pleural effusion/empyema, LDH >1000, pH 7.06  CTD/Rheumatic effusion cannot be excluded either.  Follow up JERROD/RF  Follow up cytology (high suspicion)  / positive for malignant cells  Reviewed CT Chest; improvement noted, small hydropneumothorax, likely trapped/entrapped lung  Appreciate CTS recommendations   Will continue to monitor   will need outpt PET scan for eval  thoracic surgery eval in progress 87M w/PMH CAD, HTN, HLD, BPH, SSS s/p PPM, CKD3, SVT, recurrent Rt pl eff, PTX s/p VATS decortication, Pleurodesis 2016, last admit 2018 for rec Rt hydropneumothorax, presents today c/o Rt sided Upper back pain w/ worsening BRENNER over past 10-14 days.  Pt states it's similar to when he's pleural effusion in the past.  He denies chest pain, cough, fever/chills, n/v/d, abd pain, dysuria, weakness.    In ED, Cr 2 (baseline), Trop neg, UA neg, RVP neg, Tmax 100.1, elevated bp (he's taken home meds today), CT ch- Large Rt pl eff, atelectasis, pulm nodule,   CT surgery made aware per ED MD.    Recurrent right sided pleural effusion  failed prior pleurodesis  Hx hydropneumothorax spont  prior VATS decortication / failed  S/p thoracentesis with thoracostomy in place; found to have a complicated pleural effusion/empyema, LDH >1000, pH 7.06  positive for malignant cells  Reviewed CT Chest; improvement noted, small hydropneumothorax, likely trapped/entrapped lung  Appreciate CTS recommendations   Will continue to monitor   will need outpt PET scan for eval  Appreciate CTS recommendations s/p VATS  follow up pathology

## 2020-03-21 NOTE — PROGRESS NOTE ADULT - ASSESSMENT
87M w/PMH CAD, HTN, HLD, BPH, SSS s/p PPM, CKD3, SVT, recurrent Rt pl eff, PTX s/p VATS decortication, Pleurodesis 2016, last admit 2018 for rec Rt hydropneumothorax, presents today c/o Rt sided Upper back pain w/ worsening BRENNER over past 10-14 days.    In ED, Cr 2 (baseline), Trop neg, UA neg, RVP neg, Tmax 100.1, elevated bp (he's taken home meds today), CT ch- Large Rt pl eff, atelectasis, pulm nodule,   CT surgery made aware per ED MD.      #Recurrent  Pleural effusion  -failed prior pleurodesis  -Hx hydropneumothorax spont  -prior VATS decortication / failed  -monitor O2 sats  -S/p thoracentesis with thoracostomy in place 3/11 removed on 3/17; Exudative; found to have a complicated pleural effusion/empyema, LDH >1000, pH 7.06 fluid sent; path/cytology - MALIGNANT  -Cultures NGTD  -Follow up JERROD/RF  -educated on deep breathing exercises educated on incentive spirometer  -CTS consult appreciated  clamp stevie drain at midnight tonight, FU CXR in AM  -S/P Right VATS, evacuation of clots, pleural biopsies and placement of stevie drain - FU Pleural Biopsy results   -Pleural fluid will most likely re-accumulate will treat w thoracentesis if clinically indicated in future; Discussed with Dr Valadez if patient has symptoms, patient will call their office and they will set up IR to do thoracentesis if needed versus sending the patient to the hospital    #Dyspnea on exertion likely 2/2 pleural effusion, pna, other  -treat underlying cause  -monitor O2 sats  -cardio/pulm cs for further input   -check TTE Estimated left ventricular ejection fraction is 60-65 %.    #Febrile possibly 2/2 pleural effusion and  underlying pna  -S/P  IV rocephin, azithro  -ID cs appreciated - no need for further ABX    # Soft tissue attenuation is again seen circumferentially around the right pleura,  # .2 cm pleural-based lung nodule at the medial right upper lobe   # 2.3 x 2.1 cm soft tissue nodular density medial to the caudate lobe, not seen in 2018.  Malignancy is not excluded.    # 1.3 x 2.0 cm soft tissue nodule, possibly a lymph node at the subcarinal region (image 60, series 2), is new from 2018. Several additional subcentimeter mediastinal nodes are not enlarged by CT criteria and are without significant change from prior studies.     -Malignancy is not excluded will need outpatient  PET-CT  -FLuid Cytology is Malignant - Discussed with patients son and patient   -[facility] consult appreciated - FU molecular testing ( EGFR, ALK-1 , ROS) and PDL-1 testing;  FU outpatient in 2 weeks    #Hpertension - better controlled   -resume pt's home meds - dilt, labetalol, lasix  -will add prn hydralazine for sbp>160  -monitor bp and titrate meds as needed.     #CKD (chronic kidney disease), stage III.  Plan: stable and at baseline   -monitor labs.     #Prophylactic measure. SQH.    signout for AM attending:  Very pleasant Patient,  has had recurrent effusions on the right sided for many years. cytology shows malignancy, S/P VATS [place]

## 2020-03-21 NOTE — PROGRESS NOTE ADULT - SUBJECTIVE AND OBJECTIVE BOX
Patient is a 87y old  Male who presents with a chief complaint of BRENNER, Rt back pain (11 Mar 2020 18:28)      HPI:  HPI:  87M w/PMH CAD, HTN, HLD, BPH, SSS s/p PPM, CKD3, SVT, recurrent Rt pl eff, PTX s/p VATS decortication, Pleurodesis , last admit 2018 for rec Rt hydropneumothorax, presents today c/o Rt sided Upper back pain w/ worsening BRENNER over past 10-14 days.  Pt states it's similar to when he's pleural effusion in the past.  He denies chest pain, cough, fever/chills, n/v/d, abd pain, dysuria, weakness.    In ED, Cr 2 (baseline), Trop neg, UA neg, RVP neg, Tmax 100.1, elevated bp (he's taken home meds today), CT ch- Large Rt pl eff, atelectasis, pulm nodule,   CT surgery made aware per ED MD.    pt is well known from his prior admissions / not recently followed up   now admitted and required repeat CT right chest for further eval  no smoker  Allergies  amlodipine (Unknown)  Crestor (Other)  Lipitor (Unknown)    3/21  No acute pulmonary events occurred overnight  CT clamped    MEDICATIONS  (STANDING):  aspirin enteric coated 81 milliGRAM(s) Oral daily  finasteride 5 milliGRAM(s) Oral daily  furosemide    Tablet 20 milliGRAM(s) Oral daily  heparin  Injectable 5000 Unit(s) SubCutaneous three times a day  labetalol 300 milliGRAM(s) Oral two times a day  lidocaine   Patch 1 Patch Transdermal once  lidocaine 1% Injectable 20 milliLiter(s) Local Injection once  pravastatin 80 milliGRAM(s) Oral at bedtime  sodium bicarbonate 325 milliGRAM(s) Oral three times a day  tamsulosin 0.4 milliGRAM(s) Oral at bedtime    MEDICATIONS  (PRN):  acetaminophen   Tablet .. 650 milliGRAM(s) Oral every 4 hours PRN Mild Pain (1 - 3)  ALBUTerol    90 MICROgram(s) HFA Inhaler 2 Puff(s) Inhalation every 6 hours PRN Shortness of Breath and/or Wheezing  hydrALAZINE Injectable 10 milliGRAM(s) IV Push every 6 hours PRN SBP>160  ondansetron Injectable 4 milliGRAM(s) IV Push every 6 hours PRN Nausea  oxycodone    5 mG/acetaminophen 325 mG 1 Tablet(s) Oral every 4 hours PRN Severe Pain (7 - 10)  traMADol 50 milliGRAM(s) Oral every 6 hours PRN Moderate Pain (4 - 6)      Vital Signs Last 24 Hrs  T(C): 36.7 (20 Mar 2020 20:31), Max: 36.7 (20 Mar 2020 20:31)  T(F): 98.1 (20 Mar 2020 20:), Max: 98.1 (20 Mar 2020 20:)  HR: 60 (20 Mar 2020 20:) (56 - 60)  BP: 146/41 (20 Mar 2020 20:) (141/37 - 146/41)  BP(mean): --  RR: 16 (20 Mar 2020 20:) (16 - 17)  SpO2: 96% (20 Mar 2020 20:) (96% - 97%)    General Appearance: cooperative, no acute distress, right sided CT in place   HEENT: PERRL, conjunctiva clear,  Neck: Supple, , no adenopathy  Lungs: decreased breath sounds right mid-lower lung fields no wheezing, left side clear, Right sided thoracostomy in place   Heart: Regular rate and rhythm, S1, S2 normal, no murmur, rub or gallop  Abdomen: Soft, non-tender, bowel sounds active , no hepatosplenomegaly  Extremities: no cyanosis or edema, no joint swelling  Skin: Skin color, texture normal, no rashes   Neurologic: Alert and oriented X3 , cranial nerves intact, sensory and motor normal,    ECG:    LABS:                          12.0   9.04  )-----------( 294      ( 11 Mar 2020 08:35 )             36.2     03-11    140  |  110<H>  |  19  ----------------------------<  138<H>  3.9   |  22  |  2.00<H>    Ca    8.8      11 Mar 2020 08:35    TPro  6.7  /  Alb  3.1<L>  /  TBili  0.5  /  DBili  x   /  AST  13<L>  /  ALT  12  /  AlkPhos  88  03-11    CARDIAC MARKERS ( 11 Mar 2020 08:35 )  <0.015 ng/mL / x     / x     / x     / x            Pro BNP  799 03-11 @ 08:35  D Dimer  --  @ 08:35    PT/INR - ( 11 Mar 2020 08:35 )   PT: 11.6 sec;   INR: 1.04 ratio         PTT - ( 11 Mar 2020 08:35 )  PTT:30.1 sec  Urinalysis Basic - ( 11 Mar 2020 13:15 )    Color: Yellow / Appearance: Clear / S.015 / pH: x  Gluc: x / Ketone: Trace  / Bili: Negative / Urobili: Negative mg/dL   Blood: x / Protein: 15 mg/dL / Nitrite: Negative   Leuk Esterase: Negative / RBC: 0-2 /HPF / WBC 0-2   Sq Epi: x / Non Sq Epi: Occasional / Bacteria: Occasional      < from: Xray Chest 1 View- PORTABLE-Urgent (20 @ 09:12) >  PROCEDURE DATE:  2020    < from: CT Chest No Cont (20 @ 09:39) >  mediastinal margin in the right upper lobe on image #61/series 2.    PLEURA: Large right pleural effusion. There is soft tissue attenuation circumferentially around the pleura, assessment is limited without intravenous contrast. There is a small amount of air, likely in the right pleural space as seen on image #38/series 2, correlate with recent intervention.    MEDIASTINUM AND CARLITA: No lymphadenopathy.    VESSELS: The thoracic aorta and main pulmonary artery are normal in caliber. There is heavy coronary artery calcification.    HEART: Heart size is normal. There are pacemaker leads in the right atrium and right ventricle. Trace pericardial effusion.    CHEST WALL AND LOWER NECK: The thyroid gland is within normal limits. There is no supraclavicular or axillary lymphadenopathy.There is a pacemaker generator pack in the left upper chest wall.    VISUALIZED UPPER ABDOMEN: The imaged adrenal glands are within normal limits. The imaged portions of the unenhanced liver, spleen, pancreas are within normal limits. There is a partially imaged large cyst at the upper pole of the left kidney measuring 8.3 x 5.8 cm.    BONES: Multilevel degenerative change of the thoracic spine with osteophytes, degenerative disc disease and facet joint arthropathy.    IMPRESSION:     Large rightpleural effusion with pleural base soft tissue, concerning for metastasis.    There are 2 pulmonary nodules measuring up to 1.2 cm as above.    Complete atelectasis of the right middle and lower lobes.    Postsurgical changes in the right lung apex.    Other incidental findings are as above.    < end of copied text >        INTERPRETATION:  AP erect chest on 2020 at 9:02 AM. Patient has right-sided chest pain.    Heart suggest normal size. Left-sided pacemaker again noted.    Suture line in the right paratracheal area is better seen on study of May 26, 2018.    On the study there was a loculated pneumothorax at the right base with adjacent atelectasis.    Present film shows a large right effusion.    IMPRESSION: Large right effusion.    < end of copied text >    < from: Xray Chest 1 View- PORTABLE-Routine (03.15.20 @ 10:12) >        INTERPRETATION:  AP chest with comparison to 3/14/2020.    Clinical indications: Right-sided chest tube.    IMPRESSION: There is left-sided pacemaker. The heart is normal in size. There remains a right-sided chest tube with a moderate-sized right pleural effusion and right base atelectasis. There is no pneumothorax.    < end of copied text >      RADIOLOGY & ADDITIONAL STUDIES:    ct scan images from 2018 and current ct scans reveiwed

## 2020-03-21 NOTE — PROGRESS NOTE ADULT - SUBJECTIVE AND OBJECTIVE BOX
The patient was seen and examined. No acute events overnight.  No chest pain.  Stable shortness of breath.  No palpitations.    Initial Consult HPI    S. BEHAR is a 87y year old Male with a past medical history of sick sinus syndrome status post dual-chamber pacemaker, coronary atherosclerosis noted on CAT scan, history of chronic kidney disease, hypertension, hyperlipidemia, BPH, history of recurrent pleural effusion with prior VATS and pleurodesis in 2016 and adenocarcinoma who was admitted for right back pain with shortness of breath with recurrent pleural effusion s/p VATS 3/19    Echo 3/11  Mild mitral annular calcification is present.   Mild (1+) mitral regurgitation is present.   EA reversal of the mitral inflow consistent with reduced compliance of the   left ventricle.   Fibrocalcific changes noted to the Aortic valve leaflets with preserved   leaflet excursion.   Normal appearing tricuspid valve structure.   Mild to Moderate Tricuspid regurgitation is present.   Mild pulmonary hypertension.   Pulmonic valve not well seen.   Trace pulmonic valvular regurgitation is present.   The left atrium is mildly dilated.   Estimated left ventricular ejection fraction is 60-65 %.   The left ventricle is normal in size, wall thickness, wall motion and   contractility as seen in limited views.   Normal appearing right atrium.   Normal appearing right ventricle structure and function.   A device wire is seen in the RV and RA.   IVC was not visualized within the subcostal view.   ________________________________  Review of systems: A 10 point review of system has been performed, and is negative except for what has been mentioned in the above history of present illness.     PAST MEDICAL & SURGICAL HISTORY:  Spinal stenosis  CKD (chronic kidney disease), stage III  SSS (sick sinus syndrome)  Pleural effusion  Hyperlipidemia  BPH (benign prostatic hyperplasia)  CAD (coronary artery disease)  Hypertension  Status post lumbar spinal fusion        Home Medications:  Aspirin Enteric Coated 81 mg oral delayed release tablet: 1 tab(s) orally once a day (11 Mar 2020 10:57)  Co Q-10 100 mg oral capsule: 1 cap(s) orally once a day (11 Mar 2020 11:40)  dilTIAZem 180 mg/24 hours oral capsule, extended release: 1 cap(s) orally once a day (11 Mar 2020 10:57)  finasteride 5 mg oral tablet: 1 tab(s) orally once a day (at bedtime) (11 Mar 2020 10:57)  furosemide 20 mg oral tablet: 1 tab(s) orally once a day (11 Mar 2020 10:57)  labetalol 300 mg oral tablet: 1 tab(s) orally 2 times a day (11 Mar 2020 11:40)  pravastatin 80 mg oral tablet: 1 tab(s) orally once a day (11 Mar 2020 10:57)  Ranexa 500 mg oral tablet, extended release: 1 tab(s) orally once a day (11 Mar 2020 11:40)  sodium bicarbonate 325 mg oral tablet: 1 tab(s) orally 3 times a day (11 Mar 2020 10:57)  tamsulosin 0.4 mg oral capsule: 1 cap(s) orally once a day (11 Mar 2020 10:57)  Vitamin B12 1000 mcg oral tablet: 1 tab(s) orally once a day (11 Mar 2020 10:57)  Vitamin D3 2000 intl units oral tablet: 1 tab(s) orally once a day (11 Mar 2020 10:57)  Welchol 625 mg oral tablet: 1 tab(s) orally 2 times a day (11 Mar 2020 10:57)    MEDICATIONS  (STANDING):  aspirin enteric coated 81 milliGRAM(s) Oral daily  diltiazem    milliGRAM(s) Oral daily  finasteride 5 milliGRAM(s) Oral daily  heparin  Injectable 5000 Unit(s) SubCutaneous three times a day  labetalol 300 milliGRAM(s) Oral two times a day  lidocaine   Patch 1 Patch Transdermal once  pravastatin 80 milliGRAM(s) Oral at bedtime  sodium bicarbonate 325 milliGRAM(s) Oral three times a day  tamsulosin 0.4 milliGRAM(s) Oral at bedtime    MEDICATIONS  (PRN):  acetaminophen   Tablet .. 650 milliGRAM(s) Oral every 4 hours PRN Mild Pain (1 - 3)  ALBUTerol    90 MICROgram(s) HFA Inhaler 2 Puff(s) Inhalation every 6 hours PRN Shortness of Breath and/or Wheezing  hydrALAZINE Injectable 10 milliGRAM(s) IV Push every 6 hours PRN SBP>160  ondansetron Injectable 4 milliGRAM(s) IV Push every 6 hours PRN Nausea  oxyCODONE    IR 5 milliGRAM(s) Oral every 4 hours PRN Mild Pain (1 - 3)  senna 2 Tablet(s) Oral at bedtime PRN Constipation      Vital Signs Last 24 Hrs  T(C): 36.8 (21 Mar 2020 09:23), Max: 36.8 (21 Mar 2020 09:23)  T(F): 98.3 (21 Mar 2020 09:23), Max: 98.3 (21 Mar 2020 09:23)  HR: 60 (21 Mar 2020 09:23) (56 - 60)  BP: 138/44 (21 Mar 2020 09:23) (138/44 - 146/41)  BP(mean): --  RR: 17 (21 Mar 2020 09:23) (16 - 17)  SpO2: 97% (21 Mar 2020 09:23) (96% - 97%)  I&O's Summary    20 Mar 2020 07:01  -  21 Mar 2020 07:00  --------------------------------------------------------  IN: 0 mL / OUT: 525 mL / NET: -525 mL    21 Mar 2020 07:01  -  21 Mar 2020 11:30  --------------------------------------------------------  IN: 0 mL / OUT: 175 mL / NET: -175 mL      ________________________________  GENERAL APPEARANCE:  No acute distress  HEAD: normocephalic, atraumatic  NECK: supple, no jugular venous distention, no carotid bruit    HEART: Regular rate and rhythm, S1, S2 normal, 2/6 regurgitant murmur    CHEST:  No anterior chest wall tenderness    LUNGS: faint crackles at bases, CT in place    ABDOMEN: soft, nontender, nondistended, with positive bowel sounds appreciated  EXTREMITIES: no clubbing, cyanosis, or edema.   NEURO: Alert and oriented x3  PSYC:  Normal affect  SKIN:  Dry  ________________________________     LABS:                                   10.6   9.40  )-----------( 280      ( 20 Mar 2020 07:17 )             32.9          03-21    143  |  112<H>  |  28<H>  ----------------------------<  112<H>  4.2   |  25  |  1.67<H>    Ca    8.7      21 Mar 2020 06:48  Phos  3.6     03-21  Mg     2.4     03-21                ________________________________    RADIOLOGY & ADDITIONAL STUDIES:   CT chest without contrast  1.  Patient is status post interval right-sided chest tube placement with significant interval decrease in size of the right pleural effusion, with small residual loculated appearing components. New air is seen within the right pleural space, presumably related to recent intervention. Improved aeration of the right lower and middle lobes with persistent rounded appearing atelectasis of the posterior right lower lobe; superimposed infectious process would be difficult to exclude.     2.  Soft tissue attenuation is again seen circumferentially around the right pleura, similar to the prior study.  Although this finding could represent sequelae of prior pleurodesis, cannot exclude malignancy/metastatic disease.     Consider tissue biopsy and/or PET-CT for further evaluation.    3.   There is a 1.2 cm pleural-based lung nodule at the medial right upper lobe (image 64, series 2), similar to the prior study but not seen on more remote prior studies.  Malignancy is not excluded.  This too could be further assessed with PET-CT.    4. A 2.3 x 2.1 cm soft tissue nodular density medial to the caudate lobe, not seen in 2018.  Malignancy is not excluded.  This too could be further assessed with PET-CT.    5.  A 1.3 x 2.0 cm soft tissue nodule, possibly a lymph node at the subcarinal region (image 60, series 2), is new from 2018. Several additional subcentimeter mediastinal nodes are not enlarged by CT criteria and are without significant change from prior studies.   Malignancy is not excluded.     This too could be further assessed with PET-CT.    6.  Mildly enlarged main pulmonary artery, correlate for pulmonary artery hypertension.    7.  Pacemaker. Coronary artery calcifications and/or stents.    8.  Other findings, as above.   ________________________________  Assessment:  Recurrent pleural effusions requiring VATS  History of coronary atherosclerosis noted on CAT scan  Adenocarcinoma  Sick sinus syndrome status pacemaker implantation  Pulmonary hypertension  Mild mitral valve regurgitation with prese EF rved LVEF  Hypertension CKD  Preop CV evaluation      Plan:    Remain stable from cardiovascular standpoint status post VATS for molecular testing for underlying adenocarcinoma.   Continue with Cardizem.  Continue aspirin.  Continue labetalol and statin.  Will follow as needed.    DVT prophylaxis    GRACIE Hopkins DO, FACC

## 2020-03-21 NOTE — PROGRESS NOTE ADULT - SUBJECTIVE AND OBJECTIVE BOX
HOSPITALIST PROGRESS NOTE:  SUBJECTIVE:  PCP: Cardio- Dillon  CTS- Eric  PCP- Kelby    Chief Complaint: Patient is a 87y old  Male who presents with a chief complaint of BRENNER, Rt back pain (16 Mar 2020 09:28)    HPI:  87M w/PMH CAD, HTN, HLD, BPH, SSS s/p PPM, CKD3, SVT, recurrent Rt pl eff, PTX s/p VATS decortication, Pleurodesis 2016, last admit 2018 for rec Rt hydropneumothorax, presents today c/o Rt sided Upper back pain w/ worsening BRENNER over past 10-14 days.  Pt states it's similar to when he's pleural effusion in the past.  He denies chest pain, cough, fever/chills, n/v/d, abd pain, dysuria, weakness.    In ED, Cr 2 (baseline), Trop neg, UA neg, RVP neg, Tmax 100.1, elevated bp (he's taken home meds today), CT ch- Large Rt pl eff, atelectasis, pulm nodule,   CT surgery made aware per ED MD.      3/16: Above reviewed;  Patient has no complaints; Denies any dyspnea   3/17:  Patient has no complaints; Chest tube removed;  Discussed with CTS  3/18:  Discussed the results of the cytology which is malignant with patient and son; CTS wants VATS for chantale  3/19:  Patient had VATS procedure this morning, NO issues overnight   3/20:  CT draining. no cp, dyspnea  3/21: CT draining; patient has no complaints.     Allergies:  amlodipine (Unknown)  Crestor (Other)  Lipitor (Unknown)    REVIEW OF SYSTEMS:  See HPI. All other review of systems is negative unless indicated above.     OBJECTIVE  Physical Exam:  Vital Signs Last 24 Hrs  T(C): 36.8 (21 Mar 2020 09:23), Max: 36.8 (21 Mar 2020 09:23)  T(F): 98.3 (21 Mar 2020 09:23), Max: 98.3 (21 Mar 2020 09:23)  HR: 60 (21 Mar 2020 09:23) (56 - 60)  BP: 138/44 (21 Mar 2020 09:23) (138/44 - 146/41)  BP(mean): --  RR: 17 (21 Mar 2020 09:23) (16 - 17)  SpO2: 97% (21 Mar 2020 09:23) (96% - 97%)    Constitutional: NAD, awake and alert, well-developed  Neurological: AAO x 3, no focal deficits  HEENT: PERRLA, EOMI, MMM  Neck: Soft and supple, No LAD, No JVD  Respiratory: Breath sounds are clear bilaterally, No wheezing, rales or rhonchi  Abd: soft nondistended; no rebound tenderness  Back: No CVA tenderness   Extremities: No peripheral edema  Vascular: 2+ peripheral pulses  Musculoskeletal: 5/5 strength b/l upper and lower extremities  Skin: No rashes  Breast: Deferred  Rectal: Deferred    MEDICATIONS  (STANDING):  aspirin enteric coated 81 milliGRAM(s) Oral daily  finasteride 5 milliGRAM(s) Oral daily  furosemide    Tablet 20 milliGRAM(s) Oral daily  heparin  Injectable 5000 Unit(s) SubCutaneous three times a day  labetalol 300 milliGRAM(s) Oral two times a day  lidocaine   Patch 1 Patch Transdermal once  lidocaine 1% Injectable 20 milliLiter(s) Local Injection once  pravastatin 80 milliGRAM(s) Oral at bedtime  sodium bicarbonate 325 milliGRAM(s) Oral three times a day  tamsulosin 0.4 milliGRAM(s) Oral at bedtime      Lab Results:  CBC  CBC Full  -  ( 20 Mar 2020 07:17 )  WBC Count : 9.40 K/uL  RBC Count : 3.53 M/uL  Hemoglobin : 10.6 g/dL  Hematocrit : 32.9 %  Platelet Count - Automated : 280 K/uL  Mean Cell Volume : 93.2 fl  Mean Cell Hemoglobin : 30.0 pg  Mean Cell Hemoglobin Concentration : 32.2 gm/dL  Auto Neutrophil # : x  Auto Lymphocyte # : x  Auto Monocyte # : x  Auto Eosinophil # : x  Auto Basophil # : x  Auto Neutrophil % : x  Auto Lymphocyte % : x  Auto Monocyte % : x  Auto Eosinophil % : x  Auto Basophil % : x    .		Differential:	[] Automated		[] Manual  Chemistry                        10.6   9.40  )-----------( 280      ( 20 Mar 2020 07:17 )             32.9     03-21    143  |  112<H>  |  28<H>  ----------------------------<  112<H>  4.2   |  25  |  1.67<H>    Ca    8.7      21 Mar 2020 06:48  Phos  3.6     03-21  Mg     2.4     03-21    MICROBIOLOGY/CULTURES:  Culture Results:   No growth at 5 days. (03-11 @ 17:00)  Culture Results:   Testing in progress (03-11 @ 17:00)  Culture Results:   No growth (03-11 @ 13:15)  Culture Results:   No growth at 5 days. (03-11 @ 08:35)  Culture Results:   No growth at 5 days. (03-11 @ 08:35)      RADIOLOGY/EKG:    < from: Xray Chest 1 View- PORTABLE-Routine (03.15.20 @ 10:12) >    IMPRESSION: There is left-sided pacemaker. The heart is normal in size. There remains a right-sided chest tube with a moderate-sized right pleural effusion and right base atelectasis. There is no pneumothorax.        < end of copied text >    < from: CT Chest No Cont (03.16.20 @ 08:30) >    IMPRESSION:  1.  Patient is status post interval right-sided chest tube placement with significant interval decrease in size of the right pleural effusion, with small residual loculated appearing components. New air is seen within the right pleural space, presumably related to recent intervention. Improved aeration of the right lower and middle lobes with persistent rounded appearing atelectasis of the posterior right lower lobe; superimposed infectious process would be difficult to exclude.     2.  Soft tissue attenuation is again seen circumferentially around the right pleura, similar to the prior study.  Although this finding could represent sequelae of prior pleurodesis, cannot exclude malignancy/metastatic disease.     Consider tissue biopsy and/or PET-CT for further evaluation.    3.   There is a 1.2 cm pleural-based lung nodule at the medial right upper lobe (image 64, series 2), similar to the prior study but not seen on more remote prior studies.  Malignancy is not excluded.  This too could be further assessed with PET-CT.    4. A 2.3 x 2.1 cm soft tissue nodular density medial to the caudate lobe, not seen in 2018.  Malignancy is not excluded.  This too could be further assessed with PET-CT.    5.  A 1.3 x 2.0 cm soft tissue nodule, possibly a lymph node at the subcarinal region (image 60, series 2), is new from 2018. Several additional subcentimeter mediastinal nodes are not enlarged by CT criteria and are without significant change from prior studies.   Malignancy is not excluded.     This too could be further assessed with PET-CT.    6.  Mildly enlarged main pulmonary artery, correlate for pulmonary artery hypertension.    7.  Pacemaker. Coronary artery calcifications and/or stents.    8.  Other findings, as above.       < end of copied text >    < from: TTE Echo Complete w/o contrast w/ Doppler (03.11.20 @ 21:46) >     Impression     Summary     Fibrocalcific changes noted to the mitral valve leaflets with preserved   leaflet excursion.   Mild mitral annular calcification is present.   Mild (1+) mitral regurgitation is present.   EAreversal of the mitral inflow consistent with reduced compliance of the   left ventricle.   Fibrocalcific changes noted to the Aortic valve leaflets with preserved   leaflet excursion.   Normal appearing tricuspid valve structure.   Mild to Moderate Tricuspid regurgitation is present.   Mild pulmonary hypertension.   Pulmonic valve not well seen.   Trace pulmonic valvular regurgitation is present.   The left atrium is mildly dilated.   Estimated left ventricular ejection fraction is 60-65 %.   The left ventricle is normal in size, wall thickness, wall motion and   contractility as seen in limited views.   Normal appearing right atrium.   Normal appearing right ventricle structure and function.   A device wire is seen in the RV and RA.   IVC was not visualized within the subcostal view.   No evidence of pericardial effusion.   No evidence of pleural effusion.      < end of copied text >

## 2020-03-21 NOTE — PROGRESS NOTE ADULT - ASSESSMENT
86yo M with PMH of mild Pulm HTN, CAD (on ASA), BPH, CKD III, HLD, HTN, SSS s/p PPM, spinal stenosis s/p fusion and R VATS, pleurodesis 5/2016 admitted 3/11 with recurrent right pleural effusion. 3/11 R PTC placed under US guidance. One liter serosang output. Of note CT Chest revealed RUL (1.2 cm) and SAMREEN (0.8 cm) nodules. Exudative effusion, Cultures NGTD. Repeat CT chest with residual right effusion, lung nodules as seen previously and pleural thickening.  Cytology +adenocarcinoma. Tube removed 3/17. POD 2 Right VATS evacuation of clots, pleural biopsies and placement of stevie drain. CXR stable on clamp trial. Stevie removed and two absorbable sutures placed. Post-pull CXR stable.      D/W Dr. Victoriano Daily PA  Thoracic Sx  #[id number]

## 2020-03-21 NOTE — PROGRESS NOTE ADULT - ASSESSMENT
87M w/PMH CAD, HTN, HLD, BPH, SSS s/p PPM, CKD3, SVT, recurrent Rt pl eff, PTX s/p VATS decortication, Pleurodesis 2016, last admit 2018 for rec Rt hydropneumothorax, presents today c/o Rt sided Upper back pain w/ worsening BRENNER over past 10-14 days.  Pt states it's similar to when he's pleural effusion in the past.  He denies chest pain, cough, fever/chills, n/v/d, abd pain, dysuria, weakness.    In ED, Cr 2 (baseline), Trop neg, UA neg, RVP neg, Tmax 100.1, elevated bp (he's taken home meds today), CT ch- Large Rt pl eff, atelectasis, pulm nodule,   CT surgery made aware per ED MD.    Recurrent right sided pleural effusion  failed prior pleurodesis  Hx hydropneumothorax spont  prior VATS decortication / failed  S/p thoracentesis with thoracostomy in place; found to have a complicated pleural effusion/empyema, LDH >1000, pH 7.06  positive for malignant cells  Reviewed CT Chest; improvement noted, small hydropneumothorax, likely trapped/entrapped lung  Appreciate CTS recommendations   Will continue to monitor   will need outpt PET scan for eval  Appreciate CTS recommendations s/p VATS  follow up pathology

## 2020-03-21 NOTE — PROGRESS NOTE ADULT - SUBJECTIVE AND OBJECTIVE BOX
Subjective: Patient with no complaints. Tolerating clamp trial. Denies SOB and CP. Saturating well on RA.     Vital Signs:  Vital Signs Last 24 Hrs  T(C): 36.8 (03-21-20 @ 09:23), Max: 36.8 (03-21-20 @ 09:23)  T(F): 98.3 (03-21-20 @ 09:23), Max: 98.3 (03-21-20 @ 09:23)  HR: 60 (03-21-20 @ 09:23) (56 - 60)  BP: 138/44 (03-21-20 @ 09:23) (138/44 - 146/41)  RR: 17 (03-21-20 @ 09:23) (16 - 17)  SpO2: 97% (03-21-20 @ 09:23) (96% - 97%) on (O2)    I&O's Detail    20 Mar 2020 07:01  -  21 Mar 2020 07:00  --------------------------------------------------------  IN:  Total IN: 0 mL    OUT:    Chest Tube: 75 mL    Voided: 450 mL  Total OUT: 525 mL    Total NET: -525 mL      21 Mar 2020 07:01  -  21 Mar 2020 13:23  --------------------------------------------------------  IN:  Total IN: 0 mL    OUT:    Voided: 175 mL  Total OUT: 175 mL    Total NET: -175 mL      General: WN/WD NAD  Neurology: Awake, nonfocal, CARRANZA x 4  Eyes: Scleras clear, PERRLA/ EOMI, Gross vision intact  ENT: Gross hearing intact, grossly patent pharynx, no stridor  Neck: Neck supple, trachea midline, No JVD  Respiratory: Decreased BS at right base  CV: S1S2, no murmurs, rubs or gallops  Abdominal: Soft, NT, ND  Extremities: No edema  Skin: No Rashes, Hematoma, Ecchymosis  Psych: Oriented x 3, normal affect  Incisions: c/d/i  Tubes: R stevie chest tube clamped, 75 (day) +50 (now unclamped) out in last 24H, dark bloody fluid, trapped lung with signs of air leak, CXR stable clamped, Chest tube removed upon forced expiration, and two small absorbable sutures placed.     Relevant labs, radiology and Medications reviewed                        10.6   9.40  )-----------( 280      ( 20 Mar 2020 07:17 )             32.9     03-21    143  |  112<H>  |  28<H>  ----------------------------<  112<H>  4.2   |  25  |  1.67<H>    Ca    8.7      21 Mar 2020 06:48  Phos  3.6     03-21  Mg     2.4     03-21        MEDICATIONS  (STANDING):  aspirin enteric coated 81 milliGRAM(s) Oral daily  diltiazem    milliGRAM(s) Oral daily  finasteride 5 milliGRAM(s) Oral daily  heparin  Injectable 5000 Unit(s) SubCutaneous three times a day  labetalol 300 milliGRAM(s) Oral two times a day  lidocaine   Patch 1 Patch Transdermal once  pravastatin 80 milliGRAM(s) Oral at bedtime  sodium bicarbonate 325 milliGRAM(s) Oral three times a day  tamsulosin 0.4 milliGRAM(s) Oral at bedtime    MEDICATIONS  (PRN):  acetaminophen   Tablet .. 650 milliGRAM(s) Oral every 4 hours PRN Mild Pain (1 - 3)  ALBUTerol    90 MICROgram(s) HFA Inhaler 2 Puff(s) Inhalation every 6 hours PRN Shortness of Breath and/or Wheezing  hydrALAZINE Injectable 10 milliGRAM(s) IV Push every 6 hours PRN SBP>160  ondansetron Injectable 4 milliGRAM(s) IV Push every 6 hours PRN Nausea  oxyCODONE    IR 5 milliGRAM(s) Oral every 4 hours PRN Mild Pain (1 - 3)  senna 2 Tablet(s) Oral at bedtime PRN Constipation        Assessment  87y Male  w/ PAST MEDICAL & SURGICAL HISTORY:  Spinal stenosis  CKD (chronic kidney disease), stage III  SSS (sick sinus syndrome)  Pleural effusion  Hyperlipidemia  BPH (benign prostatic hyperplasia)  CAD (coronary artery disease)  Hypertension  Status post lumbar spinal fusion  admitted with complaints of Patient is a 87y old  Male who presents with a chief complaint of BRENNER, Rt back pain (21 Mar 2020 12:01)  .  On (Date), patient underwent Chest tube placement with US guidance  VATS, with pleural biopsy

## 2020-03-21 NOTE — PROGRESS NOTE ADULT - PROBLEM SELECTOR PLAN 2
Do not submerge incisions in water. remove dressing in 48H and shower only. Pat incisions dry.   No need for surgical follow up.   Sutures will absorb and fall out with time.   Likely to recur as malignant in nature. Drain with thoracentesis in future if patient becomes symptomatic only.   Please DC home with pain medication Oxycodone 5mg q 4-6H PRN.   D/W Dr. De Santiago likely home tomorrow.

## 2020-03-22 NOTE — DISCHARGE NOTE PROVIDER - NSDCMRMEDTOKEN_GEN_ALL_CORE_FT
Aspirin Enteric Coated 81 mg oral delayed release tablet: 1 tab(s) orally once a day  Co Q-10 100 mg oral capsule: 1 cap(s) orally once a day  dilTIAZem 180 mg/24 hours oral capsule, extended release: 1 cap(s) orally once a day  finasteride 5 mg oral tablet: 1 tab(s) orally once a day (at bedtime)  furosemide 20 mg oral tablet: 1 tab(s) orally once a day  labetalol 300 mg oral tablet: 1 tab(s) orally 2 times a day  oxyCODONE 5 mg oral tablet: 1 tab(s) orally every 6 hours, As Needed -Mild Pain (1 - 3) - for moderate pain MDD:4 tabs  pravastatin 80 mg oral tablet: 1 tab(s) orally once a day  Ranexa 500 mg oral tablet, extended release: 1 tab(s) orally once a day  sodium bicarbonate 325 mg oral tablet: 1 tab(s) orally 3 times a day  tamsulosin 0.4 mg oral capsule: 1 cap(s) orally once a day  Vitamin B12 1000 mcg oral tablet: 1 tab(s) orally once a day  Vitamin D3 2000 intl units oral tablet: 1 tab(s) orally once a day  Welchol 625 mg oral tablet: 1 tab(s) orally 2 times a day

## 2020-03-22 NOTE — DISCHARGE NOTE PROVIDER - CARE PROVIDERS DIRECT ADDRESSES
,DirectAddress_Unknown,DirectAddress_Unknown,[email or web address]

## 2020-03-22 NOTE — PROGRESS NOTE ADULT - NSHPATTENDINGPLANDISCUSS_GEN_ALL_CORE
patient, nursing, staff
patient, nursing, staff
patient, nursing,staff
patient, nurse
patient, nursing, staff

## 2020-03-22 NOTE — DISCHARGE NOTE PROVIDER - PROVIDER TOKENS
PROVIDER:[TOKEN:[[id number]]],PROVIDER:[TOKEN:[[id number]]],PROVIDER:[TOKEN:[[id number]]]

## 2020-03-22 NOTE — PROGRESS NOTE ADULT - SUBJECTIVE AND OBJECTIVE BOX
Patient is a 87y old  Male who presents with a chief complaint of BRENNER, Rt back pain (11 Mar 2020 18:28)      HPI:  HPI:  87M w/PMH CAD, HTN, HLD, BPH, SSS s/p PPM, CKD3, SVT, recurrent Rt pl eff, PTX s/p VATS decortication, Pleurodesis , last admit  for rec Rt hydropneumothorax, presents today c/o Rt sided Upper back pain w/ worsening BRENNER over past 10-14 days.  Pt states it's similar to when he's pleural effusion in the past.  He denies chest pain, cough, fever/chills, n/v/d, abd pain, dysuria, weakness.    In ED, Cr 2 (baseline), Trop neg, UA neg, RVP neg, Tmax 100.1, elevated bp (he's taken home meds today), CT ch- Large Rt pl eff, atelectasis, pulm nodule,   CT surgery made aware per ED MD.    pt is well known from his prior admissions / not recently followed up   now admitted and required repeat CT right chest for further eval  no smoker  Allergies  amlodipine (Unknown)  Crestor (Other)  Lipitor (Unknown)    3/21  No acute pulmonary events occurred overnight  CT clamped    3/22  Overall, doing well, less pain at the site of Matteo drain (removed)    MEDICATIONS  (STANDING):  aspirin enteric coated 81 milliGRAM(s) Oral daily  finasteride 5 milliGRAM(s) Oral daily  furosemide    Tablet 20 milliGRAM(s) Oral daily  heparin  Injectable 5000 Unit(s) SubCutaneous three times a day  labetalol 300 milliGRAM(s) Oral two times a day  lidocaine   Patch 1 Patch Transdermal once  lidocaine 1% Injectable 20 milliLiter(s) Local Injection once  pravastatin 80 milliGRAM(s) Oral at bedtime  sodium bicarbonate 325 milliGRAM(s) Oral three times a day  tamsulosin 0.4 milliGRAM(s) Oral at bedtime    MEDICATIONS  (PRN):  acetaminophen   Tablet .. 650 milliGRAM(s) Oral every 4 hours PRN Mild Pain (1 - 3)  ALBUTerol    90 MICROgram(s) HFA Inhaler 2 Puff(s) Inhalation every 6 hours PRN Shortness of Breath and/or Wheezing  hydrALAZINE Injectable 10 milliGRAM(s) IV Push every 6 hours PRN SBP>160  ondansetron Injectable 4 milliGRAM(s) IV Push every 6 hours PRN Nausea  oxycodone    5 mG/acetaminophen 325 mG 1 Tablet(s) Oral every 4 hours PRN Severe Pain (7 - 10)  traMADol 50 milliGRAM(s) Oral every 6 hours PRN Moderate Pain (4 - 6)      Vital Signs Last 24 Hrs  T(C): 36.7 (20 Mar 2020 20:31), Max: 36.7 (20 Mar 2020 20:31)  T(F): 98.1 (20 Mar 2020 20:31), Max: 98.1 (20 Mar 2020 20:31)  HR: 60 (20 Mar 2020 20:) (56 - 60)  BP: 146/41 (20 Mar 2020 20:) (141/37 - 146/41)  BP(mean): --  RR: 16 (20 Mar 2020 20:31) (16 - 17)  SpO2: 96% (20 Mar 2020 20:) (96% - 97%)    General Appearance: cooperative, no acute distress, right sided CT in place   HEENT: PERRL, conjunctiva clear,  Neck: Supple, , no adenopathy  Lungs: Matteo removed   Heart: Regular rate and rhythm, S1, S2 normal, no murmur, rub or gallop  Abdomen: Soft, non-tender, bowel sounds active , no hepatosplenomegaly  Extremities: no cyanosis or edema, no joint swelling  Skin: Skin color, texture normal, no rashes   Neurologic: Alert and oriented X3 , cranial nerves intact, sensory and motor normal,    ECG:    LABS:                          12.0   9.04  )-----------( 294      ( 11 Mar 2020 08:35 )             36.2     03-11    140  |  110<H>  |  19  ----------------------------<  138<H>  3.9   |  22  |  2.00<H>    Ca    8.8      11 Mar 2020 08:35    TPro  6.7  /  Alb  3.1<L>  /  TBili  0.5  /  DBili  x   /  AST  13<L>  /  ALT  12  /  AlkPhos  88  03-11    CARDIAC MARKERS ( 11 Mar 2020 08:35 )  <0.015 ng/mL / x     / x     / x     / x            Pro BNP  799  @ 08:35  D Dimer  --  @ 08:35    PT/INR - ( 11 Mar 2020 08:35 )   PT: 11.6 sec;   INR: 1.04 ratio         PTT - ( 11 Mar 2020 08:35 )  PTT:30.1 sec  Urinalysis Basic - ( 11 Mar 2020 13:15 )    Color: Yellow / Appearance: Clear / S.015 / pH: x  Gluc: x / Ketone: Trace  / Bili: Negative / Urobili: Negative mg/dL   Blood: x / Protein: 15 mg/dL / Nitrite: Negative   Leuk Esterase: Negative / RBC: 0-2 /HPF / WBC 0-2   Sq Epi: x / Non Sq Epi: Occasional / Bacteria: Occasional      < from: Xray Chest 1 View- PORTABLE-Urgent (20 @ 09:12) >  PROCEDURE DATE:  2020    < from: CT Chest No Cont (20 @ 09:39) >  mediastinal margin in the right upper lobe on image #61/series 2.    PLEURA: Large right pleural effusion. There is soft tissue attenuation circumferentially around the pleura, assessment is limited without intravenous contrast. There is a small amount of air, likely in the right pleural space as seen on image #38/series 2, correlate with recent intervention.    MEDIASTINUM AND CARLITA: No lymphadenopathy.    VESSELS: The thoracic aorta and main pulmonary artery are normal in caliber. There is heavy coronary artery calcification.    HEART: Heart size is normal. There are pacemaker leads in the right atrium and right ventricle. Trace pericardial effusion.    CHEST WALL AND LOWER NECK: The thyroid gland is within normal limits. There is no supraclavicular or axillary lymphadenopathy.There is a pacemaker generator pack in the left upper chest wall.    VISUALIZED UPPER ABDOMEN: The imaged adrenal glands are within normal limits. The imaged portions of the unenhanced liver, spleen, pancreas are within normal limits. There is a partially imaged large cyst at the upper pole of the left kidney measuring 8.3 x 5.8 cm.    BONES: Multilevel degenerative change of the thoracic spine with osteophytes, degenerative disc disease and facet joint arthropathy.    IMPRESSION:     Large rightpleural effusion with pleural base soft tissue, concerning for metastasis.    There are 2 pulmonary nodules measuring up to 1.2 cm as above.    Complete atelectasis of the right middle and lower lobes.    Postsurgical changes in the right lung apex.    Other incidental findings are as above.    < end of copied text >        INTERPRETATION:  AP erect chest on 2020 at 9:02 AM. Patient has right-sided chest pain.    Heart suggest normal size. Left-sided pacemaker again noted.    Suture line in the right paratracheal area is better seen on study of May 26, 2018.    On the study there was a loculated pneumothorax at the right base with adjacent atelectasis.    Present film shows a large right effusion.    IMPRESSION: Large right effusion.    < end of copied text >    < from: Xray Chest 1 View- PORTABLE-Routine (03.15.20 @ 10:12) >        INTERPRETATION:  AP chest with comparison to 3/14/2020.    Clinical indications: Right-sided chest tube.    IMPRESSION: There is left-sided pacemaker. The heart is normal in size. There remains a right-sided chest tube with a moderate-sized right pleural effusion and right base atelectasis. There is no pneumothorax.    < end of copied text >      RADIOLOGY & ADDITIONAL STUDIES:    ct scan images from 2018 and current ct scans reveiwed

## 2020-03-22 NOTE — PROGRESS NOTE ADULT - REASON FOR ADMISSION
BRENNER, Rt back pain

## 2020-03-22 NOTE — PROGRESS NOTE ADULT - SUBJECTIVE AND OBJECTIVE BOX
HOSPITALIST PROGRESS NOTE:  SUBJECTIVE:  PCP: Cardio- Dillon  CTS- Eric  PCP- Kelby    Chief Complaint: Patient is a 87y old  Male who presents with a chief complaint of BRENNER, Rt back pain (16 Mar 2020 09:28)    HPI:  87M w/PMH CAD, HTN, HLD, BPH, SSS s/p PPM, CKD3, SVT, recurrent Rt pl eff, PTX s/p VATS decortication, Pleurodesis 2016, last admit 2018 for rec Rt hydropneumothorax, presents today c/o Rt sided Upper back pain w/ worsening BRENNER over past 10-14 days.  Pt states it's similar to when he's pleural effusion in the past.  He denies chest pain, cough, fever/chills, n/v/d, abd pain, dysuria, weakness.    In ED, Cr 2 (baseline), Trop neg, UA neg, RVP neg, Tmax 100.1, elevated bp (he's taken home meds today), CT ch- Large Rt pl eff, atelectasis, pulm nodule,   CT surgery made aware per ED MD.      3/16: Above reviewed;  Patient has no complaints; Denies any dyspnea   3/17:  Patient has no complaints; Chest tube removed;  Discussed with CTS  3/18:  Discussed the results of the cytology which is malignant with patient and son; CTS wants VATS for chantale  3/19:  Patient had VATS procedure this morning, NO issues overnight   3/20:  CT draining. no cp, dyspnea  3/21: CT draining; patient has no complaints.   3/22:  Patient has no compliants wants to go home; Discussed in detail with Patients son     Allergies:  amlodipine (Unknown)  Crestor (Other)  Lipitor (Unknown)    REVIEW OF SYSTEMS:  See HPI. All other review of systems is negative unless indicated above.     OBJECTIVE  Physical Exam:  Vital Signs Last 24 Hrs  T(C): 36.9 (22 Mar 2020 09:40), Max: 37.2 (21 Mar 2020 21:53)  T(F): 98.5 (22 Mar 2020 09:40), Max: 98.9 (21 Mar 2020 21:53)  HR: 60 (22 Mar 2020 09:40) (59 - 60)  BP: 136/38 (22 Mar 2020 09:40) (129/43 - 141/39)  BP(mean): --  RR: 16 (22 Mar 2020 09:40) (16 - 17)  SpO2: 97% (22 Mar 2020 09:40) (95% - 97%)    Constitutional: NAD, awake and alert, well-developed  Neurological: AAO x 3, no focal deficits  HEENT: PERRLA, EOMI, MMM  Neck: Soft and supple, No LAD, No JVD  Respiratory: Breath sounds are clear bilaterally, No wheezing, rales or rhonchi  Abd: soft nondistended; no rebound tenderness  Back: No CVA tenderness   Extremities: No peripheral edema  Vascular: 2+ peripheral pulses  Musculoskeletal: 5/5 strength b/l upper and lower extremities  Skin: No rashes  Breast: Deferred  Rectal: Deferred    MEDICATIONS  (STANDING):  aspirin enteric coated 81 milliGRAM(s) Oral daily  finasteride 5 milliGRAM(s) Oral daily  furosemide    Tablet 20 milliGRAM(s) Oral daily  heparin  Injectable 5000 Unit(s) SubCutaneous three times a day  labetalol 300 milliGRAM(s) Oral two times a day  lidocaine   Patch 1 Patch Transdermal once  lidocaine 1% Injectable 20 milliLiter(s) Local Injection once  pravastatin 80 milliGRAM(s) Oral at bedtime  sodium bicarbonate 325 milliGRAM(s) Oral three times a day  tamsulosin 0.4 milliGRAM(s) Oral at bedtime    Lab Results:  CBC  CBC Full  -  ( 22 Mar 2020 08:01 )  WBC Count : 7.48 K/uL  RBC Count : 3.07 M/uL  Hemoglobin : 9.5 g/dL  Hematocrit : 29.1 %  Platelet Count - Automated : 285 K/uL  Mean Cell Volume : 94.8 fl  Mean Cell Hemoglobin : 30.9 pg  Mean Cell Hemoglobin Concentration : 32.6 gm/dL  Auto Neutrophil # : x  Auto Lymphocyte # : x  Auto Monocyte # : x  Auto Eosinophil # : x  Auto Basophil # : x  Auto Neutrophil % : x  Auto Lymphocyte % : x  Auto Monocyte % : x  Auto Eosinophil % : x  Auto Basophil % : x    .		Differential:	[] Automated		[] Manual  Chemistry                        9.5    7.48  )-----------( 285      ( 22 Mar 2020 08:01 )             29.1     03-22    143  |  110<H>  |  25<H>  ----------------------------<  99  4.4   |  27  |  1.61<H>    Ca    8.7      22 Mar 2020 08:01  Phos  3.8     03-22  Mg     2.4     03-22    MICROBIOLOGY/CULTURES:  Culture Results:   No growth at 5 days. (03-11 @ 17:00)  Culture Results:   Testing in progress (03-11 @ 17:00)  Culture Results:   No growth (03-11 @ 13:15)  Culture Results:   No growth at 5 days. (03-11 @ 08:35)  Culture Results:   No growth at 5 days. (03-11 @ 08:35)      RADIOLOGY/EKG:    < from: Xray Chest 1 View- PORTABLE-Routine (03.15.20 @ 10:12) >    IMPRESSION: There is left-sided pacemaker. The heart is normal in size. There remains a right-sided chest tube with a moderate-sized right pleural effusion and right base atelectasis. There is no pneumothorax.        < end of copied text >    < from: CT Chest No Cont (03.16.20 @ 08:30) >    IMPRESSION:  1.  Patient is status post interval right-sided chest tube placement with significant interval decrease in size of the right pleural effusion, with small residual loculated appearing components. New air is seen within the right pleural space, presumably related to recent intervention. Improved aeration of the right lower and middle lobes with persistent rounded appearing atelectasis of the posterior right lower lobe; superimposed infectious process would be difficult to exclude.     2.  Soft tissue attenuation is again seen circumferentially around the right pleura, similar to the prior study.  Although this finding could represent sequelae of prior pleurodesis, cannot exclude malignancy/metastatic disease.     Consider tissue biopsy and/or PET-CT for further evaluation.    3.   There is a 1.2 cm pleural-based lung nodule at the medial right upper lobe (image 64, series 2), similar to the prior study but not seen on more remote prior studies.  Malignancy is not excluded.  This too could be further assessed with PET-CT.    4. A 2.3 x 2.1 cm soft tissue nodular density medial to the caudate lobe, not seen in 2018.  Malignancy is not excluded.  This too could be further assessed with PET-CT.    5.  A 1.3 x 2.0 cm soft tissue nodule, possibly a lymph node at the subcarinal region (image 60, series 2), is new from 2018. Several additional subcentimeter mediastinal nodes are not enlarged by CT criteria and are without significant change from prior studies.   Malignancy is not excluded.     This too could be further assessed with PET-CT.    6.  Mildly enlarged main pulmonary artery, correlate for pulmonary artery hypertension.    7.  Pacemaker. Coronary artery calcifications and/or stents.    8.  Other findings, as above.       < end of copied text >    < from: TTE Echo Complete w/o contrast w/ Doppler (03.11.20 @ 21:46) >     Impression     Summary     Fibrocalcific changes noted to the mitral valve leaflets with preserved   leaflet excursion.   Mild mitral annular calcification is present.   Mild (1+) mitral regurgitation is present.   EAreversal of the mitral inflow consistent with reduced compliance of the   left ventricle.   Fibrocalcific changes noted to the Aortic valve leaflets with preserved   leaflet excursion.   Normal appearing tricuspid valve structure.   Mild to Moderate Tricuspid regurgitation is present.   Mild pulmonary hypertension.   Pulmonic valve not well seen.   Trace pulmonic valvular regurgitation is present.   The left atrium is mildly dilated.   Estimated left ventricular ejection fraction is 60-65 %.   The left ventricle is normal in size, wall thickness, wall motion and   contractility as seen in limited views.   Normal appearing right atrium.   Normal appearing right ventricle structure and function.   A device wire is seen in the RV and RA.   IVC was not visualized within the subcostal view.   No evidence of pericardial effusion.   No evidence of pleural effusion.      < end of copied text >

## 2020-03-22 NOTE — DISCHARGE NOTE NURSING/CASE MANAGEMENT/SOCIAL WORK - PATIENT PORTAL LINK FT
You can access the FollowMyHealth Patient Portal offered by [facility] by registering at the following website: [email or web address]. By joining [facility]’s FollowMyHealth portal, you will also be able to view your health information using other applications (apps) compatible with our system.

## 2020-03-22 NOTE — PROGRESS NOTE ADULT - ASSESSMENT
87M w/PMH CAD, HTN, HLD, BPH, SSS s/p PPM, CKD3, SVT, recurrent Rt pl eff, PTX s/p VATS decortication, Pleurodesis 2016, last admit 2018 for rec Rt hydropneumothorax, presents today c/o Rt sided Upper back pain w/ worsening BRENNER over past 10-14 days.    In ED, Cr 2 (baseline), Trop neg, UA neg, RVP neg, Tmax 100.1, elevated bp (he's taken home meds today), CT ch- Large Rt pl eff, atelectasis, pulm nodule,   CT surgery made aware per ED MD.      #Recurrent  Pleural effusion  -failed prior pleurodesis  -Hx hydropneumothorax spont  -prior VATS decortication / failed  -monitor O2 sats  -S/p thoracentesis with thoracostomy in place 3/11 removed on 3/17; Exudative; found to have a complicated pleural effusion/empyema, LDH >1000, pH 7.06 fluid sent; path/cytology - MALIGNANT  -Cultures NGTD  -Follow up JERROD/RF  -educated on deep breathing exercises educated on incentive spirometer  -CTS consult appreciated  clamp stevie drain at midnight tonight, FU CXR in AM  -S/P Right VATS, evacuation of clots, pleural biopsies and placement of stevie drain - Drain removed on 3/21 - FU Pleural Biopsy results   -Pleural fluid will most likely re-accumulate will treat w thoracentesis if clinically indicated in future; Discussed with Dr Valadez if patient has symptoms, patient will call their office and they will set up IR to do thoracentesis if needed versus sending the patient to the hospital  -will need outpaitent PET Scan    #Dyspnea on exertion likely 2/2 pleural effusion, pna, other  -treat underlying cause  -monitor O2 sats  -cardio/pulm cs for further input   -check TTE Estimated left ventricular ejection fraction is 60-65 %.    #Febrile possibly 2/2 pleural effusion and  underlying pna  -S/P  IV rocephin, azithro  -ID cs appreciated - no need for further ABX    # Soft tissue attenuation is again seen circumferentially around the right pleura,  # .2 cm pleural-based lung nodule at the medial right upper lobe   # 2.3 x 2.1 cm soft tissue nodular density medial to the caudate lobe, not seen in 2018.  Malignancy is not excluded.    # 1.3 x 2.0 cm soft tissue nodule, possibly a lymph node at the subcarinal region (image 60, series 2), is new from 2018. Several additional subcentimeter mediastinal nodes are not enlarged by CT criteria and are without significant change from prior studies.     -Malignancy is not excluded will need outpatient  PET-CT  -FLuid Cytology is Malignant - Discussed with patients son and patient   -[facility] consult appreciated - FU molecular testing ( EGFR, ALK-1 , ROS) and PDL-1 testing;  FU outpatient in 2 weeks    #Hpertension - better controlled   -resume pt's home meds - dilt, labetalol, lasix  -will add prn hydralazine for sbp>160  -monitor bp and titrate meds as needed.     #CKD (chronic kidney disease), stage III.  Plan: stable and at baseline   -monitor labs.     #Prophylactic measure. SQH.    signout for AM attending:  Very pleasant Patient,  has had recurrent effusions on the right sided for many years. cytology shows malignancy, S/P VATS

## 2020-03-22 NOTE — DISCHARGE NOTE PROVIDER - NSDCCPCAREPLAN_GEN_ALL_CORE_FT
PRINCIPAL DISCHARGE DIAGNOSIS  Diagnosis: Pleural effusion  Assessment and Plan of Treatment: #Recurrent  Pleural effusion  -S/p thoracentesis with thoracostomy in place 3/11 removed on 3/17; Exudative; found to have a complicated pleural effusion/empyema, LDH >1000, pH 7.06 fluid sent; path/cytology - MALIGNANT  -Cultures NGTD  -Follow up JERROD/RF  -educated on deep breathing exercises educated on incentive spirometer  -FU with CTS Dr Pastrana  -FU with Dr Joseph  -S/P Right VATS, evacuation of clots, pleural biopsies and placement of stevie drain - Drain removed on 3/21 - FU Pleural Biopsy results   -Pleural fluid will most likely re-accumulate will treat w thoracentesis if clinically indicated in future; Discussed with Dr Valadez if patient has symptoms, patient will call their office and they will set up IR to do thoracentesis if needed versus sending the patient to the hospital  -will need outpaitent PET Scan  -FU with CTS Dr Pastrana  -FU with Pulm Dr Joseph  -FU with hemeon Dr Pastrana        SECONDARY DISCHARGE DIAGNOSES  Diagnosis: Lung cancer  Assessment and Plan of Treatment: # Soft tissue attenuation is again seen circumferentially around the right pleura,  # .2 cm pleural-based lung nodule at the medial right upper lobe   # 2.3 x 2.1 cm soft tissue nodular density medial to the caudate lobe, not seen in 2018.  Malignancy is not excluded.    # 1.3 x 2.0 cm soft tissue nodule, possibly a lymph node at the subcarinal region (image 60, series 2), is new from 2018. Several additional subcentimeter mediastinal nodes are not enlarged by CT criteria and are without significant change from prior studies.     - will need outpatient  PET-CT  -FLuid Cytology is Malignant -   -Fu with hemeonc  - FU molecular testing ( EGFR, ALK-1 , ROS) and PDL-1 testing;  FU outpatient in 2 weeks

## 2020-03-22 NOTE — DISCHARGE NOTE PROVIDER - CARE PROVIDER_API CALL
EMELI Mills ()  Pulmonary Disease; Sleep Medicine  [address] [place] [address]  Phone: [phone]  Fax: [phone]  Follow Up Time:     Ruperto Simmons)  Hematology; Medical Oncology  [address] [facility], [address]nd [place] [address]  Phone: [phone]  Fax: [phone]  Follow Up Time:     Yue Pastrana)  Thoracic and Cardiac Surgery  [address] [place] [address]  Phone: [phone]  Fax: [phone]  Follow Up Time:

## 2020-03-22 NOTE — DISCHARGE NOTE PROVIDER - HOSPITAL COURSE
HOSPITALIST PROGRESS NOTE:    SUBJECTIVE:    PCP: Kathy Carranza    CTS- Eric    PCP- Kelby        Chief Complaint: Patient is a 87y old  Male who presents with a chief complaint of BRENNER, Rt back pain (16 Mar 2020 09:28)        HPI:    87M w/PMH CAD, HTN, HLD, BPH, SSS s/p PPM, CKD3, SVT, recurrent Rt pl eff, PTX s/p VATS decortication, Pleurodesis 2016, last admit 2018 for rec Rt hydropneumothorax, presents today c/o Rt sided Upper back pain w/ worsening BRENNER over past 10-14 days.  Pt states it's similar to when he's pleural effusion in the past.  He denies chest pain, cough, fever/chills, n/v/d, abd pain, dysuria, weakness.      In ED, Cr 2 (baseline), Trop neg, UA neg, RVP neg, Tmax 100.1, elevated bp (he's taken home meds today), CT ch- Large Rt pl eff, atelectasis, pulm nodule,     CT surgery made aware per ED MD.          3/16: Above reviewed;  Patient has no complaints; Denies any dyspnea     3/17:  Patient has no complaints; Chest tube removed;  Discussed with CTS    3/18:  Discussed the results of the cytology which is malignant with patient and son; CTS wants VATS for chantale    3/19:  Patient had VATS procedure this morning, NO issues overnight     3/20:  CT draining. no cp, dyspnea    3/21: CT draining; patient has no complaints.     3/22:  Patient has no compliants wants to go home; Discussed in detail with Patients son        87M w/PMH CAD, HTN, HLD, BPH, SSS s/p PPM, CKD3, SVT, recurrent Rt pl eff, PTX s/p VATS decortication, Pleurodesis 2016, last admit 2018 for rec Rt hydropneumothorax, presents today c/o Rt sided Upper back pain w/ worsening BRENNER over past 10-14 days.      In ED, Cr 2 (baseline), Trop neg, UA neg, RVP neg, Tmax 100.1, elevated bp (he's taken home meds today), CT ch- Large Rt pl eff, atelectasis, pulm nodule,     CT surgery made aware per ED MD.          #Recurrent  Pleural effusion    -failed prior pleurodesis    -Hx hydropneumothorax spont    -prior VATS decortication / failed    -monitor O2 sats    -S/p thoracentesis with thoracostomy in place 3/11 removed on 3/17; Exudative; found to have a complicated pleural effusion/empyema, LDH >1000, pH 7.06 fluid sent; path/cytology - MALIGNANT    -Cultures NGTD    -Follow up JERROD/RF    -educated on deep breathing exercises educated on incentive spirometer    -CTS consult appreciated  clamp stevie drain at midnight tonight, FU CXR in AM    -S/P Right VATS, evacuation of clots, pleural biopsies and placement of stevie drain - Drain removed on 3/21 - FU Pleural Biopsy results     -Pleural fluid will most likely re-accumulate will treat w thoracentesis if clinically indicated in future; Discussed with Dr Valadez if patient has symptoms, patient will call their office and they will set up IR to do thoracentesis if needed versus sending the patient to the hospital    -will need outpaitent PET Scan        #Dyspnea on exertion likely 2/2 pleural effusion, pna, other    -treat underlying cause    -monitor O2 sats    -cardio/pulm cs for further input     -check TTE Estimated left ventricular ejection fraction is 60-65 %.        #Febrile possibly 2/2 pleural effusion and  underlying pna    -S/P  IV rocephin, azithro    -ID cs appreciated - no need for further ABX        # Soft tissue attenuation is again seen circumferentially around the right pleura,    # .2 cm pleural-based lung nodule at the medial right upper lobe     # 2.3 x 2.1 cm soft tissue nodular density medial to the caudate lobe, not seen in 2018.  Malignancy is not excluded.      # 1.3 x 2.0 cm soft tissue nodule, possibly a lymph node at the subcarinal region (image 60, series 2), is new from 2018. Several additional subcentimeter mediastinal nodes are not enlarged by CT criteria and are without significant change from prior studies.       -Malignancy is not excluded will need outpatient  PET-CT    -FLuid Cytology is Malignant - Discussed with patients son and patient     -[facility] consult appreciated - FU molecular testing ( EGFR, ALK-1 , ROS) and PDL-1 testing;  FU outpatient in 2 weeks        #Hpertension - better controlled     -resume pt's home meds - dilt, labetalol, lasix    -will add prn hydralazine for sbp>160    -monitor bp and titrate meds as needed.         #CKD (chronic kidney disease), stage III.  Plan: stable and at baseline     -monitor labs.         #Prophylactic measure. SQH.        signout for AM attending:    Very pleasant Patient,  has had recurrent effusions on the right sided for many years. cytology shows malignancy, S/P VATS         total time 85 minutes

## 2020-03-22 NOTE — PROGRESS NOTE ADULT - ASSESSMENT
87M w/PMH CAD, HTN, HLD, BPH, SSS s/p PPM, CKD3, SVT, recurrent Rt pl eff, PTX s/p VATS decortication, Pleurodesis 2016, last admit 2018 for rec Rt hydropneumothorax, presents today c/o Rt sided Upper back pain w/ worsening BRENNER over past 10-14 days.  Pt states it's similar to when he's pleural effusion in the past.  He denies chest pain, cough, fever/chills, n/v/d, abd pain, dysuria, weakness.    In ED, Cr 2 (baseline), Trop neg, UA neg, RVP neg, Tmax 100.1, elevated bp (he's taken home meds today), CT ch- Large Rt pl eff, atelectasis, pulm nodule,   CT surgery made aware per ED MD.    Recurrent right sided pleural effusion  failed prior pleurodesis  Hx hydropneumothorax spont  prior VATS decortication / failed  S/p thoracentesis with thoracostomy in place; found to have a complicated pleural effusion/empyema, LDH >1000, pH 7.06  positive for malignant cells  Reviewed CT Chest; improvement noted, small hydropneumothorax, likely trapped/entrapped lung  Appreciate CTS recommendations   Will continue to monitor   will need outpt PET scan for eval  Appreciate CTS recommendations s/p VATS, stevie drain removed 3/21  No acute pulmonary events occurred overnight   Will follow up with Dr Mills

## 2020-04-24 NOTE — PROVIDER CONTACT NOTE (CHANGE IN STATUS NOTIFICATION) - RECOMMENDATIONS
as per MD Morrison, patient DNR/DNI - to keep and monitor on nonrebreather mask 15L. no other intervention required at this time

## 2020-04-24 NOTE — ED PROVIDER NOTE - PHYSICAL EXAMINATION
Constitutional: Awake, Alert, non-toxic. NAD. Well appearing, well nourished.   HEAD: Normocephalic, atraumatic.   EYES: EOM intact, conjunctiva and sclera are clear bilaterally.   ENT: No rhinorrhea, patent, mucous membranes pink/moist, no drooling or stridor.   NECK: Supple, non-tender  CARDIOVASCULAR: Normal S1, S2; regular rhythm. (+) tachycardia   RESPIRATORY: (+) decreased right sided breath sough, (+) accesory muscle use, limited speech, no wheezes.   ABDOMEN: Soft; non-tender, non-distended.   EXTREMITIES: Full passive and active ROM in all extremities; non-tender to palpation; distal pulses palpable and symmetric  SKIN: Warm, dry; good skin turgor, no apparent lesions or rashes, no ecchymosis, brisk capillary refill.  NEURO: A&O x3. Sensory and motor functions are grossly intact. Speech is normal. Appearance and judgement seem appropriate for gender and age.

## 2020-04-24 NOTE — CONSULT NOTE ADULT - ASSESSMENT
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   Patient son is a gyn/onc surgeon.   They have elected no chemotherapy; and no targeted immunotherapy    Patient states he feels moderately better after the chest tube in place. Post chest tube CXR shows airated RUL. The right middle and lower lobe of the lung appear   to be completely trapped. The output looks like dark/old blood.     I had a discussion with son. I think the options are not great. A pleurex catheter can be placed in the OR to allow for intermittent drainage.   This has a high likely eugene of getting infected and then having a pleural space empyema.     The other option is to tell the Patient go home with the chest tube that ER has placed to continuous drainage.   He has an elevated WBC count which could be from pneumonia/infected pleural space.   Will reassess the Patient and see how he wants to proceed.   Plan is to have Patient go to home hospice in the near future (this is what the son and Patient want)

## 2020-04-24 NOTE — ED ADULT NURSE REASSESSMENT NOTE - NS ED NURSE REASSESS COMMENT FT1
Plan for remote tele admission. Patient made aware. Awaiting admission assessment and orders. Awaiting bed assignment. Safety maintained.

## 2020-04-24 NOTE — DISCHARGE NOTE FOR THE EXPIRED PATIENT - HOSPITAL COURSE
HPI:  86yo male with pmhx BPH, CAD, CKD, HLD, HTN, spinal stenosis, SSS, recurrent pleural effusion, presents with c/o progressive SOB. Patient was recently admitted to [facility] for SOB, and found to have a large right sided pleural effusion, s/p VATS on 3/19/20. 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 Of note, patient states he tested negative for COVID 2-3 weeks ago. Patient states he has felt increasingly short of breath for the past 2 weeks. As per son, patient fell out of bed today, concerning his daughter and prompting an evaluation at [facility] ED.     In the ED, patient endorsed SOB. Patient vitals were T 99.7, , /57, RR 34, SpO2 95% on NRB. CBC exhibited WBC 17.79, Hgb 13.8, Hct 44.4, Plt 473, NLR 13.25. CMP exhibited Na 142, K 5.1, Cl 110, CO2 15, BUN 39, Cr 2.2, Gluc 178. Albumin found to be 2.3. Lactate was found to be 5.8. Procalcitonin 0.28. ProBNP was found to be 2107. CXR exhibited interval increased right mid to lower lung opacification, which may represent a combination of pleural effusion, atelectasis, and/or pneumonia. ED attending consulted CT surgery Dr. Pastrana.  ED attending Dr. Morrison placed thoracostomy tube in the right pleural space, draining 650cc bloody fluid. Follow up repeat CXR exhibited right chest tube with small right base pneumothorax. Patient vitals after chest tube were documented to be , BP 94/53, RR 18, SpO2 99% on 10L NRB. Initial EKG exhibited atrial fibrillation with RVR at 133. Follow up EKG exhibited wide QRS complexes with occasional PVCs and RBBB at 104. Patient was administered NS 1000cc IV bolus, Morphine 2mg IV x 1. (24 Apr 2020 15:04)    Pt admitted for respiratory distress   Chest tube placed 2/2 pleural effusion.   Hospice was discussed during admission.  PT DNR/DNI    Notified by nurse and resident regarding pt's hypotension.  IVF was started.  Son Mark Behar was called, he wanted pt to be on pressors and agreed to a central line.   ICU was called.  However pt started having agonal breathing during ICU consultation.  Shortly after the agonal breathing, pt was no longer breathing.   Son was notified.     Patient seen and examined at bedside. Patient did not respond to verbal, physical, or painful stimuli. Absent heart and breath sounds on auscultation. Pupils are fixed and dilated, absent corneal reflex. No palpable pulses at radial, carotid, or femoral arteries.    Time of Death: 2133  Family notified via phone.  Mark Behar

## 2020-04-24 NOTE — CONSULT NOTE ADULT - SUBJECTIVE AND OBJECTIVE BOX
Date/Time Patient Seen:  		  Referring MD:   Data Reviewed	       Patient is a 87y old  Male who presents with a chief complaint of     Subjective/HPI  History and Physical:   Source of Information	Chart(s), Patient  Comments/Contacts	Marc Behar (Son): [phone]     Patient Identity:  · Birth Sex	Male       History of Present Illness:  History of Present Illness:   88yo male with pmhx BPH, CAD, CKD, HLD, HTN, spinal stenosis, SSS, recurrent pleural effusion, presents with c/o SOB. Patient was recently admitted to [facility] for SOB, and found to have a large right sided pleural effusion, s/p VATS on 3/19/20. Of note, patient states he tested negative for COVID 2-3 weeks ago.       In the ED, patient endorsed SOB. Patient vitals were T 99.7, , /57, RR 34, SpO2 95% on NRB. CBC exhibited WBC 17.79, Hgb 13.8, Hct 44.4, Plt 473, NLR 13.25. CMP exhibited Na 142, K 5.1, Cl 110, CO2 15, BUN 39, Cr 2.2, Gluc 178. Albumin found to be 2.3. Lactate was found to be 5.8. Procalcitonin 0.28. ProBNP was found to be 2107. CXR exhibited interval increased right mid to lower lung opacification, which may represent a combination of pleural effusion, atelectasis, and/or pneumonia. ED attending consulted CT surgery Dr. Pastrana.  ED attending Dr. Morrison placed thoracostomy tube in the right pleural space, draining 650cc bloody fluid. Follow up repeat CXR exhibited right chest tube with small right base pneumothorax. Patient vitals after chest tube were documented to be , BP 94/53, RR 18, SpO2 99% on 10L NRB. Initial EKG exhibited atrial fibrillation with RVR at 133. Patient was administered NS 1000cc IV bolus, Morphine 2mg IV x 1.    PAST MEDICAL & SURGICAL HISTORY:  Spinal stenosis  CKD (chronic kidney disease), stage III  SSS (sick sinus syndrome)  Pleural effusion  Hyperlipidemia  BPH (benign prostatic hyperplasia)  CAD (coronary artery disease)  Hypertension  No pertinent past medical history  Status post lumbar spinal fusion  No significant past surgical history        Medication list         MEDICATIONS  (STANDING):    MEDICATIONS  (PRN):         Vitals log        ICU Vital Signs Last 24 Hrs  T(C): 37.6 (24 Apr 2020 14:00), Max: 37.6 (24 Apr 2020 14:00)  T(F): 99.7 (24 Apr 2020 14:00), Max: 99.7 (24 Apr 2020 14:00)  HR: 99 (24 Apr 2020 15:35) (86 - 135)  BP: 118/56 (24 Apr 2020 15:35) (90/54 - 118/72)  BP(mean): --  ABP: --  ABP(mean): --  RR: 18 (24 Apr 2020 15:35) (18 - 34)  SpO2: 99% (24 Apr 2020 15:35) (95% - 99%)       has children  born in [place]  non smoker  non drinker  family hx - ashd  ros - sob      Input and Output:  I&O's Detail      Lab Data                        13.8   17.79 )-----------( 473      ( 24 Apr 2020 14:13 )             44.4     04-24    142  |  110<H>  |  39<H>  ----------------------------<  178<H>  5.1   |  15<L>  |  2.20<H>    Ca    8.9      24 Apr 2020 14:13    TPro  6.1  /  Alb  2.3<L>  /  TBili  0.7  /  DBili  x   /  AST  17  /  ALT  10<L>  /  AlkPhos  69  04-24      CARDIAC MARKERS ( 24 Apr 2020 14:13 )  <.015 ng/mL / x     / x     / x     / x            Review of Systems	  sob  velásquez      Objective     Physical Examination    heart s1s2  lung dec BS  abd soft      Pertinent Lab findings & Imaging      [place]aver:  NO   Adequate UO     I&O's Detail           Discussed with:     Cultures:	        Radiology    EXAM:  XR CHEST PORTABLE URGENT 1V                            PROCEDURE DATE:  04/24/2020          INTERPRETATION:  Chest one view    HISTORY: Chest tube placement    COMPARISON STUDY: Earlier the same day    Frontal expiratory view of the chest shows the heart to be similar in size. Right chest tube and left cardiac pacemaker are present.    The lungs show partial clearing of the right lung with small right base pneumothorax. The left lung is clear.    IMPRESSION:  Right chest tube with small right base pneumothorax.    Thank you for the courtesy of this referral.                LOLI LAWTON M.D., ATTENDING RADIOLOGIST  This document has been electronically signed. Apr 24 2020  3:11PM              EXAM:  CT CHEST                            PROCEDURE DATE:  03/16/2020          INTERPRETATION:  CT CHEST WITHOUT CONTRAST    Clinical Indication: History of trapped lung, admitted with effusion, status post chest tube.    Additional history from electronic medical records indicates is a 87M w/PMH CAD, HTN, HLD, BPH, SSS s/p PPM, CKD3, SVT, recurrent Rt pl eff, PTX s/p VATS decortication, Pleurodesis 2016, last admit 2018 for rec Rt hydropneumothorax, presents c/o Rt sided Upper back pain w/ worsening VELÁSQUEZ over past 10-14 days. Pt states it's similar to when he's pleural effusion in the past. He denies chest pain, cough, fever/chills, n/v/d, abd pain, dysuria, weakness.    In ED, Cr 2 (baseline), Trop neg, UA neg, RVP neg, Tmax 100.1, elevated bp (he's taken home meds today), CT ch- Large Rt pl eff, atelectasis, pulm nodule,    Technique: Axial CT images of the chest were obtained from the lung apices to the diaphragms without IV contrast.  Coronal and sagittal reformatted images were created and reviewed.    Comparison: Prior CT of the chest from 3/11/2020 5/23/2018.    Findings:  The patient is status post interval right-sided chest tube placement with the pigtail catheter located within the inferolateral right pleural space. There has been significant interval decrease in size of the right pleural effusion, with small residual loculated appearing components. New air is seen within the right pleural space, presumably related to recent intervention. Soft tissue attenuation is again seen circumferentially around the right pleura, similar to the prior study. There has been improved aeration of the right lower and middle lobes with persistent rounded appearing atelectasis of the posterior right lower lobe; superimposed infectious process would be difficult to exclude.  Surgical sutures are seen at the right apex, correlate for prior wedge resection.    Linear scarring in the lingula with a 8 mm nodular component (image 73, series 2), is without significant change from multiple prior exams. Again seen is a 1.2 cm pleural-based nodule at the medial right upper lobe (image 64, series 2), similar to the prior study but not seen on more remote priors. The tracheobronchial tree is patent. There is mild bronchial wall thickening.    There is a left subclavian approach dual lead pacemaker with leads terminating within the right atrium and the right ventricle. There is a 1.3 x 2.0 cm soft tissue nodule, possibly a lymph node at the subcarinal region (image 60, series 2), is new from 2018. Several additional subcentimeter mediastinal nodes are not enlarged by CT criteria and are without significant change from prior studies.   Evaluation for hilar lymphadenopathy is limited without IV contrast, however there is no gross hilar lymphadenopathy. The heart size is within normal limits. Coronary artery calcifications and/or stents noted. There is no sizable pericardial effusion. The ascending and descending aorta are not enlarged. Mild atherosclerotic calcifications of the thoracic aorta are noted related The pulmonary artery is mildly enlarged, up to 3.0 cm, correlate for pulmonary artery hypertension.    There is a 2.3 x 2.1 cm soft tissue nodular density medial to the caudate lobe, not seen in 2018.  There is partial visualization of right renal hypodensities, likely cysts, better seen on prior studies. Mild atherosclerotic calcifications of the upper abdominal aorta are noted. The included upper abdomen is otherwise grossly unremarkable although limited without oral or IV contrast.    No aggressive osseous lesion is identified. There is diffuse osseous demineralization. DISH or ankylosing type changes are seen throughout the visualized spine.    IMPRESSION:  1.  Patient is status post interval right-sided chest tube placement with significant interval decrease in size of the right pleural effusion, with small residual loculated appearing components. New air is seen within the right pleural space, presumably related to recent intervention. Improved aeration of the right lower and middle lobes with persistent rounded appearing atelectasis of the posterior right lower lobe; superimposed infectious process would be difficult to exclude.     2.  Soft tissue attenuation is again seen circumferentially around the right pleura, similar to the prior study.  Although this finding could represent sequelae of prior pleurodesis, cannot exclude malignancy/metastatic disease.     Consider tissue biopsy and/or PET-CT for further evaluation.    3.   There is a 1.2 cm pleural-based lung nodule at the medial right upper lobe (image 64, series 2), similar to the prior study but not seen on more remote prior studies.  Malignancy is not excluded.  This too could be further assessed with PET-CT.    4. A 2.3 x 2.1 cm soft tissue nodular density medial to the caudate lobe, not seen in 2018.  Malignancy is not excluded.  This too could be further assessed with PET-CT.    5.  A 1.3 x 2.0 cm soft tissue nodule, possibly a lymph node at the subcarinal region (image 60, series 2), is new from 2018. Several additional subcentimeter mediastinal nodes are not enlarged by CT criteria and are without significant change from prior studies.   Malignancy is not excluded.     This too could be further assessed with PET-CT.    6.  Mildly enlarged main pulmonary artery, correlate for pulmonary artery hypertension.    7.  Pacemaker. Coronary artery calcifications and/or stents.    8.  Other findings, as above.                 FRANCISCO JAVIER KIRBY   This document has been electronically signed. Mar 16 2020 10:44AM

## 2020-04-24 NOTE — PROVIDER CONTACT NOTE (CHANGE IN STATUS NOTIFICATION) - ACTION/TREATMENT ORDERED:
Betzaida called to bedside but did not evaluate patient. Resident Deshaun bedside and Tay hospitalist bedside, contacting son. IV fluids started.

## 2020-04-24 NOTE — ED ADULT NURSE NOTE - OBJECTIVE STATEMENT
87 year old male brought in by EMS from home for difficulty breathing. Patient with history of lung CA - a high grade undifferentiated sarcomatoid neoplasm. Patient with recurrent pleural effusions requiring 3 chest tubes in the past - one time draining 3L. Last admission at [facility], VATS done on 3/19/2020. Patient also tested negative for COVID, no known exposure. Patient states he has been short of breath for about a month but today it was far worse. Patient also fell from his bed today due to worsening symptoms and weakness. Patient complains of right sided chest pain. Abrasions noted to the right knuckles. Patient severely tachypneic, short of breath at rest, using accessory muscles to breath, and unable to speak in full sentences. Patient respiratory rate 30-40. SpO2 90-95% on 15L nonrebreather. Diminished lung sounds to the right lower lung, diminished lung sounds to the right upper lung. Patient afebrile 99.7 rectal temp on arrival. BP within normal limits. HR elevated on arrival, 130-140 afib noted. +1 pitting edema noted to the bilateral lower extremities. Patient warm to touch. Denies abdominal pain, nausea/vomiting. Patient denies headache/dizziness. Patient DNR/DNI as per son and confirmed by patient.

## 2020-04-24 NOTE — CHART NOTE - NSCHARTNOTEFT_GEN_A_CORE
Called by RN for patient hypoxic into upper 80s on NRB, unresposive to verbal stimuli, hypotensive . Patient seen and examined at bedside.         T(C): 36.7 (04-24-20 @ 18:20), Max: 37.6 (04-24-20 @ 14:00)  HR: 64 (04-24-20 @ 19:30) (64 - 135)  BP: 120/58 (04-24-20 @ 18:20) (90/54 - 120/58)  RR: 20 (04-24-20 @ 19:30) (16 - 34)  SpO2: 89% (04-24-20 @ 19:30) (89% - 99%)  Wt(kg): --    Physical :  Gen- NAD, ncat  Cardio - s+1,s+2, rrr, no murmur  Lung - cta b/l, no wheeze, no rhonchi, no rales   Abdomen- +BS, NT/ND, no guarding, no rebound, no masses  Ext- no edema, 2+ pulses b/l  Neuro- CN grossly intact, strength 5/5 b/l extrem    LABS:                        13.8   17.79 )-----------( 473      ( 24 Apr 2020 14:13 )             44.4     04-24    142  |  110<H>  |  39<H>  ----------------------------<  178<H>  5.1   |  15<L>  |  2.20<H>    Ca    8.9      24 Apr 2020 14:13    TPro  6.1  /  Alb  2.3<L>  /  TBili  0.7  /  DBili  x   /  AST  17  /  ALT  10<L>  /  AlkPhos  69  04-24    PT/INR - ( 24 Apr 2020 14:13 )   PT: 12.8 sec;   INR: 1.14 ratio         PTT - ( 24 Apr 2020 14:13 )  PTT:28.4 sec      CARDIAC MARKERS ( 24 Apr 2020 14:13 )  <.015 ng/mL / x     / x     / x     / x            Assessment/Plan  87yMale admitted for   1. Called by RN for patient hypoxic into upper 80s on NRB, unresposive to verbal stimuli, hypotensive 76/52. Patient seen and examined at bedside. Dr. Cross also at bedside.      T(C): 36.7 (04-24-20 @ 18:20), Max: 37.6 (04-24-20 @ 14:00)  HR: 64 (04-24-20 @ 19:30) (64 - 135)  BP: 120/58 (04-24-20 @ 18:20) (90/54 - 120/58)  RR: 20 (04-24-20 @ 19:30) (16 - 34)  SpO2: 89% (04-24-20 @ 19:30) (89% - 99%)  Wt(kg): --    Physical :  Gen- in distress  Cardio - s+1,s+2, rrr, no murmur  Lung - R sided chest tube in place, decreased breath sounds R anterior, breath sounds not heard R posterior  Abdomen- +BS, NT/ND, no guarding, no rebound, no masses  Ext- no edema, 2+ pulses b/l  Neuro- CN grossly intact, strength 5/5 b/l extrem    LABS:                        13.8   17.79 )-----------( 473      ( 24 Apr 2020 14:13 )             44.4     04-24    142  |  110<H>  |  39<H>  ----------------------------<  178<H>  5.1   |  15<L>  |  2.20<H>    Ca    8.9      24 Apr 2020 14:13    TPro  6.1  /  Alb  2.3<L>  /  TBili  0.7  /  DBili  x   /  AST  17  /  ALT  10<L>  /  AlkPhos  69  04-24    PT/INR - ( 24 Apr 2020 14:13 )   PT: 12.8 sec;   INR: 1.14 ratio         PTT - ( 24 Apr 2020 14:13 )  PTT:28.4 sec      CARDIAC MARKERS ( 24 Apr 2020 14:13 )  <.015 ng/mL / x     / x     / x     / x            Assessment/Plan  87yMale admitted for   1. Called by RN for patient hypoxic into upper 80s on NRB, unresposive to verbal stimuli, hypotensive 76/52. Patient seen and examined at bedside. Dr. Cross also at bedside. Pt admitted for respiratory distress, initial COVID negative  Chest tube placed 2/2 pleural effusion. Family's wishes for hospice was discussed during admission. Patient DNR/DNI. While at bedside, repeat BP 52/36 manual. IVF was started. Son Mark Behar was called, he wanted pt to be on pressors and agreed to a central line.  ICU was called.  However pt started having agonal breathing during ICU consultation.  Shortly after the agonal breathing, pt was no longer breathing. Patient's death pronounced by Dr. Cross. Son was notified.       T(C): 36.7 (04-24-20 @ 18:20), Max: 37.6 (04-24-20 @ 14:00)  HR: 64 (04-24-20 @ 19:30) (64 - 135)  BP: 120/58 (04-24-20 @ 18:20) (90/54 - 120/58)  RR: 20 (04-24-20 @ 19:30) (16 - 34)  SpO2: 89% (04-24-20 @ 19:30) (89% - 99%)  Wt(kg): --    Physical Exam on initial evaluation :  Gen- in distress  Cardio - s+1,s+2, rrr, no murmur  Lung - R sided chest tube in place, decreased breath sounds R anterior, breath sounds not heard R posterior  Abdomen- NT/ND, no guarding, no rebound, no masses  Ext- no edema, 2+ pulses b/l  Neuro- not alert, withdraws to pain    LABS:                        13.8   17.79 )-----------( 473      ( 24 Apr 2020 14:13 )             44.4     04-24    142  |  110<H>  |  39<H>  ----------------------------<  178<H>  5.1   |  15<L>  |  2.20<H>    Ca    8.9      24 Apr 2020 14:13    TPro  6.1  /  Alb  2.3<L>  /  TBili  0.7  /  DBili  x   /  AST  17  /  ALT  10<L>  /  AlkPhos  69  04-24    PT/INR - ( 24 Apr 2020 14:13 )   PT: 12.8 sec;   INR: 1.14 ratio         PTT - ( 24 Apr 2020 14:13 )  PTT:28.4 sec      CARDIAC MARKERS ( 24 Apr 2020 14:13 )  <.015 ng/mL / x     / x     / x     / x            Assessment/Plan  87yMale admitted for   1. Called by RN for patient hypoxic into upper 80s on NRB, unresponsive to verbal stimuli, hypotensive 76/52. Patient seen and examined at bedside. Dr. Cross also at bedside. Pt admitted for respiratory distress, initial COVID negative.  Chest tube placed in the ED 2/2 pleural effusion. CXR at the time confirmed placement with noted small right base pneumothorax. Patient with decreased breath sounds R anterior, breath sounds not heard R posterior. Repeat CXR mostly unchanged. While at bedside, repeat BP 52/36 manual. IVF was started. Family's wishes for hospice was discussed during admission. Patient DNR/DNI. Son Mark Behar was called, he reiterated patient was DNR/DNI but wanted pt to be on pressors and agreed to a central line. ICU was called and came to evaluate patient. However pt started having agonal breathing during ICU consultation. Shortly after the agonal breathing, pt was no longer breathing. Patient's death pronounced by Dr. Cross at 21:33. Son was notified.

## 2020-04-24 NOTE — H&P ADULT - PROBLEM SELECTOR PLAN 1
Likely due to recurrent pleural effusion  Patient has required multiple VATS in past  Recent pathology confirmed high grade undifferentiated sarcomatoid neoplasm  CXR exhibited worsened recurrent pleural effusion  s/p chest tube in ED, drained 650cc bloody fluid  CT surgery consulted, f/u recommendations for management of chest tube  Pain management per pain scale with chest tube  Respiratory status improved, SpO2 99% on 10L NRB  Titrate supplemental oxygen to SpO2 >92%  Pulm consulted (Dr. Benitez), f/u recommendations  f/u repeat COVID testing Likely due to recurrent pleural effusion vs. 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 Benitez), f/u recommendations  f/u repeat COVID testing

## 2020-04-24 NOTE — PROVIDER CONTACT NOTE (CHANGE IN STATUS NOTIFICATION) - SITUATION
patient noted to have shallow breathing and not responsive to verbal stimuli. patient continued on nonrebreather mask 89-90%. patient now hypotensive 76/52. DNR/DNI

## 2020-04-24 NOTE — ED ADULT NURSE NOTE - NSSEPSISCRITERIAMET_ED_A_ED
"  Pre-Admission Screening Form    Patient Information:   Name: Jeffrey Lizama     MRN: [id number]       : 1932      Age: 86 y.o.   Gender: male      Race: White [7]       Marital Status:  [2]  Family Contact: Arya Lizama,Allyn Dsouza        Relationship: Son [15]  Spouse [17]  Daughter [2]  Home Phone: [phone]           Cell Phone: [phone]  Advanced Directives: None  Code Status:  FULL  Current Attending Provider: Sulma Henry M.D.  Referring Physician: Dr. Henry     Physiatrist Consult: Dr. Emory Garcia       Referral Date: 19  Primary Payor Source:  MEDICARE  Secondary Payor Source:  JOSELITO    Medical Information:   Date of Admission to Acute Care [place]  Room Number: T735/01  Rehabilitation Diagnosis: 16 Debility (Non Cardiac, Non Pulmonary)  Immunization History   Administered Date(s) Administered   • Influenza Seasonal Injectable 10/30/2015   • Influenza TIV (IM) 2011, 10/28/2012, 2013, 10/30/2015   • Influenza Vaccine Pediatric Split 2010   • Influenza Vaccine Quad Inj (Pf) 10/10/2018   • Pneumococcal Conjugate Vaccine (Prevnar/PCV-13) 2015   • Pneumococcal polysaccharide vaccine (PPSV-23) 10/10/2018   • Tdap Vaccine 2013     Allergies   Allergen Reactions   • Pcn [Penicillins] Rash     Tolerates cephalosporins       Past Medical History:   Diagnosis Date   • Anesthesia     \"heart stopped\"   • Arrhythmia    • Arthritis     hand, elbow   • Asbestos exposure    • ASTHMA    • Back pain    • Benign neoplasm of pituitary gland and craniopharyngeal duct (pouch) ([facility]) 2010   • BPH (benign prostatic hypertrophy) 2010   • Bradycardia    • Breath shortness    • Bronchitis    • Cardiac pacemaker 2010   • Cataract    • COPD (chronic obstructive pulmonary disease) ([facility])    • Diverticulitis    • DJD (degenerative joint disease)    • EMPHYSEMA    • Glaucoma    • Heart " burn    • Heart murmur    • Hiatus hernia syndrome    • Hypertension    • 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   • Indigestion    • Knee arthroplasty 2002    right   • Obstructive hypertrophic cardiomyopathy (HCC) 8/4/2011   • PAF (paroxysmal atrial fibrillation) ([facility])    • Paroxysmal atrial fibrillation ([facility]) 8/29/2011   • PE (pulmonary embolism)     IVC filter, states PE clots 8 times   • Phlebitis     chronic / recurrent   • Pituitary adenoma ([facility]) 1995    hypophysectomy   • Pituitary adenoma ([facility]) 1995    hypophysectomy   • Pneumonia    • Rheumatic fever    • S/P insertion of IVC (inferior vena caval) filter    • S/P parathyroidectomy ([facility])    • S/P parathyroidectomy ([facility]) 2005   • Sleep apnea     no cpap machine   • SSS (sick sinus syndrome) ([facility]) 8/29/2011   • Third degree AV block ([facility]) 8/29/2011   • thyroidectomy 2005    benign   • Unspecified disorder of thyroid    • Unspecified hemorrhagic conditions    • Unspecified urinary incontinence    • Urinary bladder disorder      Past Surgical History:   Procedure Laterality Date   • GASTROSCOPY-ENDO  1/22/2019    Procedure: GASTROSCOPY-ENDO;  Surgeon: Yoandy Mcelroy M.D.;  Location: ENDOSCOPY [facility];  Service: Gastroenterology   • GASTROSCOPY-ENDO  1/21/2019    Procedure: GASTROSCOPY-ENDO;  Surgeon: Luca Mtz M.D.;  Location: ENDOSCOPY [facility];  Service: Gastroenterology   • KNEE REVISION TOTAL  6/20/2013    Performed by Ortega Vega M.D. at SURGERY [facility]   • CARPAL TUNNEL ENDOSCOPIC  9/30/2011    Performed by RONALD ENNIS at SURGERY SAME DAY [facility]   • PACEMAKER INSERTION Left 04/23/2010    [employer] Altrua 60 [id number] implanted by Dr. Donaldson.   • CATARACT EXTRACTION WITH IOL      bilateral    • CERVICAL DISK AND FUSION ANTERIOR     • CERVICAL FUSION POSTERIOR     • CHOLECYSTECTOMY     • CHOLECYSTECTOMY     • OTHER      pituitary tumor removed   • OTHER ORTHOPEDIC SURGERY      knee surgery left knee x 2   • PB REVISE  ULNAR NERVE AT WRIST     • PB TOTAL KNEE ARTHROPLASTY      left   • PB TOTAL KNEE ARTHROPLASTY      right   • THYROIDECTOMY         History Leading to Admission, Conditions that Caused the Need for Rehab (CMS):   Luca Puckett M.D. Physician Signed Hospital Medicine  H&P Date of Service: 1/21/2019  3:45 PM         []Hide copied text  []Nitza for attribution information  Hospital Medicine History & Physical Note     Date of Service  1/21/2019     Primary Care Physician  Bebe Banerjee M.D.     Consultants  Critical Care. I discussed with Dr. Britney MCCLENDON. I discussed with Dr. Mtz  Code Status  Full code by default.      Chief Complaint  Black stools.      History of Presenting Illness  86 y.o. male who presented 1/21/2019 with Black stools. Mr. Lizama has a complex medical history including history of pulmonary embolism as well as atrial fibrillation on chronic Coumadin therapy as well as oxygen dependency from COPD at 5 L that reportedly 2 days ago developed black stools.  He was brought to the emergency room in [facility] where he was found to be hypotensive with a blood pressure of 60/40 and was found to have a hemoglobin of 11 and INR of 4.  He was appropriately resuscitated and immediately transferred here for higher level of care.  In the emergency room, he cannot protect her is airway and required intubation.  Despite IV fluids, IV pressors, and blood products, he remains markedly hypotensive.  I spoke with Dr. Mtz, gastric, and he will take him to the operating room for an EGD.  Of note, as patient is on outpatient Cortef thus IV hydrocortisone will be given the emergency room.  At this point in time, there are no family members available nor contacts we have only the ER notes from the transferring facility.     Review of Systems  Review of Systems   Unable to perform ROS: Intubated         Past Medical History   has a past medical history of Anesthesia; Arrhythmia; Arthritis; Asbestos exposure;  ASTHMA; Back pain; Benign neoplasm of pituitary gland and craniopharyngeal duct (pouch) ([facility]) (4/1/2010); BPH (benign prostatic hypertrophy) (4/1/2010); Bradycardia; Breath shortness; Bronchitis; Cardiac pacemaker (04 2010); Cataract; COPD (chronic obstructive pulmonary disease) ([facility]); Diverticulitis; DJD (degenerative joint disease); EMPHYSEMA; Glaucoma; Heart burn; Heart murmur; Hiatus hernia syndrome; Hypertension; Hypopituitarism ([facility]) (1995); Indigestion; Knee arthroplasty (2002); Obstructive hypertrophic cardiomyopathy ([facility]) (8/4/2011); PAF (paroxysmal atrial fibrillation) ([facility]); Paroxysmal atrial fibrillation ([facility]) (8/29/2011); PE (pulmonary embolism); Phlebitis; Pituitary adenoma ([facility]) (1995); Pituitary adenoma ([facility]) (1995); Pneumonia; Rheumatic fever; S/P insertion of IVC (inferior vena caval) filter; S/P parathyroidectomy ([facility]); S/P parathyroidectomy ([facility]) (2005); Sleep apnea; SSS (sick sinus syndrome) ([facility]) (8/29/2011); Third degree AV block ([facility]) (8/29/2011); thyroidectomy (2005); Unspecified disorder of thyroid; Unspecified hemorrhagic conditions; Unspecified urinary incontinence; and Urinary bladder disorder.     Surgical History   has a past surgical history that includes other orthopedic surgery; cholecystectomy; pr total knee arthroplasty; other; cervical disk and fusion anterior; pr revise ulnar nerve at wrist; pr total knee arthroplasty; cataract extraction with iol; thyroidectomy; pacemaker insertion (Left, 04/23/2010); carpal tunnel endoscopic (9/30/2011); knee revision total (6/20/2013); cholecystectomy; and cervical fusion posterior.      Family History  Unobtainable as patient is intubated.       Social History   reports that he has never smoked. He has never used smokeless tobacco. He reports that he does not drink alcohol or use drugs.         Assessment/Plan:  I anticipate this patient will require at least two midnights for appropriate medical management, necessitating inpatient  admission.         * Shock (HCC)- (present on admission)   Assessment & Plan     Hypovolemic shock secondary to acute blood loss  IV fluids, transfusion of RBCs, IV levophed ordered to maintain a MAP over 65, IV hydrocortisone.   Admit to the ICU      Acute respiratory failure with hypoxia (HCC)- (present on admission)   Assessment & Plan     Initiated on mechanical ventilation on 1/21/2019 due to lack of ability to protect his airway  Critical care has been consulted      GI bleed- (present on admission)   Assessment & Plan     Upper GI hemorrhage  IV protonix drip ordered  Reverse INR with vitamin K and Kcentra  TEG ordered  Dr. Mtz, GI, consulted for EGD 1/21 after reversal.      Candidal intertrigo- (present on admission)   Assessment & Plan     Nystatin ordered.       Anemia due to GI blood loss- (present on admission)   Assessment & Plan     Hb 7  Transfuse RBCs for Hb less than 7 or for hypotension  Serial Hb q 6 hours ordered.       Supratherapeutic INR- (present on admission)   Assessment & Plan     On coumadin for history of PE and afib  INR was 4 prior to transfer and has gone up to 6.56      SIRS (systemic inflammatory response syndrome) ([facility])- (present on admission)   Assessment & Plan     WBC 19.6 and tachycardia  No apparent source of infection  This is consistent with a non-infectious SIRS in the setting of GI hemorrhage      Paroxysmal atrial fibrillation ([facility])- (present on admission)   Assessment & Plan     History of   On Sotalol outpatient which will be restarted once he is able to tolerate enteral meds      Anticoagulant long-term use- (present on admission)   Assessment & Plan     On coumadin      History of pulmonary embolism- (present on admission)   Assessment & Plan     On coumadin outpatient  History of IVC filter      COPD (chronic obstructive pulmonary disease) ([facility])- (present on admission)   Assessment & Plan     On 5 liters nasal cannula oxygen prior to admit      Cardiac pacemaker-  (present on admission)   Assessment & Plan     Secondary to sick sinus syndrome      Hypopituitarism (HCC)- (present on admission)   Assessment & Plan     On cortef outpatient  Stress dose IV hydrocortisone 100 mg x 1 in the ER and 50 mg IV q6 hourse             VTE prophylaxis: SCDs    Randolph Tan M.D. Physician Signed Pulmonary  Consults Date of Service: 1/21/2019  3:48 PM      Expand All Collapse All    []Hide copied text  []Hover for attribution information  Critical Care Consultation     Date of consult: 1/21/2019     Referring Physician  Macie Simmons M.D.     Reason for Consultation  Hemorrhagic shock     History of Presenting Illness  86 y.o. male who presented 1/21/2019 with pituitary neoplasm on chronic steroids, pulmonary embolism with IVC filter, cardiac pace maker for sick sinus syndrome, COPD, EVANGELINA, that presented to [facility] for one day of bloody melanotic stools, and some chronic pain which he was taking aleve per report. His hg was 11 and repeat here was 7 and had persistent melanotic stools and agitation that led him getting intubation with ketamine and mayuri in ER here and was given 1 prbc and placed on Jonny synephrine up to 400 and norepinephrine to 20 also required jonny pushes, his inial blood gas was 7.23/50/391 and his RR was increased from 18 to 22 and titrated down to 40% FIO2. His INR was 6 and was given K centra. I was asked to see patient and history is limited secondary to being intubated and given ketamine and versed.       Assessment/Plan      * Shock (HCC)- (present on admission)   Assessment & Plan     Mutlifactorial: vasodilatory secondary to adrenal insufficiency, hemorrhagic shock and possible septic  Follow up on exam, hg, cortisol, echo and cultures  Currently with warm shock predominating  Wean jonny first and continue to wean norepinephrine to map > 65      Acute respiratory failure with hypoxia (HCC)   Assessment & Plan     Intubated date: 1/21 for airway protection and  hemodynamic control  Goal saturation > 90%     Monitor pulse oximetry and adjust peep and FIO2 to abg/vbg, and peep optimization.  Monitor ventilator waveforms and titrate flow/peep and volumes according.   CXR: monitor lung volumes and tube/line placement  VAP bundle prevention, oral care, post pyloric feeding  Head of bed > 30 degree  GI prophylaxis  Daily awakening and SBT trials unless contraindicated  Monitor for liberation/extubation     Respiratory treatments: bronchodilators     PNA left lower lobe vs effusion will start antibiotics                  GI bleed- (present on admission)   Assessment & Plan     Life threatening GI bleed requiring emergent reversal of anticoagulation and blood transfusion  PPI and GI planning for EGD tonight to access bleeding  Q6 hg re-check teg and Inr post kcentra  Continue to evaluate need for IR or surgery  Likely related to coagulopathy and chronic aleve use per report      Anemia due to GI blood loss- (present on admission)   Assessment & Plan     PPI likely NSAID related and coagulopathy, Q6 hr, INR TEG follow up  Transfuse hg<7 s/p 3 units PRBC and Kcentra      Supratherapeutic INR- (present on admission)   Assessment & Plan     Secondary to Coumadin s/p kcentra      SIRS (systemic inflammatory response syndrome) (HCC)- (present on admission)   Assessment & Plan     Board spectrum antibiotics and rapidly narrow antibiotic coverage once source or culture data return.      Likely source: lung vs gi     Continue to monitor for adequate source control  Fluid resusitiation of crystalloids of at least 30ml/kg unless contraindicated.  Monitor lactate clearence and end organ perfusion  Monitor for patient fluid responsiveness vs EVLW and cappilary leak  If persistent hypotension will add vasopressor/iontropic support  If escalation of pressor requirement or concern for relative adrenal insufficiency will give a trial of steroids.      Review risk for MRSA, MDRO, pseudomonas, ESBL,  strep pna resistance, caninda risk     Patient critical ill with vasodilatory and hemorrhagic shock as well as adrenal insufficieny will cover empirically with meropenem and doxy and follow up on culture my guess this is all related to AI and bleeding but is critical ill.          Paroxysmal atrial fibrillation (HCC)- (present on admission)   Assessment & Plan     Rate control  Currently in shock on pressors norepi  Continue to re-access need for and add home regime once stablize      Anticoagulant long-term use- (present on admission)   Assessment & Plan     Currently reversed with kcentra for life threatening hemorrhage INR 6      History of pulmonary embolism- (present on admission)   Assessment & Plan     Currently with life threatening hemorrhagic shock requiring Kcentra reversal  Has IVC filter  Check official Echo  EGD then discuss with GI timing depending of restarting anticoagulation on risk of source of bleeding      COPD (chronic obstructive pulmonary disease) (HCC)- (present on admission)   Assessment & Plan     Not in acute excerbation continue bronchidilators  On stress dose steroids for AI  No air trapping on Vent wavefom  LLL pleural effusion vs pna      Cardiac pacemaker- (present on admission)   Assessment & Plan     Continue to monitor on tele      Hypopituitarism (HCC)- (present on admission)   Assessment & Plan     Continue hydrocortisone stress dose   Wean as appropriate to chronic dosing  Check TSH  Follow up on cortisol            Discussed patient condition and risk of morbidity and/or mortality with Hospitalist, RN, Pharmacy and GI.    The patient remains critically ill from hemorrhagic and vasodilatory shock requiring pressor support.  Critical care time = 65 minutes in directly providing and coordinating critical care and extensive data review.  No time overlap and excludes procedures.        Luca Mtz M.D. Physician Signed Gastroenterology  Consults Date of Service: 1/21/2019  4:26 PM       Expand All Collapse All    []Hide copied text  []Hover for attribution information  Date of Consultation:  1/21/2019     Patient: : Jeffrey Lizama  MRN: [id number]     Referring Physician: HAKAN Simmons:Luca Mtz M.D.      Reason for Consultation: Upper GI bleed melena anemia     History of Present Illness:   86-year-old male with history of proximal atrial fibrillation sick sinus syndrome third-degree AV block cardiomyopathy pacemaker pituitary adenoma with hypopituitarism who is being seen today for anemia and melena.  Patient was intubated on examination history obtained from discussion with team as well as nursing staff.  Per review of chart patient had melena since last night INR was 4 initially however on arrival was 6.  Patient was noncompliant during flight and upon arrival had a large melenic stool.  Due to mentation changes patient was intubated.  Patient was noted to have hemoglobin of 7 down from previous 13 in 2018.  In review of chart patient was taking a lot of NSAID as well.  OG was placed with significant amount of dark blood noted.  Patient was given transfusion and medication for reversal            Impressions:  1. Shock: Likely multifactorial with hemorrhagic septic versus hypopituitarism  2.  GI bleed suspect upper: Differential diagnosis esophagitis peptic ulcer disease versus other  3. Acute respiratory failure  4. Supratherapeutic INR s/p kcentra  5. Sick sinus  6. History of PE  7. Hypopituitarism      86-year-old male with history of multiple significant condition including hypopituitarism, sick sinus on chronic pacemaker atrial fibrillation on chronic anticoagulation who presents with melenic stool requiring intubation.  Also had hypotension possibly hemorrhagic versus septic shock.  Was taking significant NSAIDs.  INR supratherapeutic.  Differential diagnosis likely upper GI peptic ulcer versus other.  OG suction out significant amount of blood.  H&H  sound.     Recommendations:  1.  INR anticoagulation reversal as per pharmacy and ICU  2.  Hypotension management as per ICU with blood replacement pressor  3.  Based on anticoagulation status and reversal INR recommend potential for endoscopic evaluation.  Risk and benefit was unable to be discussed to physician consent signed.  10 mg IV Reglan given to promote GI motility to clean out stomach for further evaluation  4.  PPI drip for now  5.  Critically ill patient with high potential for decompensation and rebleeding.            This note was generated using voice recognition software which has a small chance of producing errors of grammar and possibly content. I have made every reasonable attempt to find and correct any obvious errors, but expect that some may not be found prior to finalization of this note.     Luca Mtz M.D. Physician Addendum Gastroenterology  Procedures Date of Service: 1/21/2019  6:15 PM         []Hide copied text  []Quentinver for attribution information  Esophagogastroduodenoscopy  Date of Procedure: 1/21/2019  Attending Physician: Luca Mtz MD     Indications: Melena, anemia, upper GI bleed     Instrument: Olympus Flexible Endoscope  Sedation:   Moderate sedation  Versed: 2mg (patient already intubated)  Start 6:00PM  End: 6:15PM     Pre-Anesthesia Assessment:  Prior to the procedure, a History and Physical was performed, and patient medications and allergies were reviewed. The patient’s tolerance of previous anesthesia was also reviewed. The risks and benefits of the procedure and the sedation options and risks were discussed with the family including but not limited to infection, bleeding, aspiration, perforation, adverse medication reaction, missed diagnosis, and missed lesions. The patient verbalized understanding. All questions were answered, and informed consent was obtained.         After I obtained informed consent from the patient, the patient was placed in the left lateral  position. Appropriate time-out protocol was followed: the correct patient, the correct procedure, and the correct equipment in the room were confirmed. Throughout the procedure, the patient’s blood pressure, pulse, and oxygen saturations were monitored continuously. The Olympus flexible gastroscope was gently passed through the incisoral orifice into the oral cavity and under direct visualization the esophagus was intubated. The endoscope was passed down the esophagus, through the stomach and into the 2nd portion of the duodenum. Retroflexion was performed at the gastroesophageal junction. Color, texture, mucosa and anatomy of esophagus, stomach, and duodenum were carefully examined with the scope.  After completion of the examination, the endoscope as removed. The patient tolerated the procedure well. There were no immediate postoperative complications.         Findings:    Esophagus: no gross esophagitis, some reflux old blood seen. No active fresh blood seen.     Stomach: visualization of stomach poor due to lots of old blood (black) covering the mucosa; despite extensive lavage, does not have great visualization. Retroflexion of the cardia fundus showed old blood with possible clot noted. Unable to suction out all the clots but visualization still hindered. No active fresh red blood seen.     Duodenum: old red blood noted to 2nd portion of the duodenum.     Impressions:   1. GI bleed likely from gastric source, however unable to visualize due to old blood. No active fresh red blood seen.     Recommendations:   1. Continue PPI drip  2. Continue serial H/H monitoring, transfuse as per ICU  3. Recommend repeat EGD tomorrow with Dr. Mcelroy on 1/22/2019 at bedside after additional promotility medications given (erythromycin 250mg IV x 1 dose national shortage, given 10mg reglan x 2.doses).        This note was generated using voice recognition software which has a small chance of producing errors of grammar and  possibly content. I have made every reasonable attempt to find and correct any obvious errors, but expect that some may not be found prior to finalization of this note             Salvador Corona M.D. Physician Signed Pulmonary  Procedures Date of Service: 1/21/2019 10:47 PM         []Hide copied text  []Hover for attribution information  Date: 1/22/2019     Procedure:  Arterial Catheter Insertion     Indication: Hypotension, GI bleed     Physician:  Dr. Salvador Corona MD     Consent:  Emergent     Procedure:  The patient is intubated and on full mechanical vent support with GI bleed, hypotensive on vasopressors.  Arterial catheter is indicated for continuous invasive BP monitoring to titrate vasoactive agents.  The right wrist was prepped and draped using sterile barrier precautions.  A small bore radial artery catheter was placed in the right radial artery without difficulty.  Ultrasound was used for localization and placement.  A good quality arterial waveform was noted on monitor.  The catheter ws sutured in place and a sterile dressing was applied.  No immediate complications.  EBL < 5 cc.           Yoandy Mcelroy M.D. Physician Signed Gastroenterology  OP Report Date of Service: 1/22/2019  2:08 PM         []Hide copied text  []Hover for attribution information  OP Note  Procedure Date: 1/22/2019      Procedure Time: 13:19-13:37     PreOp Diagnosis: UGI bleeding     PostOp Diagnosis:   Esophagus: Esophagitis, LA-B  Stomach: A. A 2 cm somewaht mass-like lesion with ulcerative surface with exposed vessel and small oozing (Tito IB) was seen at the lesser curvature of lower body, s/p Epi (1:10,000) 3 cc injection X 2, and hemoclip X 1 with successful hemostasis B. A 2 cm ulcer with adherent clot (Tito IIB) was seen at the greater curvature of upper body, s/p Epi (1:10,000) 4 cc injection X 1, and hemoclip X 1 with successful hemostasis  Duodenum: Mild  duodenitis     Procedure(s):  GASTROSCOPY-ENDO     Surgeon(s):  Yoandy Mcelroy M.D.     Anesthesiologist/Type of Anesthesia:  No anesthesia staff entered./* No anesthesia type entered * Propofol 60 mg and 40 mg per ICU RN     Surgical Staff:  Endoscopy Technician: Lucero Langston     Specimens removed if any:  Nil     Estimated Blood Loss: < 1 cc     COMPLICATIONS:  No immediate complications.     PROCEDURE IN DETAIL, Findings and ENDOSCOPIC DIAGNOSIS:       -Prior to the procedure, a History and Physical was performed, and patient medications and allergies were reviewed. The patient’s tolerance of previous anesthesia was also reviewed. The risks and benefits of the procedure and the sedation options and risks were previously discussed with the family. The patient was deemed satisfactory to undergo the procedure. Consent was made by 2 physicians.     -Prior Anticoagulants: the patient has taken no previous anticoagulant or antiplatelet agents.     -ASA Grade Assessment: IV     -The patient was placed in the left lateral decubitus position. The scope was passed under direct vision. Continuous oxygen was provided via ET tube and intravenous sedation was administered in divided doses throughout the procedure. The patient’s blood pressure, pulse, and oxygen saturation were monitored continuously throughout the procedure.     -The gastroscope was gently advanced under direct visualization over the tongue, down the esophagus, through the stomach and into the 2nd portion of the duodenum. The color, texture, mucosa and anatomy of esophagus, stomach and duodenum were carefully examined with the scope. The scope was then withdrawn from the patient and the procedure terminated. Further details are in the finding section, based on anatomical location.     -The patient tolerated the procedure well and there were no immediate complications. The patient was then recovered in stable condition.     Findings:  Esophagus:  Esophagitis, LA-B  Stomach: A. A 2 cm somewaht mass-like lesion with ulcerative surface with exposed vessel and small oozing (Tito IB) was seen at the lesser curvature of lower body, s/p Epi (1:10,000) 3 cc injection X 2, and hemoclip X 1 with successful hemostasis B. A 2 cm ulcer with adherent clot (Tito IIB) was seen at the greater curvature of upper body, s/p Epi (1:10,000) 4 cc injection X 1, and hemoclip X 1 with successful hemostasis  Duodenum: Mild duodenitis     RECOMMENDATIONS:    1. OG was removed. Avoid OG insertion/trauma  2. Consider to extubate the patient if stable  3. Ok to start clear liq diet once extubated, awake and alert  4. Continue PPI infusion for another 48 hours  5. Repeat EGD to confirm healing and possible biopsy in 3-4 months     1/22/2019 2:08 PM Yoandy Mcelroy M.D.              Faviola Garcia M.D. Physician Signed Physical Medicine & Rehab  Consults Date of Service: 1/25/2019  1:04 PM   Consult Orders:   IP CONSULT FOR PHYSIATRY [[id number]] ordered by Sulma Henry M.D. at 01/25/19 1209      Expand All Collapse All    []Hide copied text  []Hover for attribution information  Medical chart review completed.      Patient is a 86 y.o. year-old male with a past medical history significant for BPH, COPD, Glaucoma, HTN, A fib on coumadin, Hx of PE, Hypothyroidism,  admitted to [facility]note 3[facility] on 1/21/2019 12:42 PM with black stools for 2 days. Patient was reportedly hypotensive at OSH and anemic and was transferred to [facility] for higher level of care.  Patient on transport could not protect his airway and required intubation. Patient was hypotensive and required IV fluids, pressors and multiple blood transfusions. In addition patient received Vit K and K centra for coumadin. Patient was taken to EGD with Dr. Mtz on 1/21/19, but with significant old blood limiting view. Patient was admitted to ICU and continued on PPI drip.  Patient had repeat EGD on 1/22/19 and two ulcers  were clipped with successful hemostasis.  Per GI consult, one lesion concerning for malignancy and recommending follow-up EGD in 3 months.  Patient was extubated successfully on 1/22/19 and has since been transferred to the floor.  Hemoglobin has remained 8-8.5 since clipping. Echo was performed on 1/22/19 and EF of 80% with mild aortic stenosis/insufficiency. Last SBP 85/57.  Patient has been discontinued off of antibiotics, last WBC 13.7 on 1/25/19.      Patient reportedly lives with wife who has Parkinson's in [place] in a 1 story home with a ramp to enter; previously using a 4WW. Patient was last evaluated by PT on 1/24/19 and was Jeanette for gait. Patient last evaluated by OT on 1/24/19 and was CGA-MaxA for ADLs.          Pcn [penicillins]     PSYCHOSOCIAL HISTORY:  Living Site:  Home  Living With:  spouse  Caregiver's availability:  Wife caregiver  Number of stairs:  Ramped  Substance use history:  Denies        The patient presents  with cognitive deficits and functional deficits in mobility/self-cares, and Moderate  de-conditioning. Pre-morbidly, this patient lived in a single level home with rampto enter,with spouse  The patient was evaluated by acute care Physical Therapy and Occupational Therapy; currently requiring minimal assistance for mobility and maximal assistance for ADLs. The patient's current diet is Full liquid liquids.      Patient still undergoing acute work-up. Current SBP is 85/57 and not medically stable for rehab.  Hgb stable in 8 range after clipping. WBC 13.7 improved from > 25, no longer on antibiotics.      Once medically stable, the patient is   a Good candidate for an acute inpatient rehabilitation program with a coordinated program of care at an intensity and frequency not available at a lower level of care.      Note: This recommendation requires that patient has at least CGA/Minimal Assistance needs in at least two therapy disciplines.  If patient progresses to no longer need  CGA/Lex with at least two therapy disciplines they may be more appropriate for Skilled nursing facility versus home with home health.       This recommendation is substantiated by the patient's current medical condition with intervention and assessment of medical issues requiring an acute level of care for patient's safety and maximum outcome. A coordinated program of care will be provided by an interdisciplinary team including physical therapy, occupational therapy, hospitalist, physiatry, rehab nursing and rehab psychology. Rehab goals include improved mobility, self-care management, strength and conditioning/endurance, pain management, bowel and bladder management, mood and affect, and safety with independent home management including caregiver training. Estimated length of stay is approximately 10-14 days. Rehab potential: Good. Disposition: to pre-morbid independent living setting with supportive care of patient's spouse. We will continue to follow with you in anticipation of discharge to acute inpatient rehabilitation when medically stable to do so at the discretion of the attending physician. Thank you for allowing us to participate in this patient's care. Please call with any questions regarding this recommendation.     Faviola Garcia M.D.          Co-morbidities: See above  Potential Risk - Complications: Deep Vein Thrombosis, Incontinence, Malnutrition, Pain, Perceptual Impairment, Pneumonia, Pressure Ulcer and Infection  Level of Risk: High    Ongoing Medical Management Needed (Medical/Nursing Needs):   Patient Active Problem List    Diagnosis Date Noted   • Acute on chronic respiratory failure with hypoxia (HCC) 01/21/2019     Priority: High   • Advance care planning 01/27/2019     Priority: Medium   • GI bleed 01/21/2019     Priority: Medium   • Edema 01/24/2019     Priority: Low   • Aortic stenosis 01/22/2019     Priority: Low   • Anemia due to GI blood loss 01/21/2019     Priority: Low   •  "Candidal intertrigo 01/21/2019     Priority: Low   • Anticoagulant long-term use 07/02/2012     Priority: Low   • COPD (chronic obstructive pulmonary disease) ([facility]) 02/03/2012     Priority: Low   • History of pulmonary embolism 02/03/2012     Priority: Low   • Paroxysmal atrial fibrillation ([facility]) 08/29/2011     Priority: Low   • Hypopituitarism ([facility])      Priority: Low   • Cardiac pacemaker 04/01/2010     Priority: Low   • Pleural effusion 01/23/2019   • Supratherapeutic INR 01/21/2019   • Pulmonary embolism ([facility]) 08/18/2016   • Potential for deficient knowledge of chronic obstructive pulmonary disease 08/02/2016   • Frequent PVCs 07/26/2016   • Chronic respiratory failure ([facility]) 07/26/2016   • Long term current use of antiarrhythmic drug 07/26/2016   • Ventricular ectopy 06/16/2016   • SOB (shortness of breath) 06/16/2016   • Peripheral neuropathy, idiopathic 05/26/2015   • 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 Registered Nurse Signed   Progress Notes Date of Service: 1/28/2019  2:28 AM         []Hide copied text  []Hover for attribution information  2 RN skin check completed with Tomasa RN      Pt's bilateral ears intact. Bilateral upper extremities red, bruised, weeping but blanchable. Pt. Sacrum intact, red, blanchable. Bilateral heels dry but intact.      Encouraged to turn in bed heels floated with pillows and condom cath used for moisture diversion.            Current Vital Signs:   Temperature: 36.9 °C (98.4 °F) Pulse: 71 Respiration: 19 Blood Pressure : 152/77  Weight: 102.6 kg (226 lb 3.1 oz) Height: 165.1 cm (5' 5\")  Pulse Oximetry: " 97 % O2 (LPM): 4      Completed Laboratory Reports:  Recent Labs      19   0410  19   0430   WBC  14.1*  18.9*   HEMOGLOBIN  8.7*  8.7*   HEMATOCRIT  27.1*  27.5*   PLATELETCT  147*  174   SODIUM  136  136   POTASSIUM  4.0  3.6   BUN  15  15   CREATININE  0.83  0.86   ALBUMIN   --   2.9*   GLUCOSE  108*  96     Additional Labs: Not Applicable    Prior Living Situation:   Housing / Facility: 1 Story House  Steps Into Home:  (ramp to enter)  Lives with - Patient's Self Care Capacity: Spouse  Equipment Owned: 4-Wheel Walker, Ramp    Prior Level of Function / Living Situation:   Physical Therapy: Prior Services: None  Housing / Facility: 1 Story House  Steps Into Home:  (ramp to enter)  Bathroom Set up: Bathtub / Shower Combination, Shower Chair  Equipment Owned: 4-Wheel Walker, Ramp  Lives with - Patient's Self Care Capacity: Spouse  Bed Mobility: Independent  Transfer Status: Independent  Ambulation: Independent  Distance Ambulation (Feet):  (community ambulator)  Assistive Devices Used: 4-Wheel Walker  Current Level of Function:   Level Of Assist: Minimal Assist  Assistive Device: Front Wheel Walker  Distance (Feet): 60 (40 feet, then seated rest, then 20 feet to sink.)  Deviation: Bradykinetic  # of Stairs Climbed: 0  Weight Bearing Status: no restrictions  Skilled Intervention: Verbal Cuing  Supine to Sit:  (up in chair)  Sit to Supine:  (up chair)  Scooting: Stand by Assist  Skilled Intervention: Verbal Cuing  Sit to Stand: Contact Guard Assist (SBA by last sit to stand from chair)  Bed, Chair, Wheelchair Transfer: Contact Guard Assist  Skilled Intervention: Verbal Cuing, Sequencing  Sitting in Chair: pre/post  Standin  Occupational Therapy:   Self Feeding: Independent  Grooming / Hygiene: Independent  Bathing: Independent  Dressing: Independent  Toileting: Independent  Medication Management: Independent  Laundry: Requires Assist  Kitchen Mobility: Independent  Finances: Independent  Home  Management: Requires Assist  Shopping: Requires Assist  Prior Level Of Mobility: Independent With Device in Community, Independent With Device in Home  Driving / Transportation: Driving Independent  Prior Services: None  Housing / Facility: 1 Story House  Occupation (Pre-Hospital Vocational): Retired Due To Age  Current Level of Function:   Eating: Supervision (w/ built up )  Lower Body Dressing: Moderate Assist (don pajama pants)  Skilled Intervention: Verbal Cuing, Tactile Cuing, Compensatory Strategies  Speech Language Pathology:      Rehabilitation Prognosis/Potential: Good  Estimated Length of Stay: 10-14 days    Nursing:   Orientation : Oriented x 4  Incontinent    Scope/Intensity of Services Recommended:  Physical Therapy: 1.5 hr / day  5 days / week. Therapeutic Interventions Required: Maximize Endurance, Mobility, Strength and Safety  Occupational Therapy: 1.5 hr / day 5 days / week. Therapeutic Interventions Required: Maximize Self Care, ADLs, IADLs and Energy Conservation  Speech & Language Pathology: SLP Amauri loving at [facility] 5 days / week. Therapeutic Interventions Required: Maximize Pending recommendations of SLP Amauri loving  Rehabilitation Nursin/7. Therapeutic Interventions Required: Monitor Pain, Skin, Vital Signs, Intake and Output, Labs, Safety, Family Training and Bowel & Bladder regimen; DVT Prophylaxis; ADL's.   Rehabilitation Physician: 3 - 5 days / week. Therapeutic Interventions Required: Medical Management  Dietician: Consult. Therapeutic Interventions Required: Nutritional evaluation with recommendations to promote optimal health/healing.    Rehabilitation Goals and Plan (Expected frequency & duration of treatment in the IRF):   Return to the Community, Modified Independent Level of Care and Outpatient Support  Anticipated Date of Rehabilitation Admission: 19  Patient/Family oriented IRF level of care/facility/plan: YesYes  Patient/Family willing to participate in IRF  care/facility/plan: Yes  Patient able to tolerate IRF level of care proposed: Yes  Patient has potential to benefit IRF level of care proposed: Yes  Comments: Not Applicable    Special Needs or Precautions - Medical Necessity:  Safety Concerns/Precautions:  Fall Risk / High Risk for Falls and Balance  Pain Management     Diet:   DIET ORDERS (Through next 24h)    Start     Ordered    01/25/19 1839  Diet Order Low Fiber(GI Soft) (once extubated, awake and alert)  ALL MEALS     Question:  Diet:  Answer:  Low Fiber(GI Soft)  Comment:  once extubated, awake and alert    01/25/19 1839          Anticipated Discharge Destination / Patient/Family Goal:  Destination: Home with Assistance Support System: Spouse  Anticipated home health services: OT, PT and Nursing  Previously used HH service/ provider: Not Applicable  Anticipated DME Needs: To be determined  Outpatient Services: To be determined  Alternative resources to address additional identified needs:   N/A  Pre-Screen Completed: 1/28/2019 1:18 PM Ruthann Caro R.N.   Abnormal VS & WBC/Abnormal Lactate

## 2020-04-24 NOTE — CONSULT NOTE ADULT - CONSULT REQUESTED BY NAME
Contact Numbers    [facility] Main Line: [phone]  [facility] Triage and after hours / weekends / holidays: [phone]      Please call the triage or after hours line if you experience a temperature greater than or equal to 100.5, shaking chills, have uncontrolled nausea, vomiting and/or diarrhea, dizziness, shortness of breath, chest pain, bleeding, unexplained bruising, or if you have any other new/concerning symptoms, questions or concerns.      If you are having any concerning symptoms or wish to speak to a provider before your next infusion visit, please call your care coordinator or triage to notify them so we can adequately serve you.     If you need a refill on a narcotic prescription or other medication, please call before your infusion appointment.       December 2019 Sunday Monday Tuesday Wednesday Thursday Friday Saturday   1     2    [facility]   7:00 AM   (15 min.)   [facility]   [facility] Draw    [facility] ONC INFUSION 60   7:30 AM   (60 min.)   [facility]   [facility] 3    [facility] ONC INFUSION 60   7:00 AM   (60 min.)    ONCOLOGY INFUSION   [facility] 4    [facility] ON[facility] INFUSION 60   7:30 AM   (60 min.)    ONCOLOGY INFUSION   [facility] 5    [facility] ON[facility] INFUSION 60   7:30 AM   (60 min.)   [facility]ION   [facility] 6    [facility] D[facility]   6:15 AM   (15 min.)   [facility]   [facility] Draw    [facility] ONC INFUSION 60   7:00 AM   (60 min.)    ONCOLOGY INFUSION   [facility] 7       8     9     10     11     12     13    [facility]   1:45 PM   (60 min.)   [facility] 14       15     16    [facility] D[facility]  10:00 AM   (15 min.)   [facility]   [facility] Draw    [facility] ONC INFUSION 360  10:30 AM   (360 min.)    ONCOLOGY INFUSION   [facility] 17     18      MD 19     20     21       22     23     24     25     26     27     28       29     30    [facility] DRAW   1:30 PM   (15 min.)   [facility] LAB DRAW   [facility] Lab Draw    [facility] RETURN   1:55 PM   (50 min.)   Romina Squires PA-C   [facility]    [facility] ONC INFUSION 60   3:30 PM   (60 min.)   [facility] ONCOL[facility]Y INFUSION   [facility] 31    [facility] ONC INFUSION 60   2:30 PM   (60 min.)   [facility] ONCOL[facility]Y INFUSION   [facility]                                 January 2020 Sunday Monday Tuesday Wednesday Thursday Friday Saturday                  1     2    [facility] ONC INFUSION 60   4:00 PM   (60 min.)   [facility] ONCOL[facility]Y INFUSION   [facility] 3    [facility] ONC INFUSION 60   3:30 PM   (60 min.)   [facility] ONCOL[facility]Y INFUSION   [facility] 4       5     6    [facility] ONC INFUSION 60   3:00 PM   (60 min.)   [facility]COL[facility] INFUSION   [facility] 7     8     9     10     11       12     13     14     15     16     17     18       19     20     21     22     23     24     25       26     27     28     29     30     31                         Recent Results (from the past 24 hour(s))   *CBC with platelets differential    Collection Time: 12/06/19  7:10 AM   Result Value Ref Range    WBC 2.7 (L) 4.0 - 11.0 10e9/L    RBC Count 3.50 (L) 4.4 - 5.9 10e12/L    Hemoglobin 11.9 (L) 13.3 - 17.7 g/dL    Hematocrit 35.4 (L) 40.0 - 53.0 %     (H) 78 - 100 fl    MCH 34.0 (H) 26.5 - 33.0 pg    MCHC 33.6 31.5 - 36.5 g/dL    RDW 14.4 10.0 - 15.0 %    Platelet Count 47 (LL) 150 - 450 10e9/L    Diff Method Automated Method     % Neutrophils 47.2 %    % Lymphocytes 26.0 %    % Monocytes 11.3 %    % Eosinophils 14.3 %    % Basophils 0.4 %    % Immature Granulocytes 0.8 %    Nucleated RBCs 0 0 /100    Absolute Neutrophil 1.3 (L) 1.6 - 8.3 10e9/L    Absolute Lymphocytes 0.7 (L) 0.8 - 5.3 10e9/L    Absolute Monocytes 0.3 0.0 - 1.3 10e9/L    Absolute  Eosinophils 0.4 0.0 - 0.7 10e9/L    Absolute Basophils 0.0 0.0 - 0.2 10e9/L    Abs Immature Granulocytes 0.0 0 - 0.4 10e9/L    Absolute Nucleated RBC 0.0     Platelet Estimate Confirming automated cell count    Comprehensive metabolic panel    Collection Time: 12/06/19  7:15 AM   Result Value Ref Range    Sodium 137 133 - 144 mmol/L    Potassium 3.8 3.4 - 5.3 mmol/L    Chloride 107 94 - 109 mmol/L    Carbon Dioxide 24 20 - 32 mmol/L    Anion Gap 6 3 - 14 mmol/L    Glucose 116 (H) 70 - 99 mg/dL    Urea Nitrogen 15 7 - 30 mg/dL    Creatinine 1.09 0.66 - 1.25 mg/dL    GFR Estimate 71 >60 mL/min/[1.73_m2]    GFR Estimate If Black 83 >60 mL/min/[1.73_m2]    Calcium 8.8 8.5 - 10.1 mg/dL    Bilirubin Total 0.7 0.2 - 1.3 mg/dL    Albumin 3.6 3.4 - 5.0 g/dL    Protein Total 7.1 6.8 - 8.8 g/dL    Alkaline Phosphatase 51 40 - 150 U/L    ALT 36 0 - 70 U/L    AST 13 0 - 45 U/L

## 2020-04-24 NOTE — H&P ADULT - NSICDXPASTMEDICALHX_GEN_ALL_CORE_FT
PAST MEDICAL HISTORY:  BPH (benign prostatic hyperplasia)     CAD (coronary artery disease)     CKD (chronic kidney disease), stage III     Hyperlipidemia     Hypertension     Pleural effusion     Spinal stenosis     SSS (sick sinus syndrome)

## 2020-04-24 NOTE — CONSULT NOTE ADULT - PROBLEM SELECTOR RECOMMENDATION 9
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 Heart disease and CKD and Anemia  prognosis grim  labs and imaging reviewed -  may have infectious component to the clinical picture and eff - eg. empyema

## 2020-04-24 NOTE — ED ADULT NURSE REASSESSMENT NOTE - NS ED NURSE REASSESS COMMENT FT1
Repeat lactate collected and sent to the lab at this time. Awaiting results and bed assignment. Safety maintained.

## 2020-04-24 NOTE — H&P ADULT - PROBLEM SELECTOR PLAN 10
Heparin for DVT ppx  DASH/TLC diet  Ambulate with cane  IMPROVE VTE Individual Risk Assessment        RISK                                                          Points  [  ] Previous VTE                                                3  [  ] Thrombophilia                                             2  [  ] Lower limb paralysis                                   2        (unable to hold up >15 seconds)    [ x ] Current Cancer                                            2         (within 6 months)  [  ] Immobilization > 24 hrs                              1  [  ] ICU/CCU stay > 24 hours                            1  [ x ] Age > 60                                                    1    IMPROVE VTE Score _____3____ Will hold pharm dvt ppx as patient just had chest tube placed and can consider starting tomorrow  DASH/TLC diet  Ambulate with cane  IMPROVE VTE Individual Risk Assessment        RISK                                                          Points  [  ] Previous VTE                                                3  [  ] Thrombophilia                                             2  [  ] Lower limb paralysis                                   2        (unable to hold up >15 seconds)    [ x ] Current Cancer                                            2         (within 6 months)  [  ] Immobilization > 24 hrs                              1  [  ] ICU/CCU stay > 24 hours                            1  [ x ] Age > 60                                                    1    IMPROVE VTE Score _____3____

## 2020-04-24 NOTE — PROVIDER CONTACT NOTE (CHANGE IN STATUS NOTIFICATION) - SITUATION
patient provided dinner tray. patient reports disinterest in eating solids. patient requesting apple juice. patient assisted and began coughing. oxygen saturation dropping on room air from 96-98% to 84-86%. patient repositioned and started on nonrebreather mask but some improvement

## 2020-04-24 NOTE — ED ADULT NURSE NOTE - NSFALLRSKUNASSIST_ED_ALL_ED
Hospitalist Progress Note


Assessment/Plan: 





1. RUL infiltrate- possible aspiration PNA


-IV abx, pip/breann, Bd Cx pending, flu neg


-SLP eval, swallow study


-pulm toilet as able


-O2 support as needed





2. Bradycardia


-hold b blockers


-tsh nl


-difficult to know if symptomatic 





3. Dementia with possible acute worsening/encephalopathy due to infection/

possible dehydration


-hopefully family will come in to help with assessment of baseline





4. Deaf


-appreciate interpretors availabilty





5. Hypernatremia


-mild, gentle IF hydration





6. L wrist fracture


-in splint


-needs surgical repair per ortho when stable (as outpt)





DISPO > 2 mdnts due to multiple med co-morbids, IV abx, assessment of feeding/

risks


FULL CODE- AD scanned in


DVT prophy- lovenox


PCP


[facility] [place] resident but concern from family re care, CM assisting


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 Sleeping most of 

day (I checked on him several times) but awakens with gentle nudge. Unable to 

answer questions (unsure re baseline with dementia). Did eat per nursing.


Objective: 


 Vital Signs











Temp Pulse Resp BP Pulse Ox


 


 97.2 F   43 L  8 L  104/53 L  96 


 


 01/08/18 07:52  01/08/18 07:52  01/08/18 07:52  01/08/18 07:52  01/08/18 07:52








 Laboratory Results





 01/08/18 04:20 





 01/08/18 04:20 





 











 01/06/18 01/07/18 01/08/18





 11:59 11:59 11:59


 


Intake Total   850


 


Balance   850














- Time Spent With Patient


Time Spent with Patient: greater than 35 minutes


Time Spent with Patient: Greater than 35 minutes spent on this patients care, 

greater than 50% of time spent counseling, educating, and coordinating care 

regarding the above mentioned plan.





- Pending Discharge


Pending Discharge Within 48 Hours: No





- Physical Exam


Constitutional: unkempt, cachectic, other (sleeping but arouses easily)


Ears, Nose, Mouth, Throat: dry mucous membranes


Cardiovascular: regular rate and rhythym, tachycardia


Respiratory: no rales or rhonchi, reduced air movement, other (wet cough), No 

expiratory wheeze, No respiratory distress


Gastrointestinal: normoactive bowel sounds, soft, non-tender abdomen, No 

guarding, No rebound


Skin: warm


Musculoskeletal: other (JACOB)


Psychiatric: agitated, other (dementia, deaf)





ICD10 Worksheet


Patient Problems: 


 Problems











Problem Status Onset


 


Left wrist fracture Acute  


 


Pneumonia Acute  


 


TUSHAR (acute kidney injury) Acute  


 


Fall Acute  


 


Generalized weakness Acute  


 


Pyelonephritis, acute Acute no

## 2020-04-24 NOTE — H&P ADULT - PROBLEM SELECTOR PLAN 2
Plan as above Concern for underlying emypema or PNA, will start on vancomycin and zosyn and monitor labs. F/u cultures and vanco trough.   -discussed with patients son via phone

## 2020-04-24 NOTE — ED PROVIDER NOTE - CLINICAL SUMMARY MEDICAL DECISION MAKING FREE TEXT BOX
BIBA due to SOB. hx of pleural effusion. Recent admission due to pleural effusion. VATS done by Dr. Pastrana. Hypoxic on room air to low 80s. Mild respiratory distress, (+) accessory muscle use. Non-rebreather placed. Plan includes labs, EKG, CXR BIBA due to SOB. hx of pleural effusion. Recent admission due to pleural effusion. VATS done by Dr. Pastrana. Hypoxic on room air to low 80s. Mild respiratory distress, (+) accessory muscle use. Non-rebreather placed. Plan includes labs, EKG, CXR, chest tube to be placed Morrison: BIBA due to SOB. hx of pleural effusion. Recent admission due to pleural effusion. VATS done by Dr. Pastrana. Hypoxic on room air to low 80s. Mild respiratory distress, (+) accessory muscle use. Non-rebreather placed. Plan includes labs, EKG, CXR, chest tube placed in the R chest, draining black liquid.

## 2020-04-24 NOTE — H&P ADULT - PROBLEM SELECTOR PLAN 3
As per son and patient, DNR/DNI  MOLST form completed   Palliative care team consulted  Social work contacted for home hospice evaluation, per HCP wishes

## 2020-04-24 NOTE — ED PROVIDER NOTE - PROGRESS NOTE DETAILS
Discussed acuity of patient care with Dr. Pastrana. Reports pt had an effusion in which was loculated. notes she is currently scrubbed and unavailable. reporting poor prognosis and recommended the discussion of Hospice/DNR/DNI. Dr. Morrison discussed with patient son who is requesting hospice and pt currently DNR/DNI. Chest tube placed by Dr. Morrison, 650 cc blood drained so far. Significant improvement noted. No tachycardia, decreased accessory muscle use. Discussed with  Josseline for hospice care

## 2020-04-24 NOTE — ED ADULT NURSE NOTE - NURSING SKIN WOUND TYPE #1
Detail Level: Detailed Quality 110: Preventive Care And Screening: Influenza Immunization: Influenza immunization was not ordered or administered, reason not given Quality 130: Documentation Of Current Medications In The Medical Record: Current Medications Documented Quality 226: Preventive Care And Screening: Tobacco Use: Screening And Cessation Intervention: Patient screened for tobacco use and is an ex/non-smoker abrasion

## 2020-04-24 NOTE — H&P ADULT - ATTENDING COMMENTS
Agree with the above. Discussed with patients son Dr. Marc Behar who is also a physician. Discussed initiation of abx and eval's by CT surg and pulmonology. YAMELST filled out and used verbal consent from son.

## 2020-04-24 NOTE — H&P ADULT - NSHPPHYSICALEXAM_GEN_ALL_CORE
T(F): 99.7 (04-24-20 @ 14:00)  HR: 99 (04-24-20 @ 15:35)  BP: 118/56 (04-24-20 @ 15:35)  RR: 18 (04-24-20 @ 15:35)  SpO2: 99% (04-24-20 @ 15:35)    Physical Exam:  General: elderly, NAD  Neck: Supple, nontender, no mass  Neurology: A&Ox3, nonfocal   Respiratory: diminished BS on right, coarse BS  CV: RRR, +S1/S2, no murmurs, rubs or gallops, chest tube in place over right side  Abdominal: Soft, NT, ND +BSx4  Extremities: +1 edema t oshins right lower ext, + peripheral pulses  MSK: Normal ROM, no joint erythema or warmth, no joint swelling   Skin: warm, dry, normal color, no rash T(C): 36.7 (04-24-20 @ 18:20), Max: 37.6 (04-24-20 @ 14:00)  HR: 64 (04-24-20 @ 18:20) (64 - 135)  BP: 120/58 (04-24-20 @ 18:20) (90/54 - 120/58)  RR: 16 (04-24-20 @ 18:20) (16 - 34)  SpO2: 98% (04-24-20 @ 18:20) (95% - 99%)  Wt(kg): --    Physical Exam:   GENERAL: well-groomed, well-developed, NAD  HEENT: head NC/AT; EOM intact, conjunctiva & sclera clear; hearing grossly intact, moist mucous membranes  NECK: supple, no JVD  RESPIRATORY: chest tube in place, decreased breath sounds b/l  CARDIOVASCULAR: S1&S2, RRR, no murmurs or gallops  ABDOMEN: soft, non-tender, non-distended, + Bowel sounds x4 quadrants, no guarding, rebound or rigidity  MUSCULOSKELETAL:  no clubbing, cyanosis or edema of all 4 extremities  LYMPH: no cervical lymphadenopathy  VASCULAR: Radial pulses 2+ bilaterally, no varicose veins   SKIN: warm and dry, color normal  NEUROLOGIC: AA&O X3, CN2-12 intact w/ no focal deficits, no sensory loss, motor Strength 5/5 in UE & LE B/L  Psych: Normal mood and affect, normal behavior

## 2020-04-24 NOTE — H&P ADULT - PROBLEM SELECTOR PLAN 7
Hold home Pravastatin, Welchol, as neither are formulary  Patient is allergic to multiple statin medications, will not start alternative therapy at this time  Will encourage family to bring in home medications if possible

## 2020-04-24 NOTE — H&P ADULT - HISTORY OF PRESENT ILLNESS
88yo male with pmhx BPH, CAD, CKD, HLD, HTN, spinal stenosis, SSS, recurrent pleural effusion, presents with c/o SOB. Patient was recently admitted to [facility] for SOB, and found to have a large right sided pleural effusion, s/p VATS on 3/19/20. Of note, patient states he tested negative for COVID 2-3 weeks ago.       In the ED, patient endorsed SOB. Patient vitals were T 99.7, , /57, RR 34, SpO2 95% on NRB. CBC exhibited WBC 17.79, Hgb 13.8, Hct 44.4, Plt 473, NLR 13.25. CMP exhibited Na 142, K 5.1, Cl 110, CO2 15, BUN 39, Cr 2.2, Gluc 178. Albumin found to be 2.3. Lactate was found to be 5.8. Procalcitonin 0.28. ProBNP was found to be 2107. CXR exhibited interval increased right mid to lower lung opacification, which may represent a combination of pleural effusion, atelectasis, and/or pneumonia. ED attending consulted CT surgery Dr. Pastrana.  ED attending Dr. Morrison placed thoracostomy tube in the right pleural space, draining 650cc bloody fluid. Follow up repeat CXR exhibited right chest tube with small right base pneumothorax. Patient vitals after chest tube were documented to be , BP 94/53, RR 18, SpO2 99% on 10L NRB. Initial EKG exhibited atrial fibrillation with RVR at 133. Patient was administered NS 1000cc IV bolus, Morphine 2mg IV x 1. 86yo male with pmhx BPH, CAD, CKD, HLD, HTN, spinal stenosis, SSS, recurrent pleural effusion, presents with c/o SOB. Patient was recently admitted to [facility] for SOB, and found to have a large right sided pleural effusion, s/p VATS on 3/19/20. 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 Of note, patient states he tested negative for COVID 2-3 weeks ago. Patient states he has felt increasingly short of breath for the past 2 weeks. As per son, patient fell out of bed today, concerning his daughter and prompting an evaluation at [facility] ED.      In the ED, patient endorsed SOB. Patient vitals were T 99.7, , /57, RR 34, SpO2 95% on NRB. CBC exhibited WBC 17.79, Hgb 13.8, Hct 44.4, Plt 473, NLR 13.25. CMP exhibited Na 142, K 5.1, Cl 110, CO2 15, BUN 39, Cr 2.2, Gluc 178. Albumin found to be 2.3. Lactate was found to be 5.8. Procalcitonin 0.28. ProBNP was found to be 2107. CXR exhibited interval increased right mid to lower lung opacification, which may represent a combination of pleural effusion, atelectasis, and/or pneumonia. ED attending consulted CT surgery Dr. Pastrana.  ED attending Dr. Morrison placed thoracostomy tube in the right pleural space, draining 650cc bloody fluid. Follow up repeat CXR exhibited right chest tube with small right base pneumothorax. Patient vitals after chest tube were documented to be , BP 94/53, RR 18, SpO2 99% on 10L NRB. Initial EKG exhibited atrial fibrillation with RVR at 133. Follow up EKG exhibited wide QRS complexes with occasional PVCs and RBBB at 104. Patient was administered NS 1000cc IV bolus, Morphine 2mg IV x 1. 88yo male with pmhx BPH, CAD, CKD, HLD, HTN, spinal stenosis, SSS, recurrent pleural effusion, presents with c/o progressive SOB. [facility], and found to have a large right sided pleural effusion, s/p VATS on 3/19/20. 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 Of note, patient states he tested negative for COVID 2-3 weeks ago. Patient states he has felt increasingly short of breath for the past 2 weeks. As per son, patient fell out of bed today, concerning his daughter and prompting an evaluation at [facility] ED.      In the ED, patient endorsed SOB. Patient vitals were T 99.7, , /57, RR 34, SpO2 95% on NRB. CBC exhibited WBC 17.79, Hgb 13.8, Hct 44.4, Plt 473, NLR 13.25. CMP exhibited Na 142, K 5.1, Cl 110, CO2 15, BUN 39, Cr 2.2, Gluc 178. Albumin found to be 2.3. Lactate was found to be 5.8. Procalcitonin 0.28. ProBNP was found to be 2107. CXR exhibited interval increased right mid to lower lung opacification, which may represent a combination of pleural effusion, atelectasis, and/or pneumonia. ED attending consulted CT surgery Dr. Pastrana.  ED attending Dr. Morrison placed thoracostomy tube in the right pleural space, draining 650cc bloody fluid. Follow up repeat CXR exhibited right chest tube with small right base pneumothorax. Patient vitals after chest tube were documented to be , BP 94/53, RR 18, SpO2 99% on 10L NRB. Initial EKG exhibited atrial fibrillation with RVR at 133. Follow up EKG exhibited wide QRS complexes with occasional PVCs and RBBB at 104. Patient was administered NS 1000cc IV bolus, Morphine 2mg IV x 1. 86yo male with pmhx BPH, CAD, CKD, HLD, HTN, spinal stenosis, SSS, recurrent pleural effusion, presents with c/o progressive SOB. Patient was recently admitted to [facility] for SOB, and found to have a large right sided pleural effusion, s/p VATS on 3/19/20. 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 Of note, patient states he tested negative for COVID 2-3 weeks ago. Patient states he has felt increasingly short of breath for the past 2 weeks. As per son, patient fell out of bed today, concerning his daughter and prompting an evaluation at [facility] ED.       In the ED, patient endorsed SOB. Patient vitals were T 99.7, , /57, RR 34, SpO2 95% on NRB. CBC exhibited WBC 17.79, Hgb 13.8, Hct 44.4, Plt 473, NLR 13.25. CMP exhibited Na 142, K 5.1, Cl 110, CO2 15, BUN 39, Cr 2.2, Gluc 178. Albumin found to be 2.3. Lactate was found to be 5.8. Procalcitonin 0.28. ProBNP was found to be 2107. CXR exhibited interval increased right mid to lower lung opacification, which may represent a combination of pleural effusion, atelectasis, and/or pneumonia. ED attending consulted CT surgery Dr. Pastrana.  ED attending Dr. Morrison placed thoracostomy tube in the right pleural space, draining 650cc bloody fluid. Follow up repeat CXR exhibited right chest tube with small right base pneumothorax. Patient vitals after chest tube were documented to be , BP 94/53, RR 18, SpO2 99% on 10L NRB. Initial EKG exhibited atrial fibrillation with RVR at 133. Follow up EKG exhibited wide QRS complexes with occasional PVCs and RBBB at 104. Patient was administered NS 1000cc IV bolus, Morphine 2mg IV x 1.

## 2020-04-24 NOTE — H&P ADULT - ASSESSMENT
88yo male with pmhx BPH, CAD, CKD, HLD, HTN, spinal stenosis, SSS, recurrent malignant pleural effusion, presents with c/o SOB, found to have increased right mid to lower lung opacification representing pleural effusion, s/p chest tube in ED draining 650cc, admitted for respiratory distress secondary to pleural effusion and evaluation for home hospice.

## 2020-04-24 NOTE — H&P ADULT - PROBLEM SELECTOR PLAN 5
SHRUTHI on CKD  Cr 2.2, BUN 39  Baseline Cr 1.6, BUN 20  s/p NS 1000cc bolus in ED  Encourage PO hydration   monitor fluid status SHRUTHI on CKD  Cr 2.2, BUN 39  Baseline Cr 1.6, BUN 20  s/p NS 1000cc bolus in ED  Encourage PO hydration   monitor fluid status  will continue lasix to avoid fluid overload and reaccumulation of pleural effusion

## 2020-04-24 NOTE — H&P ADULT - NSHPSOCIALHISTORY_GEN_ALL_CORE
Patient denies smoking hx  Patient denies illicit drug use  Patient denies etoh use  Regular diet, does not eat pork  Ambulates with cane  Lives in [place] with daughter  retired, former

## 2020-04-24 NOTE — H&P ADULT - PROBLEM SELECTOR PLAN 4
Chronic  c/w home labetalol, diltiazem with hold parameters  c/w home furosemide, monitor lower ext swelling  monitor fluid status

## 2020-04-24 NOTE — ED ADULT NURSE REASSESSMENT NOTE - NS ED NURSE REASSESS COMMENT FT1
MD called to bedside due to hypotension, change in mental status - now not responsive to verbal or tactile stimuli, and hypoxia on nonrebreather mask. ICU bedside. Son contacted for plan of care and to be made aware. Patient began agonally breathing. No pulses. Patient  21:33. Pronounced by MD Cross. Patient DNR/DNI, MOLST in chart. Son Marc Behar made aware. Admitting made aware. Organ donation called, not a candidate.

## 2020-06-02 ENCOUNTER — APPOINTMENT (OUTPATIENT)
Dept: ELECTROPHYSIOLOGY | Facility: CLINIC | Age: 85
End: 2020-06-02

## 2020-09-09 NOTE — PROVIDER CONTACT NOTE (OTHER) - ACTION/TREATMENT ORDERED:
Formerly [facility], later [facility]  Pulmonology   Consult Note    Inpatient consult to Pulmonology  Consult performed by: Ovidio Estrada MD  Consult ordered by: Sara Jones MD  Reason for consult: aspiration vs atypical pneumonia; pt discharged 1 week ago and now is back  Assessment/Recommendations: Acute on chronic hypoxemic respiratory failure secondary to mycoplasma pneumonia:  -  Transition to macrolide monotherapy.  -  Diuresis today.  -  Echo.  -  Stop CS.  -  Continue frequent aerosolized BDs.  -  Titrate supplemental oxygen to maintain SpO2  > 88%.         Subjective     Chief Complaint   Patient presents with    Shortness of Breath    Cough      History of Present Illness:  Ms. Venkat Lara is a 70 year old lady with a history of GERD, bronchiectasis, and COPD (no spirometry available for confirmation), who was in her USOH until approximately 48 weeks ago when she developed progressive exertional dyspnea and more persistent chronic cough.  She initially sought out her Pulmonologist who placed her on a course of oral ABx for a bronchiectasis flare.  Unfortunately, her symptoms continued to progress and she also developed associated hypoxemia.  Her symptom progression in combination with developing hypoxemia prompted her to come to the [facility] ED at the end of last month.  She was found to be in acute hypoxemic respiratory failure and after receiving a couple of days of ABx and systemic CS, her hypoxemia improved and she was discharged home last week.  Unfortunately, her symptoms rapidly recurred, as did her hypoxemia.  This prompted her to come back to the [facility] ED last night, where again, she was found to be in acute hypoxemic respiratory failure.  Hospital medicine was consulted, started her back on ABx/methylprednisolone and Pulmonology was consulted for the above stated reasons.  When I examined Ms. Lara this morning she was resting comfortably, sitting up in bed eating a salad and speaking in complete sentences  on HFNC @ 50% / 10 LPM with an SpO2 of 88%.  She reports some subjective improvement since admission but is still very short of breath with any activity.  Aside from the above mentioned symptoms, she denies any sputum production, pleurisy, fevers, orthopnea or LE edema.     Past Medical History:   Diagnosis Date    Colon polyp 01/01/2013    COPD, moderate 01/01/2010    was told by Dr. 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    Lumbar spinal stenosis     Oxygen dependent 01/01/2011    2L NC Nightly    Primary osteoarthritis of first carpometacarpal joints, bilateral     Rheumatoid arthritis     Scoliosis 01/01/2014    Spinal stenosis 01/01/2014    upper and lowerr back - tingling in left arm at times     Past Surgical History:   Procedure Laterality Date    BREAST SURGERY  2008    Reduction    BUNIONECTOMY Bilateral 01/1999    CARDIOVASCULAR STRESS TEST  2013    CARPAL TUNNEL RELEASE Left 10/08/2015    CATARACT EXTRACTION BILATERAL W/ ANTERIOR VITRECTOMY  04/2010    CMC ARTHROPLASTY, RIGHT  02/23/2004    COLONOSCOPY  04/18/2013    CORRECTION OF HAMMER TOE Left 8/14/2019    Procedure: CORRECTION, HAMMER TOE;  Surgeon: Jackson Truong DPM;  [facility];  Service: Podiatry;  Laterality: Left;    ESOPHAGOGASTRODUODENOSCOPY  12/21/2012    2012-with dilation #1, 2013 #2    HAND SURGERY  2003    JOINT REPLACEMENT      REMOVAL OF HARDWARE FROM LOWER EXTREMITY Left 8/14/2019    Procedure: REMOVAL, HARDWARE, LOWER EXTREMITY;  Surgeon: Jackson DE LA O  SAE Brannon;  Location: [facility] OR;  Service: Podiatry;  Laterality: Left;    RHINOPLASTY TIP  2010    SURGICAL REMOVAL OF BUNION WITH OSTEOTOMY OF METATARSAL BONE Left 2019    Procedure: BUNIONECTOMY, WITH METATARSAL OSTEOTOMY;  Surgeon: Jackson Brannon DPM;  Location: [facility] OR;  Service: Podiatry;  Laterality: Left;  Arthrodesis 2nd PIP joint, screw 1st toe, C-arm, synthes-Gaby, 3.0, 4.0, AO modular set GABY BOY NOTIFIED    TONSILLECTOMY, ADENOIDECTOMY  1970    TOTAL KNEE ARTHROPLASTY Right 2008    TOTAL KNEE ARTHROPLASTY Left 2015    TOTAL REDUCTION MAMMOPLASTY  2008    TUBAL LIGATION      VAGINAL DELIVERY  1983    x2     Family History   Problem Relation Age of Onset    Cancer Father         colon      Social History     Tobacco Use    Smoking status: Former Smoker     Packs/day: 2.00     Years: 33.00     Pack years: 66.00     Types: Cigarettes     Quit date: 1997     Years since quittin.3    Smokeless tobacco: Former User     Quit date: 1997   Substance and Sexual Activity    Alcohol use: Yes     Alcohol/week: 4.0 standard drinks     Types: 4 Glasses of wine per week     Comment: monthly    Drug use: Never    Sexual activity: Not on file      Allergies:  Statins-hmg-coa reductase inhibitors, Doxycycline, Adhesive, and Erythromycin     Facility-Administered Medications as of 2020   Medication Route Frequency    acetaminophen tablet 650 mg Oral Q8H PRN    albuterol inhaler 2 puff Inhalation Q6H PRN    albuterol nebulizer solution 2.5 mg Nebulization Q4H PRN    albuterol-ipratropium 2.5 mg-0.5 mg/3 mL nebulizer solution 3 mL Nebulization Q4H PRN    buPROPion TB24 tablet 300 mg Oral QHS    cholecalciferol (vitamin D3) 125 mcg (5,000 unit) tablet 5,000 Units Oral Daily    dextrose 50% injection 12.5 g Intravenous PRN    dextrose 50% injection 25 g Intravenous PRN    enoxaparin injection 40 mg Subcutaneous Q24H    fluticasone furoate-vilanteroL 100-25  mcg/dose diskus inhaler 1 puff Inhalation Daily    [COMPLETED] furosemide injection 40 mg Intravenous Once    gabapentin capsule 300 mg Oral QHS    glucagon (human recombinant) injection 1 mg Intramuscular PRN    glucose chewable tablet 16 g Oral PRN    glucose chewable tablet 24 g Oral PRN    lactobacillus acidophilus & bulgar 100 million cell packet 1 each Oral Daily    [COMPLETED] levoFLOXacin 750 mg/150 mL IVPB 750 mg Intravenous ED 1 Time    [COMPLETED] magnesium oxide tablet 800 mg Oral Once    melatonin tablet 6 mg Oral Nightly PRN    morphine injection 4 mg Intravenous Q4H PRN    multivitamin tablet Oral Daily    olopatadine 0.1 % ophthalmic solution 1 drop Both Eyes BID    ondansetron injection 4 mg Intravenous Q8H PRN    oxybutynin 24 hr tablet 5 mg Oral Daily    pantoprazole EC tablet 40 mg Oral Before breakfast    [COMPLETED] piperacillin-tazobactam 3.375 g in dextrose 5 % 50 mL IVPB (ready to mix system) Intravenous ED 1 Time    piperacillin-tazobactam 3.375 g in dextrose 5 % 50 mL IVPB (ready to mix system) Intravenous Q8H    polyethylene glycol packet 17 g Oral BID PRN    prochlorperazine injection Soln 5 mg Intravenous Q6H PRN    sodium chloride 0.9% flush 10 mL Intravenous PRN    telmisartan tablet 40 mg Oral Daily    tiotropium inhalation capsule 18 mcg Inhalation Daily    verapamiL CR tablet 240 mg Oral BID    zolpidem tablet 5 mg Oral Nightly PRN     Outpatient Medications as of 9/9/2020   Medication    albuterol (PROAIR HFA) 90 mcg/actuation HFAA    albuterol (PROVENTIL) 2.5 mg /3 mL (0.083 %) nebulizer solution    buPROPion (WELLBUTRIN XL) 300 MG 24 hr tablet    co-enzyme Q-10 30 mg capsule    cyanocobalamin (VITAMIN B-12) 1000 MCG tablet    eszopiclone (LUNESTA) 2 MG Tab    garlic 1,000 mg Cap    lactobacillus acidophilus & bulgar (LACTINEX) 100 million cell packet    multivitamin with minerals tablet    MYRBETRIQ 25 mg Tb24 ER tablet    omeprazole (PRILOSEC)  40 MG capsule    telmisartan (MICARDIS) 40 MG Tab    tiotropium-olodaterol (STIOLTO RESPIMAT) 2.5-2.5 mcg/actuation Mist    verapamil (VERELAN) 240 MG C24P    vitamin D (VITAMIN D3) 1000 units Tab    ibuprofen 200 mg Cap    olopatadine (PATANOL) 0.1 % ophthalmic solution      Review of Systems   Constitutional: Negative for fever, chills, weight loss, weight gain and night sweats.   HENT: Negative for postnasal drip, rhinorrhea, sinus pressure, trouble swallowing, voice change and congestion.    Eyes: Negative for redness and itching.   Respiratory: Positive for cough, choking, shortness of breath, previous hospitalization due to pulmonary problems, dyspnea on extertion and use of rescue inhaler. Negative for sputum production, chest tightness, wheezing, orthopnea, asthma nighttime symptoms and pleurisy.    Cardiovascular: Negative for chest pain, palpitations and leg swelling.   Genitourinary: Negative for difficulty urinating and hematuria.   Endocrine: Negative for polydipsia, polyphagia and polyuria.    Musculoskeletal: Negative for gait problem, joint swelling and myalgias.   Skin: Negative for rash.   Gastrointestinal: Positive for acid reflux. Negative for nausea, vomiting and abdominal pain.   Neurological: Negative for syncope, weakness and headaches.   Hematological: Negative for adenopathy. Excessive bruising. Does not bruise/bleed easily.   Psychiatric/Behavioral: Negative for confusion and sleep disturbance. The patient is not nervous/anxious.    All other systems reviewed and are negative.     I have personally reviewed the following: active problem list, medication list, allergies, family history, social history, health maintenance, notes from last encounter, lab results, endoscopy procedure notes, imaging and nursing/provider documentation .    Objective     VS: Temp:  [97.9 °F (36.6 °C)-98.4 °F (36.9 °C)]   Pulse:  [74-92]   Resp:  [14-28]   BP: ()/(51-70)   SpO2:  [91 %-98 %]     Spoke w/ Alison from Doctor service "Estimated body mass index is 27.02 kg/m² as calculated from the following:    Height as of this encounter: 5' 6" (1.676 m).    Weight as of this encounter: 75.9 kg (167 lb 6.4 oz).   Ideal body weight: 59.3 kg (130 lb 11.7 oz)  Adjusted ideal body weight: 66 kg (145 lb 6.4 oz)   I/O:   Intake/Output Summary (Last 24 hours) at 9/9/2020 1414  Last data filed at 9/9/2020 0800  Gross per 24 hour   Intake 550 ml   Output --   Net 550 ml        Vent: SpO2 (!) 91% on HFNC @ 10 LPM / 50% FiO2   PE Physical Exam   Constitutional: She is oriented to person, place, and time. She appears well-developed and well-nourished. She appears not cachectic. She is cooperative.  Non-toxic appearance. No distress. Nasal cannula in place. She is not obese.   HENT:   Head: Normocephalic.   Right Ear: External ear normal.   Left Ear: External ear normal.   Nose: Nose normal.   Mouth/Throat: Oropharynx is clear and moist. Normal dentition. Mallampati Score: I.   Neck: Normal range of motion. No JVD present. No thyromegaly present.   Cardiovascular: Normal rate, regular rhythm, normal heart sounds and intact distal pulses.   No murmur heard.  Pulmonary/Chest: Normal expansion, symmetric chest wall expansion and effort normal. No tachypnea. She has no decreased breath sounds. She has no wheezes. She has no rhonchi. She has rales. Chest wall is not dull to percussion. She exhibits no tenderness.   Abdominal: Soft. Bowel sounds are normal. She exhibits no distension and no mass. There is no abdominal tenderness.   Musculoskeletal: Normal range of motion.         General: No tenderness or edema.   Lymphadenopathy: No supraclavicular adenopathy is present.     She has no cervical adenopathy.     She has no axillary adenopathy.   Neurological: She is alert and oriented to person, place, and time. No cranial nerve deficit.   Skin: Skin is warm and dry. No rash noted. She is not diaphoretic.   Psychiatric: She has a normal mood and affect. Her " behavior is normal. Thought content normal.   Nursing note and vitals reviewed.       Recent Diagnostic Studies:  Labs I have personally reviewed and interpreted all recent labs / diagnostic studies, and noted the following relevant results:  All pertinent labs within the past 24 hours have been reviewed.  Recent Labs   Lab 20  1920 20  0616   WBC  --  4.64   RBC  --  3.70*   HGB  --  11.9*   HCT  --  36.2*   PLT  --  256   MCV  --  98   MCH  --  32.2*   MCHC  --  32.9   NA  --  134*   K  --  4.5   CL  --  97   CO2  --  25   BUN  --  17   CREATININE  --  0.7   MG  --  2.1   TROPONINI <0.030  --      BNP:  143  CPK:  145  CRP:  13.57  LDH:  278  Lactate:  1.2  PCT:   0.08  COVID-19 RNA Amplification:  Negative  U/A:  Negative  AB.392 / 43 / 55 / 26.4     Imaging I have personally reviewed and interpreted all available images and reviewed the associated Radiology reports.  My personal impression of the relevant studies is as follows:  I have reviewed and interpreted all pertinent imaging results/findings within the past 24 hours.    CXR:  Radiology Report:  Mild scattered diffuse interstitial opacities in both lungs, new from the previous exam suggesting mild atypical infection/pneumonia given the clinical history.  My impression:  Diffuse / bilateral ground lass opacities with interstitial prominence.  DDx includes atypical infection but certainly not the only possibility.  Others include cardiogenic pulmonary edema, pneumonitis, or ILD    CTA Chest:  2020:    Radiology Report:  1. No evidence of pulmonary embolism to the segmental arterial level. If there is continued clinical concern, consider further evaluation with lower extremity doppler.  2. Heterogeneous foamy debris opacifying the dependent lower lobeairways in keeping with aspiration.  3. Mild basilar predominant bronchiectasis.  4. Mild upper lobe predominant mosaic groundglass attenuation suggesting mild atypical  infection/pneumonia.  My impression:  Upper lobe predominant GGOs with basilar atelectasis and debris within the airways.     EK2020:  Normal sinus rhythm.  Low voltage QRS.     Micro I have personally reviewed and interpreted the available culture data.  Relevant results are as follows.  Blood Culture   Lab Results   Component Value Date    LABBLOO No Growth to date 2020   , Sputum Culture   Lab Results   Component Value Date    GSRESP >10 epithelial cells per low power field 2020    GSRESP Few WBC's 2020    GSRESP Many Gram positive cocci 2020    GSRESP Few Gram positive rods 2020    GSRESP Rare Gram negative rods 2020    GSRESP  2020     Predominance of oropharyngeal jennifer. Please recollect.    GSRESP  2020     Results called to and read back by: Martha Holloway RN on [facility] by SLOANE    GSRESP 2020  09:56 2020    RESPIRATORYC Normal respiratory jennifer 2020        Previous Diagnostic Studies:  I have personally reviewed and interpreted the following labs/studies/images from previous encounters:  · Mycoplasma pneumonia:  IgG positive/ IgM negative  · Chlamydia pneumonia:  IgG elevated / IgM not elevated.    Assessment       Active Hospital Problems    Diagnosis    *Primary atypical pneumonia due to Mycoplasma pneumoniae    Chronic pulmonary aspiration    Bronchiectasis without complication    Elevated brain natriuretic peptide (BNP) level    Chronic obstructive pulmonary disease with acute lower respiratory infection    Pneumonia of both lungs due to infectious organism    Essential hypertension    Acute on chronic respiratory failure with hypoxia      My Impression:  Acute on chronic hypoxemic respiratory failure secondary to mycoplasma pneumonia infection superimposed on bronchiectasis/COPD.    Plan     Pulmonary  · Transition to macrolide monotherapy.  · Diuresis today.  · Echo.  · Stop CS.  · Continue frequent aerosolized  BDs.  · Titrate supplemental oxygen to maintain SpO2  > 88%.       Thank you for your consult. I will follow-up with patient. Please contact us if you have any additional questions.    Ovidio Estrada MD  Pulmonary / Critical Care Medicine  [facility]

## 2020-12-02 NOTE — DISCHARGE NOTE PROVIDER - NSTOBACCOUSAGEY/N_GEN_A_CS
Here for uncontrolled pain and right foot swelling. Had right knee surgery yesterday, was prescribed percocet, last dose at 4:30 am but not helping with pain. ABCs intact.  
No

## 2021-06-14 NOTE — PROCEDURE NOTE - DRAINAGE DEVICE
Health Maintenance Due   Topic Date Due   • COVID-19 Vaccine (1) Never done       Patient is up to date, no discussion needed.        
No complaints.  Denies bleeding, leaking, cramping.  Normal ultrasound today.  1 hour/hemoglobin next visit  
Pleurevac

## 2022-02-23 NOTE — PROGRESS NOTE ADULT - ASSESSMENT
Wrap both for now   Let me knw Monday how they are looking I may want to see him next week to recheck 87 y/o M with PMH of CKD3 (baseline SCr 1.8-2.0), HTN, CAD, HLD, BPH, SSS/SVT s/p PPM, h/o right pleural effusions and PTX s/p VATS decortication, pleurodesis 5/2016, spinal stenosis s/p lumbar fusion presents to ED with complaint of cough with renal evaluation of CKD.    1. =CKD3 - baseline SCr 1.8-2.0,   - Outpatient follow up with Dr. Pastrana  - encourage po hydration  - not on any nephrotoxic meds  -May not need nahc03 TID, defer managment to Dr. Pastrana (stable serum values here)    2. HTN - controlled    3. Hydropneumothorax  -CTS/pulmonary managment

## 2022-05-17 NOTE — CONSULT NOTE ADULT - CONSULT REASON
Carmen SHORT [facility] Clinical Staff  Subject: Medication Problem     QUESTIONS   Name of Medication? pantoprazole (PROTONIX) 40 MG tablet   Patient-reported dosage and instructions? 40 mg 3 tabs po before dinner   What question or problem do you have with the medication? pt said she is   suppose to be getting 30 tabs, the script was called in today for 24 tab. pt would like a call back regarding this. [facility] [address] [facility]   [phone] [facility] [phone]   Pharmacy phone number (if available)? [phone]   Additional Information for Provider?   ---------------------------------------------------------------------------   --------------   [address]   What is the best way for the office to contact you? OK to leave message on   voicemail   Preferred Call Back Phone Number? [phone]   ---------------------------------------------------------------------------   --------------   SCRIPT ANSWERS   Relationship to Patient?  Self
Julieta SHORT [facility] Clinical Staff  Subject: Referral Request     QUESTIONS   Reason for referral request? [facility] gastro   Has the physician seen you for this condition before? No   Preferred Specialist (if applicable)? Do you already have an appointment scheduled? Additional Information for Provider? pt would like to have a referral sent   to [facility] gastro   ---------------------------------------------------------------------------   --------------   CALL BACK INFO   What is the best way for the office to contact you? OK to leave message on   voicemail   Preferred Call Back Phone Number? [phone]   ---------------------------------------------------------------------------   --------------   SCRIPT ANSWERS   Relationship to Patient?  Self
Pleural effusion
pleural eff  dyspnea  frailty  weakness

## 2022-05-17 NOTE — ED ADULT NURSE NOTE - NSFALLRSKASSESSTYPE_ED_ALL_ED
Pt medicated per MAR and set up with breakfast, pt remains calm and cooperative at this time. Sitter at bedside.   Initial (On Arrival)

## 2022-09-27 NOTE — ED ADULT NURSE NOTE - NS ED NURSE LEVEL OF CONSCIOUSNESS ORIENTATION
Subjective   Patient ID: Collin John is a 6 year old female.    Chief Complaint   Patient presents with   • Eye Problem     PINK EYE.  Both Eyes started 2 days ago       HPI     \"Collin John is accompanied by father  who is the primary historian for the patient.\"    Onset of Symptoms: 9/25/22  Description of symptoms : both eyes have been red and there has been eye drainage.  She has had a cough  X 3weeks; it has been daily, worse in the evening, a dry cough. She has nasal congestion and rhinorrhea x 2-3 weeks. Her appetite is less, her energy is low. Sleep at night is restless due to her congestion. Cough and congestion not getting better  No fevers.  No headaches  No v/d.  Went to [facility] 9/12- covid was negative and she was given prednisone for 3 days- dad said she was not given this  Remedies tried: tylenol for the fevers at onset for a few days; mucinex, humidifier in her room  COvid exposure/contacts: no  Sick contacts: mom is also sick  School/day care status: normally in school , not today    Also with some white patches on her cheeks        Collin John has no past medical history on file.  Collin John has Head injury, acute, without loss of consciousness; Eczema; Elevated blood lead level; Speech developmental delay; Parvovirus infection; Snores; Large tonsils; and Pediatric body mass index (BMI) of greater than or equal to 95th percentile for age on their problem list.  Collin John has no past surgical history on file.  Collin John   Current Outpatient Medications   Medication Sig Dispense Refill   • hydroCORTisone (CORTIZONE) 2.5 % ointment Apply thin layer 1-2 times daily to red, rough, itchy areas 454 g 1     No current facility-administered medications for this visit.     Collin John has No Known Allergies.    Review of Systems   Constitutional: Positive for activity change and appetite change.   HENT: Positive for congestion and rhinorrhea.    Eyes: Positive for discharge and redness.    Respiratory: Positive for cough.      Objective   Physical Exam  Vitals and nursing note reviewed. Exam conducted with a chaperone present.   Constitutional:       General: She is active.      Appearance: Normal appearance. She is well-developed.   HENT:      Right Ear: External ear normal. There is impacted cerumen.      Left Ear: External ear normal. There is impacted cerumen.      Nose: Congestion present.      Mouth/Throat:      Mouth: Mucous membranes are moist.      Pharynx: Oropharynx is clear. No oropharyngeal exudate or posterior oropharyngeal erythema.      Neck: Normal range of motion.   Eyes:      Extraocular Movements: Extraocular movements intact.      Conjunctiva/sclera: Conjunctivae normal.      Pupils: Pupils are equal, round, and reactive to light.      Comments: B/l sclera injected   Cardiovascular:      Rate and Rhythm: Normal rate and regular rhythm.      Pulses: Normal pulses. Pulses are strong.      Heart sounds: Normal heart sounds, S1 normal and S2 normal. No murmur heard.  Pulmonary:      Effort: Pulmonary effort is normal.      Breath sounds: Normal breath sounds.   Musculoskeletal:         General: Normal range of motion.   Lymphadenopathy:      Cervical: No cervical adenopathy.   Skin:     General: Skin is warm and dry.      Capillary Refill: Capillary refill takes less than 2 seconds.      Comments: Minor hypopigmented patches on her cheeks   Neurological:      General: No focal deficit present.      Mental Status: She is alert.     Ear Cerumen Removal    Date/Time: 9/27/2022 2:31 PM  Performed by: Bethany Joyner CNP  Authorized by: Bethany Joyner CNP     Consent:     Consent obtained:  Verbal    Consent given by:  Parent    Risks discussed:  Pain and incomplete removal  Universal protocol:     Procedure explained and questions answered to patient or proxy's satisfaction: yes      Patient identity confirmed:  Verbally with patient  Procedure details:     Location:  R ear and L ear     Procedure type: irrigation      Procedure outcomes: cerumen removed    Post-procedure details:     Inspection:  TM intact    Procedure completion:  Tolerated  Comments:      RTM was normal and LTM was retracted, erythmic       Assessment   Diagnoses and all orders for this visit:  Bilateral impacted cerumen  Pityriasis alba  Rhinosinusitis  Pink eye disease of both eyes  Other orders  -     polymyxin b-trimethoprim (POLYTRIM) 51324-8.1 UNIT/ML-% ophthalmic solution; 1-2 drops to each eye 3-4 times per day for 5-7 days  -     amoxicillin-clavulanate (AUGMENTIN) 400-57 MG/5ML suspension; Take 9.3 mLs by mouth in the morning and 9.3 mLs in the evening. Do all this for 10 days.  -     Ear Cerumen Removal       6 yr old F here for conjunctivitis and URI symptoms x 3 weeks, that are not improving and along with this she has low energy and appetite. She had fevers at onset but not anymore.  Will treat her for rhinosinusitis and also has a retracted LTM. Treat with usual dose augmentin bid x 10 days.        Start antibiotics twice daily for 10 days  It might give her some loose stools  Eat daily activia to help with this      supportive care measures to include:  -steam bathroom  -humidifier in room  -saline spray for nares  - infant chest rub for chest/back/soles of feet  -if over 1 yr can give 1/2 tsp of honey- try also chamomille tea, soups, encourage fluids. Remember for infants, might feed less volume and more often  -rtc if fevers of 100.4 or higher for 2 or more consecutive days, or worsens  -DO NOT GIVE BENADRYL FOR NASAL CONGESTION, DO NOT GIVE OTC COUGH AND COLD MEDICATIONS. It is however safe to give something natural like Zarbee's or Camron's for kids    Pink eye  - wash hands before and after instillation of drops  -change bed linens 24 hours after start drops  -discussed contagious nature  -return to school 24 hours after starting drops      \"Total time I spent today on this appointment is 25 minutes. This  total time included the following components: reviewing patient’s chart, reviewing results, obtaining a medical history, performing a physical examination, counseling the patient/family member/caregiver, ordering any necessary medications, tests, or procedures, documenting clinical information in the EHR, referring to or communicating with other health care professionals if needed, independently interpreting any results, and providing care coordination.     Oriented - self; Oriented - place; Oriented - time

## 2022-10-08 NOTE — BRIEF OPERATIVE NOTE - OPERATION/FINDINGS
"Pt still sleeping, writer encouraged to get up and begin moving to get stronger. Pt stated he's \"still sleeping\". Educated on need to move around today. Also informed pt that his mother also called and stated she would transfer him to the [facility], if that's something he is willing to do. Pt stated \"I'll talk about it when I wake up\".   " ct guidance. 8fr apd trocar. 60cc turbid serosanguinous. pt jose proc well.

## 2023-01-01 NOTE — ED PROVIDER NOTE - OBJECTIVE STATEMENT
86 y/o male with PMHx BPH, CAD, CKD, Hyperlipidemia, Hypertension, Pleural effusion, Spinal stenosis, SSS BIBA due to SOB. pt reports hx of fluid in lungs. pt notes COVID negative 2-3 weeks ago. pt denies fever, chills, N/V, abd pain, or any other complaints. I will START or STAY ON the medications listed below when I get home from the hospital:  None 86 y/o male with PMHx BPH, CAD, CKD, Hyperlipidemia, Hypertension, Pleural effusion, Spinal stenosis, SSS BIBA due to SOB. pt reports hx of fluid in lungs. pt notes COVID negative 2-3 weeks ago. pt denies fever, chills, N/V, abd pain, or any other complaints.    Jose Juan Behar (Son): [phone]

## 2023-04-29 NOTE — PROVIDER CONTACT NOTE (OTHER) - DATE AND TIME:
Dear: DR. BARBARA GAMBOA:  We had the pleasure of seeing your patient in the Pediatric Nephrology Clinic in [facility] on  05.02.2023.  Please find our consultation summary below.    Ricardo Bernal is a 10 year old male who presents for a  a follow up visit regarding:  Elevated blood pressures, history of anemia, thrombocytopenia and uremia.    Chief Complaint   Patient presents with   • Office Visit   CHIEF COMPLAINT:  Elevated blood pressures, history of anemia, thrombocytopenia and uremia.    Ricardo is accompanied and history obtained by: Mother.sister     History of present illness:  Original HPI    Ricardo Bernal was born on June 05, 2012, and   who was admitted 2/2014 with anemia, thrombocytopenia, elevated blood urea nitrogen   level, elevated serum creatinine level, Dark urine.      Ricardo Bernal has apparently had cough and   post-tussive emesis For at least 2 weeks. He has also apparently had the   complaint of abdominal pain, dysuria, and cough for perhaps as long as 2   months. He has been scheduled to see Dr. 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      Ricardo was seen by Dr. Yahir Cardoza on Thursday January 30, 2014, and was   started on Bactrim 5 mL by mouth twice each day and metoclopramide 1.5 mL by   mouth 4 times each day. The vomiting continued relentlessly. He was vomiting   to the point he was refusing all solid food and was vomiting any liquid which   he consumed. On Saturday February 1, 2014, he had diarrhea. He had 3 or 4   bowel movements which were described as profuse, watery, and reddish, but not   bloody on Saturday February 1, 2014, Sunday February 2, 2013, and Monday   February 3, 2014. He was seen by Dr. Cardoza on February 3, 2014, and   directed to the emergency department. In the emergency department, he has   urinalysis: Specific gravity 1.010, pH 5, large blood, 100 mg/dL of protein   moderate bacteria, 11-25 red blood  cells per high-power field, 6-10 white   blood cells per high-power field. He has a complete blood count with a   hemoglobin 6 g/dL, hematocrit 16.7%, white blood cell count 22.8 thousand   platelet count 34,000. Mean cellular volume 73.2 femtoliters, 40 polys, 5   bands, 38 lymphocytes, 9 monocytes, 0 eosinophils, 0 basophils.      Sodium 135, potassium 5.7, chloride 100, bicarbonate 25, blood urea nitrogen   level 71, serum creatinine level 0.81, calcium 8.6.      On January 11, 2014, urinalysis: Specific gravity 1.010, pH 7.5, urine   negative on dipstick and microscopic evaluation.      December 30, 2013, urinalysis: Specific gravity 1.020, pH 7.5, urine negative   on dipstick and microscopic evaluation. Urine culture greater than 100,000   colonies per mL of mixed ghassan. Stool culture negative. Ova and parasites   negative. Pinworm examination negative.      On October 15, 2013, there was a complete blood count with hemoglobin 12.4   g/dL, hematocrit 36.7%, white blood cell count 8.5 thousand, platelet count   350,000. Mean cellular volume 77.3 femtoliter, 32 polys, 0 bands, 15   lymphocytes, 6 monocytes, 3 eosinophils, 0 basophils. January 12, 2013, lead   level less than 2. Urinalysis with gravity less than 1.005, pH 7.5, urine   negative on dipstick and microscopic evaluation. Complete blood count:   Hemoglobin 11.8 g/dL, hematocrit 33.5%, white blood cell count 8.5 thousand   platelet count 252,000, mean cellular volume 78.6 femtoliter, 31 polys, 0   bands, 57 lymphocytes, 8 monocytes, 3 eosinophils, 0 basophils.      There has not been any recent fevers. There has not been any foul-smelling   urine. There has been dark urine. No definite gross hematuria. There has   been nausea and vomiting and diarrhea as noted above. Vomiting for at least      2 weeks and possibly longer. Diarrhea since Saturday February 1, 2014. There   has been abdominal pain for an indeterminate amount of time, probably at   least  23-May-2018 22:58 several weeks or even longer. There has not been any flank pain. There   has been dysuria. He is not toilet trained at this time. Cannot    urinary frequency, urinary urgency or urinary incontinence. There has been no   history of edema, bruising, bleeding. He has had diaper rash in the past.   He has had no other rash. There is no history of thrush. No history of joint   pain or joint swelling. No history of erythema. He recently has begun to   look pale. No history of cyanosis. No history of mouth sores, tremors or   palpitations. No history of headache, nosebleed, blurry vision, or dizziness.   No history of hearing problems or visual problems.         1.   Ricardo Bernal was born on 2012 and  who was admitted to [facility] on February 3, 2014, with possible hemolytic uremic syndrome.   2.   He had history of coughing and post-tussive emesis for at least 2 weeks prior to his admission to [facility].  3.  He had history of abdominal pain, dysuria, hematuria and coughing.   4.   He had history of diarrhea reportedly starting February 1, 2014, and continuing through the time of his admission.   5.   Ricardo had been diagnosed with otitis media on January 30, 2014, and started on Bactrim and metoclopramide.   6.   Ricardo's vomiting continued relentlessly and he then developed diarrhea on February 1, 2014.   7.   Ricardo had normal complete blood count as recently as October 15, 2013.   8.   Ricardo was admitted to [facility] February 3, 2014, with a hemoglobin of 6 g/dL and then he received transfusion of packed red blood cells 15 mL/kg. His hemoglobin went up to 9.8 g/dL. His hemoglobin then gradually decreased as low as 7 g/dL and 7.2 g/dL on February 6, 2014, and February 7, 2014. He received an additional transfusion of packed red blood cells on February 7, 2014. Therefore, he received a total of 2 transfusions of packed red blood cells,  one on February 3, 2014, one on February 7, 2014.   9.  Since that time, his hemoglobin level has been stable.   10.  He was admitted with thrombocytopenia. His platelet count has normalized.   11  .He did not receive any transfusions of platelets   12  He was admitted with leukocytosis, which is also resolved.   13.  Most recent white blood cell count is within normal limits.   14.  He was admitted with elevated blood urea nitrogen level of 71 mg/dL on February 3, 2014. With intravenous fluids and fresh frozen plasma, his blood urea nitrogen level has been gradually decreasing and was as low as 13 mg/dL. His blood urea nitrogen level was 13 mg/dL February 7, 2014. Since that time, blood urea nitrogen level increased up to 48 mg/dL on February 10, 2014. Furosemide discontinued, amlodipine decreased to a more reasonable dosing, and on February 11, 2014, the blood urea nitrogen level was 33 mg/dL and the serum creatinine level 0.42 mg/dL.   15.  Serum creatinine level elevated at 0.71 mg/dL at the time of admission. Serum creatinine level has normalized.   16.  Patient received multiple transfusions of fresh frozen plasma. He was receiving fresh frozen plasma from February 4, 2014, through February 6, 2014.   17.  Serum uric acid level had increased and he did receive 1 dose of rasburicase, but serum uric acid levels have been within normal limits since that time.   18.  Blood pressures became elevated. At first, he was started on nifedipine. Blood pressures remained elevated. He was then started on amlodipine. Amlodipine dose increased up to 0.2 mg/kg q.12 hours. His amlodipine dose now decreased to 0.1 mg/kg q.12 hours, which is 1.3 mg p.o./NG q.12 hours.   19  .Note that Ricardo was admitted to [facility] February 3, 2014, with low hemoglobin level of 6 g/dL, low platelet count of 39, elevated serum creatinine level, elevated blood urea nitrogen level. He also had elevated lactic  acid dehydrogenase level. He also had immeasurably low haptoglobin level. He was also noted to have schistocytes on the blood smear by the pediatric hematology service. 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   20.  Difficult to determine if this is typical hemolytic uremic syndrome associated with diarrhea or if this is atypical hemolytic uremic syndrome.   21.  Note that the course of diarrhea started February 1, 2014, which would not be consistent with being admitted with hemolytic uremic syndrome on February 3, 2014.   22.  Note that complement panel was within normal limits on admission. Also [id number] activity was within normal limits on admission. This was not consistent with atypical hemolytic uremic syndrome.   23.  Patient also noted to have normal glucose-6-phosphate dehydrogenase activity level and also been noted to have normal pyruvate kinase activity level.   24.  Gradual improvement in platelet count, gradual improvement in serum creatinine level and blood urea nitrogen level. Hemoglobin level improved with transfusions.   25.  Patient once again had increase in blood urea nitrogen level. Suspect this is related to aggressive use of furosemide and amlodipine.   26.  Blood urea nitrogen level once again decreasing.   27.   AT THE TIME OF DISCHARGE TO HOME ON 02.11.2014, BP CONTROLLED WITH AMLODIPINE 1.3 MG BID  28.  AT THE TIME OF DISCHARGE TO HOME ON FEBRUARY 11, 2013, NEHEMIAH CONTINUED TO HAVE PROTEINURIA AND HEMATURIA.  29.   NEHEMIAHWAS DISCHARGED FROM [facility] ON AMLODIPINE 1.3 MG = 1.3 ML Q 12 HOURS  30.   MOTHER UNILATERALLY DISCONTINUED THE AMLODIPINE ABOUT ONE AND ONE-HALF WEEKS PRIOR TO TODAY'S PEDIATRIC NEPHROLOGY CLINIC VISIT (NEHEMIAH LOOKED WELL)  31.   NEHEMIAH HAS SEEN DR. HAGAN REGARDING THE DYSURIA- CONTINUED TREATMENT FOR CONSTIPATION- DYSURIA HAS RESOLVED.   32.   NEHEMIAH WAS RESTARTED ON AMLODIPINE ON 04.09.2014- AMLODIPINE 1.5 ML BID = 1.5 MG BID-BLOOD PRESSURES  IMPROVED ON 05.14.2014.  33.   BLOOD PRESSURES IMPROVED ON 09.02.2014 AND THE DOSE OF AMLODIPINE IS DECREASED TO 1.0 MG BID = 1.0 ML BID   34.  NO APPARENT RECURRENCE OF HEMOLYTIC UREMIC SYNDROME  35.  AMLODIPINE DOSE DECREASED TO 1 MG Q DAY ON 06.04.2015.  36. BLOOD PRESSURES HAVE REMAINED NORMAL AS AMLODIPINE HAS BEEN TAPERED - WILL DISCONTINUE AMLODIPINE ON 09.29.2015 AND RECHECK BP IN THREE MONTHS  37.   BLOOD PRESSURES HAVE BEEN NORMAL, ANTI HYPERTENSIVE MEDICATIONS DISCONTINUED MORE THAN ONE YEAR PRIOR TO PED NEPHROLOGY CLINIC VISIT ON 01.03.2017.   38.   ON 01.16.2018, BLOOD PRESSURES NORMAL, URINE ANALYSIS NORMAL - BUT VERY DENSE AND NEHEMIAH SHOULD BE DRINKING MORE WATER , MULTIPLE JOINT PAIN, BACK PAIN REPORTED IN THE LAST ONE YEAR.        Today's Update:   Since the last Pediatric Nephrology clinic visit,  There have been no fevers,   There has been no  history of gross hematuria  There has been no history of foul smelling urine.  Urine does have slight odor present sometimes.   There has been no nausea, no vomiting, no diarrhea.  There has been no constipation.  There has been no abdominal pain, flank pain, dysuria.  There has been no urinary frequency, urinary urgency, urinary hesitancy.  There has been no daytime urinary incontinence.  There has been no night time urinary incontinence.  There has been no fecal incontinence during the daytime or the night time.  There has bee no edema  There has been no bruising or bleeding.  Rash with amoxicillin,   There has been no other rash.  There has been no thrush.  There has been no joint pain or joint swelling.  There has been no pallor, cyanosis, erythema   There has been no wheezing or shortness of breath.  There has been no mouth sore, no tremor.  There has been no palpitations.  There has been no headache, nosebleed, blurry vision, dizziness.  There has been no hearing problem.  Nehemiah is near sighted, has glasses.   Nehemiah noted to have allergy to  amoxicillin, rash and pruritis with amoxillin.       Review of Systems   Constitutional: Negative for activity change, appetite change, chills, diaphoresis, fatigue, fever, irritability and unexpected weight change.   HENT: Negative for congestion, dental problem, drooling, ear discharge, ear pain, facial swelling, hearing loss, mouth sores, nosebleeds, postnasal drip, rhinorrhea, sinus pressure, sinus pain, sneezing, sore throat, tinnitus, trouble swallowing and voice change.    Eyes: Positive for visual disturbance (near sighted ). Negative for photophobia, pain, discharge, redness and itching.   Respiratory: Negative for apnea, cough, choking, chest tightness, shortness of breath, wheezing and stridor.    Cardiovascular: Negative for chest pain, palpitations and leg swelling.   Gastrointestinal: Negative for abdominal distention, abdominal pain, anal bleeding, blood in stool, constipation, diarrhea, nausea, rectal pain and vomiting.   Endocrine: Negative for cold intolerance, heat intolerance, polydipsia, polyphagia and polyuria.   Genitourinary: Negative for decreased urine volume, difficulty urinating, dysuria, enuresis, flank pain, frequency, genital sores, hematuria and urgency.   Musculoskeletal: Negative for arthralgias, back pain, gait problem, joint swelling, myalgias, neck pain and neck stiffness.   Skin: Negative for color change, pallor, rash and wound.        Rash and itching with amoxicillin  Now considered to  Be allergic to amoxicillin    Allergic/Immunologic: Negative for environmental allergies, food allergies and immunocompromised state.   Neurological: Negative for dizziness, tremors, seizures, syncope, facial asymmetry, speech difficulty, weakness, light-headedness, numbness and headaches.   Hematological: Negative for adenopathy. Does not bruise/bleed easily.   Psychiatric/Behavioral: Negative for agitation, behavioral problems, confusion, decreased concentration, dysphoric mood,  hallucinations, self-injury, sleep disturbance and suicidal ideas. The patient is not nervous/anxious and is not hyperactive.        Current Outpatient Medications   Medication Sig Dispense Refill   • albuterol 108 (90 Base) MCG/ACT inhaler INHALE 2 PUFFS BY MOUTH EVERY 6 HOURS AS NEEDED FOR WHEEZING OR SHORTNESS OF BREATH     • Flovent HFA 44 MCG/ACT inhaler INHALE 2 PUFFS BY MOUTH WITH SPACER TWICE DAILY     • lidocaine-prilocaine (EMLA) cream Put emla cream in both antecubitals and cover with plastic wrap one hour before blood is taken 30 g 0     No current facility-administered medications for this visit.       ALLERGIES:   Allergen Reactions   • Amoxicillin RASH   amoxicillin      No past medical history on file.    No past surgical history on file.    No family history on file.   Mother healthy  Father healthy  Older sister healthy.    Paternal grandmother has high cholesterol.   Maternal aunt has high blood   pressure.    Maternal first cousin has some kind of kidney   disease, but mother is not really sure what exactly this is.           Social History  Ricardo Bernal is living in [place], [place] with mother, father, sister.  No pets in the house.  No one smoking in hte house.        Vitals:    05/02/23 1604   BP: 117/75     BSA: 1.61 meters squared  Growth percentiles: 77 %ile (Z= 0.75) based on CDC (Boys, 2-20 Years) Stature-for-age data based on Stature recorded on 5/2/2023. >99 %ile (Z= 2.46) based on CDC (Boys, 2-20 Years) weight-for-age data using vitals from 5/2/2023.   Physical Exam  Vitals and nursing note reviewed.   Constitutional:       General: He is active. He is not in acute distress.     Appearance: Normal appearance. He is well-developed. He is not toxic-appearing or diaphoretic.   HENT:      Head: Atraumatic. No signs of injury.      Right Ear: Tympanic membrane, ear canal and external ear normal. There is no impacted cerumen. Tympanic membrane is not erythematous or bulging.      Left  Ear: Tympanic membrane, ear canal and external ear normal. There is no impacted cerumen. Tympanic membrane is not erythematous or bulging.      Nose: Nose normal. No congestion or rhinorrhea.      Mouth/Throat:      Mouth: Mucous membranes are moist.      Dentition: No dental caries.      Pharynx: Oropharynx is clear. No oropharyngeal exudate or posterior oropharyngeal erythema.      Tonsils: No tonsillar exudate.   Eyes:      General:         Right eye: No discharge.         Left eye: No discharge.      Extraocular Movements: Extraocular movements intact.      Conjunctiva/sclera: Conjunctivae normal.      Pupils: Pupils are equal, round, and reactive to light.   Cardiovascular:      Rate and Rhythm: Normal rate and regular rhythm.      Pulses: Normal pulses.      Heart sounds: Normal heart sounds. No murmur heard.     No friction rub. No gallop.   Pulmonary:      Effort: Pulmonary effort is normal. No respiratory distress, nasal flaring or retractions.      Breath sounds: Normal breath sounds and air entry. No stridor or decreased air movement. No wheezing, rhonchi or rales.   Abdominal:      General: Bowel sounds are normal. There is no distension.      Palpations: Abdomen is soft. There is no mass.      Tenderness: There is no abdominal tenderness. There is no guarding or rebound.      Hernia: No hernia is present.   Genitourinary:     Penis: Normal.    Musculoskeletal:         General: No swelling, tenderness, deformity or signs of injury. Normal range of motion.      Cervical back: Normal range of motion and neck supple. No rigidity. No muscular tenderness.   Lymphadenopathy:      Cervical: No cervical adenopathy.   Skin:     General: Skin is warm and dry.      Capillary Refill: Capillary refill takes less than 2 seconds.      Coloration: Skin is not cyanotic, jaundiced or pale.      Findings: No erythema, petechiae or rash.      Comments: Acanthosis nigricans   Neurological:      General: No focal deficit  present.      Mental Status: He is alert.      Cranial Nerves: No cranial nerve deficit.      Sensory: No sensory deficit.      Motor: No weakness.      Coordination: Coordination normal.      Gait: Gait normal.      Deep Tendon Reflexes: Reflexes normal.   Psychiatric:         Mood and Affect: Mood normal.         Behavior: Behavior normal.         Lab and Imaging Results  Recent Results (from the past 4200 hour(s))   Phosphorus    Collection Time: 05/02/23  3:55 PM   Result Value Ref Range    Phosphorus 4.7 3.7 - 5.6 mg/dL   Lipid Panel With Reflex    Collection Time: 05/02/23  3:55 PM   Result Value Ref Range    Cholesterol 139 <=169 mg/dL    Triglycerides 105 (H) <=89 mg/dL    HDL 72 >=46 mg/dL    LDL 46 <=109 mg/dL    Non-HDL Cholesterol 67 <=119 mg/dL    Cholesterol/ HDL Ratio 1.9 <=4.4   Magnesium    Collection Time: 05/02/23  3:55 PM   Result Value Ref Range    Magnesium 2.1 1.7 - 2.4 mg/dL   Urinalysis With Microscopy Exam W/O C/S    Collection Time: 05/02/23  3:55 PM   Result Value Ref Range    COLOR, URINALYSIS Straw     APPEARANCE, URINALYSIS Clear     GLUCOSE, URINALYSIS Negative Negative mg/dL    BILIRUBIN, URINALYSIS Negative Negative    KETONES, URINALYSIS Negative Negative mg/dL    SPECIFIC GRAVITY, URINALYSIS 1.023 1.005 - 1.030    OCCULT BLOOD, URINALYSIS Negative Negative    PH, URINALYSIS 6.5 5.0 - 7.0    PROTEIN, URINALYSIS Negative Negative mg/dL    UROBILINOGEN, URINALYSIS 0.2 0.2, 1.0 mg/dL    NITRITE, URINALYSIS Negative Negative    LEUKOCYTE ESTERASE, URINALYSIS Negative Negative    SQUAMOUS EPITHELIAL, URINALYSIS 1 to 5 None Seen, 1 to 5 /hpf    ERYTHROCYTES, URINALYSIS 1 to 2 None Seen, 1 to 2 /hpf    LEUKOCYTES, URINALYSIS 1 to 5 None Seen, 1 to 5 /hpf    BACTERIA, URINALYSIS Few (A) None Seen /hpf    HYALINE CASTS, URINALYSIS None Seen None Seen, 1 to 5 /lpf    MUCUS Present    Protein, Total, Urine    Collection Time: 05/02/23  3:55 PM   Result Value Ref Range    Protein, Urine 17 (H)  <12 mg/dL   Creatinine, Urine    Collection Time: 05/02/23  3:55 PM   Result Value Ref Range    Creatinine, Urine 135.00 mg/dL   CBC with Automated Differential (performable only)    Collection Time: 05/02/23  3:55 PM   Result Value Ref Range    WBC 11.0 4.2 - 13.5 K/mcL    RBC 4.50 3.90 - 5.30 mil/mcL    HGB 12.2 11.5 - 15.5 g/dL    HCT 36.9 35.0 - 45.0 %    MCV 82.0 77.0 - 95.0 fl    MCH 27.1 25.0 - 33.0 pg    MCHC 33.1 31.0 - 37.0 g/dL    RDW-CV 13.8 11.0 - 15.0 %    RDW-SD 40.7 35.0 - 47.0 fL     140 - 450 K/mcL    NRBC 0 <=0 /100 WBC    Neutrophil, Percent 65 %    Lymphocytes, Percent 26 %    Mono, Percent 5 %    Eosinophils, Percent 2 %    Basophils, Percent 1 %    Immature Granulocytes 1 %    Absolute Neutrophils 7.3 1.8 - 8.0 K/mcL    Absolute Lymphocytes 2.8 1.5 - 6.5 K/mcL    Absolute Monocytes 0.6 0.0 - 0.8 K/mcL    Absolute Eosinophils  0.2 0.0 - 0.5 K/mcL    Absolute Basophils 0.1 0.0 - 0.2 K/mcL    Absolute Immature Granulocytes 0.1 0.0 - 0.2 K/mcL       Assessment and Plan:  Ricardo was seen today for office visit.    Diagnoses and all orders for this visit:    Elevated blood-pressure reading without diagnosis of hypertension  -     CBC with Automated Differential; Future  -     Cystatin C; Future  -     Phosphorus; Future  -     Glycohemoglobin; Future  -     Lipid Panel With Reflex; Future  -     Magnesium; Future  -     Urinalysis With Microscopy Exam W/O C/S; Future  -     Microalbumin Urine Random; Future  -     Protein, Total, Urine; Future  -     Protein, Total, Urine; Future  -     Creatinine, Urine; Future     ASSESSMENT:  1.   Ricardo Bernal was born on 2012 and  who was admitted to [facility] on February 3, 2014, with possible hemolytic uremic syndrome.   2.   He had history of coughing and post-tussive emesis for at least 2 weeks prior to his admission to [facility].  3.  He had history of abdominal pain, dysuria, hematuria and coughing.   4.    He had history of diarrhea reportedly starting February 1, 2014, and continuing through the time of his admission.   5.   Ricardo had been diagnosed with otitis media on January 30, 2014, and started on Bactrim and metoclopramide.   6.   Ricardo's vomiting continued relentlessly and he then developed diarrhea on February 1, 2014.   7.   Ricardo had normal complete blood count as recently as October 15, 2013.   8.   Ricardo was admitted to [facility] February 3, 2014, with a hemoglobin of 6 g/dL and then he received transfusion of packed red blood cells 15 mL/kg. His hemoglobin went up to 9.8 g/dL. His hemoglobin then gradually decreased as low as 7 g/dL and 7.2 g/dL on February 6, 2014, and February 7, 2014. He received an additional transfusion of packed red blood cells on February 7, 2014. Therefore, he received a total of 2 transfusions of packed red blood cells, one on February 3, 2014, one on February 7, 2014.   9.  Since that time, his hemoglobin level has been stable.   10.  He was admitted with thrombocytopenia. His platelet count has normalized.   11  .He did not receive any transfusions of platelets   12  He was admitted with leukocytosis, which is also resolved.   13.  Most recent white blood cell count is within normal limits.   14.  He was admitted with elevated blood urea nitrogen level of 71 mg/dL on February 3, 2014. With intravenous fluids and fresh frozen plasma, his blood urea nitrogen level has been gradually decreasing and was as low as 13 mg/dL. His blood urea nitrogen level was 13 mg/dL February 7, 2014. Since that time, blood urea nitrogen level increased up to 48 mg/dL on February 10, 2014. Furosemide discontinued, amlodipine decreased to a more reasonable dosing, and on February 11, 2014, the blood urea nitrogen level was 33 mg/dL and the serum creatinine level 0.42 mg/dL.   15.  Serum creatinine level elevated at 0.71 mg/dL at the time of admission. Serum  creatinine level has normalized.   16.  Patient received multiple transfusions of fresh frozen plasma. He was receiving fresh frozen plasma from February 4, 2014, through February 6, 2014.   17.  Serum uric acid level had increased and he did receive 1 dose of rasburicase, but serum uric acid levels have been within normal limits since that time.   18.  Blood pressures became elevated. At first, he was started on nifedipine. Blood pressures remained elevated. He was then started on amlodipine. Amlodipine dose increased up to 0.2 mg/kg q.12 hours. His amlodipine dose now decreased to 0.1 mg/kg q.12 hours, which is 1.3 mg p.o./NG q.12 hours.   19  .Note that Ricardo was admitted to [facility] February 3, 2014, with low hemoglobin level of 6 g/dL, low platelet count of 39, elevated serum creatinine level, elevated blood urea nitrogen level. He also had elevated lactic acid dehydrogenase level. He also had immeasurably low haptoglobin level. He was also noted to have schistocytes on the blood smear by the pediatric hematology service. 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   20.  Difficult to determine if this is typical hemolytic uremic syndrome associated with diarrhea or if this is atypical hemolytic uremic syndrome.   21.  Note that the course of diarrhea started February 1, 2014, which would not be consistent with being admitted with hemolytic uremic syndrome on February 3, 2014.   22.  Note that complement panel was within normal limits on admission. Also [id number] activity was within normal limits on admission. This was not consistent with atypical hemolytic uremic syndrome.   23.  Patient also noted to have normal glucose-6-phosphate dehydrogenase activity level and also been noted to have normal pyruvate kinase activity level.   24.  Gradual improvement in platelet count, gradual improvement in serum creatinine level and blood urea nitrogen  level. Hemoglobin level improved with transfusions.   25.  Patient once again had increase in blood urea nitrogen level. Suspect this is related to aggressive use of furosemide and amlodipine.   26.  Blood urea nitrogen level once again decreasing.   27.   AT THE TIME OF DISCHARGE TO HOME ON 02.11.2014, BP CONTROLLED WITH AMLODIPINE 1.3 MG BID  28.  AT THE TIME OF DISCHARGE TO HOME ON FEBRUARY 11, 2013, NEHEMIAH CONTINUED TO HAVE PROTEINURIA AND HEMATURIA.  29.   NEHEMIAHWAS DISCHARGED FROM [facility] ON AMLODIPINE 1.3 MG = 1.3 ML Q 12 HOURS  30.   MOTHER UNILATERALLY DISCONTINUED THE AMLODIPINE ABOUT ONE AND ONE-HALF WEEKS PRIOR TO TODAY'S PEDIATRIC NEPHROLOGY CLINIC VISIT (NEHEMIAH LOOKED WELL)  31.   NEHEMIAH HAS SEEN DR. HAGAN REGARDING THE DYSURIA- CONTINUED TREATMENT FOR CONSTIPATION- DYSURIA HAS RESOLVED.   32.   NEHEMIAH WAS RESTARTED ON AMLODIPINE ON 04.09.2014- AMLODIPINE 1.5 ML BID = 1.5 MG BID-BLOOD PRESSURES IMPROVED ON 05.14.2014.  33.   BLOOD PRESSURES IMPROVED ON 09.02.2014 AND THE DOSE OF AMLODIPINE IS DECREASED TO 1.0 MG BID = 1.0 ML BID   34.  NO APPARENT RECURRENCE OF HEMOLYTIC UREMIC SYNDROME  35.  AMLODIPINE DOSE DECREASED TO 1 MG Q DAY ON 06.04.2015.  36. BLOOD PRESSURES HAVE REMAINED NORMAL AS AMLODIPINE HAS BEEN TAPERED - WILL DISCONTINUE AMLODIPINE ON 09.29.2015 AND RECHECK BP IN THREE MONTHS  37.   BLOOD PRESSURES HAVE BEEN NORMAL, ANTI HYPERTENSIVE MEDICATIONS DISCONTINUED MORE THAN ONE YEAR PRIOR TO PED NEPHROLOGY CLINIC VISIT ON 01.03.2017.   38.   ON 01.16.2018, BLOOD PRESSURES NORMAL, URINE ANALYSIS NORMAL - BUT VERY DENSE AND NEHEMIAH SHOULD BE DRINKING MORE WATER , MULTIPLE JOINT PAIN, BACK PAIN REPORTED IN THE LAST ONE YEAR.   39.  01.10.2019  There has been no recent joint pain, there has been no recent back pain,  There has been occasional abdominal pain.   Blood pressures remain within normal limits.  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  40.  Lab Results   Component  Value Date    5COL Yellow 05/03/2022    5UAPP Clear 05/03/2022    5UGLU Negative 05/03/2022    5UBILI Negative 05/03/2022    5UKET Negative 05/03/2022    5USPG  05/03/2022      Comment:      1.029    5URBC Trace (A) 05/03/2022    5UPH 5.0 05/03/2022    5UPROT Trace (A) 05/03/2022    5UURP  05/03/2022      Comment:      NEG    POCTUNITR Negative 05/03/2022    5UWBC Negative 05/03/2022       Urine analysis 01.10.2019  Urine sp gravity 1.024  Urine pH  5  Urine negative on dipstick examination  Urine negative on microscopic examination of the urine sediment    41.  Labs from 01.10.2019  Lytes normal, BUN  12 mg/dl, creatinine 0.59 mg/dl, calcium 9.7 mg/dl  CBC WBC  9.2 K,  hgb  12.9 gm/dl, hct  37.5 %, plt  400 K  ALL LABS NORMAL  Follow up in one year or earlier if necessary  42.  01.09.2020  Lab Results   Component Value Date    5COL Yellow 05/03/2022    5UAPP Clear 05/03/2022    5UGLU Negative 05/03/2022    5UBILI Negative 05/03/2022    5UKET Negative 05/03/2022    5USPG  05/03/2022      Comment:      1.029    5URBC Trace (A) 05/03/2022    5UPH 5.0 05/03/2022    5UPROT Trace (A) 05/03/2022    5UURP  05/03/2022      Comment:      NEG    POCTUNITR Negative 05/03/2022    5UWBC Negative 05/03/2022       Urine analysis   01.09.2020  Urine sp gravity   1.025  Urine pH  6  Urine trace protein, physiological in this highly concentrated urine specimen, otherwise, negative on dipstick examination  Urine negative on microscopic examination of the urine sediment  43.  01.09.2020  On 01.09.2020,  Ricardo has normal urine analysis and normal blood pressures.  Will check CBC AND BMP  44.  01.09.2020  LABS REVIEWED   SODIUM 138, POTASSIUM 4.3, CHLORIDE  107, BICARBONATE 26,  BUN 18 MG/DL, CREATININE  0.52 MG/DL  CALCIUM 9.9 MG/DL  CBC  WBC  12.8 K, HGB  12.8 GM/DL, HCT  37.9 %, PLT  356 K  LABS NORMAL   45.  05.04.2021  Lab Results   Component Value Date    5COL Yellow 05/03/2022    5UAPP Clear 05/03/2022    5UGLU Negative  05/03/2022    5UBILI Negative 05/03/2022    5UKET Negative 05/03/2022    5USPG  05/03/2022      Comment:      1.029    5URBC Trace (A) 05/03/2022    5UPH 5.0 05/03/2022    5UPROT Trace (A) 05/03/2022    5UURP  05/03/2022      Comment:      NEG    POCTUNITR Negative 05/03/2022    5UWBC Negative 05/03/2022     05.04.2021  Urine analysis   05.04.2021  Urine sp gravity  1.014  Urine pH  6  Urine negative on dipstick examination  Urine negative on microscopic examination of the urine sediment  46.  05.10.2021  Labs  Sodium 137, potassium  4.1, chloride 107, bicarbonate 26,  BUN  12 mg/dl, creatinine  0.65 mg/dl, calcium 9.4 mg/dl,   Cholesterol 149 mg/dl   Hgb  A1c  5.1 %   CBC  WBC   7.9 K, HGB  12.6 GM/DL, HCT  38.1 %,  K  Hgb  A1c   5.1 %     LABS NORMAL  FOLLOW UP IN ONE YEAR OR EARLIER IF NECESSARY  47.  05.03.2022  Lab Results   Component Value Date    5COL Yellow 05/03/2022    5UAPP Clear 05/03/2022    5UGLU Negative 05/03/2022    5UBILI Negative 05/03/2022    5UKET Negative 05/03/2022    5USPG  05/03/2022      Comment:      1.029    5URBC Trace (A) 05/03/2022    5UPH 5.0 05/03/2022    5UPROT Trace (A) 05/03/2022    5UURP  05/03/2022      Comment:      NEG    POCTUNITR Negative 05/03/2022    5UWBC Negative 05/03/2022     05.03.2022  Urine analysis  05.03.2022  Urine sp gravity  1.029  Urine pH  5  Urine trace protein (physiological), trace blood, otherwise, negative on dipstick examination  Urine  1- 2 eumorphic RBC per HPF, otherwise, negative on microscopic examination of the urine sediment  48.  05.03.2022  Blood pressures were  115 - 120 / 58 - 59 mm  Hg on 05.03.2022 .  49.  05.03.2022  Ricardo has had no symptoms or signs referable to kidneys or urinary tract from the last   Pediatric   Nephrology clinic visit on   05.04.2021   to the current   Pediatric   Nephrology clinic visit on 05.03.2022.  50.  05.03.2022  Labs 05.03.2022  Sodium 136, potassium 4.0, chloride 106, bicarbonate 25,  BUN 16 mg/dl,  creatinine  0.60 mg/dl, calcium 9.6 mg/dl,    total protein 7.6 gm/dl, albumin  3.9 gm/dl  Cholesterol 147 mg/dl, triglycerides  79 mg/dl,  HDL cholesterol 64 mg/dl,  LDL cholesterol 67 mg/dl,  Cholesterol / HDL cholesterol  2.3   CBC  WBC 9.8  K, HGB  12.3 GM/DL, HCT  36.6 %,  K  Hgb  A1c  5.1 %   Labs within acceptable limits   51.  05.02.2023  Blood pressures were  117 - 123 / 71 - 75 mm  Hg on 05.02.2023 .  52.  05.02.2023  Sodium 138, potassium 4.2, chloride 109, bicarbonate 25,  BUN 12 mg/dl, creatinine 0.70  mg/dl, calcium  9.4 mg/dl,   magnesium 2.1 mg/dl, phosphorus 4.7 mg/dl,   total protein  7.4 gm/dl, albumin 3.8  gm/dl  Cystatin   C 0.82 mg/liter   Cholesterol 139 mg/dl, triglycerides 105 mg/dl,  HDL cholesterol 72 mg/dl,  LDL cholesterol 46 mg/dl,  Cholesterol / HDL cholesterol  1.9  CBC  WBC  11  K, HGB  12.2 GM/DL, HCT  36.9 %,  K  Hgb  A1c  4.8 % normal   Urine analysis  05.02.2023  Urine sp gravity  1.023  Urine pH  6.5  Urine negative on dipstick examination  Urine few bacteria, otherwise, negative on microscopic examination of the urine sediment  Urine protein 17 mg/dl  Urine creatinine  135.00 mg/dl  Urine protein to creatinine ratio  0.126 normal   Urine microalbumin  1.54 mg/dl  Urine creatinine  139.00 mg/dl  Urine microalbumin to creatinine ratio  11.1 mg/gram =  0.011 mg/mg normal   Estimated   GFR   Creatinine  87  Estimated   GFR   Cystatin  C  85  Estimated   GFR   Creatinine and  Cystatin  C 91    Labs normal   Follow up in one year          RECOMMENDATIONS:   1.  Check CBC, CMP, MAGNESIUM PHOSPHORUS, CYSTATIN C, LIPID PANEL, GLYCOHEMOGLOBIN, URINE PROTEIN, URINE CREATININE, URINE MICROALBUMIN, URINE ANALYSIS   2.  LOW SODIUM DIET / AVOID CONCENTRATED SWEETS   3.  FOLLOW UP IN ONE YEAR R EARLIER IF NECESSARY      I reviewed the above recommendations with patient/family who expressed understanding and agreed with this plan.    Thank you very much for allowing me to  participate in the care of this patient. If you have any questions, please do not hesitate to contact me.     Mark Subramanian MD  Pediatric Nephrology  [facility]/ [facility]

## 2023-05-16 NOTE — CONSULT NOTE ADULT - PROVIDER SPECIALTY LIST ADULT
Cardiology
Infectious Disease
Nephrology
Pulmonology
Requested Prescriptions     Pending Prescriptions Disp Refills    glimepiride (Amaryl) 4 mg tablet 90 tablet 0     Sig: Take 1 tablet (4 mg) by mouth 2 times a day.    metFORMIN (Glucophage) 1,000 mg tablet 90 tablet 0     Sig: Take 1 tablet (1,000 mg) by mouth 2 times a day.    atorvastatin (Lipitor) 10 mg tablet 90 tablet 0     Sig: Take 1 tablet (10 mg) by mouth once daily.    lisinopril 5 mg tablet 90 tablet 0     Sig: Take 1 tablet (5 mg) by mouth once daily.       
Thoracic Surgery

## 2024-06-26 NOTE — ED ADULT NURSE NOTE - CAS EDP DISCH DISPOSITION ADMI
Today's Recommendations    Your LDL cholesterol is still slightly above the goal, I would like you to increase your rosuvastatin to 20mg daily   Stay on your flecanide and metoprolol   Continue all other medications without changes.  Please follow up with Dr. Gonzalez in 6 months.    Please send a [employer] message or call [phone] to the RN team with questions or concerns.     Scheduling number [phone]  MICHAEL Martin, CNP   
Telemetry

## 2024-11-08 NOTE — BRIEF OPERATIVE NOTE - NSICDXBRIEFPROCEDURE_GEN_ALL_CORE_FT
Last Appointment:  5/24/2024  Future Appointments   Date Time Provider Department Center   11/25/2024 10:40 AM Pablo Kumar MD [place] [facility] DEP   12/9/2024  3:30 PM Eladio Rivas PA [place]      1 pill left  
PROCEDURES:  VATS, with pleural biopsy 19-Mar-2020 15:31:30 Right VATS evacuation of clots, pleural biopsies and placement of stevie drain Isabel Garcia E

## 2024-11-26 NOTE — ED ADULT NURSE REASSESSMENT NOTE - NS ED NURSE REASSESS COMMENT FT1
Airway       Patient location during procedure: OR       Procedure Start/Stop Times: 11/26/2024 10:05 AM  Staff -        Anesthesiologist:  Dean Acevedo MD       CRNA: Bety Araya APRN CRNA       Performed By: CRNA and anesthesiologist  Consent for Airway        Urgency: elective  Indications and Patient Condition       Indications for airway management: marci-procedural       Induction type:intravenous       Mask difficulty assessment: 1 - vent by mask    Final Airway Details       Final airway type: endotracheal airway       Successful airway: ETT - single  Endotracheal Airway Details        ETT size (mm): 7.5       Successful intubation technique: direct laryngoscopy       DL Blade Type: Marks 2       Grade View of Cords: 1       Adjucts: stylet       Position: Right       Measured from: lips       Secured at (cm): 21       Bite block used: None    Post intubation assessment        Placement verified by: capnometry, equal breath sounds and chest rise        Number of attempts at approach: 1       Number of other approaches attempted: 0       Secured with: tape       Ease of procedure: easy       Dentition: Intact and Unchanged       Dental guard used and removed. Dental Guard Type: Standard White.    Medication(s) Administered   Medication Administration Time: 11/26/2024 10:05 AM         Care of pt received from Oriana Meade RN, pt updated in regards to admission process and plan of care at this time. Pt verbalized understanding of information at this time. Pt awaiting hospitalist evaluation and admission orders at this time. Will continue to monitor.

## 2025-03-19 NOTE — PATIENT PROFILE ADULT. - FUNCTIONAL SCREEN CURRENT LEVEL: DRESSING, MLM
----- Message from Devon Johnson PA-C sent at 3/18/2025  6:08 PM EDT -----  Please let him know I spoke with Dr. Combs and he recommends that he continues on both aspirin and Plavix at this time. He should reach out with any bleeding issues or concerns.  
Spoke with pt relayed Devon GRANDA-C response, pt verbally understood.   
(2) assistive person

## 2025-07-03 NOTE — H&P ADULT - ASSESSMENT
86 yo male with PMH of CAD, HTN, HLD, BPH, SSS s/p PPM, CKD stage III, h/o SVT, h/o right pleural effusions and PTX presents to ED with complaint of SOB and right sided chest pain. Pt states he woke up this morning with dyspnea on exertion. No orthopnea or PND. States he did have right sided chest pain that wrapped around to right back this morning which has now resolved after receiving morphine. He had a recent outpatient CT 2 weeks ago which he states showed a little fluid on right side but no PTX. Denies fevers, chills, headaches, palpitations, abd pain, N/V, diarrhea, dizziness.    *SOB secondary to right hydropneumothorax  - admit to tele for pulse ox monitoring  - CT Sx consult  - IR consult for chest tube placement  - pulm consult  - O2 supplementation    *CKD stage 3  - baseline Cr 1.8  - monitor renal panel  - avoid nephrotoxic medications    *h/o CAD  - continue ASA, BB, statin, ranexa    *SSS s/p PPM  - schedule for outpatient PPM interrogation next month    *HTN  - continue home meds    *HLD  - continue statin  - check lipids    *DVT prophylaxis  - heparin sq (will hold morning dose prior to chest tube placement)
Mom (legal guardian)